# Patient Record
Sex: MALE | Race: OTHER | Employment: OTHER
[De-identification: names, ages, dates, MRNs, and addresses within clinical notes are randomized per-mention and may not be internally consistent; named-entity substitution may affect disease eponyms.]

---

## 2017-01-23 ENCOUNTER — OFFICE VISIT (OUTPATIENT)
Dept: FAMILY MEDICINE CLINIC | Facility: CLINIC | Age: 57
End: 2017-01-23

## 2017-01-23 VITALS
DIASTOLIC BLOOD PRESSURE: 91 MMHG | SYSTOLIC BLOOD PRESSURE: 141 MMHG | HEART RATE: 81 BPM | TEMPERATURE: 99.2 F | BODY MASS INDEX: 29.71 KG/M2 | WEIGHT: 200.6 LBS | HEIGHT: 69 IN

## 2017-01-23 DIAGNOSIS — G62.9 NEUROPATHY: ICD-10-CM

## 2017-01-23 DIAGNOSIS — E11.49 OTHER DIABETIC NEUROLOGICAL COMPLICATION ASSOCIATED WITH TYPE 2 DIABETES MELLITUS (HCC): Primary | ICD-10-CM

## 2017-01-23 DIAGNOSIS — R10.9 ABDOMINAL PAIN, UNSPECIFIED LOCATION: ICD-10-CM

## 2017-01-23 PROCEDURE — 99213 OFFICE O/P EST LOW 20 MIN: CPT | Performed by: FAMILY MEDICINE

## 2017-01-23 RX ORDER — GABAPENTIN 100 MG/1
100 CAPSULE ORAL NIGHTLY PRN
Qty: 90 CAPSULE | Refills: 3 | Status: SHIPPED | OUTPATIENT
Start: 2017-01-23 | End: 2017-11-20 | Stop reason: ALTCHOICE

## 2017-01-23 ASSESSMENT — ENCOUNTER SYMPTOMS
VOMITING: 0
CONSTIPATION: 0
SHORTNESS OF BREATH: 0
ABDOMINAL PAIN: 1
RHINORRHEA: 0
DIARRHEA: 0
COUGH: 0
NAUSEA: 0

## 2017-04-21 ENCOUNTER — OFFICE VISIT (OUTPATIENT)
Dept: FAMILY MEDICINE CLINIC | Age: 57
End: 2017-04-21
Payer: COMMERCIAL

## 2017-04-21 VITALS
WEIGHT: 200.8 LBS | BODY MASS INDEX: 29.74 KG/M2 | SYSTOLIC BLOOD PRESSURE: 153 MMHG | HEART RATE: 78 BPM | TEMPERATURE: 97.3 F | HEIGHT: 69 IN | DIASTOLIC BLOOD PRESSURE: 91 MMHG

## 2017-04-21 DIAGNOSIS — Z00.00 HEALTHCARE MAINTENANCE: ICD-10-CM

## 2017-04-21 DIAGNOSIS — Z79.4 TYPE 2 DIABETES MELLITUS WITHOUT COMPLICATION, WITH LONG-TERM CURRENT USE OF INSULIN (HCC): Primary | Chronic | ICD-10-CM

## 2017-04-21 DIAGNOSIS — E11.9 TYPE 2 DIABETES MELLITUS WITHOUT COMPLICATION, WITH LONG-TERM CURRENT USE OF INSULIN (HCC): Primary | Chronic | ICD-10-CM

## 2017-04-21 LAB
CREATININE URINE POCT: 300
MICROALBUMIN/CREAT 24H UR: 80 MG/G{CREAT}
MICROALBUMIN/CREAT UR-RTO: ABNORMAL

## 2017-04-21 PROCEDURE — 82044 UR ALBUMIN SEMIQUANTITATIVE: CPT | Performed by: HOSPITALIST

## 2017-04-21 PROCEDURE — 99213 OFFICE O/P EST LOW 20 MIN: CPT | Performed by: HOSPITALIST

## 2017-04-21 ASSESSMENT — ENCOUNTER SYMPTOMS
APNEA: 0
COUGH: 0
ABDOMINAL PAIN: 0
SHORTNESS OF BREATH: 0
DIARRHEA: 0
ABDOMINAL DISTENTION: 0
WHEEZING: 0
CONSTIPATION: 0

## 2017-06-15 ENCOUNTER — TELEPHONE (OUTPATIENT)
Dept: FAMILY MEDICINE CLINIC | Age: 57
End: 2017-06-15

## 2017-07-06 ENCOUNTER — OFFICE VISIT (OUTPATIENT)
Dept: FAMILY MEDICINE CLINIC | Age: 57
End: 2017-07-06
Payer: COMMERCIAL

## 2017-07-06 VITALS
HEART RATE: 85 BPM | TEMPERATURE: 98.3 F | WEIGHT: 199 LBS | BODY MASS INDEX: 29.47 KG/M2 | SYSTOLIC BLOOD PRESSURE: 145 MMHG | DIASTOLIC BLOOD PRESSURE: 93 MMHG | HEIGHT: 69 IN

## 2017-07-06 DIAGNOSIS — Z79.4 TYPE 2 DIABETES MELLITUS WITHOUT COMPLICATION, WITH LONG-TERM CURRENT USE OF INSULIN (HCC): Primary | Chronic | ICD-10-CM

## 2017-07-06 DIAGNOSIS — E11.9 TYPE 2 DIABETES MELLITUS WITHOUT COMPLICATION, WITH LONG-TERM CURRENT USE OF INSULIN (HCC): Primary | Chronic | ICD-10-CM

## 2017-07-06 DIAGNOSIS — I10 ESSENTIAL HYPERTENSION: ICD-10-CM

## 2017-07-06 LAB — HBA1C MFR BLD: 5.4 %

## 2017-07-06 PROCEDURE — 83036 HEMOGLOBIN GLYCOSYLATED A1C: CPT | Performed by: FAMILY MEDICINE

## 2017-07-06 PROCEDURE — 99213 OFFICE O/P EST LOW 20 MIN: CPT | Performed by: FAMILY MEDICINE

## 2017-07-06 RX ORDER — LISINOPRIL 30 MG/1
30 TABLET ORAL DAILY
Qty: 30 TABLET | Refills: 2 | Status: SHIPPED | OUTPATIENT
Start: 2017-07-06 | End: 2017-11-20 | Stop reason: SDUPTHER

## 2017-07-06 ASSESSMENT — ENCOUNTER SYMPTOMS
SHORTNESS OF BREATH: 0
ABDOMINAL PAIN: 1
COUGH: 0

## 2017-07-22 ENCOUNTER — HOSPITAL ENCOUNTER (EMERGENCY)
Age: 57
Discharge: HOME | End: 2017-07-22
Attending: EMERGENCY MEDICINE

## 2017-07-22 VITALS
SYSTOLIC BLOOD PRESSURE: 163 MMHG | BODY MASS INDEX: 29.03 KG/M2 | RESPIRATION RATE: 18 BRPM | DIASTOLIC BLOOD PRESSURE: 11 MMHG | HEART RATE: 107 BPM | WEIGHT: 196 LBS | HEIGHT: 69 IN | OXYGEN SATURATION: 100 % | TEMPERATURE: 99 F

## 2017-07-22 DIAGNOSIS — M54.50 CHRONIC RIGHT-SIDED LOW BACK PAIN WITHOUT SCIATICA: Primary | ICD-10-CM

## 2017-07-22 DIAGNOSIS — G89.29 CHRONIC RIGHT-SIDED LOW BACK PAIN WITHOUT SCIATICA: Primary | ICD-10-CM

## 2017-07-22 PROCEDURE — 6370000000 HC RX 637 (ALT 250 FOR IP): Performed by: EMERGENCY MEDICINE

## 2017-07-22 PROCEDURE — 99283 EMERGENCY DEPT VISIT LOW MDM: CPT

## 2017-07-22 PROCEDURE — 6360000002 HC RX W HCPCS: Performed by: EMERGENCY MEDICINE

## 2017-07-22 PROCEDURE — 96372 THER/PROPH/DIAG INJ SC/IM: CPT

## 2017-07-22 RX ORDER — NAPROXEN 250 MG/1
500 TABLET ORAL ONCE
Status: COMPLETED | OUTPATIENT
Start: 2017-07-22 | End: 2017-07-22

## 2017-07-22 RX ORDER — NAPROXEN 500 MG/1
500 TABLET ORAL 2 TIMES DAILY PRN
Qty: 15 TABLET | Refills: 0 | Status: ON HOLD | OUTPATIENT
Start: 2017-07-22 | End: 2020-03-15 | Stop reason: ALTCHOICE

## 2017-07-22 RX ORDER — CYCLOBENZAPRINE HCL 10 MG
10 TABLET ORAL 3 TIMES DAILY PRN
Qty: 15 TABLET | Refills: 0 | Status: SHIPPED | OUTPATIENT
Start: 2017-07-22 | End: 2017-08-01

## 2017-07-22 RX ORDER — ORPHENADRINE CITRATE 30 MG/ML
60 INJECTION INTRAMUSCULAR; INTRAVENOUS ONCE
Status: COMPLETED | OUTPATIENT
Start: 2017-07-22 | End: 2017-07-22

## 2017-07-22 RX ADMIN — NAPROXEN 500 MG: 250 TABLET ORAL at 21:02

## 2017-07-22 RX ADMIN — ORPHENADRINE CITRATE 60 MG: 30 INJECTION INTRAMUSCULAR; INTRAVENOUS at 21:02

## 2017-07-22 ASSESSMENT — PAIN DESCRIPTION - PROGRESSION: CLINICAL_PROGRESSION: GRADUALLY WORSENING

## 2017-07-22 ASSESSMENT — ENCOUNTER SYMPTOMS
ABDOMINAL PAIN: 0
COLOR CHANGE: 0
BACK PAIN: 1
VOMITING: 0
EYE PAIN: 0
NAUSEA: 0
COUGH: 0

## 2017-07-22 ASSESSMENT — PAIN DESCRIPTION - ONSET: ONSET: ON-GOING

## 2017-07-22 ASSESSMENT — PAIN DESCRIPTION - ORIENTATION: ORIENTATION: RIGHT

## 2017-07-22 ASSESSMENT — PAIN DESCRIPTION - DESCRIPTORS: DESCRIPTORS: SHARP;SHOOTING

## 2017-07-22 ASSESSMENT — PAIN DESCRIPTION - LOCATION: LOCATION: BACK;LEG

## 2017-07-22 ASSESSMENT — PAIN DESCRIPTION - FREQUENCY: FREQUENCY: CONTINUOUS

## 2017-07-22 ASSESSMENT — PAIN SCALES - GENERAL: PAINLEVEL_OUTOF10: 8

## 2017-07-22 ASSESSMENT — PAIN DESCRIPTION - PAIN TYPE: TYPE: ACUTE PAIN

## 2017-11-20 ENCOUNTER — OFFICE VISIT (OUTPATIENT)
Dept: FAMILY MEDICINE CLINIC | Age: 57
End: 2017-11-20
Payer: COMMERCIAL

## 2017-11-20 VITALS
TEMPERATURE: 98.6 F | DIASTOLIC BLOOD PRESSURE: 88 MMHG | WEIGHT: 215.8 LBS | SYSTOLIC BLOOD PRESSURE: 146 MMHG | BODY MASS INDEX: 31.96 KG/M2 | HEART RATE: 88 BPM | HEIGHT: 69 IN

## 2017-11-20 DIAGNOSIS — Z23 NEED FOR VACCINATION: ICD-10-CM

## 2017-11-20 DIAGNOSIS — Z79.4 TYPE 2 DIABETES MELLITUS WITHOUT COMPLICATION, WITH LONG-TERM CURRENT USE OF INSULIN (HCC): Chronic | ICD-10-CM

## 2017-11-20 DIAGNOSIS — E11.42 DIABETIC POLYNEUROPATHY ASSOCIATED WITH TYPE 2 DIABETES MELLITUS (HCC): Primary | ICD-10-CM

## 2017-11-20 DIAGNOSIS — I10 ESSENTIAL HYPERTENSION: ICD-10-CM

## 2017-11-20 DIAGNOSIS — E11.9 TYPE 2 DIABETES MELLITUS WITHOUT COMPLICATION, WITH LONG-TERM CURRENT USE OF INSULIN (HCC): Chronic | ICD-10-CM

## 2017-11-20 PROCEDURE — 99213 OFFICE O/P EST LOW 20 MIN: CPT | Performed by: STUDENT IN AN ORGANIZED HEALTH CARE EDUCATION/TRAINING PROGRAM

## 2017-11-20 PROCEDURE — 90688 IIV4 VACCINE SPLT 0.5 ML IM: CPT | Performed by: FAMILY MEDICINE

## 2017-11-20 PROCEDURE — 90471 IMMUNIZATION ADMIN: CPT | Performed by: FAMILY MEDICINE

## 2017-11-20 RX ORDER — INSULIN GLARGINE 100 [IU]/ML
35 INJECTION, SOLUTION SUBCUTANEOUS 2 TIMES DAILY
Qty: 21 ML | Refills: 3 | Status: SHIPPED | OUTPATIENT
Start: 2017-11-20 | End: 2018-01-02 | Stop reason: SDUPTHER

## 2017-11-20 RX ORDER — PREGABALIN 25 MG/1
25 CAPSULE ORAL 3 TIMES DAILY
Qty: 60 CAPSULE | Refills: 2 | Status: SHIPPED | OUTPATIENT
Start: 2017-11-20 | End: 2017-11-20 | Stop reason: CLARIF

## 2017-11-20 RX ORDER — ASPIRIN 81 MG/1
81 TABLET ORAL DAILY
Qty: 90 TABLET | Refills: 3 | Status: SHIPPED | OUTPATIENT
Start: 2017-11-20 | End: 2021-08-19

## 2017-11-20 RX ORDER — GABAPENTIN 300 MG/1
300 CAPSULE ORAL 3 TIMES DAILY
Qty: 90 CAPSULE | Refills: 2 | Status: SHIPPED | OUTPATIENT
Start: 2017-11-20 | End: 2019-09-24

## 2017-11-20 RX ORDER — LISINOPRIL 30 MG/1
30 TABLET ORAL DAILY
Qty: 30 TABLET | Refills: 2 | Status: SHIPPED | OUTPATIENT
Start: 2017-11-20 | End: 2019-09-24 | Stop reason: SDUPTHER

## 2017-11-20 ASSESSMENT — ENCOUNTER SYMPTOMS
DIARRHEA: 0
CONSTIPATION: 0
COUGH: 0
STRIDOR: 0
ABDOMINAL PAIN: 0
SHORTNESS OF BREATH: 0
NAUSEA: 0
VOMITING: 0

## 2017-11-20 ASSESSMENT — PATIENT HEALTH QUESTIONNAIRE - PHQ9
2. FEELING DOWN, DEPRESSED OR HOPELESS: 0
SUM OF ALL RESPONSES TO PHQ QUESTIONS 1-9: 0
SUM OF ALL RESPONSES TO PHQ9 QUESTIONS 1 & 2: 0
1. LITTLE INTEREST OR PLEASURE IN DOING THINGS: 0

## 2017-11-20 NOTE — PROGRESS NOTES
Subjective:      Patient ID: Luis Blankenship is a 56 y.o. male with hx of DM type 2 on insulin for last 7 yrs p/w sharp pain below his knees rhianna. Says has tried gabapentin and is helping him with some relief. Takes a few pills at a time so about 300 mg at night. Pain is through out the day. HPI    Review of Systems   Constitutional: Negative for fatigue, fever and unexpected weight change. Eyes: Negative for visual disturbance. Respiratory: Negative for cough, shortness of breath and stridor. Cardiovascular: Negative for chest pain, palpitations and leg swelling. Gastrointestinal: Negative for abdominal pain, constipation, diarrhea, nausea and vomiting. Neurological: Negative for dizziness, light-headedness and headaches. Objective:   Physical Exam   Constitutional: He appears well-developed and well-nourished. No distress. Cardiovascular: Normal rate, regular rhythm, normal heart sounds and intact distal pulses. Exam reveals no gallop and no friction rub. No murmur heard. Pulmonary/Chest: Effort normal and breath sounds normal. No respiratory distress. He has no wheezes. He has no rales. He exhibits no tenderness. Skin: He is not diaphoretic. Nursing note and vitals reviewed. Assessment:      1. Diabetic polyneuropathy associated with type 2 diabetes mellitus (HCC)  Will increase dose and frequency. Cautioned about side effects and with working on [employer] or any machinery or driving. He verbalized understanding that he must decrease dose or stop if feeling drowsy.  - gabapentin (NEURONTIN) 300 MG capsule; Take 1 capsule by mouth 3 times daily  Dispense: 90 capsule; Refill: 2    2.  Type 2 diabetes mellitus without complication, with long-term current use of insulin (HCC)  Stable, cont same  - insulin glargine (LANTUS) 100 UNIT/ML injection vial; Inject 35 Units into the skin 2 times daily  Dispense: 21 mL; Refill: 3  - insulin lispro (HUMALOG) 100 UNIT/ML injection cartridge; Inject 13 Units into the skin 3 times daily (before meals)  Dispense: 5 Cartridge; Refill: 3  - Insulin Syringe-Needle U-100 30G X 1/2\" 0.5 ML MISC; 1 each by Does not apply route daily  Dispense: 120 each; Refill: 3    3. Need for vaccination    - INFLUENZA, QUADV, 6 MO AND OLDER, IM, MDV, 0.5ML (FLULAVAL QUADV)    4. Essential hypertension  Will recheck next visit  - aspirin EC 81 MG EC tablet; Take 1 tablet by mouth daily  Dispense: 90 tablet; Refill: 3  - lisinopril (PRINIVIL;ZESTRIL) 30 MG tablet; Take 1 tablet by mouth daily  Dispense: 30 tablet; Refill: 2      1. Marcelo received counseling on the following healthy behaviors: medication adherence  2. Reviewed prior labs and health maintenance  3. Continue current medications, diet and exercise. 4.  Discussed use, benefit, and side effects of prescribed medications. Barriers to medication compliance addressed. All patient questions answered. Pt voiced understanding. 5.  Patient given educational materials - see patient instructions  6. Was a self-tracking handout given in paper form or via [employer]? No  If yes, see orders or list here.     PHQ Scores 2017   PHQ2 Score 0   PHQ9 Score 0     Interpretation of Total Score Depression Severity: 1-4 = Minimal depression, 5-9 = Mild depression, 10-14 = Moderate depression, 15-19 = Moderately severe depression, 20-27 = Severe depression  Normal    Completed Refills   Requested Prescriptions     Signed Prescriptions Disp Refills    aspirin EC 81 MG EC tablet 90 tablet 3     Sig: Take 1 tablet by mouth daily    insulin glargine (LANTUS) 100 UNIT/ML injection vial 21 mL 3     Sig: Inject 35 Units into the skin 2 times daily    insulin lispro (HUMALOG) 100 UNIT/ML injection cartridge 5 Cartridge 3     Sig: Inject 13 Units into the skin 3 times daily (before meals)    Insulin Syringe-Needle U-100 30G X 1/2\" 0.5 ML MISC 120 each 3     Si each by Does not apply route daily    lisinopril (PRINIVIL;ZESTRIL)

## 2017-11-21 NOTE — PROGRESS NOTES
Attending Physician Statement  I have discussed the care of Nena Led, including pertinent history and exam findings,  with the resident. I have reviewed the key elements of all parts of the encounter with the resident. I agree with the assessment, plan and orders as documented by the resident.   (GE Modifier)

## 2018-01-02 ENCOUNTER — OFFICE VISIT (OUTPATIENT)
Dept: FAMILY MEDICINE CLINIC | Age: 58
End: 2018-01-02
Payer: COMMERCIAL

## 2018-01-02 VITALS
DIASTOLIC BLOOD PRESSURE: 82 MMHG | HEIGHT: 69 IN | HEART RATE: 72 BPM | BODY MASS INDEX: 33.03 KG/M2 | TEMPERATURE: 98.2 F | WEIGHT: 223 LBS | SYSTOLIC BLOOD PRESSURE: 138 MMHG

## 2018-01-02 DIAGNOSIS — Z79.4 TYPE 2 DIABETES MELLITUS WITHOUT COMPLICATION, WITH LONG-TERM CURRENT USE OF INSULIN (HCC): Chronic | ICD-10-CM

## 2018-01-02 DIAGNOSIS — J06.9 VIRAL URI WITH COUGH: Primary | ICD-10-CM

## 2018-01-02 DIAGNOSIS — E11.9 TYPE 2 DIABETES MELLITUS WITHOUT COMPLICATION, WITH LONG-TERM CURRENT USE OF INSULIN (HCC): Chronic | ICD-10-CM

## 2018-01-02 PROCEDURE — 99213 OFFICE O/P EST LOW 20 MIN: CPT | Performed by: RADIOLOGY

## 2018-01-02 RX ORDER — IPRATROPIUM BROMIDE 21 UG/1
2 SPRAY, METERED NASAL 3 TIMES DAILY
Qty: 1 BOTTLE | Refills: 1 | Status: ON HOLD | OUTPATIENT
Start: 2018-01-02 | End: 2020-03-15 | Stop reason: ALTCHOICE

## 2018-01-02 RX ORDER — BENZONATATE 100 MG/1
100 CAPSULE ORAL 3 TIMES DAILY PRN
Qty: 21 CAPSULE | Refills: 0 | Status: SHIPPED | OUTPATIENT
Start: 2018-01-02 | End: 2018-01-09

## 2018-01-02 RX ORDER — INSULIN GLARGINE 100 [IU]/ML
35 INJECTION, SOLUTION SUBCUTANEOUS 2 TIMES DAILY
Qty: 21 ML | Refills: 3 | Status: SHIPPED | OUTPATIENT
Start: 2018-01-02 | End: 2018-01-08 | Stop reason: CLARIF

## 2018-01-02 ASSESSMENT — ENCOUNTER SYMPTOMS
COUGH: 1
SHORTNESS OF BREATH: 0
DIARRHEA: 0
ABDOMINAL PAIN: 1
NAUSEA: 0
SORE THROAT: 1
SINUS PRESSURE: 1
CONSTIPATION: 0
RHINORRHEA: 1
VOMITING: 0

## 2018-01-02 NOTE — PATIENT INSTRUCTIONS
Thank you for letting us take care of you today. We hope all your questions were addressed. If a question was overlooked or something else comes to mind after you return home, please contact a member of your Care Team listed below. Please make sure you have a routine office visit set up to follow-up on [address]. Your Care Team at [place] [address] is Team #4  Kaylee Pratt MD (Faculty)  MD Chris Joiner MD (Resident)  Tana Fuentes MD (Resident)  Tessy Duke MD (Resident)  Joaquin Rooney MD (Resident)  CATARINO Calvillo., ANDREW oLpez., ANDREW Martin ([address])  Abbey Byrd RN, ([address] [facility])  Vivek Casanova, Ph.D., (Behavioral Services)  Jhonny Swenson, [facility] (Clinical Pharmacist)       Office phone number: [phone]    If you need to get in right away due to illness, please be advised we have \"Same Day\" appointments available Monday-Friday. Please call us at [phone] option #1 to schedule your \"Same Day\" appointment.

## 2018-01-02 NOTE — PROGRESS NOTES
normal. There is tenderness (baseline). There is no rebound and no guarding. Neurological: He is alert. Skin: Skin is warm and dry. He is not diaphoretic. Assessment and Plan:      BP Readings from Last 3 Encounters:   18 138/82   17 (!) 146/88   17 (!) 163/11     /82 (Site: Left Arm, Position: Sitting, Cuff Size: Large Adult)   Pulse 72   Temp 98.2 °F (36.8 °C) (Temporal)   Ht 5' 9.02\" (1.753 m)   Wt 223 lb (101.2 kg)   BMI 32.92 kg/m²   Lab Results   Component Value Date    WBC 11.7 (H) 2016    HGB 8.9 (L) 2016    HCT 24.9 (L) 2016     (H) 2016    CHOL 121 2015    TRIG 46 2015    HDL 43 2015    ALT <5 (L) 2016    AST 9 2016     (L) 2016    K 4.2 2016    CL 95 (L) 2016    CREATININE 0.74 2016    BUN 20 2016    CO2 26 2016    TSH 1.04 2013    INR 1.3 2016    LABA1C 5.4 2017    LABMICR 20 2016     Lab Results   Component Value Date    CALCIUM 8.2 (L) 2016    PHOS 2.3 (L) 10/29/2016     Lab Results   Component Value Date    LDLCHOLESTEROL 69 2015         1. Viral URI with cough  - Ipratropium (ATROVENT) 0.03 % nasal spray; 2 sprays by Nasal route 3 times daily for 14 days  Dispense: 1 Bottle; Refill: 1  - Benzonatate (TESSALON PERLES) 100 MG capsule; Take 1 capsule by mouth 3 times daily as needed for Cough  Dispense: 21 capsule; Refill: 0  - Ibuprofen 600mg TID for musculoskeletal chest pain    2.  Type 2 diabetes mellitus without complication, with long-term current use of insulin (HCC)  - Insulin glargine (LANTUS) 100 UNIT/ML injection vial; Inject 35 Units into the skin 2 times daily  Dispense: 21 mL; Refill: 3      Requested Prescriptions     Signed Prescriptions Disp Refills    ipratropium (ATROVENT) 0.03 % nasal spray 1 Bottle 1     Si sprays by Nasal route 3 times daily for 14 days    insulin glargine (LANTUS) 100 UNIT/ML injection vial 21 mL 3     Sig: Inject 35 Units into the skin 2 times daily    benzonatate (TESSALON PERLES) 100 MG capsule 21 capsule 0     Sig: Take 1 capsule by mouth 3 times daily as needed for Cough       Medications Discontinued During This Encounter   Medication Reason    insulin glargine (LANTUS) 100 UNIT/ML injection vial Reorder        All patient questions answered. Pt voiced understanding. Return if symptoms worsen or fail to improve.

## 2018-01-02 NOTE — PROGRESS NOTES
I have reviewed and discussed crump elements of Remington Velarde with the resident including plan of care and follow up and agree with the care earl plan.

## 2018-01-08 DIAGNOSIS — Z79.4 TYPE 2 DIABETES MELLITUS WITHOUT COMPLICATION, WITH LONG-TERM CURRENT USE OF INSULIN (HCC): Chronic | ICD-10-CM

## 2018-01-08 DIAGNOSIS — E11.9 TYPE 2 DIABETES MELLITUS WITHOUT COMPLICATION, WITH LONG-TERM CURRENT USE OF INSULIN (HCC): Chronic | ICD-10-CM

## 2018-01-08 RX ORDER — INSULIN GLARGINE 100 [IU]/ML
35 INJECTION, SOLUTION SUBCUTANEOUS 2 TIMES DAILY
Qty: 21 ML | Refills: 3 | Status: CANCELLED | OUTPATIENT
Start: 2018-01-08

## 2018-01-08 RX ORDER — LANCETS 30 GAUGE
EACH MISCELLANEOUS
Qty: 120 EACH | Refills: 3 | Status: SHIPPED | OUTPATIENT
Start: 2018-01-08 | End: 2019-10-25 | Stop reason: SDUPTHER

## 2018-01-08 NOTE — TELEPHONE ENCOUNTER
I changed to Toujeo in hopes his insurance will cover that. Please let pharmacy know they can change to whatever is covered by his insurance.  thanks

## 2019-05-20 ENCOUNTER — OFFICE VISIT (OUTPATIENT)
Dept: FAMILY MEDICINE CLINIC | Age: 59
End: 2019-05-20
Payer: COMMERCIAL

## 2019-05-20 ENCOUNTER — TELEPHONE (OUTPATIENT)
Dept: FAMILY MEDICINE CLINIC | Age: 59
End: 2019-05-20

## 2019-05-20 VITALS
WEIGHT: 148 LBS | OXYGEN SATURATION: 98 % | TEMPERATURE: 97.4 F | BODY MASS INDEX: 21.85 KG/M2 | SYSTOLIC BLOOD PRESSURE: 127 MMHG | RESPIRATION RATE: 16 BRPM | HEART RATE: 90 BPM | DIASTOLIC BLOOD PRESSURE: 89 MMHG

## 2019-05-20 DIAGNOSIS — Z79.4 TYPE 2 DIABETES MELLITUS WITHOUT COMPLICATION, WITH LONG-TERM CURRENT USE OF INSULIN (HCC): Primary | ICD-10-CM

## 2019-05-20 DIAGNOSIS — H53.8 BLURRY VISION, BILATERAL: ICD-10-CM

## 2019-05-20 DIAGNOSIS — K59.09 OTHER CONSTIPATION: ICD-10-CM

## 2019-05-20 DIAGNOSIS — Z13.220 SCREENING FOR HYPERLIPIDEMIA: ICD-10-CM

## 2019-05-20 DIAGNOSIS — E11.9 TYPE 2 DIABETES MELLITUS WITHOUT COMPLICATION, WITH LONG-TERM CURRENT USE OF INSULIN (HCC): Primary | ICD-10-CM

## 2019-05-20 DIAGNOSIS — Z86.2 HISTORY OF ANEMIA: ICD-10-CM

## 2019-05-20 DIAGNOSIS — G25.81 RESTLESS LEG SYNDROME: ICD-10-CM

## 2019-05-20 DIAGNOSIS — E11.8 DIABETIC FOOT (HCC): ICD-10-CM

## 2019-05-20 DIAGNOSIS — I73.9 INTERMITTENT CLAUDICATION (HCC): ICD-10-CM

## 2019-05-20 LAB — HBA1C MFR BLD: 11.5 %

## 2019-05-20 PROCEDURE — G0444 DEPRESSION SCREEN ANNUAL: HCPCS | Performed by: STUDENT IN AN ORGANIZED HEALTH CARE EDUCATION/TRAINING PROGRAM

## 2019-05-20 PROCEDURE — 83036 HEMOGLOBIN GLYCOSYLATED A1C: CPT | Performed by: STUDENT IN AN ORGANIZED HEALTH CARE EDUCATION/TRAINING PROGRAM

## 2019-05-20 PROCEDURE — 99213 OFFICE O/P EST LOW 20 MIN: CPT | Performed by: STUDENT IN AN ORGANIZED HEALTH CARE EDUCATION/TRAINING PROGRAM

## 2019-05-20 RX ORDER — POLYETHYLENE GLYCOL 3350 17 G/17G
17 POWDER, FOR SOLUTION ORAL DAILY PRN
Qty: 510 G | Refills: 5 | Status: SHIPPED | OUTPATIENT
Start: 2019-05-20 | End: 2019-06-19

## 2019-05-20 RX ORDER — INSULIN GLARGINE 100 [IU]/ML
40 INJECTION, SOLUTION SUBCUTANEOUS
COMMUNITY
End: 2019-10-25 | Stop reason: SDUPTHER

## 2019-05-20 ASSESSMENT — PATIENT HEALTH QUESTIONNAIRE - PHQ9
10. IF YOU CHECKED OFF ANY PROBLEMS, HOW DIFFICULT HAVE THESE PROBLEMS MADE IT FOR YOU TO DO YOUR WORK, TAKE CARE OF THINGS AT HOME, OR GET ALONG WITH OTHER PEOPLE: 1
SUM OF ALL RESPONSES TO PHQ QUESTIONS 1-9: 10
4. FEELING TIRED OR HAVING LITTLE ENERGY: 2
9. THOUGHTS THAT YOU WOULD BE BETTER OFF DEAD, OR OF HURTING YOURSELF: 0
3. TROUBLE FALLING OR STAYING ASLEEP: 2
SUM OF ALL RESPONSES TO PHQ QUESTIONS 1-9: 10
7. TROUBLE CONCENTRATING ON THINGS, SUCH AS READING THE NEWSPAPER OR WATCHING TELEVISION: 0
2. FEELING DOWN, DEPRESSED OR HOPELESS: 2
8. MOVING OR SPEAKING SO SLOWLY THAT OTHER PEOPLE COULD HAVE NOTICED. OR THE OPPOSITE, BEING SO FIGETY OR RESTLESS THAT YOU HAVE BEEN MOVING AROUND A LOT MORE THAN USUAL: 0
5. POOR APPETITE OR OVEREATING: 0
SUM OF ALL RESPONSES TO PHQ9 QUESTIONS 1 & 2: 4
6. FEELING BAD ABOUT YOURSELF - OR THAT YOU ARE A FAILURE OR HAVE LET YOURSELF OR YOUR FAMILY DOWN: 2
1. LITTLE INTEREST OR PLEASURE IN DOING THINGS: 2

## 2019-05-20 ASSESSMENT — ENCOUNTER SYMPTOMS
STRIDOR: 0
ABDOMINAL PAIN: 0
NAUSEA: 0
SHORTNESS OF BREATH: 0
WHEEZING: 0
VOMITING: 0

## 2019-05-20 NOTE — PROGRESS NOTES
i have reviewed key elements of Giovanna Header history and exam. And I examined Giovanna Delarosa  . I have discussed the treatment plan with the resident and agree with the plan.

## 2019-05-20 NOTE — TELEPHONE ENCOUNTER
Called and left message that we have not received any thing from [facility] yet.   Patient can ask for form to be faxed again to [phone]

## 2019-05-20 NOTE — TELEPHONE ENCOUNTER
pt called earlier concerning utility cut off notice-says lights are threatened to be shut off & is on insulin

## 2019-05-20 NOTE — LETTER
[facility]  [address] [id number]  Dept: [phone]  Dept Fax: [phone]      Vear Car  1960    To whom it may concern,    There is a medical necessity for Vear Car to have running electricity continuously without interruptions given his medical condition requiring medication that needs to be kept refrigerated. For any questions, please do not hesitate to call.     Thank you,  Ryan Kelly MD      Electronically signed by Ryan Kelly MD on 5/20/2019 at 9:35 AM

## 2019-05-21 ENCOUNTER — HOSPITAL ENCOUNTER (OUTPATIENT)
Age: 59
Discharge: HOME | End: 2019-05-21
Payer: COMMERCIAL

## 2019-05-21 DIAGNOSIS — Z13.220 SCREENING FOR HYPERLIPIDEMIA: ICD-10-CM

## 2019-05-21 DIAGNOSIS — Z79.4 TYPE 2 DIABETES MELLITUS WITHOUT COMPLICATION, WITH LONG-TERM CURRENT USE OF INSULIN (HCC): ICD-10-CM

## 2019-05-21 DIAGNOSIS — E11.9 TYPE 2 DIABETES MELLITUS WITHOUT COMPLICATION, WITH LONG-TERM CURRENT USE OF INSULIN (HCC): ICD-10-CM

## 2019-05-21 DIAGNOSIS — Z86.2 HISTORY OF ANEMIA: ICD-10-CM

## 2019-05-21 LAB
ABSOLUTE EOS #: 0.09 K/UL (ref 0–0.44)
ABSOLUTE IMMATURE GRANULOCYTE: 0.17 K/UL (ref 0–0.3)
ABSOLUTE LYMPH #: 1.44 K/UL (ref 1.1–3.7)
ABSOLUTE MONO #: 0.63 K/UL (ref 0.1–1.2)
BASOPHILS # BLD: 1 % (ref 0–2)
BASOPHILS ABSOLUTE: 0.06 K/UL (ref 0–0.2)
CHOLESTEROL, FASTING: 172 MG/DL
CHOLESTEROL/HDL RATIO: 3.2
CREATININE URINE: 27.7 MG/DL (ref 39–259)
DIFFERENTIAL TYPE: ABNORMAL
EOSINOPHILS RELATIVE PERCENT: 1 % (ref 1–4)
HCT VFR BLD CALC: 41.3 % (ref 40.7–50.3)
HDLC SERPL-MCNC: 53 MG/DL
HEMOGLOBIN: 14.5 G/DL (ref 13–17)
IMMATURE GRANULOCYTES: 2 %
LDL CHOLESTEROL: 104 MG/DL (ref 0–130)
LYMPHOCYTES # BLD: 21 % (ref 24–43)
MCH RBC QN AUTO: 33 PG (ref 25.2–33.5)
MCHC RBC AUTO-ENTMCNC: 35.1 G/DL (ref 28.4–34.8)
MCV RBC AUTO: 93.9 FL (ref 82.6–102.9)
MICROALBUMIN/CREAT 24H UR: <12 MG/L
MICROALBUMIN/CREAT UR-RTO: ABNORMAL MCG/MG CREAT
MONOCYTES # BLD: 9 % (ref 3–12)
NRBC AUTOMATED: 0 PER 100 WBC
PDW BLD-RTO: 14.5 % (ref 11.8–14.4)
PLATELET # BLD: 280 K/UL (ref 138–453)
PLATELET ESTIMATE: ABNORMAL
PMV BLD AUTO: 9.9 FL (ref 8.1–13.5)
RBC # BLD: 4.4 M/UL (ref 4.21–5.77)
RBC # BLD: ABNORMAL 10*6/UL
SEG NEUTROPHILS: 66 % (ref 36–65)
SEGMENTED NEUTROPHILS ABSOLUTE COUNT: 4.6 K/UL (ref 1.5–8.1)
TRIGLYCERIDE, FASTING: 74 MG/DL
VLDLC SERPL CALC-MCNC: NORMAL MG/DL (ref 1–30)
WBC # BLD: 7 K/UL (ref 3.5–11.3)
WBC # BLD: ABNORMAL 10*3/UL

## 2019-05-21 PROCEDURE — 36415 COLL VENOUS BLD VENIPUNCTURE: CPT

## 2019-05-21 PROCEDURE — 82043 UR ALBUMIN QUANTITATIVE: CPT

## 2019-05-21 PROCEDURE — 82570 ASSAY OF URINE CREATININE: CPT

## 2019-05-21 PROCEDURE — 80061 LIPID PANEL: CPT

## 2019-05-21 PROCEDURE — 85025 COMPLETE CBC W/AUTO DIFF WBC: CPT

## 2019-05-22 ENCOUNTER — HOSPITAL ENCOUNTER (OUTPATIENT)
Dept: VASCULAR LAB | Age: 59
Discharge: HOME | End: 2019-05-22
Payer: COMMERCIAL

## 2019-05-22 DIAGNOSIS — I73.9 INTERMITTENT CLAUDICATION (HCC): ICD-10-CM

## 2019-05-22 DIAGNOSIS — I73.9 INTERMITTENT CLAUDICATION (HCC): Primary | ICD-10-CM

## 2019-05-22 PROCEDURE — 93925 LOWER EXTREMITY STUDY: CPT

## 2019-05-28 ENCOUNTER — TELEPHONE (OUTPATIENT)
Dept: FAMILY MEDICINE CLINIC | Age: 59
End: 2019-05-28

## 2019-09-24 ENCOUNTER — OFFICE VISIT (OUTPATIENT)
Dept: FAMILY MEDICINE CLINIC | Age: 59
End: 2019-09-24

## 2019-09-24 VITALS
WEIGHT: 143.6 LBS | SYSTOLIC BLOOD PRESSURE: 117 MMHG | DIASTOLIC BLOOD PRESSURE: 77 MMHG | BODY MASS INDEX: 20.56 KG/M2 | HEART RATE: 84 BPM | HEIGHT: 70 IN

## 2019-09-24 DIAGNOSIS — E11.9 TYPE 2 DIABETES MELLITUS WITHOUT COMPLICATION, WITH LONG-TERM CURRENT USE OF INSULIN (HCC): Primary | ICD-10-CM

## 2019-09-24 DIAGNOSIS — K59.00 CONSTIPATION, UNSPECIFIED CONSTIPATION TYPE: ICD-10-CM

## 2019-09-24 DIAGNOSIS — I10 ESSENTIAL HYPERTENSION: ICD-10-CM

## 2019-09-24 DIAGNOSIS — Z79.4 TYPE 2 DIABETES MELLITUS WITHOUT COMPLICATION, WITH LONG-TERM CURRENT USE OF INSULIN (HCC): Primary | ICD-10-CM

## 2019-09-24 DIAGNOSIS — E11.42 DIABETIC POLYNEUROPATHY ASSOCIATED WITH TYPE 2 DIABETES MELLITUS (HCC): ICD-10-CM

## 2019-09-24 LAB — HBA1C MFR BLD: 11.7 %

## 2019-09-24 PROCEDURE — 99211 OFF/OP EST MAY X REQ PHY/QHP: CPT | Performed by: FAMILY MEDICINE

## 2019-09-24 PROCEDURE — 83036 HEMOGLOBIN GLYCOSYLATED A1C: CPT | Performed by: STUDENT IN AN ORGANIZED HEALTH CARE EDUCATION/TRAINING PROGRAM

## 2019-09-24 PROCEDURE — 99213 OFFICE O/P EST LOW 20 MIN: CPT | Performed by: STUDENT IN AN ORGANIZED HEALTH CARE EDUCATION/TRAINING PROGRAM

## 2019-09-24 RX ORDER — DOCUSATE SODIUM 100 MG/1
100 CAPSULE, LIQUID FILLED ORAL 2 TIMES DAILY
Qty: 60 CAPSULE | Refills: 0 | Status: SHIPPED | OUTPATIENT
Start: 2019-09-24 | End: 2019-10-24

## 2019-09-24 RX ORDER — BLOOD PRESSURE TEST KIT
1 KIT MISCELLANEOUS 3 TIMES DAILY
Qty: 1 KIT | Refills: 0 | Status: SHIPPED | OUTPATIENT
Start: 2019-09-24 | End: 2020-01-16

## 2019-09-24 RX ORDER — LISINOPRIL 30 MG/1
30 TABLET ORAL DAILY
Qty: 30 TABLET | Refills: 2 | Status: SHIPPED | OUTPATIENT
Start: 2019-09-24 | End: 2020-01-16 | Stop reason: SDUPTHER

## 2019-09-24 RX ORDER — GABAPENTIN 800 MG/1
800 TABLET ORAL DAILY
Qty: 30 TABLET | Refills: 0 | Status: SHIPPED | OUTPATIENT
Start: 2019-09-24 | End: 2019-11-08 | Stop reason: SDUPTHER

## 2019-09-24 ASSESSMENT — ENCOUNTER SYMPTOMS
SHORTNESS OF BREATH: 0
CONSTIPATION: 0
ABDOMINAL PAIN: 0
NAUSEA: 0
VOMITING: 0
DIARRHEA: 0
BLOOD IN STOOL: 0

## 2019-09-24 NOTE — PROGRESS NOTES
He is oriented to person, place, and time. He appears well-developed and well-nourished. HENT:   Head: Normocephalic and atraumatic. Cardiovascular: Normal rate, regular rhythm and normal heart sounds. Pulmonary/Chest: Effort normal and breath sounds normal.   Neurological: He is alert and oriented to person, place, and time. Psychiatric: He has a normal mood and affect. Lab Results   Component Value Date    WBC 7.0 05/21/2019    HGB 14.5 05/21/2019    HCT 41.3 05/21/2019     05/21/2019    CHOL 121 06/18/2015    TRIG 46 06/18/2015    HDL 53 05/21/2019    ALT <5 (L) 11/11/2016    AST 9 11/11/2016     (L) 11/11/2016    K 4.2 11/11/2016    CL 95 (L) 11/11/2016    CREATININE 0.74 11/11/2016    BUN 20 11/11/2016    CO2 26 11/11/2016    TSH 1.04 07/31/2013    INR 1.3 11/11/2016    LABA1C 11.7 09/24/2019    LABMICR CANNOT BE CALCULATED 05/21/2019     Lab Results   Component Value Date    CALCIUM 8.2 (L) 11/11/2016    PHOS 2.3 (L) 10/29/2016     Lab Results   Component Value Date    LDLCHOLESTEROL 104 05/21/2019       Assessment and Plan:    1. Type 2 diabetes mellitus without complication, with long-term current use of insulin ([facility])  - POCT glycosylated hemoglobin (Hb A1C)  - insulin lispro (HUMALOG) 100 UNIT/ML injection cartridge; Inject 13 Units into the skin 3 times daily (before meals)  Dispense: 5 Cartridge; Refill: 3  - blood glucose monitor kit and supplies; Test 4 times a day & as needed for symptoms of irregular blood glucose. Dispense: 1 kit; Refill: 0  - External Referral To Ophthalmology    2. Essential hypertension  - Blood Pressure KIT; 1 Units by Does not apply route 3 times daily  Dispense: 1 kit; Refill: 0  - lisinopril (PRINIVIL;ZESTRIL) 30 MG tablet; Take 1 tablet by mouth daily  Dispense: 30 tablet; Refill: 2    3. Diabetic polyneuropathy associated with type 2 diabetes mellitus (HCC)  - gabapentin (NEURONTIN) 800 MG tablet; Take 1 tablet by mouth daily for 30 days. Dispense: 30 tablet; Refill: 0    4. Constipation, unspecified constipation type  - docusate sodium (COLACE) 100 MG capsule; Take 1 capsule by mouth 2 times daily  Dispense: 60 capsule; Refill: 0          Requested Prescriptions     Signed Prescriptions Disp Refills    insulin lispro (HUMALOG) 100 UNIT/ML injection cartridge 5 Cartridge 3     Sig: Inject 13 Units into the skin 3 times daily (before meals)    Blood Pressure KIT 1 kit 0     Si Units by Does not apply route 3 times daily    blood glucose monitor kit and supplies 1 kit 0     Sig: Test 4 times a day & as needed for symptoms of irregular blood glucose.  lisinopril (PRINIVIL;ZESTRIL) 30 MG tablet 30 tablet 2     Sig: Take 1 tablet by mouth daily    gabapentin (NEURONTIN) 800 MG tablet 30 tablet 0     Sig: Take 1 tablet by mouth daily for 30 days.  docusate sodium (COLACE) 100 MG capsule 60 capsule 0     Sig: Take 1 capsule by mouth 2 times daily       Medications Discontinued During This Encounter   Medication Reason    gabapentin (NEURONTIN) 300 MG capsule LIST CLEANUP    insulin lispro (HUMALOG) 100 UNIT/ML injection cartridge REORDER    lisinopril (PRINIVIL;ZESTRIL) 30 MG tablet REORDER       Return in about 1 month (around 10/24/2019) for BG readings. Zuri Ng received counseling on the following healthy behaviors: nutrition and exercise  Reviewed prior labs and health maintenance  Continue current medications, diet and exercise. Discussed use, benefit, and side effects of prescribed medications. Barriers to medication compliance addressed. Patient given educational materials - see patient instructions  Was a self-tracking handout given in paper form or via [employer]?  No:     Requested Prescriptions     Signed Prescriptions Disp Refills    insulin lispro (HUMALOG) 100 UNIT/ML injection cartridge 5 Cartridge 3     Sig: Inject 13 Units into the skin 3 times daily (before meals)    Blood Pressure KIT 1 kit 0     Si Units by Does

## 2019-10-23 ENCOUNTER — TELEPHONE (OUTPATIENT)
Dept: FAMILY MEDICINE CLINIC | Age: 59
End: 2019-10-23

## 2019-10-23 NOTE — TELEPHONE ENCOUNTER
Pt.states that he would like a refill on his Humulin and Lantus Insulin Also he needs Lancets &,alcohol swabs.  :  Future Appointments   Date Time Provider Maureen Rodas   10/30/2019  4:00 PM Jose Fisher MD [address] [place] [address] Maintenance   Topic Date Due    Diabetic retinal exam  10/06/1972    HIV screen  10/06/1977    Hepatitis B Vaccine (1 of 3 - Risk 3-dose series) 10/06/1981    Shingles Vaccine (1 of 2) 10/06/2012    Colon cancer screen colonoscopy  06/11/2017    Potassium monitoring  11/11/2017    Creatinine monitoring  11/11/2017    Flu vaccine (1) 09/01/2019    A1C test (Diabetic or Prediabetic)  12/24/2019    Diabetic foot exam  05/20/2020    Diabetic microalbuminuria test  05/21/2020    Lipid screen  05/21/2020    DTaP/Tdap/Td vaccine (2 - Td) 11/24/2025    Pneumococcal 0-64 years Vaccine  Completed    Hepatitis C screen  Completed       Hemoglobin A1C (%)   Date Value   09/24/2019 11.7   05/20/2019 11.5 (A)   07/06/2017 5.4             ( goal A1C is < 7)   Microalb/Crt.  Ratio (mcg/mg creat)   Date Value   05/21/2019 CANNOT BE CALCULATED     LDL Cholesterol (mg/dL)   Date Value   05/21/2019 104   06/18/2015 69       (goal LDL is <100)   AST (U/L)   Date Value   11/11/2016 9     ALT (U/L)   Date Value   11/11/2016 <5 (L)     BUN (mg/dL)   Date Value   11/11/2016 20     BP Readings from Last 3 Encounters:   09/24/19 117/77   05/20/19 127/89   01/02/18 138/82          (goal 120/80)    All Future Testing planned in [facility]  Lab Frequency Next Occurrence   Iron And TIBC Once 05/20/2020               Patient Active Problem List:     DM2 (diabetes mellitus, type 2) ([address]     Diverticulosis     DM (diabetes mellitus) ([facility]     HTN (hypertension)     Elevated liver enzymes     Scrotal abscess     Abdominal wall abscess     Hyperplastic colon polyp     Lower abdominal pain     Diabetic foot ([facility]

## 2019-10-25 DIAGNOSIS — Z79.4 TYPE 2 DIABETES MELLITUS WITHOUT COMPLICATION, WITH LONG-TERM CURRENT USE OF INSULIN (HCC): ICD-10-CM

## 2019-10-25 DIAGNOSIS — E11.9 TYPE 2 DIABETES MELLITUS WITHOUT COMPLICATION, WITH LONG-TERM CURRENT USE OF INSULIN (HCC): ICD-10-CM

## 2019-10-25 RX ORDER — INSULIN GLARGINE 100 [IU]/ML
40 INJECTION, SOLUTION SUBCUTANEOUS NIGHTLY
Qty: 1 VIAL | Refills: 3 | Status: SHIPPED | OUTPATIENT
Start: 2019-10-25 | End: 2019-10-30

## 2019-10-25 RX ORDER — LANCETS 30 GAUGE
EACH MISCELLANEOUS
Qty: 120 EACH | Refills: 3 | Status: SHIPPED | OUTPATIENT
Start: 2019-10-25 | End: 2021-08-19 | Stop reason: SDUPTHER

## 2019-10-25 RX ORDER — BLOOD PRESSURE TEST KIT
KIT MISCELLANEOUS
Qty: 120 EACH | Refills: 3 | Status: SHIPPED | OUTPATIENT
Start: 2019-10-25 | End: 2021-08-19 | Stop reason: SDUPTHER

## 2019-10-30 ENCOUNTER — OFFICE VISIT (OUTPATIENT)
Dept: FAMILY MEDICINE CLINIC | Age: 59
End: 2019-10-30
Payer: MEDICAID

## 2019-10-30 VITALS
DIASTOLIC BLOOD PRESSURE: 82 MMHG | HEIGHT: 69 IN | HEART RATE: 76 BPM | BODY MASS INDEX: 21.92 KG/M2 | TEMPERATURE: 97.3 F | WEIGHT: 148 LBS | SYSTOLIC BLOOD PRESSURE: 128 MMHG

## 2019-10-30 DIAGNOSIS — Z23 NEED FOR PROPHYLACTIC VACCINATION AND INOCULATION AGAINST VARICELLA: ICD-10-CM

## 2019-10-30 DIAGNOSIS — J06.9 VIRAL URI: ICD-10-CM

## 2019-10-30 DIAGNOSIS — Z79.4 TYPE 2 DIABETES MELLITUS WITHOUT COMPLICATION, WITH LONG-TERM CURRENT USE OF INSULIN (HCC): Primary | ICD-10-CM

## 2019-10-30 DIAGNOSIS — E11.9 TYPE 2 DIABETES MELLITUS WITHOUT COMPLICATION, WITH LONG-TERM CURRENT USE OF INSULIN (HCC): Primary | ICD-10-CM

## 2019-10-30 PROCEDURE — 99213 OFFICE O/P EST LOW 20 MIN: CPT | Performed by: STUDENT IN AN ORGANIZED HEALTH CARE EDUCATION/TRAINING PROGRAM

## 2019-10-30 PROCEDURE — 90686 IIV4 VACC NO PRSV 0.5 ML IM: CPT | Performed by: STUDENT IN AN ORGANIZED HEALTH CARE EDUCATION/TRAINING PROGRAM

## 2019-10-30 RX ORDER — GUAIFENESIN 600 MG/1
600 TABLET, EXTENDED RELEASE ORAL 2 TIMES DAILY
Qty: 30 TABLET | Refills: 0 | Status: SHIPPED | OUTPATIENT
Start: 2019-10-30 | End: 2019-11-14

## 2019-10-30 RX ORDER — IBUPROFEN 800 MG/1
800 TABLET ORAL 2 TIMES DAILY PRN
Qty: 60 TABLET | Refills: 0 | Status: ON HOLD | OUTPATIENT
Start: 2019-10-30 | End: 2020-03-15 | Stop reason: ALTCHOICE

## 2019-10-30 RX ORDER — GLUCOSAMINE HCL/CHONDROITIN SU 500-400 MG
CAPSULE ORAL
Qty: 100 STRIP | Refills: 0 | Status: SHIPPED | OUTPATIENT
Start: 2019-10-30 | End: 2020-02-24 | Stop reason: SDUPTHER

## 2019-10-30 ASSESSMENT — ENCOUNTER SYMPTOMS
VOMITING: 0
RHINORRHEA: 0
DIARRHEA: 0
SINUS PRESSURE: 0
SINUS PAIN: 0
ABDOMINAL PAIN: 0
NAUSEA: 0
CONSTIPATION: 0
SHORTNESS OF BREATH: 0
COUGH: 1
SORE THROAT: 1
CHEST TIGHTNESS: 0

## 2019-11-08 DIAGNOSIS — E11.42 DIABETIC POLYNEUROPATHY ASSOCIATED WITH TYPE 2 DIABETES MELLITUS (HCC): ICD-10-CM

## 2019-11-08 DIAGNOSIS — E11.42 DIABETIC POLYNEUROPATHY ASSOCIATED WITH TYPE 2 DIABETES MELLITUS (HCC): Primary | ICD-10-CM

## 2019-11-08 DIAGNOSIS — E11.9 TYPE 2 DIABETES MELLITUS WITHOUT COMPLICATION, WITH LONG-TERM CURRENT USE OF INSULIN (HCC): ICD-10-CM

## 2019-11-08 DIAGNOSIS — Z79.4 TYPE 2 DIABETES MELLITUS WITHOUT COMPLICATION, WITH LONG-TERM CURRENT USE OF INSULIN (HCC): ICD-10-CM

## 2019-11-08 RX ORDER — GABAPENTIN 800 MG/1
800 TABLET ORAL DAILY
Qty: 30 TABLET | Refills: 0 | Status: SHIPPED | OUTPATIENT
Start: 2019-11-08 | End: 2019-12-05 | Stop reason: SDUPTHER

## 2019-11-12 DIAGNOSIS — E11.9 TYPE 2 DIABETES MELLITUS WITHOUT COMPLICATION, WITH LONG-TERM CURRENT USE OF INSULIN (HCC): ICD-10-CM

## 2019-11-12 DIAGNOSIS — Z79.4 TYPE 2 DIABETES MELLITUS WITHOUT COMPLICATION, WITH LONG-TERM CURRENT USE OF INSULIN (HCC): ICD-10-CM

## 2019-11-14 ENCOUNTER — TELEPHONE (OUTPATIENT)
Dept: FAMILY MEDICINE CLINIC | Age: 59
End: 2019-11-14

## 2019-11-22 ENCOUNTER — OFFICE VISIT (OUTPATIENT)
Dept: FAMILY MEDICINE CLINIC | Age: 59
End: 2019-11-22
Payer: MEDICAID

## 2019-11-22 VITALS
TEMPERATURE: 96.3 F | HEART RATE: 83 BPM | HEIGHT: 69 IN | BODY MASS INDEX: 22.51 KG/M2 | WEIGHT: 152 LBS | SYSTOLIC BLOOD PRESSURE: 90 MMHG | DIASTOLIC BLOOD PRESSURE: 55 MMHG

## 2019-11-22 DIAGNOSIS — Z79.4 TYPE 2 DIABETES MELLITUS WITHOUT COMPLICATION, WITH LONG-TERM CURRENT USE OF INSULIN (HCC): ICD-10-CM

## 2019-11-22 DIAGNOSIS — E11.9 TYPE 2 DIABETES MELLITUS WITHOUT COMPLICATION, WITH LONG-TERM CURRENT USE OF INSULIN (HCC): ICD-10-CM

## 2019-11-22 DIAGNOSIS — J06.9 VIRAL URI: Primary | ICD-10-CM

## 2019-11-22 DIAGNOSIS — H53.8 BLURRY VISION: ICD-10-CM

## 2019-11-22 DIAGNOSIS — Z23 NEED FOR PROPHYLACTIC VACCINATION AND INOCULATION AGAINST VARICELLA: ICD-10-CM

## 2019-11-22 PROCEDURE — 3017F COLORECTAL CA SCREEN DOC REV: CPT | Performed by: STUDENT IN AN ORGANIZED HEALTH CARE EDUCATION/TRAINING PROGRAM

## 2019-11-22 PROCEDURE — G8420 CALC BMI NORM PARAMETERS: HCPCS | Performed by: STUDENT IN AN ORGANIZED HEALTH CARE EDUCATION/TRAINING PROGRAM

## 2019-11-22 PROCEDURE — G8482 FLU IMMUNIZE ORDER/ADMIN: HCPCS | Performed by: STUDENT IN AN ORGANIZED HEALTH CARE EDUCATION/TRAINING PROGRAM

## 2019-11-22 PROCEDURE — 3046F HEMOGLOBIN A1C LEVEL >9.0%: CPT | Performed by: STUDENT IN AN ORGANIZED HEALTH CARE EDUCATION/TRAINING PROGRAM

## 2019-11-22 PROCEDURE — G8427 DOCREV CUR MEDS BY ELIG CLIN: HCPCS | Performed by: STUDENT IN AN ORGANIZED HEALTH CARE EDUCATION/TRAINING PROGRAM

## 2019-11-22 PROCEDURE — 1036F TOBACCO NON-USER: CPT | Performed by: STUDENT IN AN ORGANIZED HEALTH CARE EDUCATION/TRAINING PROGRAM

## 2019-11-22 PROCEDURE — 2022F DILAT RTA XM EVC RTNOPTHY: CPT | Performed by: STUDENT IN AN ORGANIZED HEALTH CARE EDUCATION/TRAINING PROGRAM

## 2019-11-22 PROCEDURE — 99213 OFFICE O/P EST LOW 20 MIN: CPT | Performed by: STUDENT IN AN ORGANIZED HEALTH CARE EDUCATION/TRAINING PROGRAM

## 2019-11-22 RX ORDER — CETIRIZINE HYDROCHLORIDE 10 MG/1
10 TABLET ORAL DAILY
Qty: 30 TABLET | Refills: 0 | Status: SHIPPED | OUTPATIENT
Start: 2019-11-22 | End: 2019-12-22

## 2019-11-22 RX ORDER — GUAIFENESIN 600 MG/1
600 TABLET, EXTENDED RELEASE ORAL 2 TIMES DAILY
Qty: 30 TABLET | Refills: 0 | Status: SHIPPED | OUTPATIENT
Start: 2019-11-22 | End: 2019-12-07

## 2019-11-22 RX ORDER — FLUTICASONE PROPIONATE 50 MCG
1 SPRAY, SUSPENSION (ML) NASAL DAILY
Qty: 1 BOTTLE | Refills: 0 | Status: SHIPPED | OUTPATIENT
Start: 2019-11-22 | End: 2021-08-19 | Stop reason: SDUPTHER

## 2019-11-22 ASSESSMENT — ENCOUNTER SYMPTOMS
COUGH: 0
VOMITING: 0
SHORTNESS OF BREATH: 0
SORE THROAT: 0
CONSTIPATION: 0
DIARRHEA: 0
CHEST TIGHTNESS: 0
ABDOMINAL PAIN: 0
NAUSEA: 0

## 2019-11-29 ENCOUNTER — TELEPHONE (OUTPATIENT)
Dept: FAMILY MEDICINE CLINIC | Age: 59
End: 2019-11-29

## 2019-12-04 DIAGNOSIS — E11.42 DIABETIC POLYNEUROPATHY ASSOCIATED WITH TYPE 2 DIABETES MELLITUS (HCC): ICD-10-CM

## 2019-12-05 DIAGNOSIS — Z79.4 TYPE 2 DIABETES MELLITUS WITHOUT COMPLICATION, WITH LONG-TERM CURRENT USE OF INSULIN (HCC): Primary | ICD-10-CM

## 2019-12-05 DIAGNOSIS — E11.9 TYPE 2 DIABETES MELLITUS WITHOUT COMPLICATION, WITH LONG-TERM CURRENT USE OF INSULIN (HCC): Primary | ICD-10-CM

## 2019-12-05 RX ORDER — GABAPENTIN 800 MG/1
800 TABLET ORAL DAILY
Qty: 30 TABLET | Refills: 0 | Status: SHIPPED | OUTPATIENT
Start: 2019-12-05 | End: 2019-12-09 | Stop reason: SDUPTHER

## 2019-12-09 ENCOUNTER — TELEPHONE (OUTPATIENT)
Dept: FAMILY MEDICINE CLINIC | Age: 59
End: 2019-12-09

## 2019-12-09 DIAGNOSIS — E11.42 DIABETIC POLYNEUROPATHY ASSOCIATED WITH TYPE 2 DIABETES MELLITUS (HCC): ICD-10-CM

## 2019-12-09 RX ORDER — GABAPENTIN 800 MG/1
800 TABLET ORAL 2 TIMES DAILY
Qty: 60 TABLET | Refills: 0 | Status: SHIPPED | OUTPATIENT
Start: 2019-12-09 | End: 2020-01-16 | Stop reason: SDUPTHER

## 2019-12-15 ENCOUNTER — TELEPHONE (OUTPATIENT)
Dept: FAMILY MEDICINE CLINIC | Age: 59
End: 2019-12-15

## 2020-01-13 ENCOUNTER — APPOINTMENT (OUTPATIENT)
Dept: GENERAL RADIOLOGY | Age: 60
End: 2020-01-13
Payer: MEDICAID

## 2020-01-13 ENCOUNTER — APPOINTMENT (OUTPATIENT)
Dept: CT IMAGING | Age: 60
End: 2020-01-13
Payer: MEDICAID

## 2020-01-13 ENCOUNTER — HOSPITAL ENCOUNTER (EMERGENCY)
Age: 60
Discharge: HOME | End: 2020-01-13
Attending: EMERGENCY MEDICINE
Payer: MEDICAID

## 2020-01-13 VITALS
HEIGHT: 69 IN | DIASTOLIC BLOOD PRESSURE: 89 MMHG | WEIGHT: 148 LBS | BODY MASS INDEX: 21.92 KG/M2 | RESPIRATION RATE: 18 BRPM | SYSTOLIC BLOOD PRESSURE: 148 MMHG | TEMPERATURE: 97.5 F | HEART RATE: 78 BPM | OXYGEN SATURATION: 97 %

## 2020-01-13 LAB
ANION GAP SERPL CALCULATED.3IONS-SCNC: 11 MMOL/L (ref 9–17)
BILIRUBIN URINE: NEGATIVE
BUN BLDV-MCNC: 21 MG/DL (ref 6–20)
BUN/CREAT BLD: ABNORMAL (ref 9–20)
CALCIUM SERPL-MCNC: 9.1 MG/DL (ref 8.6–10.4)
CHLORIDE BLD-SCNC: 100 MMOL/L (ref 98–107)
CO2: 27 MMOL/L (ref 20–31)
COLOR: YELLOW
COMMENT UA: ABNORMAL
CREAT SERPL-MCNC: 0.6 MG/DL (ref 0.7–1.2)
GFR AFRICAN AMERICAN: >60 ML/MIN
GFR NON-AFRICAN AMERICAN: >60 ML/MIN
GFR SERPL CREATININE-BSD FRML MDRD: ABNORMAL ML/MIN/{1.73_M2}
GFR SERPL CREATININE-BSD FRML MDRD: ABNORMAL ML/MIN/{1.73_M2}
GLUCOSE BLD-MCNC: 101 MG/DL (ref 75–110)
GLUCOSE BLD-MCNC: 286 MG/DL (ref 75–110)
GLUCOSE BLD-MCNC: 381 MG/DL (ref 70–99)
GLUCOSE BLD-MCNC: 60 MG/DL (ref 75–110)
GLUCOSE URINE: ABNORMAL
HCT VFR BLD CALC: 38.7 % (ref 40.7–50.3)
HEMOGLOBIN: 14 G/DL (ref 13–17)
KETONES, URINE: NEGATIVE
LEUKOCYTE ESTERASE, URINE: NEGATIVE
MCH RBC QN AUTO: 32.4 PG (ref 25.2–33.5)
MCHC RBC AUTO-ENTMCNC: 36.2 G/DL (ref 28.4–34.8)
MCV RBC AUTO: 89.6 FL (ref 82.6–102.9)
NITRITE, URINE: NEGATIVE
NRBC AUTOMATED: 0.3 PER 100 WBC
PDW BLD-RTO: 15.2 % (ref 11.8–14.4)
PH UA: 5.5 (ref 5–8)
PLATELET # BLD: 252 K/UL (ref 138–453)
PMV BLD AUTO: 9.7 FL (ref 8.1–13.5)
POTASSIUM SERPL-SCNC: 4.7 MMOL/L (ref 3.7–5.3)
PROTEIN UA: NEGATIVE
RBC # BLD: 4.32 M/UL (ref 4.21–5.77)
SODIUM BLD-SCNC: 138 MMOL/L (ref 135–144)
SPECIFIC GRAVITY UA: 1.03 (ref 1–1.03)
TROPONIN INTERP: NORMAL
TROPONIN INTERP: NORMAL
TROPONIN T: NORMAL NG/ML
TROPONIN T: NORMAL NG/ML
TROPONIN, HIGH SENSITIVITY: 6 NG/L (ref 0–22)
TROPONIN, HIGH SENSITIVITY: <6 NG/L (ref 0–22)
TURBIDITY: CLEAR
URINE HGB: NEGATIVE
UROBILINOGEN, URINE: NORMAL
WBC # BLD: 7.4 K/UL (ref 3.5–11.3)

## 2020-01-13 PROCEDURE — 99284 EMERGENCY DEPT VISIT MOD MDM: CPT

## 2020-01-13 PROCEDURE — 6360000004 HC RX CONTRAST MEDICATION: Performed by: EMERGENCY MEDICINE

## 2020-01-13 PROCEDURE — 2580000003 HC RX 258: Performed by: EMERGENCY MEDICINE

## 2020-01-13 PROCEDURE — 81003 URINALYSIS AUTO W/O SCOPE: CPT

## 2020-01-13 PROCEDURE — 87086 URINE CULTURE/COLONY COUNT: CPT

## 2020-01-13 PROCEDURE — 74177 CT ABD & PELVIS W/CONTRAST: CPT

## 2020-01-13 PROCEDURE — 84484 ASSAY OF TROPONIN QUANT: CPT

## 2020-01-13 PROCEDURE — 71046 X-RAY EXAM CHEST 2 VIEWS: CPT

## 2020-01-13 PROCEDURE — 80048 BASIC METABOLIC PNL TOTAL CA: CPT

## 2020-01-13 PROCEDURE — 96372 THER/PROPH/DIAG INJ SC/IM: CPT

## 2020-01-13 PROCEDURE — 6370000000 HC RX 637 (ALT 250 FOR IP): Performed by: EMERGENCY MEDICINE

## 2020-01-13 PROCEDURE — 82947 ASSAY GLUCOSE BLOOD QUANT: CPT

## 2020-01-13 PROCEDURE — 85027 COMPLETE CBC AUTOMATED: CPT

## 2020-01-13 RX ORDER — 0.9 % SODIUM CHLORIDE 0.9 %
1000 INTRAVENOUS SOLUTION INTRAVENOUS ONCE
Status: COMPLETED | OUTPATIENT
Start: 2020-01-13 | End: 2020-01-13

## 2020-01-13 RX ORDER — DIAZEPAM 5 MG/1
5 TABLET ORAL ONCE
Status: COMPLETED | OUTPATIENT
Start: 2020-01-13 | End: 2020-01-13

## 2020-01-13 RX ADMIN — INSULIN LISPRO 10 UNITS: 100 INJECTION, SOLUTION INTRAVENOUS; SUBCUTANEOUS at 14:49

## 2020-01-13 RX ADMIN — DIAZEPAM 5 MG: 5 TABLET ORAL at 13:12

## 2020-01-13 RX ADMIN — SODIUM CHLORIDE 1000 ML: 9 INJECTION, SOLUTION INTRAVENOUS at 14:49

## 2020-01-13 RX ADMIN — IOHEXOL 75 ML: 350 INJECTION, SOLUTION INTRAVENOUS at 14:45

## 2020-01-13 ASSESSMENT — ENCOUNTER SYMPTOMS
VOMITING: 0
SORE THROAT: 0
DIARRHEA: 0
BLOOD IN STOOL: 0
BACK PAIN: 0
SHORTNESS OF BREATH: 0
CONSTIPATION: 0
COUGH: 0
NAUSEA: 0
ABDOMINAL PAIN: 1
WHEEZING: 0

## 2020-01-13 ASSESSMENT — HEART SCORE: ECG: 0

## 2020-01-13 ASSESSMENT — PAIN DESCRIPTION - LOCATION: LOCATION: FLANK;LEG

## 2020-01-13 ASSESSMENT — PAIN DESCRIPTION - ORIENTATION: ORIENTATION: RIGHT;LEFT

## 2020-01-13 ASSESSMENT — PAIN DESCRIPTION - PAIN TYPE: TYPE: CHRONIC PAIN

## 2020-01-13 ASSESSMENT — PAIN SCALES - GENERAL: PAINLEVEL_OUTOF10: 10

## 2020-01-13 NOTE — ED PROVIDER NOTES
MG EC tablet Take 1 tablet by mouth daily 11/20/17   Carmine Vargas MD   Insulin Syringe-Needle U-100 30G X 1/2\" 0.5 ML MISC 1 each by Does not apply route daily 11/20/17   Carmine Vargas MD   naproxen (NAPROSYN) 500 MG tablet Take 1 tablet by mouth 2 times daily as needed for Pain 7/22/17   Tanisha Denise MD   acetaminophen (TYLENOL) 325 MG tablet Take 2 tablets by mouth 2 times daily as needed for Pain 10/29/16   Nikki Rainey MD   docusate sodium (COLACE) 100 MG capsule Take 1 capsule by mouth 2 times daily as needed for Constipation 10/29/16   Nikki Rainey MD   omeprazole (PRILOSEC) 20 MG delayed release capsule Take 1 capsule by mouth daily 10/29/16   Nikki Rainey MD   glucose monitoring kit (FREESTYLE) monitoring kit 1 kit by Does not apply route 4 times daily (before meals and nightly) 10/29/16   Nikki Rainey MD   potassium chloride (KLOR-CON M) 20 MEQ extended release tablet Take 1 tablet by mouth daily 10/29/16   Nikki Rainey MD   magnesium oxide (MAGOX 400) 400 (241.3 MG) MG TABS tablet Take 1 tablet by mouth daily 10/29/16 11/28/16  Nikki Rainey MD   lovastatin (MEVACOR) 20 MG tablet Take 1 tablet by mouth nightly 2/10/16   Coco Cervantes MD   Blood Pressure Monitoring (ADULT BLOOD PRESSURE CUFF LG) KIT Check bp daily 6/10/14   Coco Cervantes MD       REVIEW OF SYSTEMS    (2-9 systems for level 4, 10 or more for level 5)      Review of Systems   Constitutional: Negative for diaphoresis and fever. HENT: Negative for sore throat. Respiratory: Negative for cough, shortness of breath and wheezing. Cardiovascular: Negative for chest pain, palpitations and leg swelling. Gastrointestinal: Positive for abdominal pain. Negative for blood in stool, constipation, diarrhea, nausea and vomiting. Genitourinary: Positive for flank pain. Negative for discharge, dysuria, frequency, hematuria, penile pain, penile swelling, scrotal swelling and testicular pain.         Pain in the right inguinal canal. Musculoskeletal: Negative for back pain and neck pain. Skin: Negative for rash. Neurological: Positive for numbness (Tingling in bilateral lower extermities). Negative for dizziness and headaches. Hematological: Does not bruise/bleed easily. PHYSICAL EXAM   (up to 7 for level 4, 8 or more for level 5)      INITIAL VITALS:   BP (!) 161/96   Pulse 84   Temp 97.5 °F (36.4 °C) (Oral)   Resp 18   Ht 5' 9\" (1.753 m)   Wt 148 lb (67.1 kg)   SpO2 100%   BMI 21.86 kg/m²     Physical Exam  Constitutional:       General: He is not in acute distress. Appearance: He is well-developed. He is not diaphoretic. HENT:      Head: Normocephalic and atraumatic. Cardiovascular:      Rate and Rhythm: Normal rate and regular rhythm. Heart sounds: Normal heart sounds. No murmur. No friction rub. No gallop. Pulmonary:      Effort: Pulmonary effort is normal. No respiratory distress. Breath sounds: Normal breath sounds. No wheezing or rales. Abdominal:      General: Bowel sounds are normal. There is no distension. Palpations: Abdomen is soft. Tenderness: There is no tenderness. There is no guarding. Genitourinary:     Comments: Patient has some tenderness in the right inguinal canal.  He has no point tenderness of the bilateral testicles. No cynthia scrotal swelling. Musculoskeletal:         General: No tenderness or deformity. Skin:     General: Skin is warm and dry. Findings: No erythema. Neurological:      Mental Status: He is alert and oriented to person, place, and time.    Psychiatric:         Behavior: Behavior normal.         DIFFERENTIAL  DIAGNOSIS     PLAN (LABS / IMAGING / EKG):  Orders Placed This Encounter   Procedures    Urine Culture    CT ABDOMEN PELVIS W IV CONTRAST Additional Contrast? None    XR CHEST STANDARD (2 VW)    Basic Metabolic Panel    CBC    Troponin    Urinalysis    POC Glucose Fingerstick    EKG 12 Lead       MEDICATIONS ORDERED:  Orders Leukocyte Esterase, Urine NEGATIVE NEGATIVE    Urinalysis Comments       Microscopic exam not performed based on chemical results unless requested in original order. POC Glucose Fingerstick   Result Value Ref Range    POC Glucose 286 (H) 75 - 110 mg/dL   EKG 12 Lead   Result Value Ref Range    Ventricular Rate 81 BPM    Atrial Rate 81 BPM    P-R Interval 156 ms    QRS Duration 86 ms    Q-T Interval 382 ms    QTc Calculation (Bazett) 443 ms    P Axis 42 degrees    R Axis 48 degrees    T Axis 68 degrees       RADIOLOGY:     Xr Chest Standard (2 Vw)    Result Date: 1/13/2020  EXAMINATION: TWO XRAY VIEWS OF THE CHEST 1/13/2020 1:20 pm COMPARISON: 11/15/2014 HISTORY: ORDERING SYSTEM PROVIDED HISTORY: chest pain TECHNOLOGIST PROVIDED HISTORY: chest pain FINDINGS: Frontal and lateral views of the chest are submitted for review. The cardiac silhouette is normal in size. Lung parenchyma is clear without focal airspace consolidation, sizeable pleural effusion, or pneumothorax. Trachea is midline. Osseous structures and soft tissues are grossly intact. No acute cardiopulmonary pathology. Ct Abdomen Pelvis W Iv Contrast Additional Contrast? None    Result Date: 1/13/2020  EXAMINATION: CT OF THE ABDOMEN AND PELVIS WITH CONTRAST 1/13/2020 2:36 pm TECHNIQUE: CT of the abdomen and pelvis was performed with the administration of intravenous contrast. Multiplanar reformatted images are provided for review. Dose modulation, iterative reconstruction, and/or weight based adjustment of the mA/kV was utilized to reduce the radiation dose to as low as reasonably achievable. COMPARISON: CT abdomen and pelvis performed 11/11/2016. HISTORY: ORDERING SYSTEM PROVIDED HISTORY: pain extending along right abdomen into scrotum. TECHNOLOGIST PROVIDED HISTORY: pain extending along right abdomen into scrotum. Reason for Exam: rt side abd pain, scrotum pain FINDINGS: Lower Chest: Lung bases are without consolidation or effusion.   The visualized cardiac structures are unremarkable. Organs: The liver and spleen are normal size and overall attenuation. The gallbladder has been removed. Pancreas and adrenal glands are unremarkable. The kidneys are without obstructive uropathy. There is a small left renal cyst.  The ureters are not dilated. The urinary bladder is unremarkable. GI/Bowel: The stomach is unremarkable. Loops of small bowel are normal in caliber without evidence for obstruction. Colon contains air and fecal residue and is otherwise unremarkable. There is no intraperitoneal free air or free fluid. Pelvis: The prostate gland and seminal vesicles are unremarkable. Phleboliths are seen in the pelvis. Peritoneum/Retroperitoneum: Psoas muscles are symmetric. The abdominal aorta is normal in caliber. The inferior vena cava is unremarkable. There is no retroperitoneal or mesenteric adenopathy. Bones/Soft Tissues: The extra-abdominal soft tissues are unremarkable. There is no acute osseous abnormality. No acute abdominal or pelvic abnormality. EKG    EKG: normal EKG, normal sinus rhythm. All EKG's are interpreted by the Emergency Department Physician whoeither signs or Co-signs this chart in the absence of a cardiologist.    Heart Score:   Heart Score for chest pain patients  History: Slightly Suspicious  ECG: Normal  Patient Age: > 39 and < 65 years  *Risk factors for Atherosclerotic disease: Diabetes Mellitus, Hypertension  Risk Factors: > 3 Risk factors or history of atherosclerotic disease*  Troponin: < 1X normal limit  Heart Score Total: 3    Score 0-3: 2.5% MACE over next 6 weeks = Discharge Home  Score 4-6: 20.3% MACE over next 6 weeks = Admit for Clinical Observation  Score 7-10: 72.7% MACE over next 6 weeks = Early Invasive Strategies   Chest Pain in the Emergency Room: A Multicenter Validation of the [address]  Samreen BE, Six AJ, Shiraz SELENA, Mast TP, Conergy F, Mast EG, Ileana SH, [employer],

## 2020-01-13 NOTE — ED PROVIDER NOTES
[facility]     Emergency Department     Faculty Note/ Attestation      Pt Name: Kathrine Liriano                                       MRN: [id number]  [place] 1960  Date of evaluation: 1/13/2020    Patients PCP:    Beatrice Curtis MD    Attestation  I performed a history and physical examination of the patient and discussed management with the resident. I reviewed the residents note and agree with the documented findings and plan of care. Any areas of disagreement are noted on the chart. I was personally present for the key portions of any procedures. I have documented in the chart those procedures where I was not present during the key portions. I have reviewed the emergency nurses triage note. I agree with the chief complaint, past medical history, past surgical history, allergies, medications, social and family history as documented unless otherwise noted below. For Physician Assistant/ Nurse Practitioner cases/documentation I have personally evaluated this patient and have completed at least one if not all key elements of the E/M (history, physical exam, and MDM). Additional findings are as noted. Initial Screens:             Vitals:    Vitals:    01/13/20 1226   BP: (!) 161/96   Pulse: 84   Resp: 18   Temp: 97.5 °F (36.4 °C)   TempSrc: Oral   SpO2: 100%   Weight: 148 lb (67.1 kg)   Height: 5' 9\" (1.753 m)       CHIEF COMPLAINT       Chief Complaint   Patient presents with    Leg Pain     Pt c/o increasing bilateral leg pain, bilateral flank pain, unable to get blood sugar below 200, has PCP appt on the 16th unable to wait        Patient is a 17-year-old male with poorly controlled diabetes patient has sugars in the 270s almost constantly. The pt has pain in chest pressure like in the upper abdomen sub xyphod area. No fever no cough leg burning pins and needles.       DIAGNOSTIC RESULTS     RADIOLOGY:   CT ABDOMEN PELVIS W IV CONTRAST Additional Contrast? None (Results Pending)   XR CHEST STANDARD (2 VW)    (Results Pending)   EKG Interpretation   Interpreted by Glendy Dalton DO    Rhythm: normal sinus   Rate: normal  Axis: normal  Ectopy: none  Conduction: normal  ST Segments: normal  T Waves: normal  Q Waves: none    Clinical Impression: no acute changes normal EKG      LABS:  Labs Reviewed   URINE CULTURE   BASIC METABOLIC PANEL   CBC   TROPONIN   TROPONIN   URINALYSIS       EMERGENCY DEPARTMENT COURSE:     -------------------------  BP: (!) 161/96, Temp: 97.5 °F (36.4 °C), Pulse: 84, Resp: 18  Physical Exam  Constitutional:       Appearance: He is well-developed. He is not diaphoretic. HENT:      Head: Normocephalic and atraumatic. Right Ear: External ear normal.      Left Ear: External ear normal.   Eyes:      General: No scleral icterus. Right eye: No discharge. Left eye: No discharge. Neck:      Musculoskeletal: Normal range of motion. Trachea: No tracheal deviation. Cardiovascular:      Rate and Rhythm: Normal rate and regular rhythm. Pulmonary:      Effort: Pulmonary effort is normal. No respiratory distress. Breath sounds: No stridor. Musculoskeletal: Normal range of motion. Skin:     General: Skin is warm and dry. Neurological:      Mental Status: He is alert and oriented to person, place, and time. Coordination: Coordination normal.   Psychiatric:         Behavior: Behavior normal.         Comments  The pt will need evaluation for ACS given the pain  CT abdomen for possible hernia and AAA given pain into back in the abdome  Electroyltes for possible abnormalities  Probable neuropathy from DM due to poor controlled sugars. Jules DO, RDMS.   Attending Emergency Physician          Glendy Dalton DO  01/13/20 1257

## 2020-01-13 NOTE — ED NOTES
Pt given 8 oz of OJ, peanut butter and karly crackers, will monitor, Dr Steve Hannah and Dr Georgina Ortega updated     Steffany Thompson, RN  01/13/20 7642

## 2020-01-14 LAB
CULTURE: NORMAL
Lab: NORMAL
SPECIMEN DESCRIPTION: NORMAL

## 2020-01-16 ENCOUNTER — OFFICE VISIT (OUTPATIENT)
Dept: FAMILY MEDICINE CLINIC | Age: 60
End: 2020-01-16
Payer: MEDICAID

## 2020-01-16 VITALS
DIASTOLIC BLOOD PRESSURE: 104 MMHG | SYSTOLIC BLOOD PRESSURE: 158 MMHG | HEIGHT: 69 IN | BODY MASS INDEX: 26.07 KG/M2 | HEART RATE: 87 BPM | WEIGHT: 176 LBS

## 2020-01-16 PROBLEM — T85.79XA INFECTED HERNIOPLASTY MESH (HCC): Status: ACTIVE | Noted: 2017-06-19

## 2020-01-16 LAB — HBA1C MFR BLD: 7.6 %

## 2020-01-16 PROCEDURE — 1036F TOBACCO NON-USER: CPT | Performed by: STUDENT IN AN ORGANIZED HEALTH CARE EDUCATION/TRAINING PROGRAM

## 2020-01-16 PROCEDURE — G8419 CALC BMI OUT NRM PARAM NOF/U: HCPCS | Performed by: STUDENT IN AN ORGANIZED HEALTH CARE EDUCATION/TRAINING PROGRAM

## 2020-01-16 PROCEDURE — G8427 DOCREV CUR MEDS BY ELIG CLIN: HCPCS | Performed by: STUDENT IN AN ORGANIZED HEALTH CARE EDUCATION/TRAINING PROGRAM

## 2020-01-16 PROCEDURE — 83036 HEMOGLOBIN GLYCOSYLATED A1C: CPT | Performed by: STUDENT IN AN ORGANIZED HEALTH CARE EDUCATION/TRAINING PROGRAM

## 2020-01-16 PROCEDURE — 99213 OFFICE O/P EST LOW 20 MIN: CPT | Performed by: STUDENT IN AN ORGANIZED HEALTH CARE EDUCATION/TRAINING PROGRAM

## 2020-01-16 PROCEDURE — 2022F DILAT RTA XM EVC RTNOPTHY: CPT | Performed by: STUDENT IN AN ORGANIZED HEALTH CARE EDUCATION/TRAINING PROGRAM

## 2020-01-16 PROCEDURE — 3017F COLORECTAL CA SCREEN DOC REV: CPT | Performed by: STUDENT IN AN ORGANIZED HEALTH CARE EDUCATION/TRAINING PROGRAM

## 2020-01-16 PROCEDURE — G8482 FLU IMMUNIZE ORDER/ADMIN: HCPCS | Performed by: STUDENT IN AN ORGANIZED HEALTH CARE EDUCATION/TRAINING PROGRAM

## 2020-01-16 RX ORDER — LISINOPRIL 30 MG/1
30 TABLET ORAL DAILY
Qty: 30 TABLET | Refills: 2 | Status: SHIPPED | OUTPATIENT
Start: 2020-01-16 | End: 2020-02-24

## 2020-01-16 RX ORDER — BLOOD PRESSURE TEST KIT
1 KIT MISCELLANEOUS 2 TIMES DAILY
Qty: 1 KIT | Refills: 0 | Status: SHIPPED | OUTPATIENT
Start: 2020-01-16

## 2020-01-16 RX ORDER — CYCLOBENZAPRINE HCL 5 MG
5 TABLET ORAL 2 TIMES DAILY PRN
Qty: 10 TABLET | Refills: 0 | Status: SHIPPED | OUTPATIENT
Start: 2020-01-16 | End: 2020-01-26

## 2020-01-16 RX ORDER — GABAPENTIN 800 MG/1
800 TABLET ORAL 3 TIMES DAILY
Qty: 90 TABLET | Refills: 0 | Status: SHIPPED | OUTPATIENT
Start: 2020-01-16 | End: 2020-02-21

## 2020-01-16 RX ORDER — TRAZODONE HYDROCHLORIDE 50 MG/1
50 TABLET ORAL NIGHTLY
Qty: 90 TABLET | Refills: 1 | Status: ON HOLD | OUTPATIENT
Start: 2020-01-16 | End: 2020-03-15 | Stop reason: ALTCHOICE

## 2020-01-16 ASSESSMENT — ENCOUNTER SYMPTOMS
CONSTIPATION: 0
SHORTNESS OF BREATH: 0
COUGH: 0
NAUSEA: 0
CHEST TIGHTNESS: 0
DIARRHEA: 0
SORE THROAT: 0
BACK PAIN: 1
ABDOMINAL PAIN: 0
VOMITING: 0

## 2020-01-16 ASSESSMENT — PATIENT HEALTH QUESTIONNAIRE - PHQ9
SUM OF ALL RESPONSES TO PHQ QUESTIONS 1-9: 0
SUM OF ALL RESPONSES TO PHQ9 QUESTIONS 1 & 2: 0
2. FEELING DOWN, DEPRESSED OR HOPELESS: 0
SUM OF ALL RESPONSES TO PHQ QUESTIONS 1-9: 0
1. LITTLE INTEREST OR PLEASURE IN DOING THINGS: 0

## 2020-01-16 NOTE — PROGRESS NOTES
1 tablet by mouth nightly       Medications Discontinued During This Encounter   Medication Reason    Blood Pressure KIT LIST CLEANUP    lisinopril (PRINIVIL;ZESTRIL) 30 MG tablet REORDER    gabapentin (NEURONTIN) 800 MG tablet REORDER       Return in about 4 weeks (around 2020) for diabetes f/u. Adrienne Moffett received counseling on the following healthy behaviors: nutrition, exercise and medication adherence  Reviewed prior labs and health maintenance  Continue current medications, diet and exercise. Discussed use, benefit, and side effects of prescribed medications. Barriers to medication compliance addressed. Patient given educational materials - see patient instructions  Was a self-tracking handout given in paper form or via [employer]? No:     Requested Prescriptions     Signed Prescriptions Disp Refills    lisinopril (PRINIVIL;ZESTRIL) 30 MG tablet 30 tablet 2     Sig: Take 1 tablet by mouth daily    gabapentin (NEURONTIN) 800 MG tablet 90 tablet 0     Sig: Take 1 tablet by mouth 3 times daily for 30 days.  cyclobenzaprine (FLEXERIL) 5 MG tablet 10 tablet 0     Sig: Take 1 tablet by mouth 2 times daily as needed for Muscle spasms    Blood Pressure KIT 1 kit 0     Si Applicatorful by Does not apply route 2 times daily    traZODone (DESYREL) 50 MG tablet 90 tablet 1     Sig: Take 1 tablet by mouth nightly       All patient questions answered. Patient voiced understanding. Quality Measures    Body mass index is 25.99 kg/m². Normal. Weight control planned discussed Healthy diet and regular exercise. BP: (!) 158/104 Blood pressure is high. Treatment plan consists of Antihypertensive Medication Started.     Lab Results   Component Value Date    LDLCHOLESTEROL 104 2019    (goal LDL reduction with dx if diabetes is 50% LDL reduction)      PHQ Scores 2017   PHQ2 Score 0 4 0   PHQ9 Score 0 10 0     Interpretation of Total Score Depression Severity: 1-4 = Minimal depression, 5-9 = Mild depression, 10-14 = Moderate depression, 15-19 = Moderately severe depression, 20-27 = Severe depression

## 2020-01-17 LAB
EKG ATRIAL RATE: 81 BPM
EKG P AXIS: 42 DEGREES
EKG P-R INTERVAL: 156 MS
EKG Q-T INTERVAL: 382 MS
EKG QRS DURATION: 86 MS
EKG QTC CALCULATION (BAZETT): 443 MS
EKG R AXIS: 48 DEGREES
EKG T AXIS: 68 DEGREES
EKG VENTRICULAR RATE: 81 BPM

## 2020-01-20 NOTE — PROGRESS NOTES
Attending Physician Statement    Wt Readings from Last 3 Encounters:   01/16/20 176 lb (79.8 kg)   01/13/20 148 lb (67.1 kg)   11/22/19 152 lb (68.9 kg)     Temp Readings from Last 3 Encounters:   01/13/20 97.5 °F (36.4 °C) (Oral)   11/22/19 96.3 °F (35.7 °C) (Temporal)   10/30/19 97.3 °F (36.3 °C) (Temporal)     BP Readings from Last 3 Encounters:   01/16/20 (!) 158/104   01/13/20 (!) 148/89   11/22/19 (!) 90/55     Pulse Readings from Last 3 Encounters:   01/16/20 87   01/13/20 78   11/22/19 83         I have discussed the care of Fanny Ballard, including pertinent history and exam findings with the resident. I have reviewed the key elements of all parts of the encounter with the resident. I agree with the assessment, plan and orders as documented by the resident.   (GE Modifier)

## 2020-01-22 ENCOUNTER — HOSPITAL ENCOUNTER (OUTPATIENT)
Dept: PHYSICAL THERAPY | Age: 60
Setting detail: THERAPIES SERIES
Discharge: HOME | End: 2020-01-22
Payer: MEDICAID

## 2020-02-21 RX ORDER — GABAPENTIN 800 MG/1
TABLET ORAL
Qty: 60 TABLET | Refills: 0 | Status: SHIPPED | OUTPATIENT
Start: 2020-02-21 | End: 2020-02-24 | Stop reason: SDUPTHER

## 2020-02-24 ENCOUNTER — OFFICE VISIT (OUTPATIENT)
Dept: FAMILY MEDICINE CLINIC | Age: 60
End: 2020-02-24
Payer: MEDICAID

## 2020-02-24 VITALS
DIASTOLIC BLOOD PRESSURE: 100 MMHG | WEIGHT: 170 LBS | SYSTOLIC BLOOD PRESSURE: 141 MMHG | HEIGHT: 69 IN | BODY MASS INDEX: 25.18 KG/M2

## 2020-02-24 PROCEDURE — G8428 CUR MEDS NOT DOCUMENT: HCPCS | Performed by: STUDENT IN AN ORGANIZED HEALTH CARE EDUCATION/TRAINING PROGRAM

## 2020-02-24 PROCEDURE — G8482 FLU IMMUNIZE ORDER/ADMIN: HCPCS | Performed by: STUDENT IN AN ORGANIZED HEALTH CARE EDUCATION/TRAINING PROGRAM

## 2020-02-24 PROCEDURE — 2022F DILAT RTA XM EVC RTNOPTHY: CPT | Performed by: STUDENT IN AN ORGANIZED HEALTH CARE EDUCATION/TRAINING PROGRAM

## 2020-02-24 PROCEDURE — 3017F COLORECTAL CA SCREEN DOC REV: CPT | Performed by: STUDENT IN AN ORGANIZED HEALTH CARE EDUCATION/TRAINING PROGRAM

## 2020-02-24 PROCEDURE — 99213 OFFICE O/P EST LOW 20 MIN: CPT | Performed by: STUDENT IN AN ORGANIZED HEALTH CARE EDUCATION/TRAINING PROGRAM

## 2020-02-24 PROCEDURE — 1036F TOBACCO NON-USER: CPT | Performed by: STUDENT IN AN ORGANIZED HEALTH CARE EDUCATION/TRAINING PROGRAM

## 2020-02-24 PROCEDURE — 3051F HG A1C>EQUAL 7.0%<8.0%: CPT | Performed by: STUDENT IN AN ORGANIZED HEALTH CARE EDUCATION/TRAINING PROGRAM

## 2020-02-24 PROCEDURE — G8419 CALC BMI OUT NRM PARAM NOF/U: HCPCS | Performed by: STUDENT IN AN ORGANIZED HEALTH CARE EDUCATION/TRAINING PROGRAM

## 2020-02-24 RX ORDER — GABAPENTIN 800 MG/1
TABLET ORAL
Qty: 90 TABLET | Refills: 0 | Status: SHIPPED | OUTPATIENT
Start: 2020-02-24 | End: 2020-03-20 | Stop reason: SDUPTHER

## 2020-02-24 RX ORDER — GLUCOSAMINE HCL/CHONDROITIN SU 500-400 MG
CAPSULE ORAL
Qty: 100 STRIP | Refills: 0 | Status: SHIPPED | OUTPATIENT
Start: 2020-02-24 | End: 2021-08-19

## 2020-02-24 RX ORDER — LISINOPRIL 40 MG/1
40 TABLET ORAL DAILY
Qty: 30 TABLET | Refills: 1 | Status: SHIPPED | OUTPATIENT
Start: 2020-02-24 | End: 2020-04-22

## 2020-02-24 RX ORDER — AMLODIPINE BESYLATE 5 MG/1
5 TABLET ORAL DAILY
Qty: 30 TABLET | Refills: 3 | Status: CANCELLED | OUTPATIENT
Start: 2020-02-24

## 2020-02-24 ASSESSMENT — ENCOUNTER SYMPTOMS
CHEST TIGHTNESS: 0
VOMITING: 0
ABDOMINAL PAIN: 0
SORE THROAT: 0
NAUSEA: 0
SHORTNESS OF BREATH: 0
CONSTIPATION: 0
DIARRHEA: 0
COUGH: 0

## 2020-02-24 NOTE — PROGRESS NOTES
5' 9.02\" (1.753 m)   Wt 170 lb (77.1 kg)   BMI 25.09 kg/m²    BP Readings from Last 3 Encounters:   02/24/20 (!) 141/100   01/16/20 (!) 158/104   01/13/20 (!) 148/89     Physical Exam  Vitals signs and nursing note reviewed. Constitutional:       Appearance: He is well-developed. Cardiovascular:      Rate and Rhythm: Normal rate and regular rhythm. Heart sounds: Normal heart sounds. No murmur. No friction rub. No gallop. Pulmonary:      Effort: Pulmonary effort is normal. No respiratory distress. Breath sounds: Normal breath sounds. No wheezing or rales. Chest:      Chest wall: No tenderness. Abdominal:      General: Bowel sounds are normal. There is no distension. Palpations: Abdomen is soft. There is no mass. Tenderness: There is no abdominal tenderness. There is no guarding. Skin:     Capillary Refill: Capillary refill takes less than 2 seconds. Neurological:      Mental Status: He is alert and oriented to person, place, and time. Lab Results   Component Value Date    WBC 7.4 01/13/2020    HGB 14.0 01/13/2020    HCT 38.7 (L) 01/13/2020     01/13/2020    CHOL 121 06/18/2015    TRIG 46 06/18/2015    HDL 53 05/21/2019    ALT <5 (L) 11/11/2016    AST 9 11/11/2016     01/13/2020    K 4.7 01/13/2020     01/13/2020    CREATININE 0.60 (L) 01/13/2020    BUN 21 (H) 01/13/2020    CO2 27 01/13/2020    TSH 1.04 07/31/2013    INR 1.3 11/11/2016    LABA1C 7.6 01/16/2020    LABMICR CANNOT BE CALCULATED 05/21/2019     Lab Results   Component Value Date    CALCIUM 9.1 01/13/2020    PHOS 2.3 (L) 10/29/2016     Lab Results   Component Value Date    LDLCHOLESTEROL 104 05/21/2019       Assessment and Plan:    1. Diabetic polyneuropathy associated with type 2 diabetes mellitus (HCC)  - gabapentin (NEURONTIN) 800 MG tablet; TAKE ONE TABLET BY MOUTH THREE TIMES A DAY  Dispense: 90 tablet; Refill: 0  - [facility]    2.  Type 2 diabetes mellitus without complication, with long-term current use of insulin (HCC)  - blood glucose monitor strips; Test 3 times a day & as needed for symptoms of irregular blood glucose. Dispense: 100 strip; Refill: 0  - Insulin Syringe-Needle U-100 30G X 1/2\" 0.5 ML MISC; 1 each by Does not apply route daily  Dispense: 120 each; Refill: 3  - lisinopril (PRINIVIL;ZESTRIL) 40 MG tablet; Take 1 tablet by mouth daily  Dispense: 30 tablet; Refill: 1    3. Essential hypertension  - lisinopril (PRINIVIL;ZESTRIL) 40 MG tablet; Take 1 tablet by mouth daily  Dispense: 30 tablet; Refill: 1      Requested Prescriptions     Signed Prescriptions Disp Refills    gabapentin (NEURONTIN) 800 MG tablet 90 tablet 0     Sig: TAKE ONE TABLET BY MOUTH THREE TIMES A DAY    blood glucose monitor strips 100 strip 0     Sig: Test 3 times a day & as needed for symptoms of irregular blood glucose.  Insulin Syringe-Needle U-100 30G X 1/2\" 0.5 ML MISC 120 each 3     Si each by Does not apply route daily    lisinopril (PRINIVIL;ZESTRIL) 40 MG tablet 30 tablet 1     Sig: Take 1 tablet by mouth daily       Medications Discontinued During This Encounter   Medication Reason    lisinopril (PRINIVIL;ZESTRIL) 30 MG tablet LIST CLEANUP    gabapentin (NEURONTIN) 800 MG tablet REORDER    blood glucose monitor strips REORDER    Insulin Syringe-Needle U-100 30G X 1/2\" 0.5 ML MISC REORDER       Return in about 2 months (around 2020). Freida Cummings received counseling on the following healthy behaviors: nutrition and exercise  Reviewed prior labs and health maintenance  Continue current medications, diet and exercise. Discussed use, benefit, and side effects of prescribed medications. Barriers to medication compliance addressed. Patient given educational materials - see patient instructions  Was a self-tracking handout given in paper form or via [employer]?  No:     Requested Prescriptions     Signed Prescriptions Disp Refills    gabapentin (NEURONTIN) 800 MG tablet 90 tablet 0     Sig: TAKE ONE TABLET BY MOUTH THREE TIMES A DAY    blood glucose monitor strips 100 strip 0     Sig: Test 3 times a day & as needed for symptoms of irregular blood glucose.  Insulin Syringe-Needle U-100 30G X 1/2\" 0.5 ML MISC 120 each 3     Si each by Does not apply route daily    lisinopril (PRINIVIL;ZESTRIL) 40 MG tablet 30 tablet 1     Sig: Take 1 tablet by mouth daily       All patient questions answered. Patient voiced understanding. Quality Measures    Body mass index is 25.09 kg/m². Normal. Weight control planned discussed Healthy diet and regular exercise. BP: (!) 141/100 Blood pressure is high. Treatment plan consists of DASH Eating Plan, Dietary Sodium Restriction, Increased Physical Activity and Antihypertensive Medication Increased.     Lab Results   Component Value Date    LDLCHOLESTEROL 104 2019    (goal LDL reduction with dx if diabetes is 50% LDL reduction)      PHQ Scores 2017   PHQ2 Score 0 4 0   PHQ9 Score 0 10 0     Interpretation of Total Score Depression Severity: 1-4 = Minimal depression, 5-9 = Mild depression, 10-14 = Moderate depression, 15-19 = Moderately severe depression, 20-27 = Severe depression

## 2020-02-24 NOTE — PATIENT INSTRUCTIONS
Thank you for letting us take care of you today. We hope all your questions were addressed. If a question was overlooked or something else comes to mind after you return home, please contact a member of your Care Team listed below. Please make sure you have a routine office visit set up to follow-up on [address]. Your Care Team at [place] [address] is Team #2  Vicki Nur DO (Faculty)  June Valle MD (Faculty)  Yordy Cruz MD (Resident)  Amaya Serna MD (Resident)  Abhay Scott MD (Resident)  Karlee Chatterjee MD (Resident)  CATARINO Smith ,LUCA MAZARIEGOS, [facility] office)  Arden Ferraro [facility] office)  Carolee Arboleda ([facility])  Claudia [place], [place] ([address] [facility])  Cheryl Russell, [address] [place] [address] (Clinical Pharmacist)     Office phone number: [phone]    If you need to get in right away due to illness, please be advised we have \"Same Day\" appointments available Monday-Friday. Please call us at [phone] option #3 to schedule your \"Same Day\" appointment.

## 2020-02-24 NOTE — PROGRESS NOTES
Attending Physician Statement  I have discussed the care of John Arauz, including pertinent history and exam findings,  with the resident. I have reviewed the key elements of all parts of the encounter with the resident. I agree with the assessment, plan and orders as documented by the resident.   (GE Modifier)

## 2020-03-04 ENCOUNTER — HOSPITAL ENCOUNTER (EMERGENCY)
Age: 60
Discharge: HOME | End: 2020-03-04
Attending: EMERGENCY MEDICINE
Payer: MEDICAID

## 2020-03-04 VITALS
HEART RATE: 75 BPM | BODY MASS INDEX: 34.15 KG/M2 | TEMPERATURE: 97.6 F | OXYGEN SATURATION: 98 % | WEIGHT: 200 LBS | DIASTOLIC BLOOD PRESSURE: 96 MMHG | SYSTOLIC BLOOD PRESSURE: 159 MMHG | HEIGHT: 64 IN

## 2020-03-04 LAB
ALBUMIN SERPL-MCNC: 3.7 G/DL (ref 3.5–5.2)
ALBUMIN/GLOBULIN RATIO: 2.1 (ref 1–2.5)
ALP BLD-CCNC: 89 U/L (ref 40–129)
ALT SERPL-CCNC: <5 U/L (ref 5–41)
ANION GAP SERPL CALCULATED.3IONS-SCNC: 11 MMOL/L (ref 9–17)
AST SERPL-CCNC: 10 U/L
BILIRUB SERPL-MCNC: 0.87 MG/DL (ref 0.3–1.2)
BILIRUBIN URINE: NEGATIVE
BUN BLDV-MCNC: 28 MG/DL (ref 6–20)
BUN/CREAT BLD: ABNORMAL (ref 9–20)
CALCIUM SERPL-MCNC: 8.4 MG/DL (ref 8.6–10.4)
CHLORIDE BLD-SCNC: 102 MMOL/L (ref 98–107)
CO2: 22 MMOL/L (ref 20–31)
COLOR: YELLOW
COMMENT UA: ABNORMAL
CREAT SERPL-MCNC: 0.59 MG/DL (ref 0.7–1.2)
GFR AFRICAN AMERICAN: >60 ML/MIN
GFR NON-AFRICAN AMERICAN: >60 ML/MIN
GFR SERPL CREATININE-BSD FRML MDRD: ABNORMAL ML/MIN/{1.73_M2}
GFR SERPL CREATININE-BSD FRML MDRD: ABNORMAL ML/MIN/{1.73_M2}
GLUCOSE BLD-MCNC: 332 MG/DL (ref 70–99)
GLUCOSE URINE: ABNORMAL
HCT VFR BLD CALC: 32.2 % (ref 40.7–50.3)
HEMOGLOBIN: 12 G/DL (ref 13–17)
KETONES, URINE: ABNORMAL
LEUKOCYTE ESTERASE, URINE: NEGATIVE
LIPASE: 171 U/L (ref 13–60)
MCH RBC QN AUTO: 33.4 PG (ref 25.2–33.5)
MCHC RBC AUTO-ENTMCNC: 37.3 G/DL (ref 28.4–34.8)
MCV RBC AUTO: 89.7 FL (ref 82.6–102.9)
NITRITE, URINE: NEGATIVE
NRBC AUTOMATED: 0 PER 100 WBC
PDW BLD-RTO: 14.8 % (ref 11.8–14.4)
PH UA: 5.5 (ref 5–8)
PLATELET # BLD: 302 K/UL (ref 138–453)
PMV BLD AUTO: 9.6 FL (ref 8.1–13.5)
POTASSIUM SERPL-SCNC: 4.2 MMOL/L (ref 3.7–5.3)
PROTEIN UA: NEGATIVE
RBC # BLD: 3.59 M/UL (ref 4.21–5.77)
SODIUM BLD-SCNC: 135 MMOL/L (ref 135–144)
SPECIFIC GRAVITY UA: 1.03 (ref 1–1.03)
TOTAL PROTEIN: 5.5 G/DL (ref 6.4–8.3)
TURBIDITY: CLEAR
URINE HGB: NEGATIVE
UROBILINOGEN, URINE: NORMAL
WBC # BLD: 6.6 K/UL (ref 3.5–11.3)

## 2020-03-04 PROCEDURE — 99284 EMERGENCY DEPT VISIT MOD MDM: CPT

## 2020-03-04 PROCEDURE — 83690 ASSAY OF LIPASE: CPT

## 2020-03-04 PROCEDURE — 85027 COMPLETE CBC AUTOMATED: CPT

## 2020-03-04 PROCEDURE — 96374 THER/PROPH/DIAG INJ IV PUSH: CPT

## 2020-03-04 PROCEDURE — 6360000002 HC RX W HCPCS: Performed by: STUDENT IN AN ORGANIZED HEALTH CARE EDUCATION/TRAINING PROGRAM

## 2020-03-04 PROCEDURE — 80053 COMPREHEN METABOLIC PANEL: CPT

## 2020-03-04 PROCEDURE — 6370000000 HC RX 637 (ALT 250 FOR IP): Performed by: STUDENT IN AN ORGANIZED HEALTH CARE EDUCATION/TRAINING PROGRAM

## 2020-03-04 PROCEDURE — 81003 URINALYSIS AUTO W/O SCOPE: CPT

## 2020-03-04 RX ORDER — CYCLOBENZAPRINE HCL 10 MG
5 TABLET ORAL ONCE
Status: COMPLETED | OUTPATIENT
Start: 2020-03-04 | End: 2020-03-04

## 2020-03-04 RX ORDER — KETOROLAC TROMETHAMINE 30 MG/ML
30 INJECTION, SOLUTION INTRAMUSCULAR; INTRAVENOUS ONCE
Status: COMPLETED | OUTPATIENT
Start: 2020-03-04 | End: 2020-03-04

## 2020-03-04 RX ADMIN — CYCLOBENZAPRINE 5 MG: 10 TABLET, FILM COATED ORAL at 18:38

## 2020-03-04 RX ADMIN — KETOROLAC TROMETHAMINE 30 MG: 30 INJECTION, SOLUTION INTRAMUSCULAR; INTRAVENOUS at 18:38

## 2020-03-04 ASSESSMENT — PAIN SCALES - WONG BAKER: WONGBAKER_NUMERICALRESPONSE: 8

## 2020-03-04 ASSESSMENT — PAIN DESCRIPTION - PAIN TYPE: TYPE: CHRONIC PAIN

## 2020-03-04 ASSESSMENT — PAIN DESCRIPTION - ORIENTATION: ORIENTATION: LEFT;MID;LOWER

## 2020-03-04 ASSESSMENT — PAIN DESCRIPTION - LOCATION: LOCATION: BACK;GROIN;LEG

## 2020-03-04 ASSESSMENT — PAIN DESCRIPTION - DESCRIPTORS: DESCRIPTORS: CONSTANT

## 2020-03-04 ASSESSMENT — PAIN SCALES - GENERAL: PAINLEVEL_OUTOF10: 10

## 2020-03-04 ASSESSMENT — PAIN DESCRIPTION - FREQUENCY: FREQUENCY: CONTINUOUS

## 2020-03-04 NOTE — ED PROVIDER NOTES
[facility] ED  Emergency Department Encounter  EmergencyMedicine Resident     Pt Name:Marcelo Rodriguez  MRN: [id number]  [place] 1960  Date of evaluation: 3/4/20  PCP:  Carlos Gomez MD    [address] [place] [address]          Chief Complaint   Patient presents with    Back Pain     chronic scheduled for pain managment tomorrow    Groin Pain     left groin crease, no known injury, no difficulty urinating    Leg Pain     chronic, \"its nerve pain and my doctor only gives me Neurontin and gapebentin\", \"scheduled for pain magmt tomorrow, but I need soomething for pain now \". Pt sts PCP will not order \"anything stronger\". HISTORY OF PRESENT ILLNESS  (Location/Symptom, Timing/Onset, Context/Setting, Quality, Duration, Modifying Factors, Severity.)       Marcus Bates is a 64 y.o. male who presents with back pain groin pain and leg pain. Patient states his leg pain is chronic, and his doctor gives him gabapentin. No acute change in this, he just states that he is sick of dealing with it. Also reports lower back pain, he is post to go to pain management tomorrow. No fevers or chills, no unintentional weight loss, no falls, no IV drug abuse, no recent spinal injections, does have diabetes and pliant with meds. No pain or burning with urination no hematuria denies any chest pain or shortness of breath    PAST MEDICAL / SURGICAL / SOCIAL / FAMILY HISTORY       has a past medical history of Bowel obstruction ([address] Diabetes mellitus ([address] Diverticulitis, Diverticulosis, GERD (gastroesophageal reflux disease), Hypertension, MIGUEL (obstructive sleep apnea), Osteoarthritis, Renal lithiasis, Rheumatoid arteritis ([address] and Type II or unspecified type diabetes mellitus without mention of complication, not stated as uncontrolled. has a past surgical history that includes Meckel's diverticulum excision; hernia repair; Cholecystectomy; Urethra dilation; Small intestine surgery; Colonoscopy;  Colon RADIOLOGY:   No results found. [facility]/EMERGENCY DEPARTMENT COURSE:  5:36 PM: 63 yo m presenting with back and groin pain, well-appearing, vital signs within normal limits except for elevated blood pressure, no focal deficits on exam, will obtain abdominal labs, urine. We will treat his pain. Patient does need to follow-up with pain management and has a appointment tomorrow, discussed giving analgesia in the emergency department but would not be able to prescribe narcotics. Lab work was within normal limits, patient's presentation low risk for back pain that is emergent that would require imaging. Discussed supportive care, following up tomorrow, patient is agreeable with the plan                  PROCEDURES:  None    CONSULTS:   None    CRITICALCARE:  None    FINAL IMPRESSION         1. Sciatica, unspecified laterality    2. Acute exacerbation of chronic low back pain    3. Left groin pain    4.  Abdominal pain, left lower quadrant        DISPOSITION / PLAN     DISPOSITION Decision To Discharge    PATIENTREFERRED TO:   Ivette Chaudhry MD  [facility] [address]  [phone]    Schedule an appointment as soon as possible for a visit       [facility] ED  [address] [place] [address]  [phone]  Go to   If symptoms worsen      DISCHARGE MEDICATIONS:     Discharge Medication List as of 3/4/2020  7:17 PM           Elisabet Rand MD  EmergencyMedicine Resident    (Please note that portions of this note were completed with a voice recognition program.  Efforts were made to edit the dictations but occasionally words are mis-transcribed.)        Elisabet Rand MD  03/06/20 5807

## 2020-03-04 NOTE — ED PROVIDER NOTES
[facility]     Emergency Department     Faculty Attestation    I performed a history and physical examination of the patient and discussed management with the resident. I have reviewed and agree with the residents findings including all diagnostic interpretations, and treatment plans as written at the time of my review. Any areas of disagreement are noted on the chart. I was personally present for the key portions of any procedures. I have documented in the chart those procedures where I was not present during the key portions. For Physician Assistant/ Nurse Practitioner cases/documentation I have personally evaluated this patient and have completed at least one if not all key elements of the E/M (history, physical exam, and MDM). Additional findings are as noted. Primary Care Physician: Kadi Hunt MD    History: This is a 64 y.o. male who presents to the Emergency Department with complaint of back belly and leg pain. This been ongoing for months. Patient has neuropathy. Physical:   height is 5' 4\" (1.626 m) and weight is 200 lb (90.7 kg). His temperature is 97.6 °F (36.4 °C). His blood pressure is 159/96 (abnormal) and his pulse is 75. His oxygen saturation is 98%. Abdomen is soft he is tender left side no guarding no rebound    Impression: Neuropathy, abdominal pain    Plan: Urinalysis, CBC, lipase, urinalysis, analgesia    (Please note that portions of this note were completed with a voice recognition program.  Efforts were made to edit the dictations but occasionally words are mis-transcribed.)    Dayami Isacas.  Jose Kiran MD, [address] [facility]  Attending Emergency Medicine Physician        Lisa Phelps MD  03/04/20 0928

## 2020-03-05 ENCOUNTER — TELEPHONE (OUTPATIENT)
Dept: FAMILY MEDICINE CLINIC | Age: 60
End: 2020-03-05

## 2020-03-05 ENCOUNTER — INITIAL CONSULT (OUTPATIENT)
Dept: PAIN MANAGEMENT | Age: 60
End: 2020-03-05
Payer: MEDICAID

## 2020-03-05 VITALS
SYSTOLIC BLOOD PRESSURE: 152 MMHG | HEIGHT: 70 IN | HEART RATE: 95 BPM | OXYGEN SATURATION: 100 % | DIASTOLIC BLOOD PRESSURE: 96 MMHG | WEIGHT: 170 LBS | BODY MASS INDEX: 24.34 KG/M2

## 2020-03-05 PROCEDURE — 2022F DILAT RTA XM EVC RTNOPTHY: CPT | Performed by: ANESTHESIOLOGY

## 2020-03-05 PROCEDURE — G8427 DOCREV CUR MEDS BY ELIG CLIN: HCPCS | Performed by: ANESTHESIOLOGY

## 2020-03-05 PROCEDURE — G8482 FLU IMMUNIZE ORDER/ADMIN: HCPCS | Performed by: ANESTHESIOLOGY

## 2020-03-05 PROCEDURE — G8420 CALC BMI NORM PARAMETERS: HCPCS | Performed by: ANESTHESIOLOGY

## 2020-03-05 PROCEDURE — 99244 OFF/OP CNSLTJ NEW/EST MOD 40: CPT | Performed by: ANESTHESIOLOGY

## 2020-03-05 RX ORDER — DULOXETIN HYDROCHLORIDE 30 MG/1
30 CAPSULE, DELAYED RELEASE ORAL DAILY
Qty: 30 CAPSULE | Refills: 0 | Status: ON HOLD | OUTPATIENT
Start: 2020-03-05 | End: 2020-03-15 | Stop reason: ALTCHOICE

## 2020-03-05 RX ORDER — TOPIRAMATE 50 MG/1
100 TABLET, FILM COATED ORAL NIGHTLY
Qty: 30 TABLET | Refills: 0 | Status: ON HOLD | OUTPATIENT
Start: 2020-03-05 | End: 2020-03-15 | Stop reason: ALTCHOICE

## 2020-03-05 ASSESSMENT — ENCOUNTER SYMPTOMS
DIARRHEA: 0
WHEEZING: 0
CONSTIPATION: 1
BACK PAIN: 1
EYE ITCHING: 0
EYE PAIN: 0
FACIAL SWELLING: 0
COUGH: 0

## 2020-03-05 NOTE — LETTER
[facility] [address] [address]  Phone: [phone]  Fax: [phone]    Quentin Briggs MD        March 5, 2020     Carlos Gomez MD  [facility] [address]    Patient: Marcus Bates  MR Number: [id number]  YOB: 1960  Date of Visit: 3/5/2020    Dear Dr. Carlos Gomez:    Thank you for the request for consultation for Mary Aaron to me for the evaluation of chronic bilateral foot pain and left lumbar radiculitis. Below are the relevant portions of my assessment and plan of care. Lumbar spine examination  No apparent deformity on inspection  Range of motion is moderately limited and associated with pain particularly on left side lateral bending and forward flexion  Gait is antalgic    Neurological examination  Normal muscle mass  Normal muscle tone  Muscle strength 5/5 in both upper and lower extremities  Deep and reflexes are 2+ at both knees and bicep  1+ at both ankles  Clonus is negative    If you have questions, please do not hesitate to call me. I look forward to following Ivanna Mclaughlin along with you.     Sincerely,        Quentin Briggs MD

## 2020-03-05 NOTE — PROGRESS NOTES
The patient is a 64 y. o. / male. Chief Complaint   Patient presents with    Consultation    Foot Pain    Leg Pain    Back Pain        HPI    Requesting physician for the evaluation of Laith Corey 1960: Zeinab Buchanan    Pain History  -28-year-old man with longstanding history of diabetes  He report history of chronic bilateral foot pain numbness tingling  He describes his pain as electric sensation  Pain extends from toes above the ankle and towards the knee  Pain is constant fluctuating intensity depending upon the weather changes touch and activity level  Nothing seems to alleviate the pain  No previous EMG nerve conduction testing    Recently patient had developed a flareup of his pain  This pain is radiating down from left side of her lower back all the way to the toes  That pain aggravates with standing lifting bending twisting and turning  Nothing seems to alleviate the pain  No previous MRI lumbar spine or x-rays  No previous physical therapy  No previous lumbar spine injection history  No previous lumbar spine surgical history    Patient denies any loss of bladder or bowel control  No history of fever chills or weight loss    Patient tried include NSAIDs and gabapentin  He was prescribed trazodone for insomnia but he states that he did not find it helpful and decided not to take it after few days  Pain score today  10  1. Location: back, legs and feet  2. Radiation: back travels around to groin area and legs  3. Character:penetrating, nagging, numb, burning, aching, throbbing, shooting, sharp, tender,   5. Duration 3 months  6. Onset: 3 months   7. Did an injury cause pain: no  8. Aggravating factors: walking and normal activities   9. Alleviating factors: rubbing the legs  10. Associated symptoms (numbness / tingling / weakness):  yes  -Where at: legs   -Down into finger tips or toes (specify which finger or toes):all toes  -constant or intermitting:  Intermitting   11.  Red Flags: (weight loss / chills / loss of bladder or bowel control):none    Previous management history  1. Previous diagnostic workup: (Imaging/EMG)   CT, MRI, or Xray: none  What part of the body:n/a  What facility did they have it at:n/a  What year or specific date: N/A  EMG:  no    2. Previous non interventional treatments tried:  chiropractor or physical therapy:  n/a  What part of the body:n/a  What facility was it done at: n/a  How long ago was it last tried: n/a  Did it work: n/a  Did they complete it:n/a    3. Previous Medications tried  NSAID's: yes  Neurontin: yes  Lyrica: no  Trycyclic antidepressant (Ellavil / Pamelor ): no  Cymbalta: no  Opioids (Ultram / Vicodin / Percocet / Morphine / Dilaudid / Oramorph/ Fentanyl etc.): Percocet in the past, tylenol #3  Last Pain medication taken (name of med and date):    4. Previous Interventional pain procedures tried:  What kind of injection: n/a   Who did the injection: n/a  did the injection help: n/a  Last time injection was done:N/A    5.  Previous surgeries for pain  What part of the body did they have the surgery:n/a  What physician did the surgery:n/a  What Facility did they have the surgery done:n/a  Date of Surgery:N/A    Social History:  Marital status Single  Employment History: no  Working  No  Full time Or Part time:   Disability  No   Legal Issues related to pain complaint: No     Pain Disability Index score : 100    Lab Results   Component Value Date    LABA1C 7.6 01/16/2020     Lab Results   Component Value Date     07/29/2012         Informant: patient    Past Medical History:   Diagnosis Date    [facility] obstruction [facility]     Diabetes mellitus ([facility]     Diverticulitis     Diverticulosis     GERD (gastroesophageal reflux disease)     Hypertension     MIGUEL (obstructive sleep apnea)     Osteoarthritis     Renal lithiasis     Rheumatoid arteritis ([facility] [facility]     Type II or unspecified type diabetes mellitus without mention of complication, not stated as uncontrolled       Past Surgical History:   Procedure Laterality Date    APPENDECTOMY      CHOLECYSTECTOMY      COLON SURGERY      COLONOSCOPY      HERNIA REPAIR      MECKEL DIVERTICULUM EXCISION      SMALL INTESTINE SURGERY      URETHRAL STRICTURE DILATATION       Social History     Socioeconomic History    Marital status:      Spouse name: None    Number of children: None    Years of education: None    Highest education level: None   Occupational History    None   Social Needs    Financial resource strain: None    Food insecurity:     Worry: None     Inability: None    Transportation needs:     Medical: None     Non-medical: None   Tobacco Use    Smoking status: Former Smoker     Types: Cigars    Smokeless tobacco: Never Used    Tobacco comment: cigars 2x week   Substance and Sexual Activity    Alcohol use: No     Alcohol/week: 0.0 standard drinks    Drug use: No    Sexual activity: None   Lifestyle    Physical activity:     Days per week: None     Minutes per session: None    Stress: None   Relationships    Social connections:     Talks on phone: None     Gets together: None     Attends [employer] service: None     Active member of club or organization: None     Attends meetings of clubs or organizations: None     Relationship status: None    Intimate partner violence:     Fear of current or ex partner: None     Emotionally abused: None     Physically abused: None     Forced sexual activity: None   Other Topics Concern    None   Social History Narrative    None     Family History   Problem Relation Age of Onset    Diabetes Father     Diabetes Other     Heart Attack Other     Hypertension Other     Diabetes Brother      No Known Allergies  Patient has no known allergies.    Vitals:    03/05/20 1251   BP: (!) 152/96   Pulse: 95   SpO2: 100%     Current Outpatient Medications   Medication Sig Dispense Refill    DULoxetine (CYMBALTA) 30 MG extended release capsule Take 1 capsule by mouth daily 30 capsule 0    topiramate (TOPAMAX) 50 MG tablet Take 2 tablets by mouth nightly 30 tablet 0    gabapentin (NEURONTIN) 800 MG tablet TAKE ONE TABLET BY MOUTH THREE TIMES A DAY 90 tablet 0    blood glucose monitor strips Test 3 times a day & as needed for symptoms of irregular blood glucose. 100 strip 0    Insulin Syringe-Needle U-100 30G X 1/2\" 0.5 ML MISC 1 each by Does not apply route daily 120 each 3    lisinopril (PRINIVIL;ZESTRIL) 40 MG tablet Take 1 tablet by mouth daily 30 tablet 1    Blood Pressure KIT 1 Applicatorful by Does not apply route 2 times daily 1 kit 0    traZODone (DESYREL) 50 MG tablet Take 1 tablet by mouth nightly 90 tablet 1    Blood Glucose Monitoring Suppl (TRUE METRIX METER) w/Device KIT Use as directed 1 kit 0    blood glucose test strips (ASCENSIA AUTODISC VI;ONE TOUCH ULTRA TEST VI) strip 1 each by In Vitro route daily As needed. 100 each 3    blood glucose test strips (ASCENSIA AUTODISC VI;ONE TOUCH ULTRA TEST VI) strip Use with associated glucose meter. To check blood sugar 4 times daily 120 strip 3    insulin lispro (HUMALOG) 100 UNIT/ML injection vial Inject 13 Units into the skin 3 times daily (before meals) 1 vial 3    fluticasone (FLONASE) 50 MCG/ACT nasal spray 1 spray by Each Nostril route daily 1 Bottle 0    insulin glargine (BASAGLAR KWIKPEN) 100 UNIT/ML injection pen Inject 40 Units into the skin nightly 5 pen 0    insulin aspart (NOVOLOG FLEXPEN) 100 UNIT/ML injection pen Inject 13 Units into the skin 3 times daily (before meals) 5 pen 3    ibuprofen (ADVIL;MOTRIN) 800 MG tablet Take 1 tablet by mouth 2 times daily as needed for Pain 60 tablet 0    Lancets MISC Check blood sugar 4 times daily (AC HS) 120 each 3    Alcohol Swabs PADS Use as needed while checking blood sugar 120 each 3    blood glucose monitor kit and supplies Test 4 times a day & as needed for symptoms of irregular blood glucose.  1 kit 0    aspirin EC 81 MG EC tablet Take 1 tablet by mouth daily 90 tablet 3    naproxen (NAPROSYN) 500 MG tablet Take 1 tablet by mouth 2 times daily as needed for Pain 15 tablet 0    acetaminophen (TYLENOL) 325 MG tablet Take 2 tablets by mouth 2 times daily as needed for Pain 120 tablet 3    docusate sodium (COLACE) 100 MG capsule Take 1 capsule by mouth 2 times daily as needed for Constipation 14 capsule 0    omeprazole (PRILOSEC) 20 MG delayed release capsule Take 1 capsule by mouth daily 30 capsule 1    glucose monitoring kit (FREESTYLE) monitoring kit 1 kit by Does not apply route 4 times daily (before meals and nightly) 1 kit 0    potassium chloride (KLOR-CON M) 20 MEQ extended release tablet Take 1 tablet by mouth daily 30 tablet 0    Blood Pressure Monitoring (ADULT BLOOD PRESSURE CUFF LG) KIT Check bp daily 1 kit 0    ipratropium (ATROVENT) 0.03 % nasal spray 2 sprays by Nasal route 3 times daily for 14 days 1 Bottle 1    magnesium oxide (MAGOX 400) 400 (241.3 MG) MG TABS tablet Take 1 tablet by mouth daily 30 tablet 0     No current facility-administered medications for this visit. Review of Systems   Constitutional: Negative for chills and fever. HENT: Negative for facial swelling and hearing loss. Eyes: Negative for pain and itching. Respiratory: Negative for cough and wheezing. Gastrointestinal: Positive for constipation. Negative for diarrhea. Endocrine: Negative for cold intolerance. Genitourinary: Positive for flank pain. Musculoskeletal: Positive for back pain and gait problem. Skin: Negative for rash and wound. Allergic/Immunologic: Negative for food allergies. Neurological: Positive for numbness and headaches. Hematological: Does not bruise/bleed easily. Psychiatric/Behavioral: Positive for agitation. The patient is nervous/anxious. All other systems reviewed and are negative. Objective:  General Appearance:  Uncomfortable, in pain, well-appearing and in no acute distress. mg a day  Consider Cymbalta titration to 60 mg if patient tolerated well  Continue gabapentin    1. Left lumbar radiculitis    2. Painful diabetic neuropathy ([address]        Orders Placed This Encounter   Procedures    XR LUMBAR SPINE (MIN 4 VIEWS)    Urine Drug Screen, Comprehensive    Ambulatory referral to Physical Therapy    EMG        Orders Placed This Encounter   Medications    DULoxetine (CYMBALTA) 30 MG extended release capsule     Sig: Take 1 capsule by mouth daily     Dispense:  30 capsule     Refill:  0    topiramate (TOPAMAX) 50 MG tablet     Sig: Take 2 tablets by mouth nightly     Dispense:  30 tablet     Refill:  0        Controlled Substance Monitoring:    Acute and Chronic Pain Monitoring:   RX Monitoring 3/5/2020   Periodic Controlled Substance Monitoring No signs of potential drug abuse or diversion identified. ;Possible medication side effects, risk of tolerance/dependence & alternative treatments discussed. ;Assessed functional status. ;Random urine drug screen sent today.        Consult note sent to the referring physician    Electronically signed by Ashlee Hernandez MD on 3/5/2020 at 1:40 PM     Urine toxicology March 5, 2020  Positive for cocaine

## 2020-03-06 ASSESSMENT — ENCOUNTER SYMPTOMS
ABDOMINAL PAIN: 1
BACK PAIN: 0
PHOTOPHOBIA: 0
NAUSEA: 0
RHINORRHEA: 0
COUGH: 0
SHORTNESS OF BREATH: 0
VOMITING: 0

## 2020-03-11 PROBLEM — F14.10 COCAINE ABUSE (HCC): Status: ACTIVE | Noted: 2020-03-11

## 2020-03-15 ENCOUNTER — HOSPITAL ENCOUNTER (OUTPATIENT)
Age: 60
Setting detail: OBSERVATION
Discharge: HOME | End: 2020-03-16
Attending: EMERGENCY MEDICINE | Admitting: EMERGENCY MEDICINE
Payer: MEDICAID

## 2020-03-15 ENCOUNTER — APPOINTMENT (OUTPATIENT)
Dept: GENERAL RADIOLOGY | Age: 60
End: 2020-03-15
Payer: MEDICAID

## 2020-03-15 PROBLEM — R07.9 CHEST PAIN: Status: ACTIVE | Noted: 2020-03-15

## 2020-03-15 LAB
ABSOLUTE EOS #: 0.14 K/UL (ref 0–0.44)
ABSOLUTE IMMATURE GRANULOCYTE: 0.11 K/UL (ref 0–0.3)
ABSOLUTE LYMPH #: 1.18 K/UL (ref 1.1–3.7)
ABSOLUTE MONO #: 0.75 K/UL (ref 0.1–1.2)
ANION GAP SERPL CALCULATED.3IONS-SCNC: 12 MMOL/L (ref 9–17)
BASOPHILS # BLD: 0 % (ref 0–2)
BASOPHILS ABSOLUTE: 0.04 K/UL (ref 0–0.2)
BNP INTERPRETATION: NORMAL
BUN BLDV-MCNC: 32 MG/DL (ref 6–20)
BUN/CREAT BLD: ABNORMAL (ref 9–20)
CALCIUM SERPL-MCNC: 8.8 MG/DL (ref 8.6–10.4)
CHLORIDE BLD-SCNC: 108 MMOL/L (ref 98–107)
CO2: 19 MMOL/L (ref 20–31)
CREAT SERPL-MCNC: 0.81 MG/DL (ref 0.7–1.2)
D-DIMER QUANTITATIVE: <0.17 MG/L FEU
DIFFERENTIAL TYPE: ABNORMAL
EOSINOPHILS RELATIVE PERCENT: 1 % (ref 1–4)
GFR AFRICAN AMERICAN: >60 ML/MIN
GFR NON-AFRICAN AMERICAN: >60 ML/MIN
GFR SERPL CREATININE-BSD FRML MDRD: ABNORMAL ML/MIN/{1.73_M2}
GFR SERPL CREATININE-BSD FRML MDRD: ABNORMAL ML/MIN/{1.73_M2}
GLUCOSE BLD-MCNC: 155 MG/DL (ref 70–99)
GLUCOSE BLD-MCNC: 362 MG/DL (ref 75–110)
HCT VFR BLD CALC: 33.5 % (ref 40.7–50.3)
HEMOGLOBIN: 12.4 G/DL (ref 13–17)
IMMATURE GRANULOCYTES: 1 %
LYMPHOCYTES # BLD: 11 % (ref 24–43)
MAGNESIUM: 1.9 MG/DL (ref 1.6–2.6)
MCH RBC QN AUTO: 33.3 PG (ref 25.2–33.5)
MCHC RBC AUTO-ENTMCNC: 37 G/DL (ref 28.4–34.8)
MCV RBC AUTO: 90.1 FL (ref 82.6–102.9)
MONOCYTES # BLD: 7 % (ref 3–12)
NRBC AUTOMATED: 0 PER 100 WBC
PDW BLD-RTO: 14.6 % (ref 11.8–14.4)
PLATELET # BLD: 256 K/UL (ref 138–453)
PLATELET ESTIMATE: ABNORMAL
PMV BLD AUTO: 9.7 FL (ref 8.1–13.5)
POTASSIUM SERPL-SCNC: 3.8 MMOL/L (ref 3.7–5.3)
PRO-BNP: <20 PG/ML
RBC # BLD: 3.72 M/UL (ref 4.21–5.77)
RBC # BLD: ABNORMAL 10*6/UL
SEG NEUTROPHILS: 80 % (ref 36–65)
SEGMENTED NEUTROPHILS ABSOLUTE COUNT: 8.71 K/UL (ref 1.5–8.1)
SODIUM BLD-SCNC: 139 MMOL/L (ref 135–144)
TROPONIN INTERP: NORMAL
TROPONIN INTERP: NORMAL
TROPONIN T: NORMAL NG/ML
TROPONIN T: NORMAL NG/ML
TROPONIN, HIGH SENSITIVITY: 6 NG/L (ref 0–22)
TROPONIN, HIGH SENSITIVITY: 6 NG/L (ref 0–22)
TSH SERPL DL<=0.05 MIU/L-ACNC: 0.84 MIU/L (ref 0.3–5)
WBC # BLD: 10.9 K/UL (ref 3.5–11.3)
WBC # BLD: ABNORMAL 10*3/UL

## 2020-03-15 PROCEDURE — 6370000000 HC RX 637 (ALT 250 FOR IP): Performed by: STUDENT IN AN ORGANIZED HEALTH CARE EDUCATION/TRAINING PROGRAM

## 2020-03-15 PROCEDURE — 2580000003 HC RX 258: Performed by: STUDENT IN AN ORGANIZED HEALTH CARE EDUCATION/TRAINING PROGRAM

## 2020-03-15 PROCEDURE — 80048 BASIC METABOLIC PNL TOTAL CA: CPT

## 2020-03-15 PROCEDURE — 93005 ELECTROCARDIOGRAM TRACING: CPT | Performed by: STUDENT IN AN ORGANIZED HEALTH CARE EDUCATION/TRAINING PROGRAM

## 2020-03-15 PROCEDURE — 84443 ASSAY THYROID STIM HORMONE: CPT

## 2020-03-15 PROCEDURE — 84484 ASSAY OF TROPONIN QUANT: CPT

## 2020-03-15 PROCEDURE — 82947 ASSAY GLUCOSE BLOOD QUANT: CPT

## 2020-03-15 PROCEDURE — 6360000002 HC RX W HCPCS: Performed by: STUDENT IN AN ORGANIZED HEALTH CARE EDUCATION/TRAINING PROGRAM

## 2020-03-15 PROCEDURE — 96374 THER/PROPH/DIAG INJ IV PUSH: CPT

## 2020-03-15 PROCEDURE — 83880 ASSAY OF NATRIURETIC PEPTIDE: CPT

## 2020-03-15 PROCEDURE — 99285 EMERGENCY DEPT VISIT HI MDM: CPT

## 2020-03-15 PROCEDURE — 71046 X-RAY EXAM CHEST 2 VIEWS: CPT

## 2020-03-15 PROCEDURE — 85025 COMPLETE CBC W/AUTO DIFF WBC: CPT

## 2020-03-15 PROCEDURE — 85379 FIBRIN DEGRADATION QUANT: CPT

## 2020-03-15 PROCEDURE — G0378 HOSPITAL OBSERVATION PER HR: HCPCS

## 2020-03-15 PROCEDURE — 83735 ASSAY OF MAGNESIUM: CPT

## 2020-03-15 RX ORDER — NICOTINE POLACRILEX 4 MG
15 LOZENGE BUCCAL PRN
Status: DISCONTINUED | OUTPATIENT
Start: 2020-03-15 | End: 2020-03-16 | Stop reason: HOSPADM

## 2020-03-15 RX ORDER — ONDANSETRON 4 MG/1
8 TABLET, ORALLY DISINTEGRATING ORAL EVERY 8 HOURS PRN
Status: DISCONTINUED | OUTPATIENT
Start: 2020-03-15 | End: 2020-03-16 | Stop reason: HOSPADM

## 2020-03-15 RX ORDER — UREA 10 %
3 LOTION (ML) TOPICAL ONCE
Status: COMPLETED | OUTPATIENT
Start: 2020-03-15 | End: 2020-03-15

## 2020-03-15 RX ORDER — DEXTROSE MONOHYDRATE 50 MG/ML
100 INJECTION, SOLUTION INTRAVENOUS PRN
Status: DISCONTINUED | OUTPATIENT
Start: 2020-03-15 | End: 2020-03-16 | Stop reason: HOSPADM

## 2020-03-15 RX ORDER — SODIUM CHLORIDE 0.9 % (FLUSH) 0.9 %
10 SYRINGE (ML) INJECTION PRN
Status: DISCONTINUED | OUTPATIENT
Start: 2020-03-15 | End: 2020-03-16 | Stop reason: HOSPADM

## 2020-03-15 RX ORDER — IBUPROFEN 400 MG/1
600 TABLET ORAL EVERY 6 HOURS PRN
Status: DISCONTINUED | OUTPATIENT
Start: 2020-03-15 | End: 2020-03-16 | Stop reason: HOSPADM

## 2020-03-15 RX ORDER — GABAPENTIN 400 MG/1
800 CAPSULE ORAL 3 TIMES DAILY
Status: DISCONTINUED | OUTPATIENT
Start: 2020-03-15 | End: 2020-03-16 | Stop reason: HOSPADM

## 2020-03-15 RX ORDER — DEXTROSE MONOHYDRATE 25 G/50ML
12.5 INJECTION, SOLUTION INTRAVENOUS PRN
Status: DISCONTINUED | OUTPATIENT
Start: 2020-03-15 | End: 2020-03-16 | Stop reason: HOSPADM

## 2020-03-15 RX ORDER — SODIUM CHLORIDE 0.9 % (FLUSH) 0.9 %
10 SYRINGE (ML) INJECTION EVERY 12 HOURS SCHEDULED
Status: DISCONTINUED | OUTPATIENT
Start: 2020-03-15 | End: 2020-03-16 | Stop reason: HOSPADM

## 2020-03-15 RX ORDER — FENTANYL CITRATE 50 UG/ML
50 INJECTION, SOLUTION INTRAMUSCULAR; INTRAVENOUS ONCE
Status: COMPLETED | OUTPATIENT
Start: 2020-03-15 | End: 2020-03-15

## 2020-03-15 RX ORDER — ACETAMINOPHEN 500 MG
1000 TABLET ORAL ONCE
Status: COMPLETED | OUTPATIENT
Start: 2020-03-15 | End: 2020-03-15

## 2020-03-15 RX ORDER — ACETAMINOPHEN 325 MG/1
650 TABLET ORAL EVERY 4 HOURS PRN
Status: DISCONTINUED | OUTPATIENT
Start: 2020-03-15 | End: 2020-03-16 | Stop reason: HOSPADM

## 2020-03-15 RX ORDER — ASPIRIN 81 MG/1
324 TABLET, CHEWABLE ORAL DAILY
Status: DISCONTINUED | OUTPATIENT
Start: 2020-03-15 | End: 2020-03-16 | Stop reason: HOSPADM

## 2020-03-15 RX ADMIN — IBUPROFEN 600 MG: 400 TABLET, FILM COATED ORAL at 21:41

## 2020-03-15 RX ADMIN — FENTANYL CITRATE 50 MCG: 50 INJECTION, SOLUTION INTRAMUSCULAR; INTRAVENOUS at 15:47

## 2020-03-15 RX ADMIN — ACETAMINOPHEN 650 MG: 325 TABLET ORAL at 18:42

## 2020-03-15 RX ADMIN — Medication 10 ML: at 21:42

## 2020-03-15 RX ADMIN — ACETAMINOPHEN 1000 MG: 500 TABLET ORAL at 15:26

## 2020-03-15 RX ADMIN — Medication 3 MG: at 22:47

## 2020-03-15 RX ADMIN — ASPIRIN 81 MG 324 MG: 81 TABLET ORAL at 15:26

## 2020-03-15 RX ADMIN — INSULIN LISPRO 3 UNITS: 100 INJECTION, SOLUTION INTRAVENOUS; SUBCUTANEOUS at 21:02

## 2020-03-15 RX ADMIN — GABAPENTIN 800 MG: 400 CAPSULE ORAL at 22:46

## 2020-03-15 ASSESSMENT — PAIN DESCRIPTION - DESCRIPTORS: DESCRIPTORS: DISCOMFORT;ACHING

## 2020-03-15 ASSESSMENT — PAIN DESCRIPTION - LOCATION: LOCATION: ABDOMEN;CHEST

## 2020-03-15 ASSESSMENT — PAIN SCALES - GENERAL
PAINLEVEL_OUTOF10: 8
PAINLEVEL_OUTOF10: 7
PAINLEVEL_OUTOF10: 7
PAINLEVEL_OUTOF10: 8
PAINLEVEL_OUTOF10: 7

## 2020-03-15 ASSESSMENT — PAIN DESCRIPTION - ORIENTATION: ORIENTATION: UPPER;MID

## 2020-03-15 ASSESSMENT — PAIN DESCRIPTION - PAIN TYPE: TYPE: ACUTE PAIN

## 2020-03-15 NOTE — ED PROVIDER NOTES
[facility]  Emergency Department  Faculty Attestation     I performed a history and physical examination of the patient and discussed management with the resident. I reviewed the residents note and agree with the documented findings and plan of care. Any areas of disagreement are noted on the chart. I was personally present for the key portions of any procedures. I have documented in the chart those procedures where I was not present during the key portions. I have reviewed the emergency nurses triage note. I agree with the chief complaint, past medical history, past surgical history, allergies, medications, social and family history as documented unless otherwise noted below. For Physician Assistant/ Nurse Practitioner cases/documentation I have personally evaluated this patient and have completed at least one if not all key elements of the E/M (history, physical exam, and MDM). Additional findings are as noted. Primary Care Physician:  Albaro Grier MD    Screenings:  [address]       Chief Complaint   Patient presents with    Chest Pain     intermittent epigastric to midsternal chest pain since last night, worse today. Pt feels like his heart is racing       RECENT VITALS:   Temp: 98 °F (36.7 °C),  Pulse: 89, Resp: 18, BP: 128/88    LABS:  Labs Reviewed   CBC WITH AUTO DIFFERENTIAL - Abnormal; Notable for the following components:       Result Value    RBC 3.72 (*)     Hemoglobin 12.4 (*)     Hematocrit 33.5 (*)     MCHC 37.0 (*)     RDW 14.6 (*)     Seg Neutrophils 80 (*)     Lymphocytes 11 (*)     Immature Granulocytes 1 (*)     Segs Absolute 8.71 (*)     All other components within normal limits   BASIC METABOLIC PANEL   MAGNESIUM   TSH WITH REFLEX   BRAIN NATRIURETIC PEPTIDE   TROPONIN   TROPONIN       Radiology  XR CHEST STANDARD (2 VW)   Final Result   No acute cardiopulmonary abnormality.              EKG:   EKG Interpretation    Interpreted by me    Rhythm: normal sinus   Rate: normal  Axis: normal  Ectopy: none  Conduction: Notching of R wave versus incomplete right bundle branch block pattern  ST Segments: no acute change  T Waves: Inversion aVL  Q Waves: none    Clinical Impression: Nonspecific    Attending Physician Additional  Notes    Patient has sudden onset of left costal margin pain with radiation to the back. It is worse with movement and breathing. No cough sputum hemoptysis. No fever chills sweats. He does have left leg discomfort recently but no swelling. No history of DVT/PE. He is had extensive abdominal surgery but nothing recently. No esophageal symptoms. No cardiac history. On exam he is mildly uncomfortable afebrile vital signs normal.  There is reproducible chest wall tenderness. Lungs are clear. Abdomen is soft and nontender. No edema cords Homans or calf tenderness. Impression is musculoskeletal chest wall pain versus pleurodynia, unlikely ACS or VTE. Unable to [facility] due to age, will add d-dimer but Wells' score is low. Plan is chest x-ray, labs, analgesics, reassess. Juvenal Baldwin.  Jose Juan Wallace MD, [facility]  Attending Emergency  Physician                Jared Rooney MD  03/15/20 1530       Jared Rooney MD  03/15/20 1539

## 2020-03-15 NOTE — ED NOTES
pt4 ambulatory to bathroom with steady gait, no distress noted     Rodriguez Valdez RN  03/15/20 6517

## 2020-03-15 NOTE — ED PROVIDER NOTES
Ina Roberts MD   lisinopril (PRINIVIL;ZESTRIL) 40 MG tablet Take 1 tablet by mouth daily 2/24/20   Carlene Cabrera MD   Blood Pressure KIT 1 Applicatorful by Does not apply route 2 times daily 1/16/20   Carlene Cabrera MD   traZODone (DESYREL) 50 MG tablet Take 1 tablet by mouth nightly 1/16/20   Carlene Cabrera MD   Blood Glucose Monitoring Suppl (TRUE METRIX METER) w/Device KIT Use as directed 12/22/19   Shelley Roldan MD   blood glucose test strips (ASCENSIA AUTODISC VI;ONE TOUCH ULTRA TEST VI) strip 1 each by In Vitro route daily As needed. 12/22/19   Shelley Roldan MD   blood glucose test strips (ASCENSIA AUTODISC VI;ONE TOUCH ULTRA TEST VI) strip Use with associated glucose meter. To check blood sugar 4 times daily 12/5/19   Carlene Cabrera MD   insulin lispro (HUMALOG) 100 UNIT/ML injection vial Inject 13 Units into the skin 3 times daily (before meals) 12/5/19   Carlene Cabrera MD   fluticasone Verline Memory) 50 MCG/ACT nasal spray 1 spray by Each Nostril route daily 11/22/19   Carlene Cabrera MD   insulin glargine [facility]) 100 UNIT/ML injection pen Inject 40 Units into the skin nightly 11/22/19   Carlene Cabrera MD   insulin aspart (NOVOLOG FLEXPEN) 100 UNIT/ML injection pen Inject 13 Units into the skin 3 times daily (before meals) 11/12/19   Carlene Cabrera MD   ibuprofen (ADVIL;MOTRIN) 800 MG tablet Take 1 tablet by mouth 2 times daily as needed for Pain 10/30/19   Carlene Cabrera MD   Lancets MISC Check blood sugar 4 times daily (AC HS) 10/25/19   Carlene Cabrera MD   Alcohol Swabs PADS Use as needed while checking blood sugar 10/25/19   Carlene Cabrera MD   blood glucose monitor kit and supplies Test 4 times a day & as needed for symptoms of irregular blood glucose.  9/24/19   Carlene Cabrera MD   ipratropium (ATROVENT) 0.03 % nasal spray 2 sprays by Nasal route 3 times daily for 14 days 1/2/18 1/16/18  Claudia Higuera MD   aspirin EC 81 MG EC tablet Take 1 tablet by mouth daily 11/20/17   Rashad GENAO is 128/88 and his pulse is 89. His respiration is 18 and oxygen saturation is 100%. Physical Exam  GENERAL: upon initial examination, patient is well appearing, nontoxic, and not in acute respiratory distress  HENT: normocephalic , nose normal,   EYES: no occular discharge, no scleral icterus  NECK: no JVD, no tracheal deviation  CV: Normal S1 S2, no MRG  PULM / CHEST: CTA Bilaterally all fields, no WRR, no pitting edema in lower extremities no unilateral leg swelling or calf tenderness palpation. ABDOMEN: SNTND, No peritoneal signs  MSK: no gross deformity, no edema, no TTP  SKIN: no rash, no erythema, cap refill < 2 sec  PSYCH / BEHAVIORAL: mood/affect normal, behavior normal, no flight of ideas      DIFFERENTIAL  DIAGNOSIS/ MDM   PLAN (LABS / IMAGING / EKG):  Orders Placed This Encounter   Procedures    XR CHEST STANDARD (2 VW)    CBC Auto Differential    Basic Metabolic Panel    Magnesium    TSH with Reflex    Brain Natriuretic Peptide    Troponin    D-Dimer, Quantitative    EKG 12 Lead       MEDICATIONS ORDERED:  Orders Placed This Encounter   Medications    aspirin chewable tablet 324 mg    acetaminophen (TYLENOL) tablet 1,000 mg    fentaNYL (SUBLIMAZE) injection 50 mcg               MDM:    Eva Lara is a 64 y.o. male who presents with chest pain. Patient has multiple cardiac risk factors. Will do cardiac work-up and pain control.         HEART Risk Score for Chest Pain Patients      History and Physical Exam Suspicion Level   · Nausea/Vomiting   · Diaphoresis   · Radiation   · Related to Exertion  · Quality of Pain     EKG Interpretation  · Normal (0 pts)  · Non-Specific Repolarization Disturbance (1 pt)  · Significant ST-Depression (2 pts)     Age of Patient (in years)  · = 45 (0 pts)  · 46-64 (1 pt)  · = 65 (2 pts)    Risk Factors (number present)  · Hypercholesterolemia  · Hypertension  · Diabetes Mellitus  · Cigarette smoking  · Positive family

## 2020-03-15 NOTE — ED NOTES
Pt to ER with c/o intermittent epigastric to midsternal chest pain radiating to left side of chest since last night, worse today. Pt feels like his heart is racing and has had a headache since last night, Denies SOB, dizziness, cough, no N/V/D/C/fever/chills. Afebrile. Placed pt on full cardiac monitor, no acute distress noted, rr even and NL.       Sharri Singh RN  03/15/20 0093

## 2020-03-16 ENCOUNTER — APPOINTMENT (OUTPATIENT)
Dept: NUCLEAR MEDICINE | Age: 60
End: 2020-03-16
Payer: MEDICAID

## 2020-03-16 VITALS
OXYGEN SATURATION: 100 % | SYSTOLIC BLOOD PRESSURE: 122 MMHG | HEIGHT: 69 IN | BODY MASS INDEX: 25.18 KG/M2 | HEART RATE: 87 BPM | DIASTOLIC BLOOD PRESSURE: 91 MMHG | RESPIRATION RATE: 16 BRPM | TEMPERATURE: 97.5 F | WEIGHT: 170 LBS

## 2020-03-16 LAB
EKG ATRIAL RATE: 82 BPM
EKG ATRIAL RATE: 90 BPM
EKG P AXIS: 38 DEGREES
EKG P AXIS: 64 DEGREES
EKG P-R INTERVAL: 154 MS
EKG P-R INTERVAL: 156 MS
EKG Q-T INTERVAL: 364 MS
EKG Q-T INTERVAL: 374 MS
EKG QRS DURATION: 86 MS
EKG QRS DURATION: 88 MS
EKG QTC CALCULATION (BAZETT): 436 MS
EKG QTC CALCULATION (BAZETT): 445 MS
EKG R AXIS: 25 DEGREES
EKG R AXIS: 54 DEGREES
EKG T AXIS: 65 DEGREES
EKG T AXIS: 72 DEGREES
EKG VENTRICULAR RATE: 82 BPM
EKG VENTRICULAR RATE: 90 BPM
GLUCOSE BLD-MCNC: 248 MG/DL (ref 75–110)
GLUCOSE BLD-MCNC: 74 MG/DL (ref 75–110)
LV EF: 61 %
LVEF MODALITY: NORMAL

## 2020-03-16 PROCEDURE — A9500 TC99M SESTAMIBI: HCPCS | Performed by: STUDENT IN AN ORGANIZED HEALTH CARE EDUCATION/TRAINING PROGRAM

## 2020-03-16 PROCEDURE — 78452 HT MUSCLE IMAGE SPECT MULT: CPT

## 2020-03-16 PROCEDURE — 6370000000 HC RX 637 (ALT 250 FOR IP): Performed by: STUDENT IN AN ORGANIZED HEALTH CARE EDUCATION/TRAINING PROGRAM

## 2020-03-16 PROCEDURE — 6360000002 HC RX W HCPCS: Performed by: STUDENT IN AN ORGANIZED HEALTH CARE EDUCATION/TRAINING PROGRAM

## 2020-03-16 PROCEDURE — 2580000003 HC RX 258: Performed by: STUDENT IN AN ORGANIZED HEALTH CARE EDUCATION/TRAINING PROGRAM

## 2020-03-16 PROCEDURE — 93010 ELECTROCARDIOGRAM REPORT: CPT | Performed by: INTERNAL MEDICINE

## 2020-03-16 PROCEDURE — 82947 ASSAY GLUCOSE BLOOD QUANT: CPT

## 2020-03-16 PROCEDURE — 93017 CV STRESS TEST TRACING ONLY: CPT

## 2020-03-16 PROCEDURE — G0378 HOSPITAL OBSERVATION PER HR: HCPCS

## 2020-03-16 PROCEDURE — 96372 THER/PROPH/DIAG INJ SC/IM: CPT

## 2020-03-16 PROCEDURE — 3430000000 HC RX DIAGNOSTIC RADIOPHARMACEUTICAL: Performed by: STUDENT IN AN ORGANIZED HEALTH CARE EDUCATION/TRAINING PROGRAM

## 2020-03-16 PROCEDURE — 93005 ELECTROCARDIOGRAM TRACING: CPT | Performed by: EMERGENCY MEDICINE

## 2020-03-16 RX ORDER — ALBUTEROL SULFATE 90 UG/1
2 AEROSOL, METERED RESPIRATORY (INHALATION) PRN
Status: DISCONTINUED | OUTPATIENT
Start: 2020-03-16 | End: 2020-03-16 | Stop reason: ALTCHOICE

## 2020-03-16 RX ORDER — SODIUM CHLORIDE 9 MG/ML
500 INJECTION, SOLUTION INTRAVENOUS CONTINUOUS PRN
Status: DISCONTINUED | OUTPATIENT
Start: 2020-03-16 | End: 2020-03-16 | Stop reason: ALTCHOICE

## 2020-03-16 RX ORDER — SODIUM CHLORIDE 0.9 % (FLUSH) 0.9 %
10 SYRINGE (ML) INJECTION PRN
Status: DISCONTINUED | OUTPATIENT
Start: 2020-03-16 | End: 2020-03-16 | Stop reason: HOSPADM

## 2020-03-16 RX ORDER — NITROGLYCERIN 0.4 MG/1
0.4 TABLET SUBLINGUAL EVERY 5 MIN PRN
Status: DISCONTINUED | OUTPATIENT
Start: 2020-03-16 | End: 2020-03-16 | Stop reason: ALTCHOICE

## 2020-03-16 RX ORDER — METOPROLOL TARTRATE 5 MG/5ML
5 INJECTION INTRAVENOUS EVERY 5 MIN PRN
Status: DISCONTINUED | OUTPATIENT
Start: 2020-03-16 | End: 2020-03-16 | Stop reason: ALTCHOICE

## 2020-03-16 RX ORDER — ATROPINE SULFATE 0.1 MG/ML
0.5 INJECTION INTRAVENOUS EVERY 5 MIN PRN
Status: DISCONTINUED | OUTPATIENT
Start: 2020-03-16 | End: 2020-03-16 | Stop reason: ALTCHOICE

## 2020-03-16 RX ORDER — ACETAMINOPHEN 325 MG/1
650 TABLET ORAL 2 TIMES DAILY PRN
Qty: 120 TABLET | Refills: 3 | Status: SHIPPED | OUTPATIENT
Start: 2020-03-16 | End: 2020-05-29 | Stop reason: ALTCHOICE

## 2020-03-16 RX ORDER — SODIUM CHLORIDE 0.9 % (FLUSH) 0.9 %
10 SYRINGE (ML) INJECTION PRN
Status: DISCONTINUED | OUTPATIENT
Start: 2020-03-16 | End: 2020-03-16 | Stop reason: ALTCHOICE

## 2020-03-16 RX ORDER — NITROGLYCERIN 0.4 MG/1
0.4 TABLET SUBLINGUAL EVERY 5 MIN PRN
Qty: 25 TABLET | Refills: 0 | Status: SHIPPED | OUTPATIENT
Start: 2020-03-16 | End: 2021-08-19

## 2020-03-16 RX ORDER — AMINOPHYLLINE DIHYDRATE 25 MG/ML
50 INJECTION, SOLUTION INTRAVENOUS PRN
Status: DISCONTINUED | OUTPATIENT
Start: 2020-03-16 | End: 2020-03-16 | Stop reason: ALTCHOICE

## 2020-03-16 RX ADMIN — TETRAKIS(2-METHOXYISOBUTYLISOCYANIDE)COPPER(I) TETRAFLUOROBORATE 38 MILLICURIE: 1 INJECTION, POWDER, LYOPHILIZED, FOR SOLUTION INTRAVENOUS at 14:52

## 2020-03-16 RX ADMIN — REGADENOSON 0.4 MG: 0.08 INJECTION, SOLUTION INTRAVENOUS at 10:59

## 2020-03-16 RX ADMIN — Medication 10 ML: at 08:49

## 2020-03-16 RX ADMIN — GABAPENTIN 800 MG: 400 CAPSULE ORAL at 08:47

## 2020-03-16 RX ADMIN — Medication 10 ML: at 10:59

## 2020-03-16 RX ADMIN — ENOXAPARIN SODIUM 40 MG: 40 INJECTION SUBCUTANEOUS at 08:48

## 2020-03-16 RX ADMIN — Medication 10 ML: at 11:00

## 2020-03-16 RX ADMIN — IBUPROFEN 600 MG: 400 TABLET, FILM COATED ORAL at 08:46

## 2020-03-16 RX ADMIN — IBUPROFEN 600 MG: 400 TABLET, FILM COATED ORAL at 16:40

## 2020-03-16 RX ADMIN — GABAPENTIN 800 MG: 400 CAPSULE ORAL at 13:55

## 2020-03-16 RX ADMIN — ASPIRIN 81 MG 324 MG: 81 TABLET ORAL at 08:47

## 2020-03-16 RX ADMIN — TETRAKIS(2-METHOXYISOBUTYLISOCYANIDE)COPPER(I) TETRAFLUOROBORATE 14 MILLICURIE: 1 INJECTION, POWDER, LYOPHILIZED, FOR SOLUTION INTRAVENOUS at 09:10

## 2020-03-16 RX ADMIN — INSULIN LISPRO 2 UNITS: 100 INJECTION, SOLUTION INTRAVENOUS; SUBCUTANEOUS at 13:52

## 2020-03-16 ASSESSMENT — PAIN SCALES - GENERAL
PAINLEVEL_OUTOF10: 8
PAINLEVEL_OUTOF10: 6
PAINLEVEL_OUTOF10: 8

## 2020-03-16 NOTE — CARE COORDINATION
Case Management Initial Discharge Plan  Martin Kearney,             Met with:patient to discuss discharge plans. Information verified: address, contacts, phone number, , insurance Yes  PCP: Ayla Kemp MD  Date of last visit: 2 weeks ago     Insurance Provider: [facility]     Discharge Planning    Living Arrangements:  Spouse/Significant Other   Support Systems:  None    Home has 2 stories  5 stairs to climb to get into front door, 1 flight stairs to climb to reach second floor  Location of bedroom/bathroom in home second floor     Patient able to perform ADL's:Independent    Current Services (outpatient & in home) none   DME equipment: none   DME provider: na       Potential Assistance Needed:  N/A    Patient agreeable to home care: No  [place] of choice provided:  n/a    Prior SNF/Rehab Placement and Facility: no  Agreeable to SNF/Rehab: No  [place] of choice provided: n/a   Evaluation: no    Expected Discharge date:  20  Patient expects to be discharged to:  home  Follow Up Appointment: Best Day/ Time: Monday PM    Transportation provider: family   Transportation arrangements needed for discharge: No    Readmission Risk              Risk of Unplanned Readmission:        0             Does patient have a readmission risk score greater than 14?: No  If yes, follow-up appointment must be made within 7 days of discharge. Goals of Care:  To have pain controlled and to check why he is having CP      Discharge Plan: Home independently after testing           Electronically signed by Unique Wilcox RN on 3/16/20 at 12:23 PM EDT

## 2020-03-16 NOTE — PROGRESS NOTES
[facility]  CDU / OBSERVATION eNCOUnter  Attending NOte       I performed a history and physical examination of the patient and discussed management with the resident. I reviewed the residents note and agree with the documented findings and plan of care. Any areas of disagreement are noted on the chart. I was personally present for the key portions of any procedures. I have documented in the chart those procedures where I was not present during the key portions. I have reviewed the nurses notes. I agree with the chief complaint, past medical history, past surgical history, allergies, medications, social and family history as documented unless otherwise noted below. The Family history, social history, and ROS are effectively unchanged since admission unless noted elsewhere in the chart. Negative EKG and troponins in ED. Heart score of 4. Patient admitted for cardiology evaluation. No prior testing. No other complaint. Patient is feeling well. Will disposition based on stress testing result. The patient is diabetic. I have reviewed the patient's chart and found the most recent A1c is 7.6. This indicates reasonable control.  Will continue to follow-up with PCP      Angelica Castelan MD  Attending Emergency  Physician

## 2020-03-16 NOTE — H&P
Attack in an other family member; Hypertension in an other family member. The patient denies any pertinent family history. I have reviewed and agree with the family history entered. I have reviewed the Family History and it is not significant to the case    SOCIAL HISTORY      reports that he has quit smoking. His smoking use included cigars. He has never used smokeless tobacco. He reports that he does not drink alcohol or use drugs. I have reviewed and agree with all Social.    PHYSICAL EXAM     INITIAL VITALS:  height is 5' 9\" (1.753 m) and weight is 170 lb (77.1 kg). His temperature is 97.5 °F (36.4 °C). His blood pressure is 122/91 (abnormal) and his pulse is 87. His respiration is 16 and oxygen saturation is 100%.       CONSTITUTIONAL: AOx4, no apparent distress, appears stated age   HEAD: normocephalic, atraumatic   EYES: PERRLA, EOMI    ENT: moist mucous membranes, uvula midline   NECK: supple, symmetric   BACK: symmetric   LUNGS: clear to auscultation bilaterally   CARDIOVASCULAR: regular rate and rhythm, no murmurs, rubs or gallops   ABDOMEN: soft, non-tender, non-distended with normal active bowel sounds   NEUROLOGIC:  MAEx4, no focal sensory or motor deficits   MUSCULOSKELETAL: no clubbing, cyanosis or edema   SKIN: no rash or wounds       DIFFERENTIAL DIAGNOSIS/MDM:   Chest Pain:  DDX: Emergent: ACS/NSTEMI/STEMI/angina, arrhythmia, trauma, aortic dissection,  PE, PNA, pneumothroax, esophageal rupture, tamponade, Cocaine use  Nonemergent: pneumonia, pericarditis, GERD, MSK, Endocarditis, anxiety  Evaluated for: diaphoresis, present chest pain, tachypnea, BP both arms, heart sounds, JVD, tender chest wall, wheezing    DIAGNOSTIC RESULTS     EKG: All EKG's are interpreted by the Observation Physician who either signs or Co-signs this chart in the absence of a cardiologist.    EKG Interpretation    Interpreted by observation physician    Rhythm: normal sinus   Rate: normal  Axis: normal  Ectopy: Possible Risk Score for Chest Pain Patients   History and Physical Exam Suspicion Level  (Nausea, Vomiting, Diaphoresis, Radiation, Exertion)   Slightly Suspicious (0 pts)   Moderately Suspicious (1 pt)   Highly Suspicious (2 pts)   EKG Interpretation   Normal (0 pts)   Non-Specific Repolarization Disturbance (1 pt)   Significant ST-Depression (2 pts)   Age of Patient (in years)   = 39 (0 pts)   46-64 (1 pt)   = 65 (2 pts)   Risk Factors   No Risk Factors (0 pts)   1-2 Risk Factors (1 pt)   = 3 Risk Factors (2 pts)   Risk Factors Include:   Hypercholesterolemia   Hypertension   Diabetes Mellitus   Cigarette smoking   Positive family history   Obesity   CAD   (SLE, CKDz, HIV, Cocaine abuse)   Troponin Levels   = Normal Limit (0 pts)   1-3 Times Normal Limit (1 pt)   > 3 Times Normal Limit (2 pts)  TOTAL: 5    Percent Risk for Major Adverse Cardiac Event (MACE)  0-3 pts indicates low risk for MACE   2.5% (DISCHARGE)   4-7 pts indicates moderate risk for MACE  20.3% (OBS)  8-10 pts indicates high risk for MACE  72.7% (EARLY INVASIVE TX)    CDU IMPRESSION / Giorgi Crew is a 64 y.o. male who presents with:    1) acute midsternal chest pain with associated palpitations, unclear etiology, initial encounter   1. Stress test ordered for today, results pending   2. Symptom control with p.o. Tylenol, IV Zofran    2) chronic type 2 diabetes mellitus with associated acute hyperglycemia, initial encounter   1. Blood glucose was 362 in emergency department. Normalizing at 74 this morning   2. SubQ insulin sliding scale orders placed. Hypoglycemia treatment protocol ordered   3. Last hemoglobin A1c 7.6.   This indicates adequate long-term glucose control    · None  · Further workup and evaluation   · Follow up recommendations     · Continue current home meds, pain control   · Monitor vitals, labs, imaging     DISPO: pending consults and clinical improvement     CONSULTS:    None    PROCEDURES:        PATIENT REFERRED TO:    Africa Ferris MD  [address]  Andria Alcantar D.O. PGY1  Emergency Medicine Resident Physician

## 2020-03-17 ENCOUNTER — TELEPHONE (OUTPATIENT)
Dept: FAMILY MEDICINE CLINIC | Age: 60
End: 2020-03-17

## 2020-03-20 RX ORDER — GABAPENTIN 800 MG/1
TABLET ORAL
Qty: 90 TABLET | Refills: 0 | Status: SHIPPED | OUTPATIENT
Start: 2020-03-20 | End: 2020-04-22 | Stop reason: SDUPTHER

## 2020-03-21 NOTE — DISCHARGE SUMMARY
DULoxetine 30 MG extended release capsule  Commonly known as:  Cymbalta     ibuprofen 800 MG tablet  Commonly known as:  ADVIL;MOTRIN     ipratropium 0.03 % nasal spray  Commonly known as: Atrovent     naproxen 500 MG tablet  Commonly known as:  Naprosyn     topiramate 50 MG tablet  Commonly known as:   Topamax     traZODone 50 MG tablet  Commonly known as:  DESYREL           Where to Get Your Medications      You can get these medications from any pharmacy    Bring a paper prescription for each of these medications  · acetaminophen 325 MG tablet  · nitroGLYCERIN 0.4 MG SL tablet         Diet:  No diet orders on file, advance as tolerated     Activity:  As tolerated    Consultants: None    Procedures:  Not indicated    Diagnostic Test:   Results for orders placed or performed during the hospital encounter of 03/15/20   CBC Auto Differential   Result Value Ref Range    WBC 10.9 3.5 - 11.3 k/uL    RBC 3.72 (L) 4.21 - 5.77 m/uL    Hemoglobin 12.4 (L) 13.0 - 17.0 g/dL    Hematocrit 33.5 (L) 40.7 - 50.3 %    MCV 90.1 82.6 - 102.9 fL    MCH 33.3 25.2 - 33.5 pg    MCHC 37.0 (H) 28.4 - 34.8 g/dL    RDW 14.6 (H) 11.8 - 14.4 %    Platelets 035 669 - 440 k/uL    MPV 9.7 8.1 - 13.5 fL    NRBC Automated 0.0 0.0 per 100 WBC    Differential Type NOT REPORTED     Seg Neutrophils 80 (H) 36 - 65 %    Lymphocytes 11 (L) 24 - 43 %    Monocytes 7 3 - 12 %    Eosinophils % 1 1 - 4 %    Basophils 0 0 - 2 %    Immature Granulocytes 1 (H) 0 %    Segs Absolute 8.71 (H) 1.50 - 8.10 k/uL    Absolute Lymph # 1.18 1.10 - 3.70 k/uL    Absolute Mono # 0.75 0.10 - 1.20 k/uL    Absolute Eos # 0.14 0.00 - 0.44 k/uL    Basophils Absolute 0.04 0.00 - 0.20 k/uL    Absolute Immature Granulocyte 0.11 0.00 - 0.30 k/uL    WBC Morphology NOT REPORTED     RBC Morphology ANISOCYTOSIS PRESENT     Platelet Estimate NOT REPORTED    Basic Metabolic Panel   Result Value Ref Range    Glucose 155 (H) 70 - 99 mg/dL    BUN 32 (H) 6 - 20 mg/dL    CREATININE 0.81 0.70 HISTORY: ORDERING SYSTEM PROVIDED HISTORY: cp TECHNOLOGIST PROVIDED HISTORY: cp Reason for Exam: lt side chest pain, sob FINDINGS: The lungs are clear. The cardiac and mediastinal contours are normal.  There is no pleural effusion or pneumothorax. No acute osseous abnormality is identified. No acute cardiopulmonary abnormality. Nm Cardiac Stress Test Nuclear Imaging    Result Date: 3/16/2020  EXAMINATION: MYOCARDIAL PERFUSION IMAGING 3/16/2020 11:19 am TECHNIQUE: For the rest study, 14.0 mCi of Tc 99 labeled sestamibi were injected. SPECT images were acquired. Under cardiology supervision, 0.4mg Tanisha Glad was infused. After pharmacologic stress, 38 mCi of Tc 99 labeled sestamibi were injected. SPECT images with ECG gating were acquired. COMPARISON: None Available. HISTORY: ORDERING SYSTEM PROVIDED HISTORY: Chest pain TECHNOLOGIST PROVIDED HISTORY: Reason for Exam: Chest pain Procedure Type->Rx Chest pain, palpitations Reason for Exam: chest pain, palpitations, hypertension, FINDINGS: Images interpreted utilizing [employer]. There is normal distribution of radiotracer throughout the myocardium without evidence for a significant reversible or fixed perfusion defect. Perfusion scores are visually adjusted to account for artifact. Summed stress score:  0 Summed rest score:  0 Summed reversibility score:  0 Function: End diastolic volume:  70SN Left ventricular ejection fraction:  61% Wall motion abnormalities:  None. TID score:  1.05 (scores greater than 1.39 are considered elevated for Lexiscan stress with Tc99m)     1. No discrete perfusion abnormality to suggest myocardial ischemia/infarction. 2. No wall motion abnormality. Calculated ejection fraction of 61%. 3. Risk stratification: Low Notes concerning risk stratification: Risk stratification incorporates both clinical history and some testing results.   Final risk determination is the responsibility of the ordering provider improved, labs and imaging reviewed. Susi Haines originally presented to the hospital on 3/15/2020  2:47 PM with acute chest pain. At that time it was determined that He required further observation and testing and consultation. Was evaluated by cardiology. Stress test ordered which was negative. No further cardiac work-up or intervention warranted during this admission. labs and imaging were followed daily. Imaging results as above. He is medically stable to be discharged. Disposition: Home    Patient stated that they will not drive themselves home from the hospital if they have gotten pain killers/ narcotics earlier that day and that they will arrange for transportation on their own or work with the  for a ride. Patient counseled NOT to drive while under the influence of narcotics/ pain killers. Condition: Good    Patient stable and ready for discharge home. I have discussed plan of care with patient and they are in understanding. They were instructed to read discharge paperwork. All of their questions and concerns were addressed. Time Spent: 0 day      --  Brianna Patricia DO  Emergency Medicine Resident Physician    This dictation was generated by voice recognition computer software. Although all attempts are made to edit the dictation for accuracy, there may be errors in the transcription that are not intended.

## 2020-03-23 RX ORDER — TOPIRAMATE 50 MG/1
TABLET, FILM COATED ORAL
Qty: 30 TABLET | Refills: 0 | OUTPATIENT
Start: 2020-03-23

## 2020-04-06 RX ORDER — DULOXETIN HYDROCHLORIDE 30 MG/1
CAPSULE, DELAYED RELEASE ORAL
Qty: 30 CAPSULE | Refills: 0 | OUTPATIENT
Start: 2020-04-06

## 2020-04-10 ENCOUNTER — TELEPHONE (OUTPATIENT)
Dept: PAIN MANAGEMENT | Age: 60
End: 2020-04-10

## 2020-04-13 ENCOUNTER — TELEPHONE (OUTPATIENT)
Dept: PAIN MANAGEMENT | Age: 60
End: 2020-04-13

## 2020-04-22 RX ORDER — GABAPENTIN 800 MG/1
TABLET ORAL
Qty: 90 TABLET | Refills: 0 | Status: SHIPPED | OUTPATIENT
Start: 2020-04-22 | End: 2020-06-18

## 2020-04-22 RX ORDER — INSULIN GLARGINE 100 [IU]/ML
40 INJECTION, SOLUTION SUBCUTANEOUS NIGHTLY
Qty: 5 PEN | Refills: 0 | Status: SHIPPED | OUTPATIENT
Start: 2020-04-22 | End: 2020-12-21

## 2020-05-16 ENCOUNTER — APPOINTMENT (OUTPATIENT)
Dept: CT IMAGING | Age: 60
End: 2020-05-16
Payer: MEDICAID

## 2020-05-16 ENCOUNTER — HOSPITAL ENCOUNTER (EMERGENCY)
Age: 60
Discharge: HOME | End: 2020-05-16
Attending: EMERGENCY MEDICINE
Payer: MEDICAID

## 2020-05-16 VITALS
HEART RATE: 104 BPM | DIASTOLIC BLOOD PRESSURE: 87 MMHG | RESPIRATION RATE: 16 BRPM | OXYGEN SATURATION: 100 % | TEMPERATURE: 97.6 F | SYSTOLIC BLOOD PRESSURE: 128 MMHG

## 2020-05-16 LAB
-: ABNORMAL
AMORPHOUS: ABNORMAL
BACTERIA: ABNORMAL
BILIRUBIN URINE: NEGATIVE
CASTS UA: ABNORMAL /LPF (ref 0–8)
COLOR: YELLOW
CRYSTALS, UA: ABNORMAL /HPF
EPITHELIAL CELLS UA: ABNORMAL /HPF (ref 0–5)
GLUCOSE URINE: ABNORMAL
KETONES, URINE: NEGATIVE
LEUKOCYTE ESTERASE, URINE: NEGATIVE
MUCUS: ABNORMAL
NITRITE, URINE: NEGATIVE
OTHER OBSERVATIONS UA: ABNORMAL
PH UA: 5 (ref 5–8)
PROTEIN UA: NEGATIVE
RBC UA: ABNORMAL /HPF (ref 0–4)
RENAL EPITHELIAL, UA: ABNORMAL /HPF
SPECIFIC GRAVITY UA: 1.03 (ref 1–1.03)
TRICHOMONAS: ABNORMAL
TURBIDITY: CLEAR
URINE HGB: NEGATIVE
UROBILINOGEN, URINE: NORMAL
WBC UA: ABNORMAL /HPF (ref 0–5)
YEAST: ABNORMAL

## 2020-05-16 PROCEDURE — 87591 N.GONORRHOEAE DNA AMP PROB: CPT

## 2020-05-16 PROCEDURE — 74176 CT ABD & PELVIS W/O CONTRAST: CPT

## 2020-05-16 PROCEDURE — 96372 THER/PROPH/DIAG INJ SC/IM: CPT

## 2020-05-16 PROCEDURE — 87491 CHLMYD TRACH DNA AMP PROBE: CPT

## 2020-05-16 PROCEDURE — 6370000000 HC RX 637 (ALT 250 FOR IP): Performed by: STUDENT IN AN ORGANIZED HEALTH CARE EDUCATION/TRAINING PROGRAM

## 2020-05-16 PROCEDURE — 99284 EMERGENCY DEPT VISIT MOD MDM: CPT

## 2020-05-16 PROCEDURE — 81001 URINALYSIS AUTO W/SCOPE: CPT

## 2020-05-16 PROCEDURE — 87086 URINE CULTURE/COLONY COUNT: CPT

## 2020-05-16 PROCEDURE — 6360000002 HC RX W HCPCS: Performed by: STUDENT IN AN ORGANIZED HEALTH CARE EDUCATION/TRAINING PROGRAM

## 2020-05-16 RX ORDER — CIPROFLOXACIN 500 MG/1
500 TABLET, FILM COATED ORAL ONCE
Status: COMPLETED | OUTPATIENT
Start: 2020-05-16 | End: 2020-05-16

## 2020-05-16 RX ORDER — CIPROFLOXACIN 500 MG/1
500 TABLET, FILM COATED ORAL 2 TIMES DAILY
Qty: 28 TABLET | Refills: 0 | Status: SHIPPED | OUTPATIENT
Start: 2020-05-16 | End: 2020-05-30

## 2020-05-16 RX ORDER — KETOROLAC TROMETHAMINE 30 MG/ML
30 INJECTION, SOLUTION INTRAMUSCULAR; INTRAVENOUS ONCE
Status: COMPLETED | OUTPATIENT
Start: 2020-05-16 | End: 2020-05-16

## 2020-05-16 RX ORDER — IBUPROFEN 800 MG/1
800 TABLET ORAL EVERY 8 HOURS PRN
Qty: 30 TABLET | Refills: 0 | Status: SHIPPED | OUTPATIENT
Start: 2020-05-16 | End: 2020-05-26

## 2020-05-16 RX ORDER — MORPHINE SULFATE 4 MG/ML
4 INJECTION, SOLUTION INTRAMUSCULAR; INTRAVENOUS ONCE
Status: COMPLETED | OUTPATIENT
Start: 2020-05-16 | End: 2020-05-16

## 2020-05-16 RX ADMIN — MORPHINE SULFATE 4 MG: 4 INJECTION INTRAVENOUS at 18:04

## 2020-05-16 RX ADMIN — KETOROLAC TROMETHAMINE 30 MG: 30 INJECTION, SOLUTION INTRAMUSCULAR at 18:04

## 2020-05-16 RX ADMIN — CIPROFLOXACIN 500 MG: 500 TABLET, FILM COATED ORAL at 20:03

## 2020-05-16 ASSESSMENT — ENCOUNTER SYMPTOMS
EYE PAIN: 0
NAUSEA: 0
ABDOMINAL PAIN: 0
BACK PAIN: 0
SHORTNESS OF BREATH: 0
SORE THROAT: 0
VOMITING: 0

## 2020-05-16 ASSESSMENT — PAIN SCALES - GENERAL
PAINLEVEL_OUTOF10: 10
PAINLEVEL_OUTOF10: 10

## 2020-05-16 NOTE — ED NOTES
Patient states that the pain starts in his scrotum and work up his sides. Patient states that it hurts when he pees.  Patient has an extensive GI history       Sunny Masters RN  05/16/20 2628

## 2020-05-16 NOTE — ED PROVIDER NOTES
[facility]  ED  Emergency Department Encounter  Emergency Medicine Resident     Pt Name: Zoran Branch  AQW:[id number]  Birthdate 1960  Date of evaluation: 5/16/20  PCP:  Marc Shepherd MD    [address] [place] [address]       Chief Complaint   Patient presents with    Abdominal Pain    Dysuria       HISTORY OF PRESENT ILLNESS  (Location/Symptom, Timing/Onset, Context/Setting, Quality, Duration, ModifyingFactors, Severity.)      Zoran Branch is a 64 y.o. male presents with 1 month of dysuria, testicular pain bilaterally. Patient states pain has gotten worse which is why he came today. Pain on both sides of his scrotum, better with elevation of testicles. No history of inguinal hernia. Pain is worse with urination and worse at night. No trauma to the area, redness or rash, no fevers or chills, abdominal pain, nausea or vomiting. Additionally has left-sided flank pain that is intermittent. Has had kidney stones before and states this feels similar. He denies any hematuria. PAST MEDICAL / SURGICAL / SOCIAL /FAMILY HISTORY      has a past medical history of Bowel obstruction ([address] Cocaine abuse ([address] Diabetes mellitus ([address] Diverticulitis, Diverticulosis, GERD (gastroesophageal reflux disease), Hypertension, MIGUEL (obstructive sleep apnea), Osteoarthritis, Renal lithiasis, Rheumatoid arteritis ([address] and Type II or unspecified type diabetes mellitus without mention of complication, not stated as uncontrolled. No other pertinent PMH on review with patient/guardian. has a past surgical history that includes Meckel's diverticulum excision; hernia repair; Cholecystectomy; Urethra dilation; Small intestine surgery; Colonoscopy; Colon surgery; and Appendectomy. No other pertinent PSH on review with patient/guardian.   Social History     Socioeconomic History    Marital status:      Spouse name: Not on file    Number of children: Not on file    Years of education: Not on file    Emergency Medicine  05/16/20 8:56 PM        (Please note that portions of this note were completed with a voice recognition program.Efforts were made to edit the dictations but occasionally words are mis-transcribed.)        Cosme Fernández DO  Resident  05/16/20 2130

## 2020-05-17 LAB
CULTURE: NO GROWTH
Lab: NORMAL
SPECIMEN DESCRIPTION: NORMAL

## 2020-05-18 LAB
C. TRACHOMATIS DNA ,URINE: NEGATIVE
N. GONORRHOEAE DNA, URINE: NEGATIVE
SPECIMEN DESCRIPTION: NORMAL

## 2020-05-20 ENCOUNTER — HOSPITAL ENCOUNTER (EMERGENCY)
Age: 60
Discharge: HOME | End: 2020-05-20
Attending: EMERGENCY MEDICINE
Payer: MEDICAID

## 2020-05-20 ENCOUNTER — APPOINTMENT (OUTPATIENT)
Dept: ULTRASOUND IMAGING | Age: 60
End: 2020-05-20
Payer: MEDICAID

## 2020-05-20 VITALS
HEIGHT: 70 IN | TEMPERATURE: 98.3 F | WEIGHT: 173 LBS | RESPIRATION RATE: 20 BRPM | OXYGEN SATURATION: 99 % | HEART RATE: 91 BPM | SYSTOLIC BLOOD PRESSURE: 141 MMHG | BODY MASS INDEX: 24.77 KG/M2 | DIASTOLIC BLOOD PRESSURE: 92 MMHG

## 2020-05-20 LAB
-: ABNORMAL
ABSOLUTE EOS #: 0.13 K/UL (ref 0–0.44)
ABSOLUTE IMMATURE GRANULOCYTE: 0.1 K/UL (ref 0–0.3)
ABSOLUTE LYMPH #: 1.46 K/UL (ref 1.1–3.7)
ABSOLUTE MONO #: 0.66 K/UL (ref 0.1–1.2)
ALBUMIN SERPL-MCNC: 3.7 G/DL (ref 3.5–5.2)
ALBUMIN/GLOBULIN RATIO: 1.8 (ref 1–2.5)
ALP BLD-CCNC: 80 U/L (ref 40–129)
ALT SERPL-CCNC: 11 U/L (ref 5–41)
AMORPHOUS: ABNORMAL
ANION GAP SERPL CALCULATED.3IONS-SCNC: 11 MMOL/L (ref 9–17)
AST SERPL-CCNC: 12 U/L
BACTERIA: ABNORMAL
BASOPHILS # BLD: 0 % (ref 0–2)
BASOPHILS ABSOLUTE: 0.03 K/UL (ref 0–0.2)
BILIRUB SERPL-MCNC: 0.62 MG/DL (ref 0.3–1.2)
BILIRUBIN URINE: NEGATIVE
BUN BLDV-MCNC: 20 MG/DL (ref 6–20)
BUN/CREAT BLD: ABNORMAL (ref 9–20)
C-REACTIVE PROTEIN: <0.3 MG/L (ref 0–5)
CALCIUM SERPL-MCNC: 8.5 MG/DL (ref 8.6–10.4)
CASTS UA: ABNORMAL /LPF (ref 0–8)
CHLORIDE BLD-SCNC: 104 MMOL/L (ref 98–107)
CO2: 22 MMOL/L (ref 20–31)
COLOR: YELLOW
CREAT SERPL-MCNC: 0.92 MG/DL (ref 0.7–1.2)
CRYSTALS, UA: ABNORMAL /HPF
DIFFERENTIAL TYPE: ABNORMAL
EOSINOPHILS RELATIVE PERCENT: 2 % (ref 1–4)
EPITHELIAL CELLS UA: ABNORMAL /HPF (ref 0–5)
GFR AFRICAN AMERICAN: >60 ML/MIN
GFR NON-AFRICAN AMERICAN: >60 ML/MIN
GFR SERPL CREATININE-BSD FRML MDRD: ABNORMAL ML/MIN/{1.73_M2}
GFR SERPL CREATININE-BSD FRML MDRD: ABNORMAL ML/MIN/{1.73_M2}
GLUCOSE BLD-MCNC: 347 MG/DL (ref 70–99)
GLUCOSE URINE: ABNORMAL
HCT VFR BLD CALC: 32.6 % (ref 40.7–50.3)
HEMOGLOBIN: 11.6 G/DL (ref 13–17)
IMMATURE GRANULOCYTES: 1 %
KETONES, URINE: ABNORMAL
LACTIC ACID, WHOLE BLOOD: 1.1 MMOL/L (ref 0.7–2.1)
LEUKOCYTE ESTERASE, URINE: NEGATIVE
LIPASE: 81 U/L (ref 13–60)
LYMPHOCYTES # BLD: 20 % (ref 24–43)
MCH RBC QN AUTO: 34 PG (ref 25.2–33.5)
MCHC RBC AUTO-ENTMCNC: 35.6 G/DL (ref 28.4–34.8)
MCV RBC AUTO: 95.6 FL (ref 82.6–102.9)
MONOCYTES # BLD: 9 % (ref 3–12)
MUCUS: ABNORMAL
NITRITE, URINE: NEGATIVE
NRBC AUTOMATED: 0.3 PER 100 WBC
OTHER OBSERVATIONS UA: ABNORMAL
PDW BLD-RTO: 15.3 % (ref 11.8–14.4)
PH UA: 5 (ref 5–8)
PLATELET # BLD: 325 K/UL (ref 138–453)
PLATELET ESTIMATE: ABNORMAL
PMV BLD AUTO: 9.7 FL (ref 8.1–13.5)
POTASSIUM SERPL-SCNC: 4.4 MMOL/L (ref 3.7–5.3)
PROTEIN UA: NEGATIVE
RBC # BLD: 3.41 M/UL (ref 4.21–5.77)
RBC # BLD: ABNORMAL 10*6/UL
RBC UA: ABNORMAL /HPF (ref 0–4)
RENAL EPITHELIAL, UA: ABNORMAL /HPF
SEG NEUTROPHILS: 68 % (ref 36–65)
SEGMENTED NEUTROPHILS ABSOLUTE COUNT: 4.91 K/UL (ref 1.5–8.1)
SODIUM BLD-SCNC: 137 MMOL/L (ref 135–144)
SPECIFIC GRAVITY UA: 1.04 (ref 1–1.03)
TOTAL PROTEIN: 5.8 G/DL (ref 6.4–8.3)
TRICHOMONAS: ABNORMAL
TURBIDITY: CLEAR
URINE HGB: NEGATIVE
UROBILINOGEN, URINE: NORMAL
WBC # BLD: 7.3 K/UL (ref 3.5–11.3)
WBC # BLD: ABNORMAL 10*3/UL
WBC UA: ABNORMAL /HPF (ref 0–5)
YEAST: ABNORMAL

## 2020-05-20 PROCEDURE — 86140 C-REACTIVE PROTEIN: CPT

## 2020-05-20 PROCEDURE — 96376 TX/PRO/DX INJ SAME DRUG ADON: CPT

## 2020-05-20 PROCEDURE — 6360000002 HC RX W HCPCS: Performed by: EMERGENCY MEDICINE

## 2020-05-20 PROCEDURE — 99283 EMERGENCY DEPT VISIT LOW MDM: CPT

## 2020-05-20 PROCEDURE — 80053 COMPREHEN METABOLIC PANEL: CPT

## 2020-05-20 PROCEDURE — 83605 ASSAY OF LACTIC ACID: CPT

## 2020-05-20 PROCEDURE — 87491 CHLMYD TRACH DNA AMP PROBE: CPT

## 2020-05-20 PROCEDURE — 2580000003 HC RX 258: Performed by: EMERGENCY MEDICINE

## 2020-05-20 PROCEDURE — 85025 COMPLETE CBC W/AUTO DIFF WBC: CPT

## 2020-05-20 PROCEDURE — 87086 URINE CULTURE/COLONY COUNT: CPT

## 2020-05-20 PROCEDURE — 96374 THER/PROPH/DIAG INJ IV PUSH: CPT

## 2020-05-20 PROCEDURE — 93976 VASCULAR STUDY: CPT

## 2020-05-20 PROCEDURE — 81001 URINALYSIS AUTO W/SCOPE: CPT

## 2020-05-20 PROCEDURE — 83690 ASSAY OF LIPASE: CPT

## 2020-05-20 PROCEDURE — 87591 N.GONORRHOEAE DNA AMP PROB: CPT

## 2020-05-20 PROCEDURE — 76870 US EXAM SCROTUM: CPT

## 2020-05-20 RX ORDER — MORPHINE SULFATE 4 MG/ML
4 INJECTION, SOLUTION INTRAMUSCULAR; INTRAVENOUS ONCE
Status: COMPLETED | OUTPATIENT
Start: 2020-05-20 | End: 2020-05-20

## 2020-05-20 RX ORDER — 0.9 % SODIUM CHLORIDE 0.9 %
1000 INTRAVENOUS SOLUTION INTRAVENOUS ONCE
Status: COMPLETED | OUTPATIENT
Start: 2020-05-20 | End: 2020-05-20

## 2020-05-20 RX ORDER — ONDANSETRON 2 MG/ML
4 INJECTION INTRAMUSCULAR; INTRAVENOUS ONCE
Status: COMPLETED | OUTPATIENT
Start: 2020-05-20 | End: 2020-05-20

## 2020-05-20 RX ORDER — TAMSULOSIN HYDROCHLORIDE 0.4 MG/1
0.4 CAPSULE ORAL DAILY
Qty: 14 CAPSULE | Refills: 0 | Status: SHIPPED | OUTPATIENT
Start: 2020-05-20 | End: 2020-11-10

## 2020-05-20 RX ORDER — HYDROCODONE BITARTRATE AND ACETAMINOPHEN 5; 325 MG/1; MG/1
1 TABLET ORAL EVERY 6 HOURS PRN
Qty: 18 TABLET | Refills: 0 | Status: SHIPPED | OUTPATIENT
Start: 2020-05-20 | End: 2020-05-25

## 2020-05-20 RX ADMIN — MORPHINE SULFATE 4 MG: 4 INJECTION, SOLUTION INTRAMUSCULAR; INTRAVENOUS at 17:15

## 2020-05-20 RX ADMIN — ONDANSETRON 4 MG: 2 INJECTION, SOLUTION INTRAMUSCULAR; INTRAVENOUS at 15:19

## 2020-05-20 RX ADMIN — SODIUM CHLORIDE 1000 ML: 9 INJECTION, SOLUTION INTRAVENOUS at 16:32

## 2020-05-20 RX ADMIN — MORPHINE SULFATE 4 MG: 4 INJECTION, SOLUTION INTRAMUSCULAR; INTRAVENOUS at 15:19

## 2020-05-20 ASSESSMENT — ENCOUNTER SYMPTOMS
CONSTIPATION: 0
ABDOMINAL PAIN: 1
VOMITING: 0
NAUSEA: 1
BACK PAIN: 0
COLOR CHANGE: 0
DIARRHEA: 0

## 2020-05-20 ASSESSMENT — PAIN SCALES - GENERAL
PAINLEVEL_OUTOF10: 9
PAINLEVEL_OUTOF10: 9
PAINLEVEL_OUTOF10: 10

## 2020-05-20 ASSESSMENT — PAIN DESCRIPTION - PAIN TYPE: TYPE: ACUTE PAIN

## 2020-05-20 ASSESSMENT — PAIN DESCRIPTION - PROGRESSION: CLINICAL_PROGRESSION: GRADUALLY WORSENING

## 2020-05-20 ASSESSMENT — PAIN DESCRIPTION - LOCATION: LOCATION: GROIN

## 2020-05-20 NOTE — ED PROVIDER NOTES
[facility]  Emergency Department Encounter  Emergency Medicine Resident     Pt Name: Chico Vera  MRN: [id number]  [place] 1960  Date of evaluation: 5/20/20  PCP:  Stella Page MD    [address] [place] [address]       Chief Complaint   Patient presents with    Groin Pain     was seen here a few days ago and treated with antibiotics without any symptom relief       HISTORY OF PRESENT ILLNESS  (Location/Symptom, Timing/Onset, Context/Setting, Quality, Duration, Modifying Factors, Severity.)    Chico Vera is a 64 y.o. male who presents with significant past medical history of prior bowel obstruction, diverticulitis, nephrolithiasis who presents to the emergency department for persistent testicular pain. Patient was seen in the emergency department recently for similar symptoms. He underwent CT, labs, urine and was diagnosed with probable prostatitis and sent home on ciprofloxacin. Patient states that he has had at home for the past 2 days hoping that the antibiotics would kick in, however his pain has been persistent and progressively worsening despite taking the prescribed Motrin and he was so uncomfortable he cannot stay at home anymore. He states that both of his testicles and penis are tender but he has not noticed any discharge, skin changing, swelling. He states that he is also had generalized abdominal pain with nausea but no vomiting. Patient has significant past abdominal surgical history including cholecystectomy, correction of small bowel obstruction, appendectomy. he denies any fever, chills, sweats, back pain, diarrhea or constipation.     PAST MEDICAL / SURGICAL / SOCIAL / FAMILY HISTORY    has a past medical history of Bowel obstruction ([address] Cocaine abuse ([address] Diabetes mellitus ([address] Diverticulitis, Diverticulosis, GERD (gastroesophageal reflux disease), Hypertension, MIGUEL (obstructive sleep apnea), Osteoarthritis, Renal lithiasis, Rheumatoid arteritis ([address] and Type (ASCENSIA AUTODISC VI;ONE TOUCH ULTRA TEST VI) strip 1 each by In Vitro route daily As needed. 12/22/19   Parvin Gamboa MD   blood glucose test strips (ASCENSIA AUTODISC VI;ONE TOUCH ULTRA TEST VI) strip Use with associated glucose meter. To check blood sugar 4 times daily 12/5/19   Yevgeniy Leo MD   fluticasone [facility]) 50 MCG/ACT nasal spray 1 spray by Each Nostril route daily 11/22/19   Yevgeniy Leo MD   insulin aspart (NOVOLOG FLEXPEN) 100 UNIT/ML injection pen Inject 13 Units into the skin 3 times daily (before meals) 11/12/19   Yevgeniy Leo MD   Lancets MISC Check blood sugar 4 times daily (AC HS) 10/25/19   Yevgeniy Leo MD   Alcohol Swabs PADS Use as needed while checking blood sugar 10/25/19   Yevgeniy Leo MD   blood glucose monitor kit and supplies Test 4 times a day & as needed for symptoms of irregular blood glucose. 9/24/19   Yevgeniy Leo MD   aspirin EC 81 MG EC tablet Take 1 tablet by mouth daily 11/20/17   Ahmed Sarina Hammans, MD   docusate sodium (COLACE) 100 MG capsule Take 1 capsule by mouth 2 times daily as needed for Constipation 10/29/16   Smita Ovalle MD   omeprazole (PRILOSEC) 20 MG delayed release capsule Take 1 capsule by mouth daily 10/29/16   Smita Ovalle MD   glucose monitoring kit (FREESTYLE) monitoring kit 1 kit by Does not apply route 4 times daily (before meals and nightly) 10/29/16   Smita Ovalle MD   potassium chloride (KLOR-CON M) 20 MEQ extended release tablet Take 1 tablet by mouth daily 10/29/16   Smita Ovalle MD   magnesium oxide (MAGOX 400) 400 (241.3 MG) MG TABS tablet Take 1 tablet by mouth daily 10/29/16 11/28/16  Smita Ovalle MD   Blood Pressure Monitoring (ADULT BLOOD PRESSURE CUFF LG) KIT Check bp daily 6/10/14   Juan M Sue MD       REVIEW OF SYSTEMS    (2-9 systems for level 4, 10 or more for level 5)    Review of Systems   Constitutional: Negative for chills, diaphoresis and fever. Gastrointestinal: Positive for abdominal pain and nausea. rash.   Neurological:      General: No focal deficit present. Mental Status: He is alert. Psychiatric:         Behavior: Behavior normal.       DIFFERENTIAL  DIAGNOSIS   PLAN (LABS / IMAGING / EKG):  Orders Placed This Encounter   Procedures    Culture, Urine    C.trachomatis N.gonorrhoeae DNA, Urine    US SCROTUM AND TESTICLES    US DUP ABD PEL RETRO SCROT LIMITED    CBC WITH AUTO DIFFERENTIAL    Comprehensive Metabolic Panel    LIPASE    C-REACTIVE PROTEIN    LACTIC ACID, WHOLE BLOOD    Urinalysis with Microscopic    Inpatient consult to Urology    Insert peripheral IV       MEDICATIONS ORDERED:  Orders Placed This Encounter   Medications    morphine injection 4 mg    ondansetron (ZOFRAN) injection 4 mg    0.9 % sodium chloride bolus    morphine injection 4 mg    HYDROcodone-acetaminophen (NORCO) 5-325 MG per tablet     Sig: Take 1 tablet by mouth every 6 hours as needed for Pain for up to 5 days. Intended supply: 3 days.  Take lowest dose possible to manage pain     Dispense:  18 tablet     Refill:  0       DDX:   Epididymitis, orchitis, prostatitis, urinary tract infection, nephrolithiasis, incarcerated hernia, testicular torsion    DIAGNOSTIC RESULTS / EMERGENCYDEPARTMENT COURSE / MDM   LABS:  Labs Reviewed   CBC WITH AUTO DIFFERENTIAL - Abnormal; Notable for the following components:       Result Value    RBC 3.41 (*)     Hemoglobin 11.6 (*)     Hematocrit 32.6 (*)     MCH 34.0 (*)     MCHC 35.6 (*)     RDW 15.3 (*)     NRBC Automated 0.3 (*)     Seg Neutrophils 68 (*)     Lymphocytes 20 (*)     Immature Granulocytes 1 (*)     All other components within normal limits   COMPREHENSIVE METABOLIC PANEL - Abnormal; Notable for the following components:    Glucose 347 (*)     Calcium 8.5 (*)     Total Protein 5.8 (*)     All other components within normal limits   LIPASE - Abnormal; Notable for the following components:    Lipase 81 (*)     All other components within normal limits URINALYSIS WITH MICROSCOPIC - Abnormal; Notable for the following components:    Glucose, Ur 3+ (*)     Ketones, Urine TRACE (*)     Specific Gravity, UA 1.039 (*)     All other components within normal limits   CULTURE, URINE   C.TRACHOMATIS N.GONORRHOEAE DNA, URINE   C-REACTIVE PROTEIN   LACTIC ACID, WHOLE BLOOD       RADIOLOGY:  Us Scrotum And Testicles    Result Date: 5/20/2020  Testicles appear symmetric with flow demonstrated bilaterally. No evidence of torsion. 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 Small hydroceles and similar small epididymal cysts. Tiny 2-3 mm left testicular cyst. RECOMMENDATIONS: In the absence of any other risk factors for testicular cancer (e.g., personal history of testicular cancer, a father or brother with testicular cancer, history of cryptorchidism or maldescent, testicular atrophy, or other risk factors), no further imaging or biochemical follow-up is necessary; all that is recommended is routine monthly testicular self-examination. However, if the patient has risk factors for testicular cancer, referral to a urologist for evaluation and determination of an optimal follow-up strategy is recommended. EMERGENCY DEPARTMENT COURSE:  ED Course as of May 20 1801   Wed May 20, 2020   1529 CBC WITH AUTO DIFFERENTIAL(!):    WBC 7.3   RBC 3.41(!)   Hemoglobin Quant 11.6(!)   Hematocrit 32.6(!)   MCV 95.6   MCH 34.0(!)   MCHC 35.6(!)   RDW 15.3(!)   Platelet Count 332   MPV 9.7   NRBC Automated 0.3(!)   Differential Type NOT REPORTED   Seg Neutrophils 68(!)   Lymphocytes 20(!)   Monocytes 9   Eosinophils % 2   Basophils 0   Immature Granulocytes 1(!)   Segs Absolute 4.91   Absolute Lymph # 1.46   Absolute Mono # 0.66   Absolute Eos # 0.13   Basophils Absolute 0.03   Absol. .. [AM]   1538 Lactic Acid, Whole Blood: 1.1 [AM]   1543 Lipase(!): 81 [AM]   1543 Comprehensive Metabolic Panel(!):    Glucose 347(!)   BUN 20   Creatinine 0.92   Bun/Cre Ratio NOT REPORTED   Calcium

## 2020-05-20 NOTE — CONSULTS
Procedure Laterality Date    APPENDECTOMY      CHOLECYSTECTOMY      COLON SURGERY      COLONOSCOPY      HERNIA REPAIR      MECKEL DIVERTICULUM EXCISION      SMALL INTESTINE SURGERY      URETHRAL STRICTURE DILATATION         Medications:      Current Facility-Administered Medications:     morphine injection 4 mg, 4 mg, Intravenous, Once, Kaylie Liu,     Current Outpatient Medications:     HYDROcodone-acetaminophen (NORCO) 5-325 MG per tablet, Take 1 tablet by mouth every 6 hours as needed for Pain for up to 5 days. Intended supply: 3 days. Take lowest dose possible to manage pain, Disp: 18 tablet, Rfl: 0    ciprofloxacin (CIPRO) 500 MG tablet, Take 1 tablet by mouth 2 times daily for 14 days, Disp: 28 tablet, Rfl: 0    ibuprofen (ADVIL;MOTRIN) 800 MG tablet, Take 1 tablet by mouth every 8 hours as needed for Pain, Disp: 30 tablet, Rfl: 0    lisinopril (PRINIVIL;ZESTRIL) 40 MG tablet, TAKE ONE TABLET BY MOUTH DAILY, Disp: 30 tablet, Rfl: 3    insulin glargine (BASAGLAR KWIKPEN) 100 UNIT/ML injection pen, Inject 40 Units into the skin nightly, Disp: 5 pen, Rfl: 0    gabapentin (NEURONTIN) 800 MG tablet, TAKE ONE TABLET BY MOUTH THREE TIMES A DAY, Disp: 90 tablet, Rfl: 0    insulin lispro (HUMALOG) 100 UNIT/ML injection vial, Inject 13 Units into the skin 3 times daily (before meals), Disp: 1 vial, Rfl: 3    acetaminophen (TYLENOL) 325 MG tablet, Take 2 tablets by mouth 2 times daily as needed for Pain, Disp: 120 tablet, Rfl: 3    nitroGLYCERIN (NITROSTAT) 0.4 MG SL tablet, Place 1 tablet under the tongue every 5 minutes as needed for Chest pain up to max of 3 total doses.  If no relief after 1 dose, call 911., Disp: 25 tablet, Rfl: 0    blood glucose monitor strips, Test 3 times a day & as needed for symptoms of irregular blood glucose., Disp: 100 strip, Rfl: 0    Insulin Syringe-Needle U-100 30G X 1/2\" 0.5 ML MISC, 1 each by Does not apply route daily, Disp: 120 each, Rfl: 3    Blood Pressure KIT, 1 Applicatorful by Does not apply route 2 times daily, Disp: 1 kit, Rfl: 0    Blood Glucose Monitoring Suppl (TRUE METRIX METER) w/Device KIT, Use as directed, Disp: 1 kit, Rfl: 0    blood glucose test strips (ASCENSIA AUTODISC VI;ONE TOUCH ULTRA TEST VI) strip, 1 each by In Vitro route daily As needed. , Disp: 100 each, Rfl: 3    blood glucose test strips (ASCENSIA AUTODISC VI;ONE TOUCH ULTRA TEST VI) strip, Use with associated glucose meter.  To check blood sugar 4 times daily, Disp: 120 strip, Rfl: 3    fluticasone (FLONASE) 50 MCG/ACT nasal spray, 1 spray by Each Nostril route daily, Disp: 1 Bottle, Rfl: 0    insulin aspart (NOVOLOG FLEXPEN) 100 UNIT/ML injection pen, Inject 13 Units into the skin 3 times daily (before meals), Disp: 5 pen, Rfl: 3    Lancets MISC, Check blood sugar 4 times daily (AC HS), Disp: 120 each, Rfl: 3    Alcohol Swabs PADS, Use as needed while checking blood sugar, Disp: 120 each, Rfl: 3    blood glucose monitor kit and supplies, Test 4 times a day & as needed for symptoms of irregular blood glucose., Disp: 1 kit, Rfl: 0    aspirin EC 81 MG EC tablet, Take 1 tablet by mouth daily, Disp: 90 tablet, Rfl: 3    docusate sodium (COLACE) 100 MG capsule, Take 1 capsule by mouth 2 times daily as needed for Constipation, Disp: 14 capsule, Rfl: 0    omeprazole (PRILOSEC) 20 MG delayed release capsule, Take 1 capsule by mouth daily, Disp: 30 capsule, Rfl: 1    glucose monitoring kit (FREESTYLE) monitoring kit, 1 kit by Does not apply route 4 times daily (before meals and nightly), Disp: 1 kit, Rfl: 0    potassium chloride (KLOR-CON M) 20 MEQ extended release tablet, Take 1 tablet by mouth daily, Disp: 30 tablet, Rfl: 0    magnesium oxide (MAGOX 400) 400 (241.3 MG) MG TABS tablet, Take 1 tablet by mouth daily, Disp: 30 tablet, Rfl: 0    Blood Pressure Monitoring (ADULT BLOOD PRESSURE CUFF LG) KIT, Check bp daily, Disp: 1 kit, Rfl: 0    Allergies:  No Known Allergies    Social History:   Social History     Socioeconomic History    Marital status:      Spouse name: Not on file    Number of children: Not on file    Years of education: Not on file    Highest education level: Not on file   Occupational History    Not on file   Social Needs    Financial resource strain: Not on file    Food insecurity     Worry: Not on file     Inability: Not on file   [employer] needs     Medical: Not on file     Non-medical: Not on file   Tobacco Use    Smoking status: Former Smoker     Types: Cigars    Smokeless tobacco: Never Used    Tobacco comment: cigars 2x week   Substance and Sexual Activity    Alcohol use: No     Alcohol/week: 0.0 standard drinks    Drug use: No    Sexual activity: Not on file   Lifestyle    Physical activity     Days per week: Not on file     Minutes per session: Not on file    Stress: Not on file   Relationships    Social connections     Talks on phone: Not on file     Gets together: Not on file     Attends [employer] service: Not on file     Active member of club or organization: Not on file     Attends meetings of clubs or organizations: Not on file     Relationship status: Not on file    Intimate partner violence     Fear of current or ex partner: Not on file     Emotionally abused: Not on file     Physically abused: Not on file     Forced sexual activity: Not on file   Other Topics Concern    Not on file   Social History Narrative    Not on file       Family History:    Family History   Problem Relation Age of Onset    Diabetes Father     Diabetes Other     Heart Attack Other     Hypertension Other     Diabetes Brother        REVIEW OF SYSTEMS:  A comprehensive 14 point review of systems was obtained. Constitutional: No fatigue  Eyes: No blurry vision  Ears, nose, mouth, throat, face: No ringing in the ears; no facial droop. Respiratory: No cough or cold.   Cardiovascular: No palpitations  Gastrointestinal: No diarrhea or constipation. Genitourinary: +burning with urination  Integument/Skin: No rashes  Hematologic/Lymphatic: No easy bruising  Musculoskeletal: No muscle pains  Neurologic: No weakness in the extremities. Psychiatric: No depression or suicidal thoughts. Endocrine: No heat or cold intolerances. Allergic/Immunologic: No current seasonal allergies; no skin hives. Physical Exam:      Vitals:    05/20/20 1413   BP: (!) 141/92   Pulse: 91   Resp: 20   Temp: 98.3 °F (36.8 °C)   SpO2: 99%     Constitutional: Patient in no acute distress. Neuro: Alert and oriented to person place and time. Head: Atraumatic and normocephalic. Neck: Trachea midline   Ext: 2+ radial pulses bilaterally. Psych: Mood and affect normal.  Skin: No rashes or bruising present. Lungs: Respiratory effort normal.  Cardiovascular:  Regular rhythm. Abdomen: Soft, non-tender, non-distended. Neither side has CVA tenderness on exam.  Bladder non-tender and not distended. Lymphatics: no palpable lymphadenopathy. Penis normal and circumcised  Urethral meatus normal  Scrotal exam shows tenderness to palpation of the left hemiscrotum and testis  Testicles normal bilaterally  Epididymis normal bilaterally  No evidence of inguinal hernia  Anus and perineum normal  Normal rectal tone with no masses  Prostate soft, non-tender to palpation. No palpable nodules. Estimated 35 grams. Nontender.     Labs:  Recent Labs     05/20/20  1510   WBC 7.3   HGB 11.6*   HCT 32.6*   MCV 95.6        Recent Labs     05/20/20  1510      K 4.4      CO2 22   BUN 20   CREATININE 0.92       Recent Labs     05/20/20  1531   COLORU YELLOW   PHUR 5.0   WBCUA None   RBCUA None   MUCUS NOT REPORTED   TRICHOMONAS NOT REPORTED   YEAST NOT REPORTED   BACTERIA NOT REPORTED   SPECGRAV 1.039*   LEUKOCYTESUR NEGATIVE   UROBILINOGEN Normal   BILIRUBINUR NEGATIVE       Culture Results:  Urine culture no growth from last visit      -----------------------------------------------------------------  Imaging Results:  Us Scrotum And Testicles    Result Date: 5/20/2020  Testicles appear symmetric with flow demonstrated bilaterally. No evidence of torsion. 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 Small hydroceles and similar small epididymal cysts. Tiny 2-3 mm left testicular cyst. RECOMMENDATIONS: In the absence of any other risk factors for testicular cancer (e.g., personal history of testicular cancer, a father or brother with testicular cancer, history of cryptorchidism or maldescent, testicular atrophy, or other risk factors), no further imaging or biochemical follow-up is necessary; all that is recommended is routine monthly testicular self-examination. However, if the patient has risk factors for testicular cancer, referral to a urologist for evaluation and determination of an optimal follow-up strategy is recommended. Images independently reviewed and correlated with radiology report(s). Assessment and Plan   Assessment:  Evelyn Guo is a 64 y.o. male who presents with:  Left chronic orchialgia  Possible chronic prostatitis/pelvic pain syndrome  Left flank pain  Dysuria      Plan:   CT abdomen/pelvis from last visit reviewed, left hydronephrosis likely an extrarenal pelvis as dilation does not go down past the proximal ureter, same appearance as CT from January 2020  Will extend patient's course of Cipro by 2 weeks to treat for possible prostatitis  Recommend high-dose Toradol for acute pain management, continue gabapentin the patient takes at home  Patient will need further work-up of chronic orchialgia including possible cord block  Patient will need further work-up of dysuria to rule out bladder pathology including tumor or CIS with cystoscopy  Set up for staff clinic follow-up appointment    Discussed with Dr. Sunny Navarro, on-call urologist, who agrees with plan.     Wayne Angel MD  PGY-3, [address] Department of Urology   6:08 PM 5/20/2020

## 2020-05-20 NOTE — ED PROVIDER NOTES
[facility] ED  Emergency Department  Emergency Medicine Resident Sign-out     Care of Amanda Hodgson was assumed from Dr. Srinivasan Ortega and is being seen for Groin Pain (was seen here a few days ago and treated with antibiotics without any symptom relief)  . The patient's initial evaluation and plan have been discussed with the prior provider who initially evaluated the patient. EMERGENCY DEPARTMENT COURSE / MEDICAL DECISION MAKING:       MEDICATIONS GIVEN:  Orders Placed This Encounter   Medications    morphine injection 4 mg    ondansetron (ZOFRAN) injection 4 mg    0.9 % sodium chloride bolus    morphine injection 4 mg    HYDROcodone-acetaminophen (NORCO) 5-325 MG per tablet     Sig: Take 1 tablet by mouth every 6 hours as needed for Pain for up to 5 days. Intended supply: 3 days.  Take lowest dose possible to manage pain     Dispense:  18 tablet     Refill:  0       LABS / RADIOLOGY:     Labs Reviewed   CBC WITH AUTO DIFFERENTIAL - Abnormal; Notable for the following components:       Result Value    RBC 3.41 (*)     Hemoglobin 11.6 (*)     Hematocrit 32.6 (*)     MCH 34.0 (*)     MCHC 35.6 (*)     RDW 15.3 (*)     NRBC Automated 0.3 (*)     Seg Neutrophils 68 (*)     Lymphocytes 20 (*)     Immature Granulocytes 1 (*)     All other components within normal limits   COMPREHENSIVE METABOLIC PANEL - Abnormal; Notable for the following components:    Glucose 347 (*)     Calcium 8.5 (*)     Total Protein 5.8 (*)     All other components within normal limits   LIPASE - Abnormal; Notable for the following components:    Lipase 81 (*)     All other components within normal limits   URINALYSIS WITH MICROSCOPIC - Abnormal; Notable for the following components:    Glucose, Ur 3+ (*)     Ketones, Urine TRACE (*)     Specific Gravity, UA 1.039 (*)     All other components within normal limits   CULTURE, URINE   C.TRACHOMATIS N.GONORRHOEAE DNA, URINE   C-REACTIVE PROTEIN   LACTIC ACID, WHOLE BLOOD factors for testicular cancer (e.g., personal history of testicular cancer, a father or brother with testicular cancer, history of cryptorchidism or maldescent, testicular atrophy, or other risk factors), no further imaging or biochemical follow-up is necessary; all that is recommended is routine monthly testicular self-examination. However, if the patient has risk factors for testicular cancer, referral to a urologist for evaluation and determination of an optimal follow-up strategy is recommended. RECENT VITALS:     Temp: 98.3 °F (36.8 °C),  Pulse: 91, Resp: 20, BP: (!) 141/92, SpO2: 99 %      This patient is a 64 y.o. Male with Testicular pain and penile pain. Epididymal cyst and microliths on US. Was recently treated for prostatitis. Labs wnl. UA clean without UTI. Hyperglycemia without complication, treated with IVF. Urology consulted, to evaluate. F/u urology evaluation. Likely discharge. Urology evaluated. Okay for discharge. ED Course as of May 20 1753   Wed May 20, 2020   1529 CBC WITH AUTO DIFFERENTIAL(!):    WBC 7.3   RBC 3.41(!)   Hemoglobin Quant 11.6(!)   Hematocrit 32.6(!)   MCV 95.6   MCH 34.0(!)   MCHC 35.6(!)   RDW 15.3(!)   Platelet Count 937   MPV 9.7   NRBC Automated 0.3(!)   Differential Type NOT REPORTED   Seg Neutrophils 68(!)   Lymphocytes 20(!)   Monocytes 9   Eosinophils % 2   Basophils 0   Immature Granulocytes 1(!)   Segs Absolute 4.91   Absolute Lymph # 1.46   Absolute Mono # 0.66   Absolute Eos # 0.13   Basophils Absolute 0.03   Absol. .. [AM]   1538 Lactic Acid, Whole Blood: 1.1 [AM]   1543 Lipase(!): 81 [AM]   1543 Comprehensive Metabolic Panel(!):    Glucose 347(!)   BUN 20   Creatinine 0.92   Bun/Cre Ratio NOT REPORTED   Calcium 8.5(!)   Sodium 137   Potassium 4.4   Chloride 104   CO2 22   Anion Gap 11   Alk Phos 80   ALT 11   AST 12   Bilirubin 0.62   Total Protein 5.8(!)   Albumin 3.7   Albumin/Globulin Ratio 1.8   GFR Non- >60   GFR

## 2020-05-21 LAB
C. TRACHOMATIS DNA ,URINE: NEGATIVE
CULTURE: NO GROWTH
Lab: NORMAL
N. GONORRHOEAE DNA, URINE: NEGATIVE
SPECIMEN DESCRIPTION: NORMAL
SPECIMEN DESCRIPTION: NORMAL

## 2020-05-26 ENCOUNTER — APPOINTMENT (OUTPATIENT)
Dept: ULTRASOUND IMAGING | Age: 60
End: 2020-05-26
Payer: MEDICAID

## 2020-05-26 ENCOUNTER — TELEPHONE (OUTPATIENT)
Dept: UROLOGY | Age: 60
End: 2020-05-26

## 2020-05-26 ENCOUNTER — HOSPITAL ENCOUNTER (EMERGENCY)
Age: 60
Discharge: HOME | End: 2020-05-26
Attending: EMERGENCY MEDICINE
Payer: MEDICAID

## 2020-05-26 VITALS
TEMPERATURE: 97.6 F | SYSTOLIC BLOOD PRESSURE: 142 MMHG | RESPIRATION RATE: 16 BRPM | DIASTOLIC BLOOD PRESSURE: 90 MMHG | HEIGHT: 69 IN | HEART RATE: 92 BPM | WEIGHT: 178 LBS | BODY MASS INDEX: 26.36 KG/M2 | OXYGEN SATURATION: 100 %

## 2020-05-26 LAB
ABSOLUTE EOS #: 0.13 K/UL (ref 0–0.44)
ABSOLUTE IMMATURE GRANULOCYTE: 0.06 K/UL (ref 0–0.3)
ABSOLUTE LYMPH #: 1.28 K/UL (ref 1.1–3.7)
ABSOLUTE MONO #: 0.48 K/UL (ref 0.1–1.2)
ANION GAP SERPL CALCULATED.3IONS-SCNC: 11 MMOL/L (ref 9–17)
BASOPHILS # BLD: 1 % (ref 0–2)
BASOPHILS ABSOLUTE: 0.04 K/UL (ref 0–0.2)
BILIRUBIN URINE: NEGATIVE
BUN BLDV-MCNC: 24 MG/DL (ref 6–20)
BUN/CREAT BLD: ABNORMAL (ref 9–20)
CALCIUM SERPL-MCNC: 9.2 MG/DL (ref 8.6–10.4)
CHLORIDE BLD-SCNC: 101 MMOL/L (ref 98–107)
CO2: 25 MMOL/L (ref 20–31)
COLOR: YELLOW
COMMENT UA: ABNORMAL
CREAT SERPL-MCNC: 0.69 MG/DL (ref 0.7–1.2)
DIFFERENTIAL TYPE: ABNORMAL
EOSINOPHILS RELATIVE PERCENT: 2 % (ref 1–4)
GFR AFRICAN AMERICAN: >60 ML/MIN
GFR NON-AFRICAN AMERICAN: >60 ML/MIN
GFR SERPL CREATININE-BSD FRML MDRD: ABNORMAL ML/MIN/{1.73_M2}
GFR SERPL CREATININE-BSD FRML MDRD: ABNORMAL ML/MIN/{1.73_M2}
GLUCOSE BLD-MCNC: 399 MG/DL (ref 70–99)
GLUCOSE URINE: ABNORMAL
HCT VFR BLD CALC: 34.9 % (ref 40.7–50.3)
HEMOGLOBIN: 12.5 G/DL (ref 13–17)
IMMATURE GRANULOCYTES: 1 %
KETONES, URINE: NEGATIVE
LEUKOCYTE ESTERASE, URINE: NEGATIVE
LYMPHOCYTES # BLD: 18 % (ref 24–43)
MCH RBC QN AUTO: 33.7 PG (ref 25.2–33.5)
MCHC RBC AUTO-ENTMCNC: 35.8 G/DL (ref 28.4–34.8)
MCV RBC AUTO: 94.1 FL (ref 82.6–102.9)
MONOCYTES # BLD: 7 % (ref 3–12)
NITRITE, URINE: NEGATIVE
NRBC AUTOMATED: 0 PER 100 WBC
PDW BLD-RTO: 15.3 % (ref 11.8–14.4)
PH UA: 5 (ref 5–8)
PLATELET # BLD: 333 K/UL (ref 138–453)
PLATELET ESTIMATE: ABNORMAL
PMV BLD AUTO: 9.5 FL (ref 8.1–13.5)
POTASSIUM SERPL-SCNC: 4.3 MMOL/L (ref 3.7–5.3)
PROTEIN UA: NEGATIVE
RBC # BLD: 3.71 M/UL (ref 4.21–5.77)
RBC # BLD: ABNORMAL 10*6/UL
SEG NEUTROPHILS: 71 % (ref 36–65)
SEGMENTED NEUTROPHILS ABSOLUTE COUNT: 5.29 K/UL (ref 1.5–8.1)
SODIUM BLD-SCNC: 137 MMOL/L (ref 135–144)
SPECIFIC GRAVITY UA: 1.04 (ref 1–1.03)
TURBIDITY: CLEAR
URINE HGB: NEGATIVE
UROBILINOGEN, URINE: NORMAL
WBC # BLD: 7.3 K/UL (ref 3.5–11.3)
WBC # BLD: ABNORMAL 10*3/UL

## 2020-05-26 PROCEDURE — 87491 CHLMYD TRACH DNA AMP PROBE: CPT

## 2020-05-26 PROCEDURE — 80048 BASIC METABOLIC PNL TOTAL CA: CPT

## 2020-05-26 PROCEDURE — 96374 THER/PROPH/DIAG INJ IV PUSH: CPT

## 2020-05-26 PROCEDURE — 76770 US EXAM ABDO BACK WALL COMP: CPT

## 2020-05-26 PROCEDURE — 6360000002 HC RX W HCPCS: Performed by: EMERGENCY MEDICINE

## 2020-05-26 PROCEDURE — 81003 URINALYSIS AUTO W/O SCOPE: CPT

## 2020-05-26 PROCEDURE — 76870 US EXAM SCROTUM: CPT

## 2020-05-26 PROCEDURE — 93976 VASCULAR STUDY: CPT

## 2020-05-26 PROCEDURE — 85025 COMPLETE CBC W/AUTO DIFF WBC: CPT

## 2020-05-26 PROCEDURE — 6370000000 HC RX 637 (ALT 250 FOR IP): Performed by: EMERGENCY MEDICINE

## 2020-05-26 PROCEDURE — 87591 N.GONORRHOEAE DNA AMP PROB: CPT

## 2020-05-26 PROCEDURE — 99284 EMERGENCY DEPT VISIT MOD MDM: CPT

## 2020-05-26 PROCEDURE — 87086 URINE CULTURE/COLONY COUNT: CPT

## 2020-05-26 RX ORDER — OXYCODONE HYDROCHLORIDE AND ACETAMINOPHEN 5; 325 MG/1; MG/1
1 TABLET ORAL ONCE
Status: COMPLETED | OUTPATIENT
Start: 2020-05-26 | End: 2020-05-26

## 2020-05-26 RX ORDER — IBUPROFEN 800 MG/1
800 TABLET ORAL EVERY 8 HOURS PRN
Qty: 21 TABLET | Refills: 0 | Status: SHIPPED | OUTPATIENT
Start: 2020-05-26 | End: 2022-03-29 | Stop reason: SDUPTHER

## 2020-05-26 RX ORDER — OXYCODONE HYDROCHLORIDE AND ACETAMINOPHEN 5; 325 MG/1; MG/1
1-2 TABLET ORAL EVERY 6 HOURS PRN
Qty: 10 TABLET | Refills: 0 | Status: SHIPPED | OUTPATIENT
Start: 2020-05-26 | End: 2020-05-29 | Stop reason: ALTCHOICE

## 2020-05-26 RX ORDER — KETOROLAC TROMETHAMINE 30 MG/ML
30 INJECTION, SOLUTION INTRAMUSCULAR; INTRAVENOUS ONCE
Status: COMPLETED | OUTPATIENT
Start: 2020-05-26 | End: 2020-05-26

## 2020-05-26 RX ADMIN — KETOROLAC TROMETHAMINE 30 MG: 30 INJECTION, SOLUTION INTRAMUSCULAR at 17:45

## 2020-05-26 RX ADMIN — OXYCODONE HYDROCHLORIDE AND ACETAMINOPHEN 1 TABLET: 5; 325 TABLET ORAL at 17:46

## 2020-05-26 RX ADMIN — OXYCODONE HYDROCHLORIDE AND ACETAMINOPHEN 1 TABLET: 5; 325 TABLET ORAL at 16:02

## 2020-05-26 ASSESSMENT — ENCOUNTER SYMPTOMS
ABDOMINAL PAIN: 0
SORE THROAT: 0
BACK PAIN: 0
NAUSEA: 0
WHEEZING: 0
COUGH: 0
SHORTNESS OF BREATH: 0
DIARRHEA: 0
VOMITING: 0
CONSTIPATION: 0
BLOOD IN STOOL: 0

## 2020-05-26 ASSESSMENT — PAIN SCALES - GENERAL
PAINLEVEL_OUTOF10: 7
PAINLEVEL_OUTOF10: 10
PAINLEVEL_OUTOF10: 7
PAINLEVEL_OUTOF10: 10

## 2020-05-26 NOTE — CONSULTS
[facility], [facility]  Urology Consultation      Patient:  Rosa Vega  MRN: [id number]  YOB: 1960    CHIEF COMPLAINT:  Left testicular pain    HISTORY OF PRESENT ILLNESS:   The patient is a 64 y.o. male who presents with left testicular pain that's been on going for the past few weeks. This is his third trip to the emergency room for it. Initially seen 5/16/20 for left flank and groin pain. CTAP showed left mod-severe hydro and was thought to have just passed a stone. He does have history of kidney stones in the past and required treatment. Discharged home on 2 weeks of cipro. He returned 4 days later on 5/20/20 due to continued pain and scrotal US showed small hydrocele, epididymal cysts. He was continued on his cipro and given pain medication. He now returns due to continued pain that has not improved and he is out of pain medication. The pain is mainly in left testicle, worse with lying down. Better with warm shower. Having some burning when he urinates. He is still slightly sore on left flank. He denies any blood in his urine. Denies fever, chills, nausea or vomiting. He is not taking any anti-inflammatories. He is diabetic with neuropathy. His labs are normal. UA looks clean, prior two urine cultures no growth. He does have follow up with urology clinic June 12th. Patient's old records, notes and chart reviewed and summarized above.     Past Medical History:    Past Medical History:   Diagnosis Date    Bowel obstruction ([address]     Cocaine abuse ([address] 3/11/2020    Diabetes mellitus ([address]     Diverticulitis     Diverticulosis     GERD (gastroesophageal reflux disease)     Hypertension     MIGUEL (obstructive sleep apnea)     Osteoarthritis     Renal lithiasis     Rheumatoid arteritis (HCC)     Type II or unspecified type diabetes mellitus without mention of complication, not stated as uncontrolled        Past Surgical History:    Past diarrhea or constipation  Genitourinary: See HPI  Integument/Skin: No rashes  Hematologic/Lymphatic: No easy bruising  Musculoskeletal: No muscle pains  Neurologic: No weakness in the extremities  Psychiatric: No depression or suicidal thoughts  Endocrine: No heat or cold intolerances  Allergic/Immunologic: No current seasonal allergies; no skin hives      Physical Exam:    This a 64 y.o. male   Vitals:    05/26/20 1543   BP:    Pulse:    Resp:    Temp: 97.6 °F (36.4 °C)   SpO2:        Constitutional: Patient in no acute distress  Neuro: Alert and oriented to person place and time  Psych: Mood and affect normal  Head: Atraumatic and normocephalic  Neck: Trachea midline  Lungs: Respiratory effort normal  Cardiovascular:  Regular rhythm  Abdomen: Soft, non-tender, non-distended. CVA tenderness left minimally   Ext: 2+ DP pulses bilaterally  Skin: No rashes or bruising present  Bladder: Non-tender and not distended  Lymphatics: No palpable lymphadenopathy    :  Penis normal and circumcised  Urethral meatus normal  Scrotal exam - skin without erythema, induration, fluctuance or crepitus. Testicles - right nontender, no masses. Left is tender but soft without any induration or masses felt  Epididymis - again tender on left side   No evidence of inguinal hernia  Anus and perineum normal without tenderness   Rectal exam deferred     Labs:  Recent Labs     05/26/20  1534   WBC 7.3   HGB 12.5*   HCT 34.9*   MCV 94.1        Recent Labs     05/26/20  1534      K 4.3      CO2 25   BUN 24*   CREATININE 0.69*       Recent Labs     05/26/20  1548   COLORU YELLOW   PHUR 5.0   SPECGRAV 1.038*   LEUKOCYTESUR NEGATIVE   UROBILINOGEN Normal   BILIRUBINUR NEGATIVE       Culture Results:  5/16 no growth  5/20 no growth  5/26 pending     -----------------------------------------------------------------  Imaging Results:  Renal US and scrotal US performed in ED.   Renal US shows improved left hydro with mild residual, improved compared to CT 5/16  Scrotal US shows testicular microlithiasis / no acute findings   No results found. Assessment and Plan   Impression:  63 yo male with -    Left epididymorchitis     Plan:  Recommend scheduled NSAID for one week - motrin 800 TID  Norco for severe pain  Pt educated on length of time for orchitis to improve and continued supportive care  Keep scheduled outpatient appointment  Can discharge home    I directly discussed this patient and plan of care with Dr Jersey Chaudhry who made all pertinent medical decisions and agrees with above. Thank you for involving us in [facility]. Should you have any questions, please do not hesitate to contact us at any time.       Dashawn Jackson PA-C  Urology Service   5:39 PM 5/26/2020     Rory Mejia   PGY2 URO

## 2020-05-26 NOTE — ED PROVIDER NOTES
Socioeconomic History    Marital status:      Spouse name: Not on file    Number of children: Not on file    Years of education: Not on file    Highest education level: Not on file   Occupational History    Not on file   Social Needs    Financial resource strain: Not on file    Food insecurity     Worry: Not on file     Inability: Not on file    Transportation needs     Medical: Not on file     Non-medical: Not on file   Tobacco Use    Smoking status: Former Smoker     Types: Cigars    Smokeless tobacco: Never Used    Tobacco comment: cigars 2x week   Substance and Sexual Activity    Alcohol use: No     Alcohol/week: 0.0 standard drinks    Drug use: No    Sexual activity: Not on file   Lifestyle    Physical activity     Days per week: Not on file     Minutes per session: Not on file    Stress: Not on file   Relationships    Social connections     Talks on phone: Not on file     Gets together: Not on file     Attends Nondenominational service: Not on file     Active member of club or organization: Not on file     Attends meetings of clubs or organizations: Not on file     Relationship status: Not on file    Intimate partner violence     Fear of current or ex partner: Not on file     Emotionally abused: Not on file     Physically abused: Not on file     Forced sexual activity: Not on file   Other Topics Concern    Not on file   Social History Narrative    Not on file       Family History   Problem Relation Age of Onset    Diabetes Father     Diabetes Other     Heart Attack Other     Hypertension Other     Diabetes Brother        Allergies:  Patient has no known allergies. Home Medications:  Prior to Admission medications    Medication Sig Start Date End Date Taking? Authorizing Provider   oxyCODONE-acetaminophen (PERCOCET) 5-325 MG per tablet Take 1-2 tablets by mouth every 6 hours as needed for Pain for up to 3 days. WARNING:  May cause drowsiness.   May impair ability to operate strips (ASCENSIA AUTODISC VI;ONE TOUCH ULTRA TEST VI) strip Use with associated glucose meter. To check blood sugar 4 times daily 12/5/19   Delvis Campuzano MD   fluticasone [facility]) 50 MCG/ACT nasal spray 1 spray by Each Nostril route daily 11/22/19   Delvis Campuzano MD   insulin aspart (NOVOLOG FLEXPEN) 100 UNIT/ML injection pen Inject 13 Units into the skin 3 times daily (before meals) 11/12/19   Delvis Campuzano MD   Lancets MISC Check blood sugar 4 times daily (AC HS) 10/25/19   Delvis Campuzano MD   Alcohol Swabs PADS Use as needed while checking blood sugar 10/25/19   Delvis Campuzano MD   blood glucose monitor kit and supplies Test 4 times a day & as needed for symptoms of irregular blood glucose. 9/24/19   Delvis Campuzano MD   aspirin EC 81 MG EC tablet Take 1 tablet by mouth daily 11/20/17   Rashad Lopez MD   docusate sodium (COLACE) 100 MG capsule Take 1 capsule by mouth 2 times daily as needed for Constipation 10/29/16   Luiza Zhang MD   omeprazole (PRILOSEC) 20 MG delayed release capsule Take 1 capsule by mouth daily 10/29/16   Luiza Zhang MD   glucose monitoring kit (FREESTYLE) monitoring kit 1 kit by Does not apply route 4 times daily (before meals and nightly) 10/29/16   Luiza Zhang MD   potassium chloride (KLOR-CON M) 20 MEQ extended release tablet Take 1 tablet by mouth daily 10/29/16   Luiza Zhang MD   magnesium oxide (MAGOX 400) 400 (241.3 MG) MG TABS tablet Take 1 tablet by mouth daily 10/29/16 11/28/16  Luiza Zhang MD   Blood Pressure Monitoring (ADULT BLOOD PRESSURE CUFF LG) KIT Check bp daily 6/10/14   Julisa Chatterjee MD       REVIEW OF SYSTEMS    (2-9 systems for level 4, 10 or more for level 5)      Review of Systems   Constitutional: Negative for diaphoresis and fever. HENT: Negative for sore throat. Respiratory: Negative for cough, shortness of breath and wheezing. Cardiovascular: Negative for chest pain, palpitations and leg swelling.    Gastrointestinal: Negative for Urinalysis    Inpatient consult to Urology       MEDICATIONS ORDERED:  Orders Placed This Encounter   Medications    oxyCODONE-acetaminophen (PERCOCET) 5-325 MG per tablet 1 tablet    ketorolac (TORADOL) injection 30 mg    oxyCODONE-acetaminophen (PERCOCET) 5-325 MG per tablet 1 tablet    oxyCODONE-acetaminophen (PERCOCET) 5-325 MG per tablet     Sig: Take 1-2 tablets by mouth every 6 hours as needed for Pain for up to 3 days. WARNING:  May cause drowsiness. May impair ability to operate vehicles or machinery. Do not use in combination with alcohol.      Dispense:  10 tablet     Refill:  0    ibuprofen (ADVIL;MOTRIN) 800 MG tablet     Sig: Take 1 tablet by mouth every 8 hours as needed for Pain     Dispense:  21 tablet     Refill:  0         DIAGNOSTIC RESULTS / EMERGENCY DEPARTMENT COURSE / MDM     LABS:  Results for orders placed or performed during the hospital encounter of 05/26/20   CBC Auto Differential   Result Value Ref Range    WBC 7.3 3.5 - 11.3 k/uL    RBC 3.71 (L) 4.21 - 5.77 m/uL    Hemoglobin 12.5 (L) 13.0 - 17.0 g/dL    Hematocrit 34.9 (L) 40.7 - 50.3 %    MCV 94.1 82.6 - 102.9 fL    MCH 33.7 (H) 25.2 - 33.5 pg    MCHC 35.8 (H) 28.4 - 34.8 g/dL    RDW 15.3 (H) 11.8 - 14.4 %    Platelets 558 222 - 216 k/uL    MPV 9.5 8.1 - 13.5 fL    NRBC Automated 0.0 0.0 per 100 WBC    Differential Type NOT REPORTED     Seg Neutrophils 71 (H) 36 - 65 %    Lymphocytes 18 (L) 24 - 43 %    Monocytes 7 3 - 12 %    Eosinophils % 2 1 - 4 %    Basophils 1 0 - 2 %    Immature Granulocytes 1 (H) 0 %    Segs Absolute 5.29 1.50 - 8.10 k/uL    Absolute Lymph # 1.28 1.10 - 3.70 k/uL    Absolute Mono # 0.48 0.10 - 1.20 k/uL    Absolute Eos # 0.13 0.00 - 0.44 k/uL    Basophils Absolute 0.04 0.00 - 0.20 k/uL    Absolute Immature Granulocyte 0.06 0.00 - 0.30 k/uL    WBC Morphology NOT REPORTED     RBC Morphology ANISOCYTOSIS PRESENT     Platelet Estimate NOT REPORTED    Basic Metabolic Panel   Result Value Ref Range    Glucose 399 (H) 70 - 99 mg/dL    BUN 24 (H) 6 - 20 mg/dL    CREATININE 0.69 (L) 0.70 - 1.20 mg/dL    Bun/Cre Ratio NOT REPORTED 9 - 20    Calcium 9.2 8.6 - 10.4 mg/dL    Sodium 137 135 - 144 mmol/L    Potassium 4.3 3.7 - 5.3 mmol/L    Chloride 101 98 - 107 mmol/L    CO2 25 20 - 31 mmol/L    Anion Gap 11 9 - 17 mmol/L    GFR Non-African American >60 >60 mL/min    GFR African American >60 >60 mL/min    GFR Comment          GFR Staging NOT REPORTED    Urinalysis   Result Value Ref Range    Color, UA YELLOW YELLOW    Turbidity UA CLEAR CLEAR    Glucose, Ur 3+ (A) NEGATIVE    Bilirubin Urine NEGATIVE NEGATIVE    Ketones, Urine NEGATIVE NEGATIVE    Specific Gravity, UA 1.038 (H) 1.005 - 1.030    Urine Hgb NEGATIVE NEGATIVE    pH, UA 5.0 5.0 - 8.0    Protein, UA NEGATIVE NEGATIVE    Urobilinogen, Urine Normal Normal    Nitrite, Urine NEGATIVE NEGATIVE    Leukocyte Esterase, Urine NEGATIVE NEGATIVE    Urinalysis Comments       Microscopic exam not performed based on chemical results unless requested in original order. RADIOLOGY:     Ct Abdomen Pelvis Wo Contrast    Result Date: 5/16/2020  EXAMINATION: CT OF THE ABDOMEN AND PELVIS WITHOUT CONTRAST 5/16/2020 6:32 pm TECHNIQUE: CT of the abdomen and pelvis was performed without the administration of intravenous contrast. Multiplanar reformatted images are provided for review. Dose modulation, iterative reconstruction, and/or weight based adjustment of the mA/kV was utilized to reduce the radiation dose to as low as reasonably achievable. COMPARISON: 01/13/2020 HISTORY: ORDERING SYSTEM PROVIDED HISTORY: left flank pain, scrotal pain TECHNOLOGIST PROVIDED HISTORY: left flank pain, scrotal pain Reason for Exam: left flank pain, scrotal pain Acuity: Unknown Type of Exam: Unknown FINDINGS: LOWER CHEST: No focal abnormality. KIDNEYS AND URINARY TRACT: Moderate left hydronephrosis without obstructing stone identified.   This appears more prominent since the prior exam.  Minimal right pelvicaliectasis appears unchanged. ORGANS: Lack of intravenous contrast limits evaluation of the solid organs and bowel. Status post cholecystectomy. The liver, pancreas, spleen, and adrenals reveal no acute abnormality. GI/BOWEL: No bowel dilatation or wall thickening. Status post partial sigmoid resection. Moderate stool burden. PELVIS: No acute findings. PERITONEUM/RETROPERITONEUM: No lymphadenopathy is noted. No free air or free fluid. The aorta is normal in caliber. BONES/SOFT TISSUES: No acute osseous abnormality is identified. Moderately severe left hydronephrosis without obstructing stone identified. The possibility of recently passed stone should be considered in the appropriate clinical setting. Us Renal Complete    Result Date: 5/26/2020  EXAMINATION: RETROPERITONEAL ULTRASOUND OF THE KIDNEYS AND URINARY BLADDER 5/26/2020 COMPARISON: CT 05/16/2020 HISTORY: ORDERING SYSTEM PROVIDED HISTORY: left flank pain, recent hydro TECHNOLOGIST PROVIDED HISTORY: left flank pain, recent hydro FINDINGS: Kidneys: The right kidney measures 12.2 cm in length and the left kidney measures 13.1 cm in length. Kidneys demonstrate normal cortical echogenicity. Mild residual pelvicaliectasis in the left kidney, which appears improved compared to the prior CT exam.  No hydronephrosis on the right side. Bladder: Unremarkable appearance of the bladder. Bilateral ureteral jets are visualized. Minimal residual collecting system dilatation on the left side, improved since CT exam 05/16/2020. Us Scrotum And Testicles    No testicular torsion. Testicular microlithiasis which has a controversial increased risk of malignancy.      Us Scrotum And Testicles    Result Date: 5/21/2020  EXAMINATION: ULTRASOUND OF THE SCROTUM/TESTICLES WITH COLOR DOPPLER FLOW EVALUATION; DOPPLER EVALUATION 5/20/2020 COMPARISON: 11/22/2014, abdomen and pelvic CT 05/16/2020 HISTORY: ORDERING SYSTEM PROVIDED HISTORY: L sided testicular pain, r/o torsion TECHNOLOGIST PROVIDED HISTORY: L sided testicular pain, r/o torsion FINDINGS: Measurements: Right testicle: 24.8 x 28.0 x 41.8 mm Left testicle: 20.6 x 29.0 x 42.3 mm Right: Grey scale: The right testicle demonstrates normal homogeneous echotexture without focal lesion. A few scattered microliths, similar to the prior study. Doppler Evaluation:  There is normal arterial and venous Doppler flow within the testicle. Scrotal Sac:  Tiny hydrocele. Epididymis:  Slightly prominent right epididymal head. Tiny 4-5 mm epididymal cyst. Left: Grey scale: The left testicle demonstrates normal homogeneous echotexture without focal lesion. Again shown is evidence of testicular microlithiasis. Tiny benign-appearing 2-3 mm cyst adjacent to the mediastinum. Doppler Evaluation:  There is normal arterial and venous Doppler flow within the testicle. Scrotal Sac:  Small hydrocele. There are mildly prominent left scrotal vessels accentuated by Valsalva maneuver suggesting a small/developing varicocele. Epididymis:  No acute abnormality. Similar small epididymal cysts, the largest measuring up to 7.4 mm which appears to be partially exophytic. Testicles appear symmetric with flow demonstrated bilaterally. No evidence of torsion. 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 Small hydroceles and similar small epididymal cysts. Tiny 2-3 mm left testicular cyst. RECOMMENDATIONS: In the absence of any other risk factors for testicular cancer (e.g., personal history of testicular cancer, a father or brother with testicular cancer, history of cryptorchidism or maldescent, testicular atrophy, or other risk factors), no further imaging or biochemical follow-up is necessary; all that is recommended is routine monthly testicular self-examination. However, if the patient has risk factors for testicular cancer, referral to a urologist for evaluation and determination of an optimal follow-up strategy is recommended.      Us Dup Abd Pel Retro Scrot Limited    Result Date: 5/21/2020  EXAMINATION: ULTRASOUND OF THE SCROTUM/TESTICLES WITH COLOR DOPPLER FLOW EVALUATION; DOPPLER EVALUATION 5/20/2020 COMPARISON: 11/22/2014, abdomen and pelvic CT 05/16/2020 HISTORY: ORDERING SYSTEM PROVIDED HISTORY: L sided testicular pain, r/o torsion TECHNOLOGIST PROVIDED HISTORY: L sided testicular pain, r/o torsion FINDINGS: Measurements: Right testicle: 24.8 x 28.0 x 41.8 mm Left testicle: 20.6 x 29.0 x 42.3 mm Right: Grey scale: The right testicle demonstrates normal homogeneous echotexture without focal lesion. A few scattered microliths, similar to the prior study. Doppler Evaluation:  There is normal arterial and venous Doppler flow within the testicle. Scrotal Sac:  Tiny hydrocele. Epididymis:  Slightly prominent right epididymal head. Tiny 4-5 mm epididymal cyst. Left: Grey scale: The left testicle demonstrates normal homogeneous echotexture without focal lesion. Again shown is evidence of testicular microlithiasis. Tiny benign-appearing 2-3 mm cyst adjacent to the mediastinum. Doppler Evaluation:  There is normal arterial and venous Doppler flow within the testicle. Scrotal Sac:  Small hydrocele. There are mildly prominent left scrotal vessels accentuated by Valsalva maneuver suggesting a small/developing varicocele. Epididymis:  No acute abnormality. Similar small epididymal cysts, the largest measuring up to 7.4 mm which appears to be partially exophytic. Testicles appear symmetric with flow demonstrated bilaterally. No evidence of torsion. 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 Small hydroceles and similar small epididymal cysts.   Tiny 2-3 mm left testicular cyst. RECOMMENDATIONS: In the absence of any other risk factors for testicular cancer (e.g., personal history of testicular cancer, a father or brother with testicular cancer, history of cryptorchidism or maldescent, testicular atrophy, or other risk factors), no

## 2020-05-26 NOTE — ED NOTES
Pt came to ED with c/o groin pain. Pt states he has pain in his left testicle. Pt reports having a stone that needed to be passed recently. Pt has burning and pain with urination. Pt alert and oriented, NAD, RR even and unlabored. Vital signs obtained, Dr. Eliceo Borja at bedside to assess pt.        Alex Kelly RN  05/26/20 2066

## 2020-05-29 ENCOUNTER — HOSPITAL ENCOUNTER (EMERGENCY)
Age: 60
Discharge: HOME | End: 2020-05-29
Attending: EMERGENCY MEDICINE
Payer: MEDICAID

## 2020-05-29 VITALS
RESPIRATION RATE: 16 BRPM | BODY MASS INDEX: 25.48 KG/M2 | TEMPERATURE: 97.9 F | HEIGHT: 69 IN | WEIGHT: 172 LBS | HEART RATE: 78 BPM | DIASTOLIC BLOOD PRESSURE: 83 MMHG | OXYGEN SATURATION: 99 % | SYSTOLIC BLOOD PRESSURE: 147 MMHG

## 2020-05-29 LAB
ANION GAP SERPL CALCULATED.3IONS-SCNC: 12 MMOL/L (ref 9–17)
BILIRUBIN URINE: NEGATIVE
BUN BLDV-MCNC: 21 MG/DL (ref 6–20)
BUN/CREAT BLD: ABNORMAL (ref 9–20)
CALCIUM SERPL-MCNC: 8.8 MG/DL (ref 8.6–10.4)
CHLORIDE BLD-SCNC: 100 MMOL/L (ref 98–107)
CO2: 24 MMOL/L (ref 20–31)
COLOR: YELLOW
COMMENT UA: ABNORMAL
CREAT SERPL-MCNC: 0.98 MG/DL (ref 0.7–1.2)
GFR AFRICAN AMERICAN: >60 ML/MIN
GFR NON-AFRICAN AMERICAN: >60 ML/MIN
GFR SERPL CREATININE-BSD FRML MDRD: ABNORMAL ML/MIN/{1.73_M2}
GFR SERPL CREATININE-BSD FRML MDRD: ABNORMAL ML/MIN/{1.73_M2}
GLUCOSE BLD-MCNC: 313 MG/DL (ref 70–99)
GLUCOSE URINE: ABNORMAL
HCT VFR BLD CALC: 33.9 % (ref 40.7–50.3)
HEMOGLOBIN: 12.2 G/DL (ref 13–17)
KETONES, URINE: NEGATIVE
LEUKOCYTE ESTERASE, URINE: NEGATIVE
MCH RBC QN AUTO: 33.7 PG (ref 25.2–33.5)
MCHC RBC AUTO-ENTMCNC: 36 G/DL (ref 28.4–34.8)
MCV RBC AUTO: 93.6 FL (ref 82.6–102.9)
NITRITE, URINE: NEGATIVE
NRBC AUTOMATED: 0 PER 100 WBC
PDW BLD-RTO: 14.7 % (ref 11.8–14.4)
PH UA: 5 (ref 5–8)
PLATELET # BLD: 313 K/UL (ref 138–453)
PMV BLD AUTO: 9.6 FL (ref 8.1–13.5)
POTASSIUM SERPL-SCNC: 4 MMOL/L (ref 3.7–5.3)
PROTEIN UA: NEGATIVE
RBC # BLD: 3.62 M/UL (ref 4.21–5.77)
SODIUM BLD-SCNC: 136 MMOL/L (ref 135–144)
SPECIFIC GRAVITY UA: 1.04 (ref 1–1.03)
TURBIDITY: CLEAR
URINE HGB: NEGATIVE
UROBILINOGEN, URINE: NORMAL
WBC # BLD: 5.8 K/UL (ref 3.5–11.3)

## 2020-05-29 PROCEDURE — 80048 BASIC METABOLIC PNL TOTAL CA: CPT

## 2020-05-29 PROCEDURE — 87086 URINE CULTURE/COLONY COUNT: CPT

## 2020-05-29 PROCEDURE — 2580000003 HC RX 258: Performed by: STUDENT IN AN ORGANIZED HEALTH CARE EDUCATION/TRAINING PROGRAM

## 2020-05-29 PROCEDURE — 85027 COMPLETE CBC AUTOMATED: CPT

## 2020-05-29 PROCEDURE — 6360000002 HC RX W HCPCS: Performed by: EMERGENCY MEDICINE

## 2020-05-29 PROCEDURE — 96374 THER/PROPH/DIAG INJ IV PUSH: CPT

## 2020-05-29 PROCEDURE — 81003 URINALYSIS AUTO W/O SCOPE: CPT

## 2020-05-29 PROCEDURE — 99284 EMERGENCY DEPT VISIT MOD MDM: CPT

## 2020-05-29 PROCEDURE — 6370000000 HC RX 637 (ALT 250 FOR IP): Performed by: STUDENT IN AN ORGANIZED HEALTH CARE EDUCATION/TRAINING PROGRAM

## 2020-05-29 RX ORDER — OXYCODONE HYDROCHLORIDE 5 MG/1
5 TABLET ORAL EVERY 6 HOURS PRN
Qty: 10 TABLET | Refills: 0 | Status: SHIPPED | OUTPATIENT
Start: 2020-05-29 | End: 2020-06-01

## 2020-05-29 RX ORDER — ACETAMINOPHEN 500 MG
1000 TABLET ORAL EVERY 6 HOURS PRN
Qty: 60 TABLET | Refills: 0 | Status: SHIPPED | OUTPATIENT
Start: 2020-05-29 | End: 2022-08-09 | Stop reason: SDUPTHER

## 2020-05-29 RX ORDER — FENTANYL CITRATE 50 UG/ML
25 INJECTION, SOLUTION INTRAMUSCULAR; INTRAVENOUS ONCE
Status: COMPLETED | OUTPATIENT
Start: 2020-05-29 | End: 2020-05-29

## 2020-05-29 RX ORDER — ACETAMINOPHEN 500 MG
1000 TABLET ORAL ONCE
Status: COMPLETED | OUTPATIENT
Start: 2020-05-29 | End: 2020-05-29

## 2020-05-29 RX ORDER — OXYCODONE HYDROCHLORIDE 5 MG/1
10 TABLET ORAL ONCE
Status: COMPLETED | OUTPATIENT
Start: 2020-05-29 | End: 2020-05-29

## 2020-05-29 RX ORDER — 0.9 % SODIUM CHLORIDE 0.9 %
1000 INTRAVENOUS SOLUTION INTRAVENOUS ONCE
Status: COMPLETED | OUTPATIENT
Start: 2020-05-29 | End: 2020-05-29

## 2020-05-29 RX ADMIN — ACETAMINOPHEN 1000 MG: 500 TABLET ORAL at 10:39

## 2020-05-29 RX ADMIN — SODIUM CHLORIDE 1000 ML: 9 INJECTION, SOLUTION INTRAVENOUS at 09:56

## 2020-05-29 RX ADMIN — OXYCODONE HYDROCHLORIDE 10 MG: 5 TABLET ORAL at 10:39

## 2020-05-29 RX ADMIN — FENTANYL CITRATE 25 MCG: 50 INJECTION, SOLUTION INTRAMUSCULAR; INTRAVENOUS at 09:14

## 2020-05-29 ASSESSMENT — PAIN SCALES - GENERAL
PAINLEVEL_OUTOF10: 10

## 2020-05-29 ASSESSMENT — PAIN DESCRIPTION - ONSET: ONSET: GRADUAL

## 2020-05-29 ASSESSMENT — ENCOUNTER SYMPTOMS
ABDOMINAL PAIN: 0
EYE DISCHARGE: 0
SHORTNESS OF BREATH: 0
EYE REDNESS: 0
CHEST TIGHTNESS: 0

## 2020-05-29 ASSESSMENT — PAIN DESCRIPTION - FREQUENCY: FREQUENCY: CONTINUOUS

## 2020-05-29 ASSESSMENT — PAIN DESCRIPTION - DESCRIPTORS: DESCRIPTORS: ACHING

## 2020-05-29 ASSESSMENT — PAIN DESCRIPTION - ORIENTATION: ORIENTATION: LEFT

## 2020-05-29 ASSESSMENT — PAIN DESCRIPTION - PAIN TYPE: TYPE: ACUTE PAIN

## 2020-05-29 ASSESSMENT — PAIN DESCRIPTION - PROGRESSION: CLINICAL_PROGRESSION: NOT CHANGED

## 2020-05-29 ASSESSMENT — PAIN DESCRIPTION - LOCATION: LOCATION: SCROTUM

## 2020-05-29 NOTE — CONSULTS
person place and time  Psych: Mood and affect normal  Head: Atraumatic and normocephalic  Neck: Trachea midline  Lungs: Respiratory effort normal  Cardiovascular:  Regular rhythm  Abdomen: Soft, non-tender, non-distended. CVA tenderness left minimally   Ext: 2+ DP pulses bilaterally  Skin: No rashes or bruising present  Bladder: Non-tender and not distended  Lymphatics: No palpable lymphadenopathy     :  Penis normal and circumcised  Urethral meatus normal  Scrotal exam - skin without erythema, induration, fluctuance or crepitus. Testicles - right nontender, no masses. Left is tender but soft without any induration or masses felt  Epididymis - again tender on left side   No evidence of inguinal hernia  Anus and perineum normal without tenderness   Rectal exam : 50 cc prostate / anodular, nontender    Labs:  Recent Labs     05/26/20  1534 05/29/20  0920   WBC 7.3 5.8   HGB 12.5* 12.2*   HCT 34.9* 33.9*   MCV 94.1 93.6    313     Recent Labs     05/26/20  1534 05/29/20  0920    136   K 4.3 4.0    100   CO2 25 24   BUN 24* 21*   CREATININE 0.69* 0.98       Recent Labs     05/29/20  1008   COLORU YELLOW   PHUR 5.0   SPECGRAV 1.036*   LEUKOCYTESUR NEGATIVE   UROBILINOGEN Normal   BILIRUBINUR NEGATIVE       Culture Results:  [unfilled]      -----------------------------------------------------------------  Imaging Results:  No results found.     Assessment and Plan   Impression:       problem list:   Left epididymitis  Lower urinary tract symptoms    Patient Active Problem List   Diagnosis    DM2 (diabetes mellitus, type 2) ([facility] [address]    Diverticulosis    DM (diabetes mellitus) ([facility] [address]    HTN (hypertension)    Elevated liver enzymes    Scrotal abscess    Abdominal abscess    Hyperplastic colon polyp    Lower abdominal pain    Diabetic foot ([facility] [address]    Infected hernioplasty mesh ([facility] [address]    Cocaine abuse ([facility] [address]    Chest pain           Plan:   Continue scheduled NSAIDs  Oral narcotic pain medication

## 2020-05-29 NOTE — ED PROVIDER NOTES
with recurrent testicle pain, chart was reviewed that shows he has had for sound of his testicle and scrotum on the 20th of this month as well as the 26th. No testicular torsion was seen, there was bilateral microlithiasis that were noted. Well as a lesion adjacent to the left epididymal head which measured 7 mm and may be an exophytic cyst.  Patient also had a CT of his abdomen and pelvis that was done the 16th of this month  That showed moderately severe left hydronephrosis without obstructing stone identified. For repeat labs, pain control urine analysis at this time. Possible urology consultation.   He does have follow-up with urology scheduled for June 16      KELVIN DuongP.H  Attending Emergency Medicine Physician         Giuseppe Black DO  05/29/20 6100      11:31 AM EDT  Spoke with urology who will be down to evaluate the patient      Giuseppe Black DO  05/29/20 0678

## 2020-05-29 NOTE — ED NOTES
Patient resting comfortably and in no acute distress. States pain significantly improved with fentanyl. Patient will try to urinate at this time. Patient denies any needs.  Will continue to monitor     Marybeth Nugyen RN  05/29/20 1047

## 2020-05-29 NOTE — ED PROVIDER NOTES
[facility] ED  Emergency Department Encounter  Emergency Medicine Resident     Pt Name: Sarah Galdamez  MRN: [id number]  [place] 1960  Date of evaluation: 5/29/20  PCP:  Kayy Pena MD    [address] [place] [address]       Chief Complaint   Patient presents with    Scrotal Pain       HISTORY OFPRESENT ILLNESS  (Location/Symptom, Timing/Onset, Context/Setting, Quality, Duration, Modifying Factors,Severity.)      Sarah Galdamez is a 64 y.o. male who presents with scrotal pain which is been ongoing for the past month. nitially seen 5/16/20 for left flank and groin pain. CT showed left mod-severe hydro and was thought to have just passed a stone. He does have history of kidney stones in the past and required treatment. Discharged home on 2 weeks of cipro. He returned 4 days later on 5/20/20 due to continued pain and scrotal US showed small hydrocele, epididymal cysts. Was most recently seen on the 26th and was evaluated by urology. Was diagnosed with epididymalorchitis and was given Motrin 800 as well as Norco for severe pain. Continuing to complain of pain. Pain is a 10 out of 10. Left testicle. Nonradiating. Sharp. PAST MEDICAL / SURGICAL / SOCIAL / FAMILY HISTORY      has a past medical history of Bowel obstruction ([address] Cocaine abuse ([address] Diabetes mellitus ([address] Diverticulitis, Diverticulosis, GERD (gastroesophageal reflux disease), Hypertension, MIGUEL (obstructive sleep apnea), Osteoarthritis, Renal lithiasis, Rheumatoid arteritis ([address] and Type II or unspecified type diabetes mellitus without mention of complication, not stated as uncontrolled. has a past surgical history that includes Meckel's diverticulum excision; hernia repair; Cholecystectomy; Urethra dilation; Small intestine surgery; Colonoscopy; Colon surgery; and Appendectomy.     Social History     Socioeconomic History    Marital status:      Spouse name: Not on file    Number of children: Not on file BASIC METABOLIC PANEL    Inpatient consult to Urology       MEDICATIONS ORDERED:  Orders Placed This Encounter   Medications    fentaNYL (SUBLIMAZE) injection 25 mcg    0.9 % sodium chloride bolus    oxyCODONE (ROXICODONE) immediate release tablet 10 mg    acetaminophen (TYLENOL) tablet 1,000 mg    acetaminophen (APAP EXTRA STRENGTH) 500 MG tablet     Sig: Take 2 tablets by mouth every 6 hours as needed for Pain     Dispense:  60 tablet     Refill:  0    oxyCODONE (ROXICODONE) 5 MG immediate release tablet     Sig: Take 1 tablet by mouth every 6 hours as needed for Pain for up to 3 days. Dispense:  10 tablet     Refill:  0       DDX: Orchitis versus epididymitis versus STD versus testicular torsion versus nephrolithiasis versus Opal's    Initial MDM/Plan: 64 y.o. male who presents with left scrotal pain ongoing for the past month. Patient has had ultrasound as well as CT scan. Pain has not changed discontinuing to remain. Will get basic labs including urinalysis and urine culture. Discussed with urology. Suspicion for testicular torsion is low as testicles normally descended. No rashes and symptoms been ongoing for months. Also normal ultrasound recently. GC and gonorrhea testing negative. Patient also known diabetic will give fluid bolus.     DIAGNOSTIC RESULTS / EMERGENCY DEPARTMENT COURSE / MDM     LABS:  Labs Reviewed   URINALYSIS - Abnormal; Notable for the following components:       Result Value    Glucose, Ur 3+ (*)     Specific Gravity, UA 1.036 (*)     All other components within normal limits   CBC - Abnormal; Notable for the following components:    RBC 3.62 (*)     Hemoglobin 12.2 (*)     Hematocrit 33.9 (*)     MCH 33.7 (*)     MCHC 36.0 (*)     RDW 14.7 (*)     All other components within normal limits   BASIC METABOLIC PANEL - Abnormal; Notable for the following components:    Glucose 313 (*)     BUN 21 (*)     All other components within normal limits   CULTURE, URINE RADIOLOGY:  No results found. EMERGENCY DEPARTMENT COURSE:  ED Course as of May 29 1635   Fri May 29, 2020   1016 Glucose(!): 313 [MS]   1136 Attending discussed case with urology resident states he will come down to see patient. [MS]      ED Course User Index  [MS] Corona Veloz, DO     Patient hyperglycemic with glucose at 313. Will give fluids. due to patient's multiple visits will discuss with urology. Urology resident came and evaluated patient who still feels that patient most likely has epididymitis and orchitis and that is most likely taking extended period of time to resolve. Reeducated patient that this can take up to 6 weeks to resolve. Follow-up appointment for urologist moved to 1 week. Refill of oxycodone as well as ibuprofen and Tylenol given. Patient understanding of plan. Encouraged to return emergency part for any worsening signs or symptoms and to follow-up with urology in 1 week. Suspicion for any acute intratesticular process is low with recent imaging as well as normal labs and normal urinalysis negative for any blood. · Based on the low acuity of concerning symptoms and improvement of symptoms, patient will be discharged with follow up and prescription information listed in the Disposition section. · Patient states they will follow-up with primary care physician and/or return to the emergency department should they experience a change or worsening of symptoms. · Patient will be discharged with resources: summary of visit, instructions, follow-up information, prescriptions if necessary and clinics available. · Patient/ family instructed to read discharge paperwork. All of their questions and concerns were addressed. · Suspicion for any acute life-threatening processes is low. Patient voices understanding of plan. PROCEDURES:  None    CONSULTS:  IP CONSULT TO UROLOGY    CRITICAL CARE:  Please see attending note    FINAL IMPRESSION      1.  Epididymitis 2. Orchitis          DISPOSITION / PLAN     DISPOSITION Decision To Discharge 05/29/2020 12:05:42 PM        PATIENTREFERRED TO:  Amarjit Figueroa MD  [address]  [phone]    Go on 6/5/2020        DISCHARGE MEDICATIONS:  Discharge Medication List as of 5/29/2020 12:18 PM      START taking these medications    Details   acetaminophen (APAP EXTRA STRENGTH) 500 MG tablet Take 2 tablets by mouth every 6 hours as needed for Pain, Disp-60 tablet, R-0Print      oxyCODONE (ROXICODONE) 5 MG immediate release tablet Take 1 tablet by mouth every 6 hours as needed for Pain for up to 3 days. , Disp-10 tablet, R-0Print             Kwan Chawla DO  EmergencyMedicine Resident    (Please note that portions of this note were completed with a voice recognition program.  Efforts were made to edit the dictations but occasionally words are mis-transcribed.)       Kwan Chawla DO  Resident  05/29/20 0914

## 2020-05-30 LAB
CULTURE: NO GROWTH
Lab: NORMAL
SPECIMEN DESCRIPTION: NORMAL

## 2020-06-01 RX ORDER — TAMSULOSIN HYDROCHLORIDE 0.4 MG/1
CAPSULE ORAL
Qty: 14 CAPSULE | Refills: 0 | OUTPATIENT
Start: 2020-06-01

## 2020-06-02 ENCOUNTER — OFFICE VISIT (OUTPATIENT)
Dept: FAMILY MEDICINE CLINIC | Age: 60
End: 2020-06-02
Payer: MEDICAID

## 2020-06-02 VITALS
BODY MASS INDEX: 25.77 KG/M2 | DIASTOLIC BLOOD PRESSURE: 78 MMHG | SYSTOLIC BLOOD PRESSURE: 124 MMHG | HEIGHT: 69 IN | HEART RATE: 96 BPM | WEIGHT: 174 LBS | OXYGEN SATURATION: 99 % | TEMPERATURE: 97.9 F

## 2020-06-02 PROCEDURE — 3017F COLORECTAL CA SCREEN DOC REV: CPT | Performed by: STUDENT IN AN ORGANIZED HEALTH CARE EDUCATION/TRAINING PROGRAM

## 2020-06-02 PROCEDURE — 1036F TOBACCO NON-USER: CPT | Performed by: STUDENT IN AN ORGANIZED HEALTH CARE EDUCATION/TRAINING PROGRAM

## 2020-06-02 PROCEDURE — 99213 OFFICE O/P EST LOW 20 MIN: CPT | Performed by: STUDENT IN AN ORGANIZED HEALTH CARE EDUCATION/TRAINING PROGRAM

## 2020-06-02 PROCEDURE — 3051F HG A1C>EQUAL 7.0%<8.0%: CPT | Performed by: STUDENT IN AN ORGANIZED HEALTH CARE EDUCATION/TRAINING PROGRAM

## 2020-06-02 PROCEDURE — 2022F DILAT RTA XM EVC RTNOPTHY: CPT | Performed by: STUDENT IN AN ORGANIZED HEALTH CARE EDUCATION/TRAINING PROGRAM

## 2020-06-02 PROCEDURE — G8419 CALC BMI OUT NRM PARAM NOF/U: HCPCS | Performed by: STUDENT IN AN ORGANIZED HEALTH CARE EDUCATION/TRAINING PROGRAM

## 2020-06-02 PROCEDURE — G8428 CUR MEDS NOT DOCUMENT: HCPCS | Performed by: STUDENT IN AN ORGANIZED HEALTH CARE EDUCATION/TRAINING PROGRAM

## 2020-06-02 RX ORDER — CYCLOBENZAPRINE HCL 10 MG
10 TABLET ORAL NIGHTLY PRN
Qty: 10 TABLET | Refills: 0 | Status: SHIPPED | OUTPATIENT
Start: 2020-06-02 | End: 2020-06-09 | Stop reason: SDUPTHER

## 2020-06-02 RX ORDER — CEPHALEXIN 500 MG/1
500 CAPSULE ORAL 2 TIMES DAILY
Qty: 14 CAPSULE | Refills: 0 | Status: SHIPPED | OUTPATIENT
Start: 2020-06-02 | End: 2020-06-09

## 2020-06-02 ASSESSMENT — ENCOUNTER SYMPTOMS
COUGH: 0
CHEST TIGHTNESS: 0
SORE THROAT: 0
CONSTIPATION: 0
SHORTNESS OF BREATH: 0
NAUSEA: 0
DIARRHEA: 0
ABDOMINAL PAIN: 0
VOMITING: 0

## 2020-06-02 NOTE — PATIENT INSTRUCTIONS
Thank you for letting us take care of you today. We hope all your questions were addressed. If a question was overlooked or something else comes to mind after you return home, please contact a member of your Care Team listed below. Please make sure you have a routine office visit set up to follow-up on [address]. Your Care Team at [place] [address] is Team #2  Janett Shetty DO (Faculty)  Suki Montes De Oca MD (Faculty)  Bharat Rico MD (Resident)  Darrin Honeycutt MD (Resident)  Julia Florian MD (Resident)  Dina العلي MD (Resident)  CATARINO Melara ,LUCA MAZARIEGOS, [facility] office)  [facility])  Kourtney Ortiz ([facility])  Ayha Saavedra, [place] ([address] [facility])  Shanti Lewis, [address] [place] [address] (Clinical Pharmacist)     Office phone number: [phone]    If you need to get in right away due to illness, please be advised we have \"Same Day\" appointments available Monday-Friday. Please call us at [phone] option #3 to schedule your \"Same Day\" appointment.

## 2020-06-02 NOTE — PROGRESS NOTES
Subjective:    Afua Roman is a 64 y.o. male with  has a past medical history of Bowel obstruction ([address] Cocaine abuse ([address] Diabetes mellitus ([address] Diverticulitis, Diverticulosis, GERD (gastroesophageal reflux disease), Hypertension, MIGUEL (obstructive sleep apnea), Osteoarthritis, Renal lithiasis, Rheumatoid arteritis ([address] and Type II or unspecified type diabetes mellitus without mention of complication, not stated as uncontrolled. Family History   Problem Relation Age of Onset    Diabetes Father     Diabetes Other     Heart Attack Other     Hypertension Other     Diabetes Brother        Presented tothe office today for:  Chief Complaint   Patient presents with    Flank Pain    Chest Pain       HPI    Patient presents today complaining of testicular pain x3 months. Patient has been to the ER multiple times for  testicular pain treated with antibiotics and imaging studies. Patient has microlithiasis in the testicles and scheduled for urology appointment this Friday. Patient denies having any discharge or bleeding. Patient also complained of having right finger pain x3 days. Patient states that there is increased swelling however denies any fever and chills. Patient denies any drainage or any bleeding. Patient denies any trauma to the area. Review of Systems   Constitutional: Negative for chills, fatigue, fever and unexpected weight change. HENT: Negative for congestion, mouth sores and sore throat. Eyes: Negative for visual disturbance. Respiratory: Negative for cough, chest tightness and shortness of breath. Cardiovascular: Negative for chest pain and leg swelling. Gastrointestinal: Negative for abdominal pain, constipation, diarrhea, nausea and vomiting. Genitourinary: Positive for testicular pain. Negative for difficulty urinating. Musculoskeletal: Positive for joint swelling. Skin: Negative for rash. Neurological: Negative for dizziness, weakness and headaches. Objective:    /78 (Site: Left Upper Arm, Position: Sitting, Cuff Size: Medium Adult) Comment: machine  Pulse 96   Temp 97.9 °F (36.6 °C) (Temporal)   Ht 5' 9.02\" (1.753 m)   Wt 174 lb (78.9 kg)   SpO2 99%   BMI 25.68 kg/m²    BP Readings from Last 3 Encounters:   06/02/20 124/78   05/29/20 (!) 147/83   05/26/20 (!) 142/90     Physical Exam  Vitals signs and nursing note reviewed. Constitutional:       Appearance: He is well-developed. Cardiovascular:      Rate and Rhythm: Normal rate and regular rhythm. Heart sounds: Normal heart sounds. No murmur. No friction rub. No gallop. Pulmonary:      Effort: Pulmonary effort is normal. No respiratory distress. Breath sounds: Normal breath sounds. No wheezing or rales. Chest:      Chest wall: No tenderness. Abdominal:      General: Bowel sounds are normal. There is no distension. Palpations: Abdomen is soft. There is no mass. Tenderness: There is no abdominal tenderness. There is no guarding. Musculoskeletal:      Comments: Right middle finger swelling around the nailbed. No discharge when applying intense pressure. Left flank pain on palpation. Skin:     Capillary Refill: Capillary refill takes less than 2 seconds. Neurological:      Mental Status: He is alert and oriented to person, place, and time.            Lab Results   Component Value Date    WBC 5.8 05/29/2020    HGB 12.2 (L) 05/29/2020    HCT 33.9 (L) 05/29/2020     05/29/2020    CHOL 121 06/18/2015    TRIG 46 06/18/2015    HDL 53 05/21/2019    ALT 11 05/20/2020    AST 12 05/20/2020     05/29/2020    K 4.0 05/29/2020     05/29/2020    CREATININE 0.98 05/29/2020    BUN 21 (H) 05/29/2020    CO2 24 05/29/2020    TSH 0.84 03/15/2020    INR 1.3 11/11/2016    LABA1C 7.6 01/16/2020    LABMICR CANNOT BE CALCULATED 05/21/2019     Lab Results   Component Value Date    CALCIUM 8.8 05/29/2020    PHOS 2.3 (L) 10/29/2016     Lab Results   Component Value Date    LDLCHOLESTEROL 104 05/21/2019       Assessment and Plan:    1. Testicular pain  -Ultrasound remarkable for microlithiasis  -Patient has urology appointment scheduled for this Friday  -Patient has visited ER multiple times for testicular pain and prescribed pain medication    2. Screening for hyperlipidemia  - Lipid Panel; Future    3. Flank pain  - cyclobenzaprine (FLEXERIL) 10 MG tablet; Take 1 tablet by mouth nightly as needed for Muscle spasms  Dispense: 10 tablet; Refill: 0    4. Type 2 diabetes mellitus without complication, with long-term current use of insulin ([facility])  -  DIABETES FOOT EXAM  - Microalbumin, Ur; Future  - Hemoglobin A1C; Future  - metFORMIN (GLUCOPHAGE) 1000 MG tablet; Take 1 tablet by mouth 2 times daily (with meals)  Dispense: 60 tablet; Refill: 3    5. Colon cancer screening  -Patient states have Cologuard material at home and will send in this week. 6. Encounter for screening for HIV  - HIV Screen; Future    7. Paronychia of finger, right  -Patient instructed to soak finger in warm water and apply intense pressure excrete material.  Repeat multiple times. - cephALEXin (KEFLEX) 500 MG capsule; Take 1 capsule by mouth 2 times daily for 7 days  Dispense: 14 capsule; Refill: 0  -Instructed to call office or go to the ER if symptoms worsen along with fever and chills          Requested Prescriptions     Signed Prescriptions Disp Refills    cyclobenzaprine (FLEXERIL) 10 MG tablet 10 tablet 0     Sig: Take 1 tablet by mouth nightly as needed for Muscle spasms    metFORMIN (GLUCOPHAGE) 1000 MG tablet 60 tablet 3     Sig: Take 1 tablet by mouth 2 times daily (with meals)    cephALEXin (KEFLEX) 500 MG capsule 14 capsule 0     Sig: Take 1 capsule by mouth 2 times daily for 7 days       There are no discontinued medications. Return in about 3 months (around 9/2/2020).       Marcelo received counseling on the following healthy behaviors: nutrition and exercise  Reviewed prior labs and health maintenance  Continue current medications, diet and exercise. Discussed use, benefit, and side effects of prescribed medications. Barriers to medication compliance addressed. Patient given educational materials - see patient instructions  Was a self-tracking handout given in paper form or via [employer]? No:     Requested Prescriptions     Signed Prescriptions Disp Refills    cyclobenzaprine (FLEXERIL) 10 MG tablet 10 tablet 0     Sig: Take 1 tablet by mouth nightly as needed for Muscle spasms    metFORMIN (GLUCOPHAGE) 1000 MG tablet 60 tablet 3     Sig: Take 1 tablet by mouth 2 times daily (with meals)    cephALEXin (KEFLEX) 500 MG capsule 14 capsule 0     Sig: Take 1 capsule by mouth 2 times daily for 7 days       All patient questions answered. Patient voiced understanding. Quality Measures    Body mass index is 25.68 kg/m². Elevated. Weight control planned discussed Healthy diet and regular exercise. BP: 124/78(machine) Blood pressure is normal. Treatment plan consists of No treatment change needed.     Lab Results   Component Value Date    LDLCHOLESTEROL 104 05/21/2019    (goal LDL reduction with dx if diabetes is 50% LDL reduction)      PHQ Scores 1/16/2020 5/20/2019 11/20/2017   PHQ2 Score 0 4 0   PHQ9 Score 0 10 0     Interpretation of Total Score Depression Severity: 1-4 = Minimal depression, 5-9 = Mild depression, 10-14 = Moderate depression, 15-19 = Moderately severe depression, 20-27 = Severe depression

## 2020-06-02 NOTE — PROGRESS NOTES
Visit Information    Have you changed or started any medications since your last visit including any over-the-counter medicines, vitamins, or herbal medicines? no   Have you stopped taking any of your medications? Is so, why? -  no  Are you having any side effects from any of your medications? - no    Have you seen any other physician or provider since your last visit?  no   Have you had any other diagnostic tests since your last visit? yes    Have you been seen in the emergency room and/or had an admission in a hospital since we last saw you?  yes    Have you had your routine dental cleaning in the past 6 months?  no     Do you have an active MyChart account? If no, what is the barrier?   Yes    Patient Care Team:  Redge Hodgkin, MD as PCP - General (Family Medicine)    Medical History Review  Past Medical, Family, and Social History reviewed and does contribute to the patient presenting condition    Health Maintenance   Topic Date Due    Diabetic retinal exam  10/06/1972    HIV screen  10/06/1977    Colon cancer screen colonoscopy  06/11/2017    Diabetic foot exam  05/20/2020    Diabetic microalbuminuria test  05/21/2020    Lipid screen  05/21/2020    Hepatitis B vaccine (1 of 3 - Risk 3-dose series) 01/16/2021 (Originally [birth year]6/1979)    A1C test (Diabetic or Prediabetic)  01/16/2021    Potassium monitoring  05/29/2021    Creatinine monitoring  05/29/2021    DTaP/Tdap/Td vaccine (2 - Td) 11/24/2025    Flu vaccine  Completed    Shingles Vaccine  Completed    Pneumococcal 0-64 years Vaccine  Completed    Hepatitis C screen  Completed    Hepatitis A vaccine  Aged Out    Hib vaccine  Aged Out    Meningococcal (ACWY) vaccine  Aged Out

## 2020-06-04 ENCOUNTER — HOSPITAL ENCOUNTER (EMERGENCY)
Age: 60
Discharge: HOME | End: 2020-06-04
Attending: EMERGENCY MEDICINE
Payer: MEDICAID

## 2020-06-04 ENCOUNTER — APPOINTMENT (OUTPATIENT)
Dept: GENERAL RADIOLOGY | Age: 60
End: 2020-06-04
Payer: MEDICAID

## 2020-06-04 VITALS
TEMPERATURE: 97.7 F | WEIGHT: 173 LBS | OXYGEN SATURATION: 98 % | BODY MASS INDEX: 25.62 KG/M2 | DIASTOLIC BLOOD PRESSURE: 86 MMHG | HEIGHT: 69 IN | RESPIRATION RATE: 17 BRPM | SYSTOLIC BLOOD PRESSURE: 139 MMHG | HEART RATE: 80 BPM

## 2020-06-04 LAB
ABSOLUTE EOS #: 0.22 K/UL (ref 0–0.44)
ABSOLUTE IMMATURE GRANULOCYTE: 0.06 K/UL (ref 0–0.3)
ABSOLUTE LYMPH #: 1.51 K/UL (ref 1.1–3.7)
ABSOLUTE MONO #: 0.65 K/UL (ref 0.1–1.2)
ANION GAP SERPL CALCULATED.3IONS-SCNC: 11 MMOL/L (ref 9–17)
BASOPHILS # BLD: 1 % (ref 0–2)
BASOPHILS ABSOLUTE: 0.04 K/UL (ref 0–0.2)
BILIRUBIN URINE: NEGATIVE
BUN BLDV-MCNC: 25 MG/DL (ref 6–20)
BUN/CREAT BLD: ABNORMAL (ref 9–20)
CALCIUM SERPL-MCNC: 9.4 MG/DL (ref 8.6–10.4)
CHLORIDE BLD-SCNC: 97 MMOL/L (ref 98–107)
CO2: 23 MMOL/L (ref 20–31)
COLOR: YELLOW
COMMENT UA: ABNORMAL
CREAT SERPL-MCNC: 0.62 MG/DL (ref 0.7–1.2)
DIFFERENTIAL TYPE: ABNORMAL
EOSINOPHILS RELATIVE PERCENT: 3 % (ref 1–4)
GFR AFRICAN AMERICAN: >60 ML/MIN
GFR NON-AFRICAN AMERICAN: >60 ML/MIN
GFR SERPL CREATININE-BSD FRML MDRD: ABNORMAL ML/MIN/{1.73_M2}
GFR SERPL CREATININE-BSD FRML MDRD: ABNORMAL ML/MIN/{1.73_M2}
GLUCOSE BLD-MCNC: 343 MG/DL (ref 70–99)
GLUCOSE URINE: ABNORMAL
HCT VFR BLD CALC: 36 % (ref 40.7–50.3)
HEMOGLOBIN: 12.8 G/DL (ref 13–17)
IMMATURE GRANULOCYTES: 1 %
KETONES, URINE: NEGATIVE
LEUKOCYTE ESTERASE, URINE: NEGATIVE
LYMPHOCYTES # BLD: 19 % (ref 24–43)
MCH RBC QN AUTO: 33.1 PG (ref 25.2–33.5)
MCHC RBC AUTO-ENTMCNC: 35.6 G/DL (ref 28.4–34.8)
MCV RBC AUTO: 93 FL (ref 82.6–102.9)
MONOCYTES # BLD: 8 % (ref 3–12)
NITRITE, URINE: NEGATIVE
NRBC AUTOMATED: 0 PER 100 WBC
PDW BLD-RTO: 14.2 % (ref 11.8–14.4)
PH UA: 5 (ref 5–8)
PLATELET # BLD: 271 K/UL (ref 138–453)
PLATELET ESTIMATE: ABNORMAL
PMV BLD AUTO: 9.4 FL (ref 8.1–13.5)
POTASSIUM SERPL-SCNC: 4.4 MMOL/L (ref 3.7–5.3)
PROTEIN UA: NEGATIVE
RBC # BLD: 3.87 M/UL (ref 4.21–5.77)
RBC # BLD: ABNORMAL 10*6/UL
SEG NEUTROPHILS: 68 % (ref 36–65)
SEGMENTED NEUTROPHILS ABSOLUTE COUNT: 5.29 K/UL (ref 1.5–8.1)
SODIUM BLD-SCNC: 131 MMOL/L (ref 135–144)
SPECIFIC GRAVITY UA: 1.03 (ref 1–1.03)
TROPONIN INTERP: NORMAL
TROPONIN INTERP: NORMAL
TROPONIN T: NORMAL NG/ML
TROPONIN T: NORMAL NG/ML
TROPONIN, HIGH SENSITIVITY: 10 NG/L (ref 0–22)
TROPONIN, HIGH SENSITIVITY: 11 NG/L (ref 0–22)
TURBIDITY: CLEAR
URINE HGB: NEGATIVE
UROBILINOGEN, URINE: NORMAL
WBC # BLD: 7.8 K/UL (ref 3.5–11.3)
WBC # BLD: ABNORMAL 10*3/UL

## 2020-06-04 PROCEDURE — 80048 BASIC METABOLIC PNL TOTAL CA: CPT

## 2020-06-04 PROCEDURE — 93005 ELECTROCARDIOGRAM TRACING: CPT | Performed by: EMERGENCY MEDICINE

## 2020-06-04 PROCEDURE — 81003 URINALYSIS AUTO W/O SCOPE: CPT

## 2020-06-04 PROCEDURE — 71045 X-RAY EXAM CHEST 1 VIEW: CPT

## 2020-06-04 PROCEDURE — 84484 ASSAY OF TROPONIN QUANT: CPT

## 2020-06-04 PROCEDURE — 85025 COMPLETE CBC W/AUTO DIFF WBC: CPT

## 2020-06-04 PROCEDURE — 99284 EMERGENCY DEPT VISIT MOD MDM: CPT

## 2020-06-04 PROCEDURE — 87086 URINE CULTURE/COLONY COUNT: CPT

## 2020-06-04 PROCEDURE — 6370000000 HC RX 637 (ALT 250 FOR IP): Performed by: EMERGENCY MEDICINE

## 2020-06-04 RX ORDER — IBUPROFEN 800 MG/1
800 TABLET ORAL ONCE
Status: COMPLETED | OUTPATIENT
Start: 2020-06-04 | End: 2020-06-04

## 2020-06-04 RX ADMIN — IBUPROFEN 800 MG: 800 TABLET, FILM COATED ORAL at 09:59

## 2020-06-04 ASSESSMENT — ENCOUNTER SYMPTOMS
DIARRHEA: 0
COUGH: 0
ABDOMINAL PAIN: 0
SHORTNESS OF BREATH: 0
CONSTIPATION: 0
SORE THROAT: 0
VOMITING: 0
NAUSEA: 0
WHEEZING: 0
BACK PAIN: 0
BLOOD IN STOOL: 0

## 2020-06-04 ASSESSMENT — PAIN DESCRIPTION - PROGRESSION: CLINICAL_PROGRESSION: NOT CHANGED

## 2020-06-04 ASSESSMENT — PAIN DESCRIPTION - ORIENTATION: ORIENTATION: LEFT

## 2020-06-04 ASSESSMENT — HEART SCORE: ECG: 0

## 2020-06-04 ASSESSMENT — PAIN DESCRIPTION - PAIN TYPE
TYPE: ACUTE PAIN
TYPE: ACUTE PAIN

## 2020-06-04 ASSESSMENT — PAIN SCALES - GENERAL
PAINLEVEL_OUTOF10: 10

## 2020-06-04 ASSESSMENT — PAIN DESCRIPTION - LOCATION
LOCATION: GROIN
LOCATION: GROIN

## 2020-06-04 NOTE — ED PROVIDER NOTES
DEPARTMENT COURSE / MDM     LABS:  Results for orders placed or performed during the hospital encounter of 06/04/20   CBC Auto Differential   Result Value Ref Range    WBC 7.8 3.5 - 11.3 k/uL    RBC 3.87 (L) 4.21 - 5.77 m/uL    Hemoglobin 12.8 (L) 13.0 - 17.0 g/dL    Hematocrit 36.0 (L) 40.7 - 50.3 %    MCV 93.0 82.6 - 102.9 fL    MCH 33.1 25.2 - 33.5 pg    MCHC 35.6 (H) 28.4 - 34.8 g/dL    RDW 14.2 11.8 - 14.4 %    Platelets 340 100 - 882 k/uL    MPV 9.4 8.1 - 13.5 fL    NRBC Automated 0.0 0.0 per 100 WBC    Differential Type NOT REPORTED     Seg Neutrophils 68 (H) 36 - 65 %    Lymphocytes 19 (L) 24 - 43 %    Monocytes 8 3 - 12 %    Eosinophils % 3 1 - 4 %    Basophils 1 0 - 2 %    Immature Granulocytes 1 (H) 0 %    Segs Absolute 5.29 1.50 - 8.10 k/uL    Absolute Lymph # 1.51 1.10 - 3.70 k/uL    Absolute Mono # 0.65 0.10 - 1.20 k/uL    Absolute Eos # 0.22 0.00 - 0.44 k/uL    Basophils Absolute 0.04 0.00 - 0.20 k/uL    Absolute Immature Granulocyte 0.06 0.00 - 0.30 k/uL    WBC Morphology NOT REPORTED     RBC Morphology NOT REPORTED     Platelet Estimate NOT REPORTED    Basic Metabolic Panel   Result Value Ref Range    Glucose 343 (H) 70 - 99 mg/dL    BUN 25 (H) 6 - 20 mg/dL    CREATININE 0.62 (L) 0.70 - 1.20 mg/dL    Bun/Cre Ratio NOT REPORTED 9 - 20    Calcium 9.4 8.6 - 10.4 mg/dL    Sodium 131 (L) 135 - 144 mmol/L    Potassium 4.4 3.7 - 5.3 mmol/L    Chloride 97 (L) 98 - 107 mmol/L    CO2 23 20 - 31 mmol/L    Anion Gap 11 9 - 17 mmol/L    GFR Non-African American >60 >60 mL/min    GFR African American >60 >60 mL/min    GFR Comment          GFR Staging NOT REPORTED    Troponin   Result Value Ref Range    Troponin, High Sensitivity 11 0 - 22 ng/L    Troponin T NOT REPORTED <0.03 ng/mL    Troponin Interp NOT REPORTED    Troponin   Result Value Ref Range    Troponin, High Sensitivity 10 0 - 22 ng/L    Troponin T NOT REPORTED <0.03 ng/mL    Troponin Interp NOT REPORTED    Urinalysis   Result Value Ref Range    Color,

## 2020-06-05 LAB
CULTURE: NO GROWTH
EKG ATRIAL RATE: 86 BPM
EKG P AXIS: 55 DEGREES
EKG P-R INTERVAL: 156 MS
EKG Q-T INTERVAL: 366 MS
EKG QRS DURATION: 86 MS
EKG QTC CALCULATION (BAZETT): 437 MS
EKG R AXIS: 39 DEGREES
EKG T AXIS: 65 DEGREES
EKG VENTRICULAR RATE: 86 BPM
Lab: NORMAL
SPECIMEN DESCRIPTION: NORMAL

## 2020-06-05 PROCEDURE — 93010 ELECTROCARDIOGRAM REPORT: CPT | Performed by: INTERNAL MEDICINE

## 2020-06-08 ENCOUNTER — HOSPITAL ENCOUNTER (OUTPATIENT)
Age: 60
Setting detail: SPECIMEN
Discharge: HOME | End: 2020-06-08
Payer: MEDICAID

## 2020-06-08 LAB
CHOLESTEROL/HDL RATIO: 3.3
CHOLESTEROL: 153 MG/DL
CREATININE URINE: 53.7 MG/DL (ref 39–259)
ESTIMATED AVERAGE GLUCOSE: 146 MG/DL
HBA1C MFR BLD: 6.7 % (ref 4–6)
HDLC SERPL-MCNC: 47 MG/DL
HIV AG/AB: NONREACTIVE
LDL CHOLESTEROL: 83 MG/DL (ref 0–130)
MICROALBUMIN/CREAT 24H UR: 16 MG/L
MICROALBUMIN/CREAT UR-RTO: 30 MCG/MG CREAT
TRIGL SERPL-MCNC: 114 MG/DL
VLDLC SERPL CALC-MCNC: NORMAL MG/DL (ref 1–30)

## 2020-06-08 NOTE — TELEPHONE ENCOUNTER
Last visit: 06-02-20  Last Med refill: 06-02-20  Does patient have enough medication for 72 hours: yes    Next Visit Date:  Future Appointments   Date Time Provider Maureen Rodas   6/12/2020  9:30 AM SCHEDULE, P [facility] UROLOGY [facility] Urology [address] Maintenance   Topic Date Due    HIV screen  10/06/1977    Colon cancer screen colonoscopy  06/11/2017    Diabetic microalbuminuria test  05/21/2020    Lipid screen  05/21/2020    Hepatitis B vaccine (1 of 3 - Risk 3-dose series) 01/16/2021 (Originally 10/6/1981)    Diabetic retinal exam  06/12/2021 (Originally 10/6/1972)    A1C test (Diabetic or Prediabetic)  01/16/2021    Diabetic foot exam  06/02/2021    Potassium monitoring  06/04/2021    Creatinine monitoring  06/04/2021    DTaP/Tdap/Td vaccine (2 - Td) 11/24/2025    Flu vaccine  Completed    Shingles Vaccine  Completed    Pneumococcal 0-64 years Vaccine  Completed    Hepatitis C screen  Completed    Hepatitis A vaccine  Aged Out    Hib vaccine  Aged Out    Meningococcal (ACWY) vaccine  Aged Out       Hemoglobin A1C (%)   Date Value   01/16/2020 7.6   09/24/2019 11.7   05/20/2019 11.5 (A)             ( goal A1C is < 7)   Microalb/Crt.  Ratio (mcg/mg creat)   Date Value   05/21/2019 CANNOT BE CALCULATED     LDL Cholesterol (mg/dL)   Date Value   05/21/2019 104   06/18/2015 69       (goal LDL is <100)   AST (U/L)   Date Value   05/20/2020 12     ALT (U/L)   Date Value   05/20/2020 11     BUN (mg/dL)   Date Value   06/04/2020 25 (H)     BP Readings from Last 3 Encounters:   06/04/20 139/86   06/02/20 124/78   05/29/20 (!) 147/83          (goal 120/80)    All Future Testing planned in [facility]  Lab Frequency Next Occurrence   Cologuard (For External Results Only) Once 10/10/2020   XR LUMBAR SPINE (MIN 4 VIEWS) Once 03/05/2020               Patient Active Problem List:     DM2 (diabetes mellitus, type 2) ([address]     Diverticulosis     DM (diabetes mellitus) ([facility]     HTN (hypertension)

## 2020-06-09 RX ORDER — CYCLOBENZAPRINE HCL 10 MG
10 TABLET ORAL NIGHTLY PRN
Qty: 10 TABLET | Refills: 0 | Status: SHIPPED | OUTPATIENT
Start: 2020-06-09 | End: 2020-06-19

## 2020-06-12 ENCOUNTER — OFFICE VISIT (OUTPATIENT)
Dept: UROLOGY | Age: 60
End: 2020-06-12
Payer: MEDICAID

## 2020-06-12 VITALS
RESPIRATION RATE: 18 BRPM | WEIGHT: 170 LBS | DIASTOLIC BLOOD PRESSURE: 84 MMHG | BODY MASS INDEX: 24.34 KG/M2 | TEMPERATURE: 98.7 F | SYSTOLIC BLOOD PRESSURE: 131 MMHG | HEART RATE: 101 BPM | HEIGHT: 70 IN

## 2020-06-12 PROCEDURE — 99202 OFFICE O/P NEW SF 15 MIN: CPT

## 2020-06-12 PROCEDURE — 99204 OFFICE O/P NEW MOD 45 MIN: CPT | Performed by: UROLOGY

## 2020-06-12 PROCEDURE — G8427 DOCREV CUR MEDS BY ELIG CLIN: HCPCS | Performed by: UROLOGY

## 2020-06-12 PROCEDURE — 1036F TOBACCO NON-USER: CPT | Performed by: UROLOGY

## 2020-06-12 PROCEDURE — 3017F COLORECTAL CA SCREEN DOC REV: CPT | Performed by: UROLOGY

## 2020-06-12 PROCEDURE — G8420 CALC BMI NORM PARAMETERS: HCPCS | Performed by: UROLOGY

## 2020-06-12 RX ORDER — TRAZODONE HYDROCHLORIDE 50 MG/1
TABLET ORAL
COMMUNITY
Start: 2020-05-23 | End: 2020-07-29

## 2020-06-12 RX ORDER — GABAPENTIN 800 MG/1
TABLET ORAL
COMMUNITY
Start: 2020-05-24 | End: 2021-04-19

## 2020-06-12 RX ORDER — ALFUZOSIN HYDROCHLORIDE 10 MG/1
10 TABLET, EXTENDED RELEASE ORAL DAILY
Qty: 30 TABLET | Refills: 0 | Status: SHIPPED | OUTPATIENT
Start: 2020-06-12 | End: 2020-07-10 | Stop reason: SDUPTHER

## 2020-06-12 RX ORDER — DOXYCYCLINE HYCLATE 100 MG
100 TABLET ORAL 2 TIMES DAILY
Qty: 60 TABLET | Refills: 0 | Status: SHIPPED | OUTPATIENT
Start: 2020-06-12 | End: 2020-07-12

## 2020-06-12 RX ORDER — DULOXETIN HYDROCHLORIDE 20 MG/1
40 CAPSULE, DELAYED RELEASE ORAL DAILY
Qty: 60 CAPSULE | Refills: 0 | Status: SHIPPED | OUTPATIENT
Start: 2020-06-12 | End: 2021-08-19

## 2020-06-12 NOTE — PROGRESS NOTES
Faraz Vidales MD   Urology Clinic Consultation / New Patient Visit      Patient:  Radha Brown  YOB: 1960  Date: 6/12/2020    HISTORY OF PRESENT ILLNESS:   The patient is a 64 y.o. male who presents today for evaluation of the following problem(s): Left testicular pain      Overall the problem(s) : show no change. Associated Symptoms: + dysuria, gross hematuria. Pain Severity: Pain Score:  10 - Worst pain ever       Scrotal / Testicular Pain:  Patient is here today for a scrotal/testicular pain which was first noted 2 month(s) ago. The pain is: left. Recently the symptoms: show no change  Current medical Rx for pain: NSAIDs (including IV toradol), gabapentin (800 mg TID), narcotics   Pain onset: gradual  Pain details: radiates to the left flank, penis  Pain type: burning  Pain severity: severe  Flank pain? Yes, left; Abdominal pain? 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 related to pain  Insurance does not cover Flomax  Has tried course of Cipro for prostatitis, no improvement  History of inguinal hernia repair  Left epididymal cyst   UCx/G/C negative    (Patient's old records, notes and chart reviewed and summarized above.)    Last several PSA's:  No results found for: PSA    Last total testosterone:  No results found for: TESTOSTERONE    Urinalysis today:  No results found for this visit on 06/12/20.       Last BUN and creatinine:  Lab Results   Component Value Date    BUN 25 (H) 06/04/2020     Lab Results   Component Value Date    CREATININE 0.62 (L) 06/04/2020 Imaging Reviewed during this Office Visit:   (results were independently reviewed by physician and radiology report verified)    PAST MEDICAL, FAMILY AND SOCIAL HISTORY:  Past Medical History:   Diagnosis Date    [facility] [address] [facility]     Cocaine abuse ([facility] [address] [facility] 3/11/2020    Diabetes mellitus ([facility] [address] [facility]     Diverticulitis     Diverticulosis     GERD (gastroesophageal reflux disease)     Hypertension     MIGUEL (obstructive sleep apnea)     Osteoarthritis     Renal lithiasis     Rheumatoid arteritis (HCC)     Type II or unspecified type diabetes mellitus without mention of complication, not stated as uncontrolled      Past Surgical History:   Procedure Laterality Date    APPENDECTOMY      CHOLECYSTECTOMY      COLON SURGERY      COLONOSCOPY      HERNIA REPAIR      MECKEL DIVERTICULUM EXCISION      SMALL INTESTINE SURGERY      URETHRAL STRICTURE DILATATION       Family History   Problem Relation Age of Onset    Diabetes Father     Diabetes Other     Heart Attack Other     Hypertension Other     Diabetes Brother      Outpatient Medications Marked as Taking for the 6/12/20 encounter (Office Visit) with Sesar Ramirez MD   Medication Sig Dispense Refill    gabapentin (NEURONTIN) 800 MG tablet       traZODone (DESYREL) 50 MG tablet       DULoxetine (CYMBALTA) 20 MG extended release capsule Take 2 capsules by mouth daily 60 capsule 0    alfuzosin (UROXATRAL) 10 MG extended release tablet Take 1 tablet by mouth daily Take at night.  30 tablet 0    doxycycline hyclate (VIBRA-TABS) 100 MG tablet Take 1 tablet by mouth 2 times daily 60 tablet 0    cyclobenzaprine (FLEXERIL) 10 MG tablet Take 1 tablet by mouth nightly as needed for Muscle spasms 10 tablet 0    metFORMIN (GLUCOPHAGE) 1000 MG tablet Take 1 tablet by mouth 2 times daily (with meals) 60 tablet 3    acetaminophen (APAP EXTRA STRENGTH) 500 MG tablet Take 2 tablets by mouth every 6 hours as needed for Pain 60 tablet 0    ibuprofen (ADVIL;MOTRIN) 800 MG tablet Take 1 tablet by mouth every 8 hours as needed for Pain 21 tablet 0    lisinopril (PRINIVIL;ZESTRIL) 40 MG tablet TAKE ONE TABLET BY MOUTH DAILY 30 tablet 3    insulin glargine (BASAGLAR KWIKPEN) 100 UNIT/ML injection pen Inject 40 Units into the skin nightly 5 pen 0    gabapentin (NEURONTIN) 800 MG tablet TAKE ONE TABLET BY MOUTH THREE TIMES A DAY 90 tablet 0    insulin lispro (HUMALOG) 100 UNIT/ML injection vial Inject 13 Units into the skin 3 times daily (before meals) 1 vial 3    blood glucose monitor strips Test 3 times a day & as needed for symptoms of irregular blood glucose. 100 strip 0    Insulin Syringe-Needle U-100 30G X 1/2\" 0.5 ML MISC 1 each by Does not apply route daily 120 each 3    Blood Pressure KIT 1 Applicatorful by Does not apply route 2 times daily 1 kit 0    Blood Glucose Monitoring Suppl (TRUE METRIX METER) w/Device KIT Use as directed 1 kit 0    blood glucose test strips (ASCENSIA AUTODISC VI;ONE TOUCH ULTRA TEST VI) strip 1 each by In Vitro route daily As needed. 100 each 3    blood glucose test strips (ASCENSIA AUTODISC VI;ONE TOUCH ULTRA TEST VI) strip Use with associated glucose meter. To check blood sugar 4 times daily 120 strip 3    insulin aspart (NOVOLOG FLEXPEN) 100 UNIT/ML injection pen Inject 13 Units into the skin 3 times daily (before meals) 5 pen 3    Lancets MISC Check blood sugar 4 times daily (AC HS) 120 each 3    Alcohol Swabs PADS Use as needed while checking blood sugar 120 each 3    blood glucose monitor kit and supplies Test 4 times a day & as needed for symptoms of irregular blood glucose.  1 kit 0    aspirin EC 81 MG EC tablet Take 1 tablet by mouth daily 90 tablet 3    docusate sodium (COLACE) 100 MG capsule Take 1 capsule by mouth 2 times daily as needed for Constipation 14 capsule 0    glucose monitoring kit (FREESTYLE) monitoring kit 1 kit by Does not apply route 4 times daily (before meals and nightly) 1 kit 0    cystoscopy under MAC to assess irritative voiding symptoms with  history of chemical exposure to rule out CIS -consent obtained in office  Left epididymal cyst unlikely to be causing problems, may need pain management if chronic issues possibly related to neuropathy considering peripheral neuropathy from diabetes             Gricel Monae MD  [facility]      I have discussed the care of this patient including pertinent history and exam findings, with the resident. I have seen and examined the patient and the key elements of all parts of the encounter have been performed by me. I agree with the assessment, plan and orders as documented by the resident.     Francisco Ulloa M.D, MD

## 2020-06-17 ENCOUNTER — TELEPHONE (OUTPATIENT)
Dept: UROLOGY | Age: 60
End: 2020-06-17

## 2020-06-18 RX ORDER — GABAPENTIN 800 MG/1
TABLET ORAL
Qty: 90 TABLET | Refills: 0 | Status: SHIPPED | OUTPATIENT
Start: 2020-06-18 | End: 2020-07-20

## 2020-06-26 ENCOUNTER — HOSPITAL ENCOUNTER (OUTPATIENT)
Dept: PREADMISSION TESTING | Age: 60
Setting detail: SPECIMEN
Discharge: HOME | End: 2020-06-30
Payer: MEDICAID

## 2020-06-26 PROCEDURE — U0003 INFECTIOUS AGENT DETECTION BY NUCLEIC ACID (DNA OR RNA); SEVERE ACUTE RESPIRATORY SYNDROME CORONAVIRUS 2 (SARS-COV-2) (CORONAVIRUS DISEASE [COVID-19]), AMPLIFIED PROBE TECHNIQUE, MAKING USE OF HIGH THROUGHPUT TECHNOLOGIES AS DESCRIBED BY CMS-2020-01-R: HCPCS

## 2020-06-28 LAB — SARS-COV-2, NAA: NOT DETECTED

## 2020-06-29 ENCOUNTER — TELEPHONE (OUTPATIENT)
Dept: PRIMARY CARE CLINIC | Age: 60
End: 2020-06-29

## 2020-06-30 ENCOUNTER — ANESTHESIA EVENT (OUTPATIENT)
Dept: OPERATING ROOM | Age: 60
End: 2020-06-30
Payer: MEDICAID

## 2020-06-30 ENCOUNTER — ANESTHESIA (OUTPATIENT)
Dept: OPERATING ROOM | Age: 60
End: 2020-06-30
Payer: MEDICAID

## 2020-06-30 ENCOUNTER — HOSPITAL ENCOUNTER (OUTPATIENT)
Age: 60
Setting detail: OUTPATIENT SURGERY
Discharge: HOME | End: 2020-06-30
Attending: UROLOGY | Admitting: UROLOGY
Payer: MEDICAID

## 2020-06-30 VITALS — DIASTOLIC BLOOD PRESSURE: 88 MMHG | OXYGEN SATURATION: 100 % | SYSTOLIC BLOOD PRESSURE: 122 MMHG

## 2020-06-30 VITALS
OXYGEN SATURATION: 95 % | HEART RATE: 80 BPM | SYSTOLIC BLOOD PRESSURE: 160 MMHG | TEMPERATURE: 97.5 F | RESPIRATION RATE: 16 BRPM | DIASTOLIC BLOOD PRESSURE: 95 MMHG

## 2020-06-30 LAB
GLUCOSE BLD-MCNC: 136 MG/DL (ref 75–110)
GLUCOSE BLD-MCNC: 144 MG/DL (ref 75–110)

## 2020-06-30 PROCEDURE — 82947 ASSAY GLUCOSE BLOOD QUANT: CPT

## 2020-06-30 PROCEDURE — 2709999900 HC NON-CHARGEABLE SUPPLY: Performed by: UROLOGY

## 2020-06-30 PROCEDURE — 3700000000 HC ANESTHESIA ATTENDED CARE: Performed by: UROLOGY

## 2020-06-30 PROCEDURE — 7100000041 HC SPAR PHASE II RECOVERY - ADDTL 15 MIN: Performed by: UROLOGY

## 2020-06-30 PROCEDURE — 3600000012 HC SURGERY LEVEL 2 ADDTL 15MIN: Performed by: UROLOGY

## 2020-06-30 PROCEDURE — 7100000040 HC SPAR PHASE II RECOVERY - FIRST 15 MIN: Performed by: UROLOGY

## 2020-06-30 PROCEDURE — 3600000002 HC SURGERY LEVEL 2 BASE: Performed by: UROLOGY

## 2020-06-30 PROCEDURE — 3700000001 HC ADD 15 MINUTES (ANESTHESIA): Performed by: UROLOGY

## 2020-06-30 PROCEDURE — 2580000003 HC RX 258: Performed by: NURSE ANESTHETIST, CERTIFIED REGISTERED

## 2020-06-30 PROCEDURE — 6360000002 HC RX W HCPCS: Performed by: NURSE ANESTHETIST, CERTIFIED REGISTERED

## 2020-06-30 PROCEDURE — 2580000003 HC RX 258: Performed by: UROLOGY

## 2020-06-30 PROCEDURE — 2500000003 HC RX 250 WO HCPCS: Performed by: NURSE ANESTHETIST, CERTIFIED REGISTERED

## 2020-06-30 RX ORDER — PROPOFOL 10 MG/ML
INJECTION, EMULSION INTRAVENOUS PRN
Status: DISCONTINUED | OUTPATIENT
Start: 2020-06-30 | End: 2020-06-30 | Stop reason: SDUPTHER

## 2020-06-30 RX ORDER — MAGNESIUM HYDROXIDE 1200 MG/15ML
LIQUID ORAL PRN
Status: DISCONTINUED | OUTPATIENT
Start: 2020-06-30 | End: 2020-06-30 | Stop reason: ALTCHOICE

## 2020-06-30 RX ORDER — PROPOFOL 10 MG/ML
INJECTION, EMULSION INTRAVENOUS CONTINUOUS PRN
Status: DISCONTINUED | OUTPATIENT
Start: 2020-06-30 | End: 2020-06-30 | Stop reason: SDUPTHER

## 2020-06-30 RX ORDER — FENTANYL CITRATE 50 UG/ML
50 INJECTION, SOLUTION INTRAMUSCULAR; INTRAVENOUS EVERY 5 MIN PRN
Status: DISCONTINUED | OUTPATIENT
Start: 2020-06-30 | End: 2020-06-30 | Stop reason: HOSPADM

## 2020-06-30 RX ORDER — FENTANYL CITRATE 50 UG/ML
INJECTION, SOLUTION INTRAMUSCULAR; INTRAVENOUS PRN
Status: DISCONTINUED | OUTPATIENT
Start: 2020-06-30 | End: 2020-06-30 | Stop reason: SDUPTHER

## 2020-06-30 RX ORDER — MAGNESIUM HYDROXIDE 1200 MG/15ML
LIQUID ORAL CONTINUOUS PRN
Status: COMPLETED | OUTPATIENT
Start: 2020-06-30 | End: 2020-06-30

## 2020-06-30 RX ORDER — SODIUM CHLORIDE, SODIUM LACTATE, POTASSIUM CHLORIDE, CALCIUM CHLORIDE 600; 310; 30; 20 MG/100ML; MG/100ML; MG/100ML; MG/100ML
INJECTION, SOLUTION INTRAVENOUS CONTINUOUS PRN
Status: DISCONTINUED | OUTPATIENT
Start: 2020-06-30 | End: 2020-06-30 | Stop reason: SDUPTHER

## 2020-06-30 RX ORDER — SODIUM CHLORIDE 0.9 % (FLUSH) 0.9 %
10 SYRINGE (ML) INJECTION PRN
Status: CANCELLED | OUTPATIENT
Start: 2020-06-30

## 2020-06-30 RX ORDER — ONDANSETRON 2 MG/ML
4 INJECTION INTRAMUSCULAR; INTRAVENOUS ONCE
Status: CANCELLED | OUTPATIENT
Start: 2020-06-30 | End: 2020-06-30

## 2020-06-30 RX ORDER — MIDAZOLAM HYDROCHLORIDE 1 MG/ML
2 INJECTION INTRAMUSCULAR; INTRAVENOUS
Status: CANCELLED | OUTPATIENT
Start: 2020-06-30 | End: 2020-06-30

## 2020-06-30 RX ORDER — SODIUM CHLORIDE, SODIUM LACTATE, POTASSIUM CHLORIDE, CALCIUM CHLORIDE 600; 310; 30; 20 MG/100ML; MG/100ML; MG/100ML; MG/100ML
INJECTION, SOLUTION INTRAVENOUS CONTINUOUS
Status: CANCELLED | OUTPATIENT
Start: 2020-06-30

## 2020-06-30 RX ORDER — SODIUM CHLORIDE 0.9 % (FLUSH) 0.9 %
10 SYRINGE (ML) INJECTION EVERY 12 HOURS SCHEDULED
Status: CANCELLED | OUTPATIENT
Start: 2020-06-30

## 2020-06-30 RX ORDER — KETOROLAC TROMETHAMINE 30 MG/ML
INJECTION, SOLUTION INTRAMUSCULAR; INTRAVENOUS PRN
Status: DISCONTINUED | OUTPATIENT
Start: 2020-06-30 | End: 2020-06-30 | Stop reason: SDUPTHER

## 2020-06-30 RX ORDER — FENTANYL CITRATE 50 UG/ML
25 INJECTION, SOLUTION INTRAMUSCULAR; INTRAVENOUS ONCE
Status: CANCELLED | OUTPATIENT
Start: 2020-06-30 | End: 2020-06-30

## 2020-06-30 RX ORDER — LIDOCAINE HYDROCHLORIDE 10 MG/ML
INJECTION, SOLUTION EPIDURAL; INFILTRATION; INTRACAUDAL; PERINEURAL PRN
Status: DISCONTINUED | OUTPATIENT
Start: 2020-06-30 | End: 2020-06-30 | Stop reason: SDUPTHER

## 2020-06-30 RX ADMIN — PROPOFOL 100 MCG/KG/MIN: 10 INJECTION, EMULSION INTRAVENOUS at 11:12

## 2020-06-30 RX ADMIN — SODIUM CHLORIDE, POTASSIUM CHLORIDE, SODIUM LACTATE AND CALCIUM CHLORIDE: 600; 310; 30; 20 INJECTION, SOLUTION INTRAVENOUS at 11:09

## 2020-06-30 RX ADMIN — FENTANYL CITRATE 25 MCG: 50 INJECTION INTRAMUSCULAR; INTRAVENOUS at 11:19

## 2020-06-30 RX ADMIN — FENTANYL CITRATE 50 MCG: 50 INJECTION INTRAMUSCULAR; INTRAVENOUS at 11:10

## 2020-06-30 RX ADMIN — PROPOFOL 20 MG: 10 INJECTION, EMULSION INTRAVENOUS at 11:24

## 2020-06-30 RX ADMIN — PROPOFOL 20 MG: 10 INJECTION, EMULSION INTRAVENOUS at 11:17

## 2020-06-30 RX ADMIN — FENTANYL CITRATE 25 MCG: 50 INJECTION INTRAMUSCULAR; INTRAVENOUS at 11:28

## 2020-06-30 RX ADMIN — PROPOFOL 50 MG: 10 INJECTION, EMULSION INTRAVENOUS at 11:12

## 2020-06-30 RX ADMIN — KETOROLAC TROMETHAMINE 30 MG: 30 INJECTION, SOLUTION INTRAMUSCULAR; INTRAVENOUS at 11:28

## 2020-06-30 RX ADMIN — LIDOCAINE HYDROCHLORIDE 50 MG: 10 INJECTION, SOLUTION EPIDURAL; INFILTRATION; INTRACAUDAL; PERINEURAL at 11:12

## 2020-06-30 ASSESSMENT — PULMONARY FUNCTION TESTS
PIF_VALUE: 0
PIF_VALUE: 1
PIF_VALUE: 1
PIF_VALUE: 0
PIF_VALUE: 1
PIF_VALUE: 1
PIF_VALUE: 0
PIF_VALUE: 1
PIF_VALUE: 1
PIF_VALUE: 0
PIF_VALUE: 1
PIF_VALUE: 0
PIF_VALUE: 1
PIF_VALUE: 0
PIF_VALUE: 1
PIF_VALUE: 1
PIF_VALUE: 0
PIF_VALUE: 1
PIF_VALUE: 1
PIF_VALUE: 0
PIF_VALUE: 1
PIF_VALUE: 0
PIF_VALUE: 1
PIF_VALUE: 0

## 2020-06-30 ASSESSMENT — PAIN DESCRIPTION - PAIN TYPE: TYPE: CHRONIC PAIN

## 2020-06-30 ASSESSMENT — PAIN - FUNCTIONAL ASSESSMENT: PAIN_FUNCTIONAL_ASSESSMENT: 0-10

## 2020-06-30 ASSESSMENT — PAIN DESCRIPTION - LOCATION: LOCATION: PENIS

## 2020-06-30 NOTE — H&P
Pre-op History and Physical  Russell Reynolds PA-C    Patient:  Kim Roque  MRN: [id number]  YOB: 1960    HISTORY OF PRESENT ILLNESS:     The patient is a 64 y.o. male who presents with history of left orchalgia the past 6-8 weeks. Here for cystoscopy under MAC. Patient's old records, notes and chart reviewed and summarized above. Russell Reynolds PA-C independently reviewed the images and verified the radiology reports from:    No results found. Past Medical History:    Past Medical History:   Diagnosis Date    Bowel obstruction ([address]     Cocaine abuse ([facility] [address] 3/11/2020    Diabetes mellitus ([facility] [address]     Diverticulitis     Diverticulosis     GERD (gastroesophageal reflux disease)     Hypertension     MIGUEL (obstructive sleep apnea)     Osteoarthritis     Renal lithiasis     Rheumatoid arteritis (HCC)     Type II or unspecified type diabetes mellitus without mention of complication, not stated as uncontrolled        Past Surgical History:    Past Surgical History:   Procedure Laterality Date    APPENDECTOMY      CHOLECYSTECTOMY      COLON SURGERY      COLONOSCOPY      HERNIA REPAIR      MECKEL DIVERTICULUM EXCISION      SMALL INTESTINE SURGERY      URETHRAL STRICTURE DILATATION         Medications Prior to Admission:    Prior to Admission medications    Medication Sig Start Date End Date Taking? Authorizing Provider   gabapentin (NEURONTIN) 800 MG tablet TAKE ONE TABLET BY MOUTH THREE TIMES A DAY 6/18/20 7/18/20  Sherlyn Porter MD   gabapentin (NEURONTIN) 800 MG tablet  5/24/20   Historical Provider, MD   traZODone (DESYREL) 50 MG tablet  5/23/20   Historical Provider, MD   DULoxetine (CYMBALTA) 20 MG extended release capsule Take 2 capsules by mouth daily 6/12/20 7/12/20  Emely Vides MD   alfuzosin (UROXATRAL) 10 MG extended release tablet Take 1 tablet by mouth daily Take at night.  6/12/20 7/12/20  Deangelo Diane MD   doxycycline hyclate (VIBRA-TABS) TOUCH ULTRA TEST VI) strip Use with associated glucose meter. To check blood sugar 4 times daily 12/5/19   Nicolette Bender MD   fluticasone [facility]) 50 MCG/ACT nasal spray 1 spray by Each Nostril route daily  Patient not taking: Reported on 6/12/2020 11/22/19   Nicolette Bender MD   insulin aspart (NOVOLOG FLEXPEN) 100 UNIT/ML injection pen Inject 13 Units into the skin 3 times daily (before meals) 11/12/19   Nicolette Bender MD   Lancets MISC Check blood sugar 4 times daily (AC HS) 10/25/19   Nicolette Bender MD   Alcohol Swabs PADS Use as needed while checking blood sugar 10/25/19   Nicolette Bender MD   blood glucose monitor kit and supplies Test 4 times a day & as needed for symptoms of irregular blood glucose. 9/24/19   Nicolette Bender MD   aspirin EC 81 MG EC tablet Take 1 tablet by mouth daily 11/20/17   Rashad Guido MD   docusate sodium (COLACE) 100 MG capsule Take 1 capsule by mouth 2 times daily as needed for Constipation 10/29/16   Eben Mccabe MD   omeprazole (PRILOSEC) 20 MG delayed release capsule Take 1 capsule by mouth daily  Patient not taking: Reported on 6/12/2020 10/29/16   Eben Mccabe MD   glucose monitoring kit (FREESTYLE) monitoring kit 1 kit by Does not apply route 4 times daily (before meals and nightly) 10/29/16   Eben Mccabe MD   potassium chloride (KLOR-CON M) 20 MEQ extended release tablet Take 1 tablet by mouth daily  Patient not taking: Reported on 6/12/2020 10/29/16   Eben Mccabe MD   Blood Pressure Monitoring (ADULT BLOOD PRESSURE CUFF LG) KIT Check bp daily 6/10/14   She Knight MD       Allergies:  Patient has no known allergies.     Social History:    Social History     Socioeconomic History    Marital status:      Spouse name: Not on file    Number of children: Not on file    Years of education: Not on file    Highest education level: Not on file   Occupational History    Not on file   Social Needs    Financial resource strain: Not on file   [place] insecurity     Worry: Not on file     Inability: Not on file    Transportation needs     Medical: Not on file     Non-medical: Not on file   Tobacco Use    Smoking status: Former Smoker     Types: Cigars    Smokeless tobacco: Never Used    Tobacco comment: cigars 2x week   Substance and Sexual Activity    Alcohol use: No     Alcohol/week: 0.0 standard drinks    Drug use: No    Sexual activity: Not on file   Lifestyle    Physical activity     Days per week: Not on file     Minutes per session: Not on file    Stress: Not on file   Relationships    Social connections     Talks on phone: Not on file     Gets together: Not on file     Attends Rastafarian service: Not on file     Active member of club or organization: Not on file     Attends meetings of clubs or organizations: Not on file     Relationship status: Not on file    Intimate partner violence     Fear of current or ex partner: Not on file     Emotionally abused: Not on file     Physically abused: Not on file     Forced sexual activity: Not on file   Other Topics Concern    Not on file   Social History Narrative    Not on file       Family History:    Family History   Problem Relation Age of Onset    Diabetes Father     Diabetes Other     Heart Attack Other     Hypertension Other     Diabetes Brother        REVIEW OF SYSTEMS:  Constitutional: negative  Eyes: negative  Respiratory: negative  Cardiovascular: negative  Gastrointestinal: negative  Genitourinary: no acute issues  Musculoskeletal: negative  Skin: negative   Neurological: negative  Hematological/Lymphatic: negative  Psychological: negative    PHYSICAL EXAM:    No data found. Constitutional: Patient in NAD  Neuro: Alert and oriented to person, place, and time  Psych: Mood and affect normal  Skin: Clean, dry, intact   Lungs: Respiratory effort normal, CTA  Cardiovascular:  Normal peripheral pulses; no murmur.  Normal rhythm  Abdomen: Soft, non-tender, non-distended, no hepatosplenomegaly or hernia, CVA tenderness none  Bladder: Non-tender and non-disdended   : Non-tender, skin intact, no lesions       LABS:   No results for input(s): WBC, HGB, HCT, MCV, PLT in the last 72 hours. No results for input(s): NA, K, CL, CO2, PHOS, BUN, CREATININE in the last 72 hours. Invalid input(s): CA  No results found for: PSA      Urinalysis: No results for input(s): COLORU, PHUR, LABCAST, WBCUA, RBCUA, MUCUS, TRICHOMONAS, YEAST, BACTERIA, CLARITYU, SPECGRAV, LEUKOCYTESUR, UROBILINOGEN, Kamille Shabbir in the last 72 hours. Invalid input(s): NITRATE, GLUCOSEUKETONESUAMORPHOUS     -----------------------------------------------------------------    ASSESSMENT AND PLAN:    Impression:    Left Orchalgia/epididymitis  LUTS    Plan: Cystoscopy under MAC in OR today.     Consent obtained    Liliana Espinal PA-C  7:54 AM 6/30/2020

## 2020-06-30 NOTE — OP NOTE
Operative Note     Name: Cadence Ramos  YOB: 1960  MRN: [id number]  Date of Procedure: 06/30/20    Surgeon: Salome Mac MD    Resident(s): Chasity Lorenzo MD-[id number]    Preoperative Diagnosis: Gross hematuria, dysuria    Postoperative Diagnosis: Gross hematuria, dysuria    Procedure:  1) Rigid cystourethroscopy     Anesthesia: MAC    Antibiotics: N/A    Indications:  Cadence Ramos is a 64 y.o. male who presents for cystoscopy for evaluation due to history of gross hematuria and dysuria. Risks, benefits, and alternatives were discussed and the patient elected to proceed. Informed consent was obtained. Description of Procedure: The patient was taken back to the operating room and transferred onto the operating room table. MAC anesthesia was induced appropriately with 2% viscous lidocaine. The patient was then placed in dorsolithotomy position and prepped and draped in the usual sterile fashion. Surgical time-out was performed indicating correct patient, procedure, and positioning. 25 Chinese rigid cystoscope with 30 degree lens was assembled and passed per the urethra. The anterior urethra was normal without any lesions; the posterior urethra and prostate were unremarkable other than some mild bilobar hypertrophy. The bladder was inspected thoroughly and systematically which did not show any lesions or tumors, stone, fistulous tracts, or diverticula. Both ureteral orifices were normal with clear efflux of urine. The bladder was then drained. The scope was removed intact and without any issues. The procedure was then terminated. The patient tolerated the procedure well and there were no intraoperative complications. The patient was taken to PACU in stable condition. Dr. Wesly Thornton was present for all critical portions of the procedure.      Findings: Mild bilobar prostatic hypertrophy    Specimens: None    Implants/Drains: None    Complications: None    Estimated blood loss:

## 2020-06-30 NOTE — ANESTHESIA PRE PROCEDURE
Department of Anesthesiology  Preprocedure Note       Name:  Veena Nogueira   Age:  64 y.o.  :  1960                                          MRN:  [id number]         Date:  2020      Surgeon: Ady Teague):  Anne Krishnamurthy MD    Procedure: Procedure(s):  CYSTOSCOPY    Medications prior to admission:   Prior to Admission medications    Medication Sig Start Date End Date Taking? Authorizing Provider   gabapentin (NEURONTIN) 800 MG tablet TAKE ONE TABLET BY MOUTH THREE TIMES A DAY 20  Michelle Odonnell MD   gabapentin (NEURONTIN) 800 MG tablet  20   Historical Provider, MD   traZODone (DESYREL) 50 MG tablet  20   Historical Provider, MD   DULoxetine (CYMBALTA) 20 MG extended release capsule Take 2 capsules by mouth daily 20  Gina Vincent MD   alfuzosin (UROXATRAL) 10 MG extended release tablet Take 1 tablet by mouth daily Take at night.  20  Deangelo Mcdonough MD   doxycycline hyclate (VIBRA-TABS) 100 MG tablet Take 1 tablet by mouth 2 times daily 20  Gina Vincent MD   metFORMIN (GLUCOPHAGE) 1000 MG tablet Take 1 tablet by mouth 2 times daily (with meals) 20   Michelle Odonnell MD   acetaminophen (APAP EXTRA STRENGTH) 500 MG tablet Take 2 tablets by mouth every 6 hours as needed for Pain 20   Brandon Winn DO   ibuprofen (ADVIL;MOTRIN) 800 MG tablet Take 1 tablet by mouth every 8 hours as needed for Pain 20  Vicky Mcfarland DO   tamsulosin [facility]) 0.4 MG capsule Take 1 capsule by mouth daily for 14 doses  Patient not taking: Reported on 2020 5/20/20 6/3/20  Vivien Patterson DO   lisinopril (PRINIVIL;ZESTRIL) 40 MG tablet TAKE ONE TABLET BY MOUTH DAILY 20   Naldo Acosta MD   insulin glargine (BASAGLAR KWIKPEN) 100 UNIT/ML injection pen Inject 40 Units into the skin nightly 20   Michelle Odonnell MD   insulin lispro (HUMALOG) 100 UNIT/ML injection vial Inject 13 Units into the skin 3 times daily (before meals) 3/20/20   Dutch Hansen MD   nitroGLYCERIN (NITROSTAT) 0.4 MG SL tablet Place 1 tablet under the tongue every 5 minutes as needed for Chest pain up to max of 3 total doses. If no relief after 1 dose, call 911. Patient not taking: Reported on 6/12/2020 3/16/20   Evangelista Aggarwal,    blood glucose monitor strips Test 3 times a day & as needed for symptoms of irregular blood glucose. 2/24/20   Dutch Hansen MD   Insulin Syringe-Needle U-100 30G X 1/2\" 0.5 ML MISC 1 each by Does not apply route daily 2/24/20   Dutch Hansen MD   Blood Pressure KIT 1 Applicatorful by Does not apply route 2 times daily 1/16/20   Dutch Hansen MD   Blood Glucose Monitoring Suppl (TRUE METRIX METER) w/Device KIT Use as directed 12/22/19   Lito Milian MD   blood glucose test strips (ASCENSIA AUTODISC VI;ONE TOUCH ULTRA TEST VI) strip 1 each by In Vitro route daily As needed. 12/22/19   Lito Milian MD   blood glucose test strips (ASCENSIA AUTODISC VI;ONE TOUCH ULTRA TEST VI) strip Use with associated glucose meter. To check blood sugar 4 times daily 12/5/19   Dutch Hansen MD   fluticasone Rick Pugh) 50 MCG/ACT nasal spray 1 spray by Each Nostril route daily  Patient not taking: Reported on 6/12/2020 11/22/19   Dutch Hansen MD   insulin aspart (NOVOLOG FLEXPEN) 100 UNIT/ML injection pen Inject 13 Units into the skin 3 times daily (before meals) 11/12/19   Dutch Hansen MD   Lancets MISC Check blood sugar 4 times daily (AC HS) 10/25/19   Dutch Hansen MD   Alcohol Swabs PADS Use as needed while checking blood sugar 10/25/19   Dutch Hansen MD   blood glucose monitor kit and supplies Test 4 times a day & as needed for symptoms of irregular blood glucose.  9/24/19   Dutch Hansen MD   aspirin EC 81 MG EC tablet Take 1 tablet by mouth daily 11/20/17   Maryanne Burrell MD   docusate sodium (COLACE) 100 MG capsule Take 1 capsule by mouth 2 times daily as needed for Constipation 10/29/16   Joanne Jane MD omeprazole (PRILOSEC) 20 MG delayed release capsule Take 1 capsule by mouth daily  Patient not taking: Reported on 6/12/2020 10/29/16   Saurav Rivera MD   glucose monitoring kit (FREESTYLE) monitoring kit 1 kit by Does not apply route 4 times daily (before meals and nightly) 10/29/16   Saurav Rivera MD   potassium chloride (KLOR-CON M) 20 MEQ extended release tablet Take 1 tablet by mouth daily  Patient not taking: Reported on 6/12/2020 10/29/16   Saurav Rivera MD   Blood Pressure Monitoring (ADULT BLOOD PRESSURE CUFF LG) KIT Check bp daily 6/10/14   Debra Palma MD       Current medications:    Current Facility-Administered Medications   Medication Dose Route Frequency Provider Last Rate Last Dose    fentaNYL (SUBLIMAZE) injection 50 mcg  50 mcg Intravenous Q5 Min PRN Agusto Waddell MD        fentaNYL (SUBLIMAZE) injection 50 mcg  50 mcg Intravenous Q5 Min PRN Agusto Waddell MD           Allergies:  No Known Allergies    Problem List:    Patient Active Problem List   Diagnosis Code    DM2 (diabetes mellitus, type 2) ([facility] [address] E11.9    Diverticulosis K57.90    DM (diabetes mellitus) ([facility] [address] E11.9    HTN (hypertension) I10    Elevated liver enzymes R74.8    Scrotal abscess N49.2    Abdominal abscess [id number]    Hyperplastic colon polyp K63.5    Lower abdominal pain R10.30    Diabetic foot ([address] E11.8    Infected hernioplasty mesh ([facility] [address] T85.79XA    Cocaine abuse ([facility] [address] F14.10    Chest pain R07.9       Past Medical History:        Diagnosis Date    Bowel obstruction ([facility] [address]     Cocaine abuse ([facility] [address] 3/11/2020    Diabetes mellitus ([facility] [address]     Diverticulitis     Diverticulosis     GERD (gastroesophageal reflux disease)     Hypertension     MIGUEL (obstructive sleep apnea)     Osteoarthritis     Renal lithiasis     Rheumatoid arteritis ([facility] [address]     Type II or unspecified type diabetes mellitus without mention of complication, not stated as uncontrolled        Past Surgical History:        Procedure Laterality Date APTT 25.3 11/11/2016       HCG (If Applicable): No results found for: PREGTESTUR, PREGSERUM, HCG, HCGQUANT     ABGs: No results found for: PHART, PO2ART, RJQ0BTP, VEB5YVD, BEART, X2JYKWGO     Type & Screen (If Applicable):  No results found for: LABABO, LABRH    Drug/Infectious Status (If Applicable):  Lab Results   Component Value Date    HEPCAB NONREACTIVE 07/17/2013       COVID-19 Screening (If Applicable):   Lab Results   Component Value Date    COVID19 Not Detected 06/26/2020         Anesthesia Evaluation  Patient summary reviewed and Nursing notes reviewed no history of anesthetic complications:   Airway: Mallampati: I  TM distance: >3 FB   Neck ROM: full  Mouth opening: > = 3 FB Dental: normal exam         Pulmonary:Negative Pulmonary ROS breath sounds clear to auscultation  (+) sleep apnea:                             Cardiovascular:  Exercise tolerance: good (>4 METS),   (+) hypertension:,     (-) valvular problems/murmurs, past MI and CAD    ECG reviewed  Rhythm: regular  Rate: normal                    Neuro/Psych:   Negative Neuro/Psych ROS  (+) neuromuscular disease:, depression/anxiety             GI/Hepatic/Renal: Neg GI/Hepatic/Renal ROS  (+) GERD:,           Endo/Other: Negative Endo/Other ROS   (+) Diabetes, : arthritis:., .                 Abdominal:       Abdomen: soft. Vascular: negative vascular ROS. Anesthesia Plan      general and MAC     ASA 3       Induction: intravenous. MIPS: Postoperative opioids intended and Prophylactic antiemetics administered. Anesthetic plan and risks discussed with patient. Plan discussed with CRNA.     Attending anesthesiologist reviewed and agrees with [address] Uyen Zamorano MD   6/30/2020

## 2020-07-07 ENCOUNTER — TELEPHONE (OUTPATIENT)
Dept: UROLOGY | Age: 60
End: 2020-07-07

## 2020-07-07 NOTE — TELEPHONE ENCOUNTER
Patient had a cystocopy on 6-30 w/Dr Elliot Jay. Patient is still having stinging on the tip of the penis and his testicles are still sore. Writer scheduled an appt for 7-10 for a PO. Patient thought maybe he should see his PCP. If patient should see PCP and not Urology, please call patient so he can get the appt rescheduled. Thanks!

## 2020-07-10 ENCOUNTER — OFFICE VISIT (OUTPATIENT)
Dept: UROLOGY | Age: 60
End: 2020-07-10
Payer: MEDICAID

## 2020-07-10 VITALS — WEIGHT: 165.8 LBS | HEIGHT: 70 IN | BODY MASS INDEX: 23.74 KG/M2

## 2020-07-10 PROBLEM — N13.8 BPH WITH OBSTRUCTION/LOWER URINARY TRACT SYMPTOMS: Status: ACTIVE | Noted: 2020-07-10

## 2020-07-10 PROBLEM — N40.1 BPH WITH OBSTRUCTION/LOWER URINARY TRACT SYMPTOMS: Status: ACTIVE | Noted: 2020-07-10

## 2020-07-10 LAB
BILIRUBIN, POC: ABNORMAL
BLOOD URINE, POC: NEGATIVE
CLARITY, POC: ABNORMAL
COLOR, POC: ABNORMAL
GLUCOSE URINE, POC: 500
KETONES, POC: NEGATIVE
LEUKOCYTE EST, POC: NEGATIVE
NITRITE, POC: NEGATIVE
PH, POC: 5.5
PROTEIN, POC: 30
SPECIFIC GRAVITY, POC: >1.03
UROBILINOGEN, POC: 1

## 2020-07-10 PROCEDURE — 1036F TOBACCO NON-USER: CPT | Performed by: UROLOGY

## 2020-07-10 PROCEDURE — 81003 URINALYSIS AUTO W/O SCOPE: CPT | Performed by: UROLOGY

## 2020-07-10 PROCEDURE — 99212 OFFICE O/P EST SF 10 MIN: CPT | Performed by: UROLOGY

## 2020-07-10 PROCEDURE — 3017F COLORECTAL CA SCREEN DOC REV: CPT | Performed by: UROLOGY

## 2020-07-10 PROCEDURE — G8420 CALC BMI NORM PARAMETERS: HCPCS | Performed by: UROLOGY

## 2020-07-10 PROCEDURE — 99213 OFFICE O/P EST LOW 20 MIN: CPT | Performed by: UROLOGY

## 2020-07-10 PROCEDURE — G8427 DOCREV CUR MEDS BY ELIG CLIN: HCPCS | Performed by: UROLOGY

## 2020-07-10 RX ORDER — ALFUZOSIN HYDROCHLORIDE 10 MG/1
10 TABLET, EXTENDED RELEASE ORAL DAILY
Qty: 90 TABLET | Refills: 3 | Status: SHIPPED | OUTPATIENT
Start: 2020-07-10 | End: 2020-09-11 | Stop reason: SDUPTHER

## 2020-07-10 RX ORDER — ALFUZOSIN HYDROCHLORIDE 10 MG/1
10 TABLET, EXTENDED RELEASE ORAL DAILY
Qty: 60 TABLET | Refills: 5 | Status: SHIPPED | OUTPATIENT
Start: 2020-07-10 | End: 2020-09-11

## 2020-07-10 RX ORDER — PHENAZOPYRIDINE HYDROCHLORIDE 100 MG/1
100 TABLET, FILM COATED ORAL 3 TIMES DAILY PRN
Qty: 21 TABLET | Refills: 0 | Status: SHIPPED | OUTPATIENT
Start: 2020-07-10 | End: 2020-07-17

## 2020-07-10 NOTE — PROGRESS NOTES
Caryl Morris MD   Urology Clinic follow-up      Patient:  Krysten Hollis  YOB: 1960  Date: 7/10/2020    HISTORY OF PRESENT ILLNESS:   The patient is a 64 y.o. male who presents today for evaluation of the following problem(s): Bilateral orchialgia and penile pain       Overall the problem(s) : show no change. Associated Symptoms: + dysuria, gross hematuria. Pain Severity: Pain Score:  10 - Worst pain ever  He is s/p rigid cystourethroscopy on 6/30/2020. Evaluation was normal except for mild bilateral hypertrophy of prostate     Scrotal / Testicular Pain:  Patient is here today for a scrotal/testicular pain which was first noted 3 month(s) ago. The pain is: bilateral testes  Recently the symptoms: show no change  Current medical Rx for pain: Duloxetine  Pain onset: gradual  Pain details: Soft bilateral testicles, pricking pain in penis just before voiding  Pain type: burning  Pain severity: severe  Flank pain? Yes, left; Abdominal pain? 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 related to pain  Insurance does not cover Flomax  History of Cipro for prostatitis and chronic pelvic pain  History of inguinal hernia repair  Left epididymal cyst   UCx/G/C negative    Today 7/10/2020  Cystoscopy June 30, 2020 is negative for ureteral or bladder or prostate abnormalities. Reports chronic orchialgia. No alleviating or aggravating factors. Reports dysuria post cystoscopy. No hematuria. Urinalysis today is negative. Has a stable stream.  On Uroxatrol.     (Patient's old records, notes and chart reviewed and summarized above.)    Last several PSA's:  No results found for: PSA    Last total testosterone:  No results found for: TESTOSTERONE    Urinalysis today:  Results for POC orders placed in visit on 07/10/20   POCT Urinalysis No Micro (Auto)   Result Value Ref Range    Color, UA dark yellow     Clarity, UA cloudy     Glucose, UA      Bilirubin, UA SMALL     Ketones, UA negative     Spec Grav, UA >1.030     Blood, UA POC negative     pH, UA 5.5     Protein, UA POC 30     Urobilinogen, UA 1.0     Leukocytes, UA negative     Nitrite, UA negative          Last BUN and creatinine:  Lab Results   Component Value Date    BUN 25 (H) 06/04/2020     Lab Results   Component Value Date    CREATININE 0.62 (L) 06/04/2020       Imaging Reviewed during this Office Visit:   (results were independently reviewed by physician and radiology report verified)    PAST MEDICAL, FAMILY AND SOCIAL HISTORY:  Past Medical History:   Diagnosis Date    [facility] obstruction ([facility]     Cocaine abuse ([facility] [facility] 3/11/2020    Diabetes mellitus ([facility]     Diverticulitis     Diverticulosis     GERD (gastroesophageal reflux disease)     Hypertension     MIGUEL (obstructive sleep apnea)     Osteoarthritis     Renal lithiasis     Rheumatoid arteritis (HCC)     Type II or unspecified type diabetes mellitus without mention of complication, not stated as uncontrolled      Past Surgical History:   Procedure Laterality Date    APPENDECTOMY      CHOLECYSTECTOMY      COLON SURGERY      COLONOSCOPY      CYSTOSCOPY  06/30/2020    CYSTOSCOPY N/A 6/30/2020    CYSTOSCOPY performed by Maritza Ramon MD at [address] [place] [address]      URETHRAL STRICTURE DILATATION       Family History   Problem Relation Age of Onset    Diabetes Father     Diabetes Other     Heart Attack Other     Hypertension Other     Diabetes Brother      Outpatient Medications Marked as Taking for the 7/10/20 encounter (Office Visit) with Jeevan Gauthier MD   Medication Sig Dispense Refill    phenazopyridine (PYRIDIUM) 100 MG tablet Take 1 tablet by mouth 3 times daily as needed for Pain 21 tablet 0    alfuzosin (UROXATRAL) 10 MG extended release tablet Take 1 tablet by mouth daily 60 tablet 5    alfuzosin (UROXATRAL) 10 MG extended release tablet Take 1 tablet by mouth daily Take at night. 90 tablet 3    gabapentin (NEURONTIN) 800 MG tablet TAKE ONE TABLET BY MOUTH THREE TIMES A DAY 90 tablet 0    gabapentin (NEURONTIN) 800 MG tablet       traZODone (DESYREL) 50 MG tablet       DULoxetine (CYMBALTA) 20 MG extended release capsule Take 2 capsules by mouth daily 60 capsule 0    doxycycline hyclate (VIBRA-TABS) 100 MG tablet Take 1 tablet by mouth 2 times daily 60 tablet 0    metFORMIN (GLUCOPHAGE) 1000 MG tablet Take 1 tablet by mouth 2 times daily (with meals) 60 tablet 3    acetaminophen (APAP EXTRA STRENGTH) 500 MG tablet Take 2 tablets by mouth every 6 hours as needed for Pain 60 tablet 0    ibuprofen (ADVIL;MOTRIN) 800 MG tablet Take 1 tablet by mouth every 8 hours as needed for Pain 21 tablet 0    lisinopril (PRINIVIL;ZESTRIL) 40 MG tablet TAKE ONE TABLET BY MOUTH DAILY 30 tablet 3    insulin glargine (BASAGLAR KWIKPEN) 100 UNIT/ML injection pen Inject 40 Units into the skin nightly 5 pen 0    insulin lispro (HUMALOG) 100 UNIT/ML injection vial Inject 13 Units into the skin 3 times daily (before meals) 1 vial 3    nitroGLYCERIN (NITROSTAT) 0.4 MG SL tablet Place 1 tablet under the tongue every 5 minutes as needed for Chest pain up to max of 3 total doses. If no relief after 1 dose, call 911. 25 tablet 0    blood glucose monitor strips Test 3 times a day & as needed for symptoms of irregular blood glucose.  100 strip 0    Insulin Syringe-Needle U-100 30G X 1/2\" 0.5 ML MISC 1 each by Does not apply route daily 120 each 3    Blood Pressure KIT 1 Applicatorful by Does not apply route 2 times daily 1 kit 0    Blood Glucose Monitoring Suppl (TRUE METRIX METER) w/Device KIT Use as directed 1 kit 0    blood glucose test strips (ASCENSIA AUTODISC VI;ONE TOUCH ULTRA TEST VI) strip 1 each by In Vitro route daily As needed. 100 each 3    blood glucose test strips (ASCENSIA AUTODISC VI;ONE TOUCH ULTRA TEST VI) strip Use with associated glucose meter. To check blood sugar 4 times daily 120 strip 3    fluticasone (FLONASE) 50 MCG/ACT nasal spray 1 spray by Each Nostril route daily 1 Bottle 0    insulin aspart (NOVOLOG FLEXPEN) 100 UNIT/ML injection pen Inject 13 Units into the skin 3 times daily (before meals) 5 pen 3    Lancets MISC Check blood sugar 4 times daily (AC HS) 120 each 3    Alcohol Swabs PADS Use as needed while checking blood sugar 120 each 3    blood glucose monitor kit and supplies Test 4 times a day & as needed for symptoms of irregular blood glucose. 1 kit 0    aspirin EC 81 MG EC tablet Take 1 tablet by mouth daily 90 tablet 3    docusate sodium (COLACE) 100 MG capsule Take 1 capsule by mouth 2 times daily as needed for Constipation 14 capsule 0    omeprazole (PRILOSEC) 20 MG delayed release capsule Take 1 capsule by mouth daily 30 capsule 1    glucose monitoring kit (FREESTYLE) monitoring kit 1 kit by Does not apply route 4 times daily (before meals and nightly) 1 kit 0    potassium chloride (KLOR-CON M) 20 MEQ extended release tablet Take 1 tablet by mouth daily 30 tablet 0    Blood Pressure Monitoring (ADULT BLOOD PRESSURE CUFF LG) KIT Check bp daily 1 kit 0       Patient has no known allergies.   Social History     Tobacco Use   Smoking Status Former Smoker    Types: Cigars   Smokeless Tobacco Never Used   Tobacco Comment    cigars 2x week       Social History     Substance and Sexual Activity   Alcohol Use No    Alcohol/week: 0.0 standard drinks       REVIEW OF SYSTEMS:  Constitutional: negative  Eyes: negative  Respiratory: negative  Cardiovascular: negative  Gastrointestinal: negative  Musculoskeletal: negative  Genitourinary: negative except for what is in HPI  Skin: negative   Neurological: negative  Hematological/Lymphatic: negative  Psychological: negative    Physical Exam:    This a 64 y.o. male   There were no vitals filed for this visit. Constitutional: Patient in no acute distress;          Assessment and Plan      1. Penile pain    2. Orchalgia    3. Dysuria    4. Gross hematuria    5. Epididymal cyst    6. Lower urinary tract symptoms (LUTS)    7. BPH with obstruction/lower urinary tract symptoms           Plan:      Return in about 1 year (around 7/10/2021). Continues to have bilateral orchialgia despite cymbalta and course of antibiotics-we will refer to pain management  Cystoscopy 7/2020 work-up negative for hematuria   Has some penile pain after the procedure, will prescribe Pyridium  Follow-up in 1 year with PSA  BPH: Continue Uroxatrol. Maryanne Romero MD  [facility]    I have discussed the care of this patient including pertinent history and exam findings, with the resident. I have seen and examined the patient and the key elements of all parts of the encounter have been performed by me. I agree with the assessment, plan and orders as documented by the resident.     Simran Carrera M.D, MD

## 2020-07-20 RX ORDER — GABAPENTIN 800 MG/1
TABLET ORAL
Qty: 90 TABLET | Refills: 0 | Status: SHIPPED | OUTPATIENT
Start: 2020-07-20 | End: 2020-08-25 | Stop reason: SDUPTHER

## 2020-07-20 NOTE — TELEPHONE ENCOUNTER
Next Visit Date:  No future appointments. Health Maintenance   Topic Date Due    Colon cancer screen colonoscopy  06/11/2017    Hepatitis B vaccine (1 of 3 - Risk 3-dose series) 01/16/2021 (Originally 10/6/1981)    Diabetic retinal exam  06/12/2021 (Originally 10/6/1972)    Flu vaccine (1) 09/01/2020    Diabetic foot exam  06/02/2021    Potassium monitoring  06/04/2021    Creatinine monitoring  06/04/2021    A1C test (Diabetic or Prediabetic)  06/08/2021    Diabetic microalbuminuria test  06/08/2021    Lipid screen  06/08/2021    DTaP/Tdap/Td vaccine (2 - Td) 11/24/2025    Shingles Vaccine  Completed    Pneumococcal 0-64 years Vaccine  Completed    Hepatitis C screen  Completed    HIV screen  Completed    Hepatitis A vaccine  Aged Out    Hib vaccine  Aged Out    Meningococcal (ACWY) vaccine  Aged Out       Hemoglobin A1C (%)   Date Value   06/08/2020 6.7 (H)   01/16/2020 7.6   09/24/2019 11.7             ( goal A1C is < 7)   Microalb/Crt.  Ratio (mcg/mg creat)   Date Value   06/08/2020 30 (H)     LDL Cholesterol (mg/dL)   Date Value   06/08/2020 83   05/21/2019 104       (goal LDL is <100)   AST (U/L)   Date Value   05/20/2020 12     ALT (U/L)   Date Value   05/20/2020 11     BUN (mg/dL)   Date Value   06/04/2020 25 (H)     BP Readings from Last 3 Encounters:   06/30/20 (!) 160/95   06/30/20 122/88   06/12/20 131/84          (goal 120/80)    All Future Testing planned in [facility]  Lab Frequency Next Occurrence   Cologuard (For External Results Only) Once 10/10/2020   XR LUMBAR SPINE (MIN 4 VIEWS) Once 03/05/2020   PSA, Diagnostic Once 07/10/2021               Patient Active Problem List:     DM2 (diabetes mellitus, type 2) ([address]     Diverticulosis     DM (diabetes mellitus) ([address]     HTN (hypertension)     Elevated liver enzymes     Scrotal abscess     Abdominal abscess     Hyperplastic colon polyp     Lower abdominal pain     Diabetic foot ([address]     Infected hernioplasty mesh (HCC)     Cocaine abuse ([facility] [address]     Chest pain     BPH with obstruction/lower urinary tract symptoms

## 2020-07-29 RX ORDER — TRAZODONE HYDROCHLORIDE 50 MG/1
TABLET ORAL
Qty: 30 TABLET | Refills: 0 | Status: SHIPPED | OUTPATIENT
Start: 2020-07-29 | End: 2020-09-01

## 2020-07-29 NOTE — TELEPHONE ENCOUNTER
Please address the medication refill and close the encounter. If I can be of assistance, please route to the applicable pool. Thank you. Last visit: 06/02/20  Last Med refill: 05/25/20  Does patient have enough medication for 72 hours: No:     Next Visit Date:  No future appointments. Health Maintenance   Topic Date Due    Colon cancer screen colonoscopy  06/11/2017    Hepatitis B vaccine (1 of 3 - Risk 3-dose series) 01/16/2021 (Originally 10/6/1981)    Diabetic retinal exam  06/12/2021 (Originally 10/6/1972)    Flu vaccine (1) 09/01/2020    Diabetic foot exam  06/02/2021    Potassium monitoring  06/04/2021    Creatinine monitoring  06/04/2021    A1C test (Diabetic or Prediabetic)  06/08/2021    Diabetic microalbuminuria test  06/08/2021    Lipid screen  06/08/2021    DTaP/Tdap/Td vaccine (2 - Td) 11/24/2025    Shingles Vaccine  Completed    Pneumococcal 0-64 years Vaccine  Completed    Hepatitis C screen  Completed    HIV screen  Completed    Hepatitis A vaccine  Aged Out    Hib vaccine  Aged Out    Meningococcal (ACWY) vaccine  Aged Out       Hemoglobin A1C (%)   Date Value   06/08/2020 6.7 (H)   01/16/2020 7.6   09/24/2019 11.7             ( goal A1C is < 7)   Microalb/Crt.  Ratio (mcg/mg creat)   Date Value   06/08/2020 30 (H)     LDL Cholesterol (mg/dL)   Date Value   06/08/2020 83   05/21/2019 104       (goal LDL is <100)   AST (U/L)   Date Value   05/20/2020 12     ALT (U/L)   Date Value   05/20/2020 11     BUN (mg/dL)   Date Value   06/04/2020 25 (H)     BP Readings from Last 3 Encounters:   06/30/20 (!) 160/95   06/30/20 122/88   06/12/20 131/84          (goal 120/80)    All Future Testing planned in [facility]  Lab Frequency Next Occurrence   Cologuard (For External Results Only) Once 10/10/2020   XR LUMBAR SPINE (MIN 4 VIEWS) Once 03/05/2020   PSA, Diagnostic Once 07/10/2021               Patient Active Problem List:     DM2 (diabetes mellitus, type 2) ([address] Diverticulosis     DM (diabetes mellitus) ([facility] [facility]     HTN (hypertension)     Elevated liver enzymes     Scrotal abscess     Abdominal abscess     Hyperplastic colon polyp     Lower abdominal pain     Diabetic foot ([facility] [address]     Infected hernioplasty mesh (HCC)     Cocaine abuse ([facility] [address]     Chest pain     BPH with obstruction/lower urinary tract symptoms

## 2020-08-25 RX ORDER — GABAPENTIN 800 MG/1
TABLET ORAL
Qty: 90 TABLET | Refills: 0 | Status: SHIPPED | OUTPATIENT
Start: 2020-08-25 | End: 2020-09-22 | Stop reason: SDUPTHER

## 2020-08-31 NOTE — TELEPHONE ENCOUNTER
Please address the medication refill and close the encounter. If I can be of assistance, please route to the applicable pool. Thank you. Last visit: 06/02/20  Last Med refill: 07/29/20  Does patient have enough medication for 72 hours: No:     Next Visit Date:  Future Appointments   Date Time Provider Maureen Rodas   9/11/2020  4:00 PM Osorio Hickey MD [address] [place] [address] Maintenance   Topic Date Due    Colon cancer screen colonoscopy  06/11/2017    Hepatitis B vaccine (1 of 3 - Risk 3-dose series) 01/16/2021 (Originally 10/6/1981)    Diabetic retinal exam  06/12/2021 (Originally 10/6/1972)    Flu vaccine (1) 09/01/2020    Diabetic foot exam  06/02/2021    Potassium monitoring  06/04/2021    Creatinine monitoring  06/04/2021    A1C test (Diabetic or Prediabetic)  06/08/2021    Diabetic microalbuminuria test  06/08/2021    Lipid screen  06/08/2021    DTaP/Tdap/Td vaccine (2 - Td) 11/24/2025    Shingles Vaccine  Completed    Pneumococcal 0-64 years Vaccine  Completed    Hepatitis C screen  Completed    HIV screen  Completed    Hepatitis A vaccine  Aged Out    Hib vaccine  Aged Out    Meningococcal (ACWY) vaccine  Aged Out       Hemoglobin A1C (%)   Date Value   06/08/2020 6.7 (H)   01/16/2020 7.6   09/24/2019 11.7             ( goal A1C is < 7)   Microalb/Crt.  Ratio (mcg/mg creat)   Date Value   06/08/2020 30 (H)     LDL Cholesterol (mg/dL)   Date Value   06/08/2020 83   05/21/2019 104       (goal LDL is <100)   AST (U/L)   Date Value   05/20/2020 12     ALT (U/L)   Date Value   05/20/2020 11     BUN (mg/dL)   Date Value   06/04/2020 25 (H)     BP Readings from Last 3 Encounters:   06/30/20 (!) 160/95   06/30/20 122/88   06/12/20 131/84          (goal 120/80)    All Future Testing planned in [facility]  Lab Frequency Next Occurrence   Cologuard (For External Results Only) Once 10/10/2020   XR LUMBAR SPINE (MIN 4 VIEWS) Once 03/05/2020   PSA, Diagnostic Once 07/10/2021 Patient Active Problem List:     DM2 (diabetes mellitus, type 2) ([facility] [address]     Diverticulosis     DM (diabetes mellitus) ([facility] [address]     HTN (hypertension)     Elevated liver enzymes     Scrotal abscess     Abdominal abscess     Hyperplastic colon polyp     Lower abdominal pain     Diabetic foot ([facility] [address]     Infected [facility] mesh ([facility]     Cocaine abuse ([facility]     Chest pain     BPH with obstruction/lower urinary tract symptoms

## 2020-09-01 RX ORDER — TRAZODONE HYDROCHLORIDE 50 MG/1
TABLET ORAL
Qty: 30 TABLET | Refills: 0 | Status: SHIPPED | OUTPATIENT
Start: 2020-09-01 | End: 2020-10-06

## 2020-09-09 RX ORDER — LISINOPRIL 40 MG/1
TABLET ORAL
Qty: 30 TABLET | Refills: 3 | Status: SHIPPED | OUTPATIENT
Start: 2020-09-09 | End: 2021-01-10

## 2020-09-09 NOTE — TELEPHONE ENCOUNTER
Last visit:   Last Med refill:   Does patient have enough medication for 72 hours: No:     Next Visit Date:  Future Appointments   Date Time Provider Maureen Rodas   9/11/2020  4:00 PM Kamar Arce MD [address] [place] [address] Maintenance   Topic Date Due    Colon cancer screen colonoscopy  06/11/2017    Flu vaccine (1) 09/01/2020    Hepatitis B vaccine (1 of 3 - Risk 3-dose series) 01/16/2021 (Originally 10/6/1981)    Diabetic retinal exam  06/12/2021 (Originally 10/6/1972)    Diabetic foot exam  06/02/2021    Potassium monitoring  06/04/2021    Creatinine monitoring  06/04/2021    A1C test (Diabetic or Prediabetic)  06/08/2021    Diabetic microalbuminuria test  06/08/2021    Lipid screen  06/08/2021    DTaP/Tdap/Td vaccine (2 - Td) 11/24/2025    Shingles Vaccine  Completed    Pneumococcal 0-64 years Vaccine  Completed    Hepatitis C screen  Completed    HIV screen  Completed    Hepatitis A vaccine  Aged Out    Hib vaccine  Aged Out    Meningococcal (ACWY) vaccine  Aged Out       Hemoglobin A1C (%)   Date Value   06/08/2020 6.7 (H)   01/16/2020 7.6   09/24/2019 11.7             ( goal A1C is < 7)   Microalb/Crt.  Ratio (mcg/mg creat)   Date Value   06/08/2020 30 (H)     LDL Cholesterol (mg/dL)   Date Value   06/08/2020 83   05/21/2019 104       (goal LDL is <100)   AST (U/L)   Date Value   05/20/2020 12     ALT (U/L)   Date Value   05/20/2020 11     BUN (mg/dL)   Date Value   06/04/2020 25 (H)     BP Readings from Last 3 Encounters:   06/30/20 (!) 160/95   06/30/20 122/88   06/12/20 131/84          (goal 120/80)    All Future Testing planned in [facility]  Lab Frequency Next Occurrence   Cologuard (For External Results Only) Once 10/10/2020   XR LUMBAR SPINE (MIN 4 VIEWS) Once 03/05/2020   PSA, Diagnostic Once 07/10/2021               Patient Active Problem List:     DM2 (diabetes mellitus, type 2) ([facility]     Diverticulosis     DM (diabetes mellitus) ([facility] [address]     HTN (hypertension) Elevated liver enzymes     Scrotal abscess     Abdominal abscess     Hyperplastic colon polyp     Lower abdominal pain     Diabetic foot (HCC)     Infected hernioplasty mesh (HCC)     Cocaine abuse (HCC)     Chest pain     BPH with obstruction/lower urinary tract symptoms           Please address the medication refill and close the encounter. If I can be of assistance, please route to the applicable pool. Thank you.

## 2020-09-11 ENCOUNTER — OFFICE VISIT (OUTPATIENT)
Dept: FAMILY MEDICINE CLINIC | Age: 60
End: 2020-09-11
Payer: MEDICAID

## 2020-09-11 VITALS
TEMPERATURE: 97.3 F | DIASTOLIC BLOOD PRESSURE: 88 MMHG | SYSTOLIC BLOOD PRESSURE: 134 MMHG | WEIGHT: 176 LBS | HEART RATE: 90 BPM | BODY MASS INDEX: 26.07 KG/M2 | HEIGHT: 69 IN

## 2020-09-11 PROCEDURE — 1036F TOBACCO NON-USER: CPT | Performed by: STUDENT IN AN ORGANIZED HEALTH CARE EDUCATION/TRAINING PROGRAM

## 2020-09-11 PROCEDURE — G8419 CALC BMI OUT NRM PARAM NOF/U: HCPCS | Performed by: STUDENT IN AN ORGANIZED HEALTH CARE EDUCATION/TRAINING PROGRAM

## 2020-09-11 PROCEDURE — G8427 DOCREV CUR MEDS BY ELIG CLIN: HCPCS | Performed by: STUDENT IN AN ORGANIZED HEALTH CARE EDUCATION/TRAINING PROGRAM

## 2020-09-11 PROCEDURE — 3017F COLORECTAL CA SCREEN DOC REV: CPT | Performed by: STUDENT IN AN ORGANIZED HEALTH CARE EDUCATION/TRAINING PROGRAM

## 2020-09-11 PROCEDURE — 99213 OFFICE O/P EST LOW 20 MIN: CPT | Performed by: STUDENT IN AN ORGANIZED HEALTH CARE EDUCATION/TRAINING PROGRAM

## 2020-09-11 RX ORDER — TADALAFIL 10 MG/1
5 TABLET ORAL PRN
Qty: 30 TABLET | Refills: 0 | Status: SHIPPED | OUTPATIENT
Start: 2020-09-11 | End: 2021-08-19 | Stop reason: SDUPTHER

## 2020-09-11 RX ORDER — TAMSULOSIN HYDROCHLORIDE 0.4 MG/1
0.4 CAPSULE ORAL DAILY
Qty: 30 CAPSULE | Refills: 3 | Status: SHIPPED | OUTPATIENT
Start: 2020-09-11 | End: 2020-09-11

## 2020-09-11 RX ORDER — ALFUZOSIN HYDROCHLORIDE 10 MG/1
10 TABLET, EXTENDED RELEASE ORAL DAILY
Qty: 90 TABLET | Refills: 0 | Status: SHIPPED | OUTPATIENT
Start: 2020-09-11 | End: 2021-08-19 | Stop reason: SDUPTHER

## 2020-09-11 RX ORDER — POLYETHYLENE GLYCOL 3350 17 G/17G
17 POWDER, FOR SOLUTION ORAL DAILY
Qty: 510 G | Refills: 5 | Status: SHIPPED | OUTPATIENT
Start: 2020-09-11 | End: 2021-03-10

## 2020-09-11 ASSESSMENT — ENCOUNTER SYMPTOMS
COUGH: 0
VOMITING: 0
SORE THROAT: 0
SHORTNESS OF BREATH: 0
CONSTIPATION: 0
CHEST TIGHTNESS: 0
NAUSEA: 0
ABDOMINAL PAIN: 0
DIARRHEA: 0

## 2020-09-11 NOTE — PROGRESS NOTES
Subjective:    Hunter Mi is a 64 y.o. male with  has a past medical history of Bowel obstruction ([address] Cocaine abuse ([address] Diabetes mellitus ([address] Diverticulitis, Diverticulosis, GERD (gastroesophageal reflux disease), Hypertension, MIGUEL (obstructive sleep apnea), Osteoarthritis, Renal lithiasis, Rheumatoid arteritis ([address] and Type II or unspecified type diabetes mellitus without mention of complication, not stated as uncontrolled. Family History   Problem Relation Age of Onset    Diabetes Father     Diabetes Other     Heart Attack Other     Hypertension Other     Diabetes Brother        Presented tot office today for:  Chief Complaint   Patient presents with    Hypertension    Diabetes       HPI     Patient presents today complaining of constipation stating regular bowel movements are once every 3 days with occasional episodes of diarrhea in between. Patient has increased fiber to his diet however continues to complain of constipation symptoms. Patient also complains of having erectile dysfunction ever since his cystoscopy procedure in July. Review of Systems   Constitutional: Negative for chills, fatigue, fever and unexpected weight change. HENT: Negative for congestion, mouth sores and sore throat. Eyes: Negative for visual disturbance. Respiratory: Negative for cough, chest tightness and shortness of breath. Cardiovascular: Negative for chest pain and leg swelling. Gastrointestinal: Negative for abdominal pain, constipation, diarrhea, nausea and vomiting. Genitourinary: Negative for difficulty urinating. Musculoskeletal: Negative for joint swelling. Skin: Negative for rash. Neurological: Negative for dizziness, weakness and headaches.        Objective:    /88 (Site: Right Upper Arm, Position: Sitting, Cuff Size: Medium Adult)   Pulse 90   Temp 97.3 °F (36.3 °C) (Temporal)   Ht 5' 9\" (1.753 m)   Wt 176 lb (79.8 kg)   BMI 25.99 kg/m²    BP Readings from Last 3 Encounters:   09/11/20 134/88   06/30/20 (!) 160/95   06/30/20 122/88     Physical Exam  Vitals signs and nursing note reviewed. Constitutional:       Appearance: He is well-developed. Cardiovascular:      Rate and Rhythm: Normal rate and regular rhythm. Heart sounds: Normal heart sounds. No murmur. No friction rub. No gallop. Pulmonary:      Effort: Pulmonary effort is normal. No respiratory distress. Breath sounds: Normal breath sounds. No wheezing or rales. Chest:      Chest wall: No tenderness. Abdominal:      General: Bowel sounds are normal. There is no distension. Palpations: Abdomen is soft. There is no mass. Tenderness: There is no abdominal tenderness. There is no guarding. Skin:     Capillary Refill: Capillary refill takes less than 2 seconds. Neurological:      Mental Status: He is alert and oriented to person, place, and time. Lab Results   Component Value Date    WBC 7.8 06/04/2020    HGB 12.8 (L) 06/04/2020    HCT 36.0 (L) 06/04/2020     06/04/2020    CHOL 153 06/08/2020    TRIG 114 06/08/2020    HDL 47 06/08/2020    ALT 11 05/20/2020    AST 12 05/20/2020     (L) 06/04/2020    K 4.4 06/04/2020    CL 97 (L) 06/04/2020    CREATININE 0.62 (L) 06/04/2020    BUN 25 (H) 06/04/2020    CO2 23 06/04/2020    TSH 0.84 03/15/2020    INR 1.3 11/11/2016    LABA1C 6.7 (H) 06/08/2020    LABMICR 30 (H) 06/08/2020     Lab Results   Component Value Date    CALCIUM 9.4 06/04/2020    PHOS 2.3 (L) 10/29/2016     Lab Results   Component Value Date    LDLCHOLESTEROL 83 06/08/2020       Assessment and Plan:    1. Constipation, unspecified constipation type  - polyethylene glycol (GLYCOLAX) 17 GM/SCOOP powder; Take 17 g by mouth daily  Dispense: 510 g; Refill: 5    2. Colon cancer screening  - Cologuard (For External Results Only); Future    3.  Xerosis of skin  - Emollient (EUCERIN SMOOTHING REPAIR) LOTN; Apply 1 ampule topically 2 times daily  Dispense: 1 Bottle; Refill: 1    4. Benign prostatic hyperplasia (BPH) with straining on urination  - alfuzosin (UROXATRAL) 10 MG extended release tablet; Take 1 tablet by mouth daily Take at night. Dispense: 90 tablet; Refill: 0    5. Post-procedural erectile dysfunction, unspecified type  - tadalafil (CIALIS) 10 MG tablet; Take 0.5 tablets by mouth as needed for Erectile Dysfunction  Dispense: 30 tablet; Refill: 0          Requested Prescriptions     Signed Prescriptions Disp Refills    polyethylene glycol (GLYCOLAX) 17 GM/SCOOP powder 510 g 5     Sig: Take 17 g by mouth daily    Emollient (EUCERIN SMOOTHING REPAIR) LOTN 1 Bottle 1     Sig: Apply 1 ampule topically 2 times daily    alfuzosin (UROXATRAL) 10 MG extended release tablet 90 tablet 0     Sig: Take 1 tablet by mouth daily Take at night.  tadalafil (CIALIS) 10 MG tablet 30 tablet 0     Sig: Take 0.5 tablets by mouth as needed for Erectile Dysfunction       Medications Discontinued During This Encounter   Medication Reason    alfuzosin (UROXATRAL) 10 MG extended release tablet LIST CLEANUP    tamsulosin (FLOMAX) 0.4 MG capsule LIST CLEANUP    alfuzosin (UROXATRAL) 10 MG extended release tablet REORDER       Return in about 3 months (around 12/11/2020). Elisa Church received counseling on the following healthy behaviors: nutrition and exercise  Reviewed prior labs and health maintenance  Continue current medications, diet and exercise. Discussed use, benefit, and side effects of prescribed medications. Barriers to medication compliance addressed. Patient given educational materials - see patient instructions  Was a self-tracking handout given in paper form or via [employer]?  Yes    Requested Prescriptions     Signed Prescriptions Disp Refills    polyethylene glycol (GLYCOLAX) 17 GM/SCOOP powder 510 g 5     Sig: Take 17 g by mouth daily    Emollient (EUCERIN SMOOTHING REPAIR) LOTN 1 Bottle 1     Sig: Apply 1 ampule topically 2 times daily    alfuzosin

## 2020-09-12 ENCOUNTER — TELEPHONE (OUTPATIENT)
Dept: FAMILY MEDICINE CLINIC | Age: 60
End: 2020-09-12

## 2020-09-12 NOTE — TELEPHONE ENCOUNTER
PA request for Alfuzosin     PA processed and submitted to pt insurance, waiting for response in regards to coverage

## 2020-09-15 ENCOUNTER — TELEPHONE (OUTPATIENT)
Dept: FAMILY MEDICINE CLINIC | Age: 60
End: 2020-09-15

## 2020-09-15 NOTE — TELEPHONE ENCOUNTER
PA Denied. Criteria for approval includes chart notes to show that you have  tried or are unable to try at least TWO (2) of the following for no less than 30 days  each: TAMSULOSIN, TERAZOSIN, DOXAZOSIN, PRAZOSIN. Medications pending.

## 2020-09-16 RX ORDER — DOXAZOSIN MESYLATE 4 MG/1
4 TABLET ORAL DAILY
Qty: 30 TABLET | Refills: 3 | Status: SHIPPED | OUTPATIENT
Start: 2020-09-16 | End: 2021-01-18

## 2020-09-16 RX ORDER — TERAZOSIN 10 MG/1
10 CAPSULE ORAL NIGHTLY
Qty: 30 CAPSULE | Refills: 3 | OUTPATIENT
Start: 2020-09-16

## 2020-09-16 RX ORDER — PRAZOSIN HYDROCHLORIDE 5 MG/1
5 CAPSULE ORAL NIGHTLY
Qty: 30 CAPSULE | Refills: 3 | OUTPATIENT
Start: 2020-09-16

## 2020-09-18 ENCOUNTER — TELEPHONE (OUTPATIENT)
Dept: FAMILY MEDICINE CLINIC | Age: 60
End: 2020-09-18

## 2020-09-18 NOTE — TELEPHONE ENCOUNTER
Pt called in stating the last medication  that was prescribed is not covered but his insurance and he will need something else pt can be reached at [phone]

## 2020-09-21 NOTE — TELEPHONE ENCOUNTER
Pierre Request for Gabapentin. Last Visit Date: 9/11/2020  Next Visit Date:  No future appointments. Health Maintenance   Topic Date Due    Colon cancer screen colonoscopy  06/11/2017    Flu vaccine (1) 09/01/2020    Hepatitis B vaccine (1 of 3 - Risk 3-dose series) 01/16/2021 (Originally 10/6/1979)    Diabetic retinal exam  06/12/2021 (Originally [birth year]/6/1972)    Diabetic foot exam  06/02/2021    Potassium monitoring  06/04/2021    Creatinine monitoring  06/04/2021    A1C test (Diabetic or Prediabetic)  06/08/2021    Diabetic microalbuminuria test  06/08/2021    Lipid screen  06/08/2021    DTaP/Tdap/Td vaccine (2 - Td) 11/24/2025    Shingles Vaccine  Completed    Pneumococcal 0-64 years Vaccine  Completed    Hepatitis C screen  Completed    HIV screen  Completed    Hepatitis A vaccine  Aged Out    Hib vaccine  Aged Out    Meningococcal (ACWY) vaccine  Aged Out       Hemoglobin A1C (%)   Date Value   06/08/2020 6.7 (H)   01/16/2020 7.6   09/24/2019 11.7             ( goal A1C is < 7)   Microalb/Crt. Ratio (mcg/mg creat)   Date Value   06/08/2020 30 (H)     LDL Cholesterol (mg/dL)   Date Value   06/08/2020 83       (goal LDL is <100)   AST (U/L)   Date Value   05/20/2020 12     ALT (U/L)   Date Value   05/20/2020 11     BUN (mg/dL)   Date Value   06/04/2020 25 (H)     BP Readings from Last 3 Encounters:   09/11/20 134/88   06/30/20 (!) 160/95   06/30/20 122/88          (goal 120/80)    All Future Testing planned in [facility]  Lab Frequency Next Occurrence   XR LUMBAR SPINE (MIN 4 VIEWS) Once 03/05/2020   PSA, Diagnostic Once 07/10/2021   Cologuard (For External Results Only) Once 09/11/2021       Next Visit Date:  No future appointments.       Patient Active Problem List:     DM2 (diabetes mellitus, type 2) ([facility]     Diverticulosis     DM (diabetes mellitus) ([facility]     HTN (hypertension)     Elevated liver enzymes     Scrotal abscess     Abdominal abscess     Hyperplastic colon polyp     Lower

## 2020-09-22 RX ORDER — GABAPENTIN 800 MG/1
TABLET ORAL
Qty: 90 TABLET | Refills: 0 | Status: SHIPPED | OUTPATIENT
Start: 2020-09-22 | End: 2020-10-23

## 2020-09-22 NOTE — PROGRESS NOTES
Attending Physician Statement    Wt Readings from Last 3 Encounters:   09/11/20 176 lb (79.8 kg)   07/10/20 165 lb 12.8 oz (75.2 kg)   06/12/20 170 lb (77.1 kg)     Temp Readings from Last 3 Encounters:   09/11/20 97.3 °F (36.3 °C) (Temporal)   06/30/20 97.5 °F (36.4 °C) (Temporal)   06/12/20 98.7 °F (37.1 °C)     BP Readings from Last 3 Encounters:   09/11/20 134/88   06/30/20 (!) 160/95   06/30/20 122/88     Pulse Readings from Last 3 Encounters:   09/11/20 90   06/30/20 80   06/12/20 101         I have discussed the care of Mandy Del Valle, including pertinent history and exam findings with the resident. I have reviewed the key elements of all parts of the encounter with the resident. I agree with the assessment, plan and orders as documented by the resident.   (GE Modifier)

## 2020-09-28 NOTE — TELEPHONE ENCOUNTER
Please address the medication refill and close the encounter. If I can be of assistance, please route to the applicable pool. Thank you. Last visit: 09/11/20  Last Med refill: 06/02/20  Does patient have enough medication for 72 hours: No:     Next Visit Date:  No future appointments. Health Maintenance   Topic Date Due    Colon cancer screen colonoscopy  06/11/2017    Flu vaccine (1) 09/01/2020    Hepatitis B vaccine (1 of 3 - Risk 3-dose series) 01/16/2021 (Originally [birth year]1981)    Diabetic retinal exam  06/12/2021 (Originally [birth year]/6/1972)    Diabetic foot exam  06/02/2021    Potassium monitoring  06/04/2021    Creatinine monitoring  06/04/2021    A1C test (Diabetic or Prediabetic)  06/08/2021    Diabetic microalbuminuria test  06/08/2021    Lipid screen  06/08/2021    DTaP/Tdap/Td vaccine (2 - Td) 11/24/2025    Shingles Vaccine  Completed    Pneumococcal 0-64 years Vaccine  Completed    Hepatitis C screen  Completed    HIV screen  Completed    Hepatitis A vaccine  Aged Out    Hib vaccine  Aged Out    Meningococcal (ACWY) vaccine  Aged Out       Hemoglobin A1C (%)   Date Value   06/08/2020 6.7 (H)   01/16/2020 7.6   09/24/2019 11.7             ( goal A1C is < 7)   Microalb/Crt.  Ratio (mcg/mg creat)   Date Value   06/08/2020 30 (H)     LDL Cholesterol (mg/dL)   Date Value   06/08/2020 83   05/21/2019 104       (goal LDL is <100)   AST (U/L)   Date Value   05/20/2020 12     ALT (U/L)   Date Value   05/20/2020 11     BUN (mg/dL)   Date Value   06/04/2020 25 (H)     BP Readings from Last 3 Encounters:   09/11/20 134/88   06/30/20 (!) 160/95   06/30/20 122/88          (goal 120/80)    All Future Testing planned in [facility]  Lab Frequency Next Occurrence   XR LUMBAR SPINE (MIN 4 VIEWS) Once 03/05/2020   PSA, Diagnostic Once 07/10/2021   Cologuard (For External Results Only) Once 09/11/2021               Patient Active Problem List:     DM2 (diabetes mellitus, type 2) ([facility]

## 2020-10-01 NOTE — TELEPHONE ENCOUNTER
E-scribe request for traZODone . Please review and e-scribe if applicable. Last Visit Date:    Next Visit Date:  Visit date not found    Hemoglobin A1C (%)   Date Value   06/08/2020 6.7 (H)   01/16/2020 7.6   09/24/2019 11.7             ( goal A1C is < 7)   Microalb/Crt.  Ratio (mcg/mg creat)   Date Value   06/08/2020 30 (H)     LDL Cholesterol (mg/dL)   Date Value   06/08/2020 83       (goal LDL is <100)   AST (U/L)   Date Value   05/20/2020 12     ALT (U/L)   Date Value   05/20/2020 11     BUN (mg/dL)   Date Value   06/04/2020 25 (H)     BP Readings from Last 3 Encounters:   09/11/20 134/88   06/30/20 (!) 160/95   06/30/20 122/88          (goal 120/80)        Patient Active Problem List:     DM2 (diabetes mellitus, type 2) (HCC)     Diverticulosis     DM (diabetes mellitus) ([address]     HTN (hypertension)     Elevated liver enzymes     Scrotal abscess     Abdominal abscess     Hyperplastic colon polyp     Lower abdominal pain     Diabetic foot ([address]     Infected hernioplasty mesh (HCC)     Cocaine abuse ([address]     Chest pain     BPH with obstruction/lower urinary tract symptoms      ----Radha Thomas

## 2020-10-06 RX ORDER — TRAZODONE HYDROCHLORIDE 50 MG/1
TABLET ORAL
Qty: 30 TABLET | Refills: 0 | Status: SHIPPED | OUTPATIENT
Start: 2020-10-06 | End: 2020-11-03

## 2020-10-13 ENCOUNTER — OFFICE VISIT (OUTPATIENT)
Dept: FAMILY MEDICINE CLINIC | Age: 60
End: 2020-10-13
Payer: MEDICAID

## 2020-10-13 VITALS
TEMPERATURE: 96.8 F | DIASTOLIC BLOOD PRESSURE: 78 MMHG | BODY MASS INDEX: 27.25 KG/M2 | WEIGHT: 184 LBS | HEART RATE: 82 BPM | HEIGHT: 69 IN | SYSTOLIC BLOOD PRESSURE: 120 MMHG

## 2020-10-13 PROCEDURE — 99213 OFFICE O/P EST LOW 20 MIN: CPT | Performed by: STUDENT IN AN ORGANIZED HEALTH CARE EDUCATION/TRAINING PROGRAM

## 2020-10-13 ASSESSMENT — ENCOUNTER SYMPTOMS
SHORTNESS OF BREATH: 0
SORE THROAT: 0
NAUSEA: 0
DIARRHEA: 0
ABDOMINAL PAIN: 0
CHEST TIGHTNESS: 0
COUGH: 0
VOMITING: 0
CONSTIPATION: 0

## 2020-10-13 ASSESSMENT — PATIENT HEALTH QUESTIONNAIRE - PHQ9
SUM OF ALL RESPONSES TO PHQ9 QUESTIONS 1 & 2: 0
SUM OF ALL RESPONSES TO PHQ QUESTIONS 1-9: 0
2. FEELING DOWN, DEPRESSED OR HOPELESS: 0
1. LITTLE INTEREST OR PLEASURE IN DOING THINGS: 0
SUM OF ALL RESPONSES TO PHQ QUESTIONS 1-9: 0

## 2020-10-13 NOTE — PROGRESS NOTES
Attending Physician Statement  I have discussed the care of Hortencia Wood 64 y.o. male, including pertinent history and exam findings, with the resident Dr. Sanaz Garcia MD.    History and Exam:   Chief Complaint   Patient presents with   [address] Maintenance     flu was done at [facility]     Past Medical History:   Diagnosis Date    Bowel obstruction ([facility]     Cocaine abuse ([facility] 3/11/2020    Diabetes mellitus ([facility]     Diverticulitis     Diverticulosis     GERD (gastroesophageal reflux disease)     Hypertension     MIGUEL (obstructive sleep apnea)     Osteoarthritis     Renal lithiasis     Rheumatoid arteritis ([facility]     Type II or unspecified type diabetes mellitus without mention of complication, not stated as uncontrolled      No Known Allergies   I have seen and examined the patient and the key elements of the encounter have been performed by me.   BP Readings from Last 3 Encounters:   10/13/20 120/78   09/11/20 134/88   06/30/20 (!) 160/95     /78 (Site: Right Upper Arm, Position: Sitting, Cuff Size: Medium Adult)   Pulse 82   Temp 96.8 °F (36 °C) (Temporal)   Ht 5' 9\" (1.753 m)   Wt 184 lb (83.5 kg)   BMI 27.17 kg/m²   Lab Results   Component Value Date    WBC 7.8 06/04/2020    HGB 12.8 (L) 06/04/2020    HCT 36.0 (L) 06/04/2020     06/04/2020    CHOL 153 06/08/2020    TRIG 114 06/08/2020    HDL 47 06/08/2020    ALT 11 05/20/2020    AST 12 05/20/2020     (L) 06/04/2020    K 4.4 06/04/2020    CL 97 (L) 06/04/2020    CREATININE 0.62 (L) 06/04/2020    BUN 25 (H) 06/04/2020    CO2 23 06/04/2020    TSH 0.84 03/15/2020    INR 1.3 11/11/2016    LABA1C 6.7 (H) 06/08/2020    LABMICR 30 (H) 06/08/2020     Lab Results   Component Value Date    LABPROT 7.0 01/04/2012    LABALBU 3.7 05/20/2020     No results found for: IRON, TIBC, FERRITIN  Lab Results   Component Value Date    LDLCHOLESTEROL 83 06/08/2020     I agree with the assessment, plan and the diagnosis of Diagnosis Orders   1. Acute bilateral knee pain  diclofenac sodium (VOLTAREN) 1 % GEL    [facility]    . I agree with orders as documented by the resident. Recommendations:  68-year-old male with bilateral knee pain, prescribed diclofenac and referred to physical therapy    More than 25 minutes spent  in face to face encounter with the patient and more than half in counseling. Patient's questions were answered. Patient Voiced understanding to the counseling. Return in about 3 months (around 1/13/2021).    (GC Modifier)-Dr. Elvira Abrams MD

## 2020-10-13 NOTE — PROGRESS NOTES
Subjective:    Terrance Barahona is a 64 y.o. male with  has a past medical history of Bowel obstruction ([facility] Cocaine abuse ([facility] [address] Diabetes mellitus ([facility] [facility] Diverticulitis, Diverticulosis, GERD (gastroesophageal reflux disease), Hypertension, MIGUEL (obstructive sleep apnea), Osteoarthritis, Renal lithiasis, Rheumatoid arteritis ([facility] [address] [facility] and Type II or unspecified type diabetes mellitus without mention of complication, not stated as uncontrolled. Family History   Problem Relation Age of Onset    Diabetes Father     Diabetes Other     Heart Attack Other     Hypertension Other     Diabetes Brother        Presented tot office today for:  Chief Complaint   Patient presents with   [address] Maintenance     flu was done at [facility]  Chief Complaint: Jacob knee pain  When did it happen? 3 months  Mechanism? Non traumatic  Location: anterior  Intensity: 7/10  Quality: achy  Radiation:shins  Getting better, worse or staying the same? worse  Aggravating: walking and steps  Associated: none  Treatments/Alleviating/Medications: asper cream    Review of Systems   Constitutional: Negative for chills, fatigue, fever and unexpected weight change. HENT: Negative for congestion, mouth sores and sore throat. Eyes: Negative for visual disturbance. Respiratory: Negative for cough, chest tightness and shortness of breath. Cardiovascular: Negative for chest pain and leg swelling. Gastrointestinal: Negative for abdominal pain, constipation, diarrhea, nausea and vomiting. Genitourinary: Negative for difficulty urinating. Musculoskeletal: Negative for joint swelling. Jacob knee pain   Skin: Negative for rash. Neurological: Negative for dizziness, weakness and headaches.        Objective:    /78 (Site: Right Upper Arm, Position: Sitting, Cuff Size: Medium Adult)   Pulse 82   Temp 96.8 °F (36 °C) (Temporal)   Ht 5' 9\" (1.753 m)   Wt 184 lb (83.5 kg)   BMI 27.17 kg/m²    BP Readings from Last 3 Encounters:   10/13/20 120/78   09/11/20 134/88   06/30/20 (!) 160/95     Physical Exam  Vitals signs and nursing note reviewed. Constitutional:       Appearance: He is well-developed. Cardiovascular:      Rate and Rhythm: Normal rate and regular rhythm. Heart sounds: Normal heart sounds. No murmur. No friction rub. No gallop. Pulmonary:      Effort: Pulmonary effort is normal. No respiratory distress. Breath sounds: Normal breath sounds. No wheezing or rales. Chest:      Chest wall: No tenderness. Abdominal:      General: Bowel sounds are normal. There is no distension. Palpations: Abdomen is soft. There is no mass. Tenderness: There is no abdominal tenderness. There is no guarding. Musculoskeletal:      Comments:     MEHDI Knee Exam  Musculoskeletal/Neurologic:  Inspection-Swelling: none, Ecchymosis: no  Palpation-Tenderness:yes  Pain with patellar grind: no  ROM-normal  Strength- WNL  Sensation-normal to light touch    Special Tests-  Varus Laxity: negative   Valgus Laxity:  negative   Anterior Drawer: negative   Posterior Drawer: negative  Lachman's: negative  Lyla's:negative    PSYCH:  Good fund of knowledge and displays understanding of exam.   Skin:     Capillary Refill: Capillary refill takes less than 2 seconds. Neurological:      Mental Status: He is alert and oriented to person, place, and time.            Lab Results   Component Value Date    WBC 7.8 06/04/2020    HGB 12.8 (L) 06/04/2020    HCT 36.0 (L) 06/04/2020     06/04/2020    CHOL 153 06/08/2020    TRIG 114 06/08/2020    HDL 47 06/08/2020    ALT 11 05/20/2020    AST 12 05/20/2020     (L) 06/04/2020    K 4.4 06/04/2020    CL 97 (L) 06/04/2020    CREATININE 0.62 (L) 06/04/2020    BUN 25 (H) 06/04/2020    CO2 23 06/04/2020    TSH 0.84 03/15/2020    INR 1.3 11/11/2016    LABA1C 6.7 (H) 06/08/2020    LABMICR 30 (H) 06/08/2020     Lab Results   Component Value Date    CALCIUM 9.4 06/04/2020    PHOS

## 2020-10-20 ENCOUNTER — HOSPITAL ENCOUNTER (OUTPATIENT)
Dept: PHYSICAL THERAPY | Age: 60
Setting detail: THERAPIES SERIES
Discharge: HOME | End: 2020-10-20
Payer: MEDICAID

## 2020-10-20 PROCEDURE — 97162 PT EVAL MOD COMPLEX 30 MIN: CPT

## 2020-10-20 PROCEDURE — 97110 THERAPEUTIC EXERCISES: CPT

## 2020-10-20 NOTE — CONSULTS
[x] [facility]  [address]  [phone]  F: [phone]         Physical Therapy Lower Extremity Evaluation    Date:  10/20/2020  Patient: Tanisha Muhammad  : 1960  MRN: [id number]  Physician: Dariusz Alvarez: Jacob Sams (limit 30 Rx per year)  Medical Diagnosis: Acute bilateral knee pain M25.561,M25.562     Rehab Codes: M79.604, M79.605, M25.561, M25.562, M25.662, R26.2, M62.81  Onset date: 2019  Next Dr's appt. : 3 months    Subjective:   HPI/CC: Pt reports B knee pain beginning approx 1 year ago, sudden onset, unknown cause. Pain increases w/cold weather, walking, steps, getting in/out of car, and after sitting for a while, up to 10/10. Pain decreases w/hot bath, and ace wraps, but only for short time. Pt has to go up steps sideways, feels doesn't have any strength with getting up. Pain is in calves and shins, burning, crampy pain, also gets pain L big toe. At times burning, crampy pain only lasts short time, other times lasts an hour, but then is sore afterward. Pain occasionally goes up to his hips.         PMHx: [] Unremarkable [x] Diabetes [x] HTN  [] Pacemaker   [] MI/Heart Problems [] Cancer [x] Arthritis-OA , RA   [x] Other:Bowel obstruction ([facility] Cocaine abuse ([facility] Diverticulitis, Diverticulosis, GERD (gastroesophageal reflux disease), MIGUEL (obstructive sleep apnea), Renal lithiasis; DM affecting vision, hernia repair              [x] Refer to full medical chart  In EPIC       Comorbidities:   [] Obesity [] Dialysis  [] Other:   [] Asthma/COPD [] Dementia [] Other:   [] Stroke [] Sleep apnea [] Other:   [] Vascular disease [] Rheumatic disease [] Other:     Tests: [] X-Ray: [] MRI:  [x] Other:    Medications: [x] Refer to full medical record [] None [] Other:  Allergies:      [x] Refer to full medical record [] None [] Other:    Function:  Hand Dominance  [x] Right  [] Left  Employer NA   Job Status []  Normal duty   [] Light duty   [] Off due to condition    []  Retired   [x] Not employed   [x] Disability  [] Other:  []  Return to work: Work activities/duties -unable to due to vision loss, applying for disability       Gait Prior level of function Current level of function    [x] Independent  [] Assist [x] Independent  [] Assist   Device: [x] Independent [x] Independent    [] Straight Cane [] Quad cane [x] Straight Cane [] Quad cane    [] Standard walker [] Rolling walker   [] 4 wheeled walker [] Standard walker [] Rolling walker   [] 4 wheeled walker    [] Wheelchair [] Wheelchair   Pt will use s.cane at times    Pain:  [x] Yes  [] No Location: B calves Pain Rating: (0-10 scale) 5-6/10  Pain altered Tx:  [] Yes  [x] No  Action:    Symptoms:  [] Improving [x] Worsening [] Same    Sleep: [] OK    [x] Disturbed-change position    Objective:    ROM  ° A/P STRENGTH TESTS (+/-) Left Right Not Tested    Left Right Left Right Ant. Drawer   []   Hip Flex 112°p 111°p 3+p 3p Post. Drawer   []   Ext   3+p 3+p Lachmans   []   ER     Valgus Stress - - []   IR     Varus Stress - - []   ABD   3 3+ Lylas   []   ADD     Apleys Comp. - - []   Knee Flex 124° 129°p 4- 4- Apleys Dist. - - []   Ext 9°p 4°p 3p 3+p Hip Scouring   []   Ankle DF   4p 4p MARISSAs   []   PF     Piriformis   []   INV     Nestors   []   EVER     Talor Tilt   []        Pat-Fem Grind   []   Varus, valgus stress tender where pushing.     OBSERVATION No Deficit Deficit Not Tested Comments   Posture       Forward Head [] [x] []    Rounded Shoulders [] [x] []    Slumped Sitting [] [x] []    Palpation [] [x] [] Tender all over B LE, knees, calves, thighs   Sensation [] [x] [] Increased sensitivity    Edema [] [] [x]    Neurological [] [] [x]    Patellar Mobility [] [x] [] R decreased; L infer/superior mobs painful   Patellar Orientation [] [] [x]    Gait [] [x] [] Analysis: slow guarded           Functional Test: LEFS  Score: 70% functionally impaired     Comments: Press Ups 5x no change in legs, affecting stomach. SKTC R sore post hip, L ant knee    Assessment:  Patient would benefit from skilled physical therapy services in order to: decrease pain, increase strength and ROM, and increase functional mobility, including steps. Problems:    [x] ? Pain: B knees, LE, 5-6/10 this date, up to 10/10  [x] ? ROM: L knee ext  [x] ? Strength: B LE  [x] ? Function: LEFS 70% loss of LE function. [] Other:       STG: (to be met in 10 treatments)  1. ? Pain: B LE 4/10 average pain, 7/10 at worst  2. ? ROM: L knee ext 5°  3. ? Strength:B hips 4-/5, B knee flex 4/5, B knee ext 4-/5, B DF 4+/5  4. ? Function: LEFS 56% loss of LE function. 5. Patient to be independent with home exercise program as demonstrated by performance with correct form without cues. LTG: (to be met in 18 treatments)  1. ? Pain: B LE 2/10 average pain, 5/10 at worst  2. ? Strength:B hips 4/5, B knee flex 4+/5, B knee ext 4/5  3. ? Function: LEFS 36% loss of LE function. 4. Pt report improved tolerance to steps                    Patient goals: \"get help\"    Rehab Potential:  [x] Good  [] Fair  [] Poor   Suggested Professional Referral:  [x] No  [] Yes:  Barriers to Goal Achievement:  [x] No  [] Yes:  Domestic Concerns:  [x] No  [] Yes:    Pt. Education:  [x] Plans/Goals, Risks/Benefits discussed  [x] Home exercise program    Method of Education: [x] Verbal  [x] Demo  [x] Written  Comprehension of Education:  [x] Verbalizes understanding. [] Demonstrates understanding. [] Needs Review. [] Demonstrates/verbalizes understanding of HEP/Ed previously given.     Treatment Plan:  [x] Therapeutic Exercise   55192  [] Iontophoresis: 4 mg/mL Dexamethasone Sodium Phosphate  mAmin  84231   [x] Therapeutic Activity  16288 [] Vasopneumatic cold with compression  27453    [] Gait Training   98824 [] Ultrasound   26154   [] Neuromuscular Re-education  76693 [x] Electrical Stimulation Unattended  51989   [x] Manual Therapy  61184 [] Electrical Stimulation Attended  [id number]   [x] Instruction in HEP  [] Lumbar/Cervical Traction  [id number]   [] Aquatic Therapy   [id number] [x] Cold/hotpack    [] Massage   [id number]      [] Dry Needling, 1 or 2 muscles  51986   [] Biofeedback, first 15 minutes   77004  [] Biofeedback, additional 15 minutes   97641 [] Dry Needling, 3 or more muscles  63816     []  Medication allergies reviewed for use of    Dexamethasone Sodium Phosphate 4mg/ml     with iontophoresis treatments. Pt is not allergic. Frequency:  2 x/week for 18 visits        Todays Treatment:  Modalities:   Precautions: Neuropathy-decreased sensation B feet  Exercises: B knees, LE  Exercise Reps/ Time Weight/ Level Comments   LAQ      Supine       Add sets 10x 3sec    SAQ 10x     SLR 10x           Other: Educated Pt in ex for HEP.      Specific Instructions for next treatment: add Scifit, calf stretch, ankle AROM ex, prone ex; Progress LE ex, stretches, strengthening, OK for heat to decrease pain, muscle tightness      Evaluation Complexity:  History (Personal factors, comorbidities) [] 0 [] 1-2 [x] 3+   Exam (limitations, restrictions) [] 1-2 [] 3 [x] 4+   Clinical presentation (progression) [] Stable [x] Evolving  [] Unstable   Decision Making [] Low [x] Moderate [] High    [] Low Complexity [x] Moderate Complexity [] High Complexity       Treatment Charges: Mins Units   [x] Evaluation       []  Low       [x]  Moderate       []  High 43 1   []  Modalities     [x]  Ther Exercise  11   []  Manual Therapy     []  Ther Activities     []  Aquatics     []  Vasocompression     []  Other       TOTAL TREATMENT TIME: 54 min    Time in:0900   Time Out:1002    Electronically signed by: Bre Farrar PT        Physician Signature:________________________________Date:__________________  By signing above or cosigning this note, I have reviewed this plan of care and certify a need for medically necessary rehabilitation services.      *PLEASE SIGN ABOVE AND FAX BACK ALL PAGES*

## 2020-10-21 NOTE — TELEPHONE ENCOUNTER
E-scribe request for diclofenac sodium . Please review and e-scribe if applicable. Last Visit Date:  10/13/2020  Next Visit Date:  Visit date not found    Hemoglobin A1C (%)   Date Value   06/08/2020 6.7 (H)   01/16/2020 7.6   09/24/2019 11.7             ( goal A1C is < 7)   Microalb/Crt.  Ratio (mcg/mg creat)   Date Value   06/08/2020 30 (H)     LDL Cholesterol (mg/dL)   Date Value   06/08/2020 83       (goal LDL is <100)   AST (U/L)   Date Value   05/20/2020 12     ALT (U/L)   Date Value   05/20/2020 11     BUN (mg/dL)   Date Value   06/04/2020 25 (H)     BP Readings from Last 3 Encounters:   10/13/20 120/78   09/11/20 134/88   06/30/20 (!) 160/95          (goal 120/80)        Patient Active Problem List:     DM2 (diabetes mellitus, type 2) (HCC)     Diverticulosis     DM (diabetes mellitus) ([address]     HTN (hypertension)     Elevated liver enzymes     Scrotal abscess     Abdominal abscess     Hyperplastic colon polyp     Lower abdominal pain     Diabetic foot ([address]     Infected hernioplasty mesh (HCC)     Cocaine abuse ([address]     Chest pain     BPH with obstruction/lower urinary tract symptoms      ----Glo Whiteside

## 2020-10-22 ENCOUNTER — HOSPITAL ENCOUNTER (OUTPATIENT)
Dept: PHYSICAL THERAPY | Age: 60
Setting detail: THERAPIES SERIES
Discharge: HOME | End: 2020-10-22
Payer: MEDICAID

## 2020-10-22 PROCEDURE — 97110 THERAPEUTIC EXERCISES: CPT

## 2020-10-22 NOTE — FLOWSHEET NOTE
[] [facility]) - [facility] &  Therapy  [address]  [phone]  F: [phone] [] [address]  [address]  P: [phone]  F: [phone] [] [address] &  Therapy  [address]  P: [phone]  F: [phone] [] [address]  P: [phone]  F: [phone] [] [address]  [facility]   [address]   [place]: [phone]  F: [phone]      Physical Therapy Daily Treatment Note    Date:  10/22/2020  Patient Name:  Deandre Pulido    :  1960  MRN: [id number]  Physician: Salvador Poncefurt: Tylor Cox (limit 30 Rx per year)  Medical Diagnosis: Acute bilateral knee pain M25.561,M25.562                 Rehab Codes: M79.604, M79.605, M25.561, M25.562, M25.662, R26.2, M62.81  Onset date: 2019                     Next 's appt. : 3 months\    Visit# / total visits:      Cancels/No Shows: 0/0    Subjective:    Pain:  [x]? Yes  []? No  Location: B calves       Pain Rating: (0-10 scale) \"sore\"/10  Pain altered Tx:  []? Yes  [x]? No  Action:  Comments: Patient states his feet are bothering him most at arrival this date, states he has \"good and bad days, but mostly bad\" when it comes to his feet. Notes knees are not really painful at arrival, \"just mostly sore\".       Objective:  Modalities:   Precautions: Neuropathy-decreased sensation B feet  Exercises: B knees, LE  Exercise Reps/ Time Weight/ Level Comments   SciFit 5m  L2          Gastroc stretch 3x20\"  Wedge         Seated      HS stretch 3x20  Stool    LAQ 10x     Marches 10x  TrA iso   HS curls 10x Lime    Heel/toe raises 10x             Supine          Add sets 10x 3sec     SAQ 10x       SLR 10x     Bridge 10x       Clamshells 10x Lime          Prone      HS curls 10x 1lb    Hip ext 10x      Quad stretch 3x20\"                 Other:      Specific Instructions for next treatment:  ankle AROM ex, prone ex; Progress LE ex, stretches, strengthening, OK for heat to decrease pain, muscle tightness        Treatment Charges: Mins Units   []  Modalities     [x]  Ther Exercise 40 3   []  Manual Therapy     []  Ther Activities     []  Aquatics     []  Vasocompression     []  Other     Total Treatment time 40 3       Assessment: [x] Progressing toward goals. Initiated therapeutic exercise program as noted with good tolerance overall and no verbalized complaints of increased pain or symptoms throughout. Patient with rhianna LE weakness demonstrated by fatigue noted with exercises completed this date, minimal rest break required. Patient stated minimal discomfort in lateral L foot during heel/toe raises, states due to increased pain at arrival in feet. Issued written handout for HEP this date with larger print as patient stated print of first copy was too small for him to read. [] No change. [] Other:  [x] Patient would continue to benefit from skilled physical therapy services in order to:     STG: (to be met in 10 treatments)  1. ? Pain: B LE 4/10 average pain, 7/10 at worst  2. ? ROM: L knee ext 5°  3. ? Strength:B hips 4-/5, B knee flex 4/5, B knee ext 4-/5, B DF 4+/5  4. ? Function: LEFS 56% loss of LE function. 5. Patient to be independent with home exercise program as demonstrated by performance with correct form without cues.     LTG: (to be met in 18 treatments)  1. ? Pain: B LE 2/10 average pain, 5/10 at worst  2. ? Strength:B hips 4/5, B knee flex 4+/5, B knee ext 4/5  3. ? Function: LEFS 36% loss of LE function. 4. Pt report improved tolerance to steps                     Patient goals: \"get help\"    Pt.  Education:  [x] Yes  [] No  [] Reviewed Prior HEP/Ed  Method of Education: [x] Verbal  [x] Demo

## 2020-10-23 RX ORDER — GABAPENTIN 800 MG/1
TABLET ORAL
Qty: 90 TABLET | Refills: 0 | Status: SHIPPED | OUTPATIENT
Start: 2020-10-23 | End: 2020-11-25

## 2020-10-27 ENCOUNTER — HOSPITAL ENCOUNTER (OUTPATIENT)
Dept: PHYSICAL THERAPY | Age: 60
Setting detail: THERAPIES SERIES
Discharge: HOME | End: 2020-10-27
Payer: MEDICAID

## 2020-10-27 PROCEDURE — 97110 THERAPEUTIC EXERCISES: CPT

## 2020-10-27 NOTE — FLOWSHEET NOTE
[] [facility]) - [facility] &  Therapy  [address]  [phone]  F: [phone] [] [address]  [facility]   [address]  P: [phone]  F: [phone] [] [address] &  Therapy  [address]  P: [phone]  F: [phone] [] [address]  P: [phone]  F: [phone] [] [address]  [facility]   [address]   [place]: [phone]  F: [phone]      Physical Therapy Daily Treatment Note    Date:  10/27/2020  Patient Name:  Hugo Akbar    :  1960  MRN: [id number]  Physician: Salvador Pryorfurt: Abrahan Robison (limit 30 Rx per year)  Medical Diagnosis: Acute bilateral knee pain M25.561,M25.562                 Rehab Codes: M79.604, M79.605, M25.561, M25.562, M25.662, R26.2, M62.81  Onset date: 2019                     Next 's appt. : 3 months\    Visit# / total visits: 3/18     Cancels/No Shows: 0/0    Subjective:    Pain:  [x]? Yes  []? No  Location: B calves       Pain Rating: (0-10 scale) \"sore\"/10  Pain altered Tx:  []? Yes  [x]? No  Action:  Comments: Patient states his feet are bothering him most at arrival this date, states he has \"good and bad days, but mostly bad\" when it comes to his feet. Notes knees are not really painful at arrival, \"just mostly sore\".       Objective:  Modalities:   Precautions: Neuropathy-decreased sensation B feet  Exercises: B knees, LE  Exercise Reps/ Time Weight/ Level Comments   SciFit 5m  L2          Gastroc stretch 3x20\"  Wedge   Heel raises  2x10            Seated      HS stretch 3x20  Stool    LAQ 15x     Marches 15x  TrA iso   HS curls 15x Lime    Heel/toe raises     Modified to standing 10/27         Supine          Add sets 15x 3sec     SAQ 15x       SLR 15x       Bridge 15x       Clamshells 2x10 Lime    HS stretch  3x20\"  Strap    4 way ankle 10x Lime           Prone      HS curls 10x 1lb    Hip ext 10x      Quad stretch 3x20\"                 Other:      Specific Instructions for next treatment:  ankle AROM ex, prone ex; Progress LE ex, stretches, strengthening, OK for heat to decrease pain, muscle tightness        Treatment Charges: Mins Units   []  Modalities     [x]  Ther Exercise 45 3   []  Manual Therapy     []  Ther Activities     []  Aquatics     []  Vasocompression     []  Other     Total Treatment time 45 3       Assessment: [x] Progressing toward goals. Continued with program as noted progressing to standing heel raises as well as increasing reps for majority of program with no issues or pain/symptoms noted. Added 4 way ankle for ankle strengthening and ROM with fair ROM noted and patient stating activation of ankle complex musculature felt good. Patient states he feels like he had a good workout post treatment. Will continue to progress per patient tolerance. [] No change. [] Other:  [x] Patient would continue to benefit from skilled physical therapy services in order to: decrease pain, increase strength and ROM, and increase functional mobility, including steps    STG: (to be met in 10 treatments)  1. ? Pain: B LE 4/10 average pain, 7/10 at worst  2. ? ROM: L knee ext 5°  3. ? Strength:B hips 4-/5, B knee flex 4/5, B knee ext 4-/5, B DF 4+/5  4. ? Function: LEFS 56% loss of LE function. 5. Patient to be independent with home exercise program as demonstrated by performance with correct form without cues.     LTG: (to be met in 18 treatments)  1. ? Pain: B LE 2/10 average pain, 5/10 at worst  2. ? Strength:B hips 4/5, B knee flex 4+/5, B knee ext 4/5  3. ? Function: LEFS 36% loss of LE function. 4. Pt report improved tolerance to steps                     Patient goals: \"get help\"    Pt.  Education:  [x] Yes  [] No  [] Reviewed Prior HEP/Ed  Method of Education: [x] Verbal  [x] Demo  [x] Written  Comprehension of Education:  [x] Verbalizes understanding. [] Demonstrates understanding. [x] Needs review. [] Demonstrates/verbalizes HEP/Ed previously given. HEP 10/22/20: SAQ, LAQ, bridges, HS stretch, SLR     Plan: [x] Continue current frequency toward long and short term goals.     [x] Specific Instructions for subsequent treatments: , progress LE strengthening/stretching    Frequency:  2 x/week for 18 visits      Time In: 773 am            Time Out: 955 am    Electronically signed by:  Shyanne Ag PTA

## 2020-10-29 ENCOUNTER — HOSPITAL ENCOUNTER (OUTPATIENT)
Dept: PHYSICAL THERAPY | Age: 60
Setting detail: THERAPIES SERIES
Discharge: HOME | End: 2020-10-29
Payer: MEDICAID

## 2020-10-29 PROCEDURE — 97110 THERAPEUTIC EXERCISES: CPT

## 2020-10-29 NOTE — FLOWSHEET NOTE
[] [facility]) - [facility] &  Therapy  [address]  [phone]  F: [phone] [] [address]  [facility]   [address]  P: [phone]  F: [phone] [] [address] &  Therapy  [address]  P: [phone]  F: [phone] [] [address]  P: [phone]  F: [phone] [] [address]  [facility]   [address]   [place]: [phone]  F: [phone]      Physical Therapy Daily Treatment Note    Date:  10/29/2020  Patient Name:  Inderjit Fisher    :  1960  MRN: [id number]  Physician: Salvador Cloudfurt: Deidre Westbrook (limit 30 Rx per year)  Medical Diagnosis: Acute bilateral knee pain M25.561,M25.562                 Rehab Codes: M79.604, M79.605, M25.561, M25.562, M25.662, R26.2, M62.81  Onset date: 2019                     Next 's appt. : 3 months\    Visit# / total visits:      Cancels/No Shows: 0/0    Subjective:    Pain:  [x]? Yes  []? No  Location: B calves       Pain Rating: (0-10 scale) \"sore\"/10  Pain altered Tx:  []? Yes  [x]? No  Action:  Comments: Patient states overall having less pain today and feet hurt less as well. Having one of \"his good days\".       Objective:  Modalities:   Precautions: Neuropathy-decreased sensation B feet  Exercises: B knees, LE  Exercise Reps/ Time Weight/ Level Comments   SciFit 5m  L3          Gastroc stretch 3x20\"  Wedge   Heel raises  2x10      Hip abduction 10x ea  Added 10/29   HS curls 10x ea  Added 10/29         Seated      HS stretch 3x20\"  Stool    LAQ 20x     Marches 20x  TrA iso   HS curls 2x10  Lime          Supine          Add sets 15x 3sec     SAQ 20x2\"       SLR 15x       Bridge 15x       Clamshells 20x Lime HS stretch  3x20\"  Strap    4 way ankle 10x Lime           Prone      HS curls 15x 2lb    Hip ext 15x      Quad stretch 3x20\"                 Other:      Specific Instructions for next treatment:  ankle AROM ex, prone ex; Progress LE ex, stretches, strengthening, OK for heat to decrease pain, muscle tightness        Treatment Charges: Mins Units   []  Modalities     [x]  Ther Exercise 50 3   []  Manual Therapy     []  Ther Activities     []  Aquatics     []  Vasocompression     []  Other     Total Treatment time 50 3       Assessment: [x] Progressing toward goals. Progressed program with added standing exs for further LE strengthening with no issues. Also increased reps for majority of mat program with patient noting minimal increased fatigue, however no complaints of increased pain or symptom onset. Educated patient to continue with exercises and stretches in HEP as able. [] No change. [] Other:  [x] Patient would continue to benefit from skilled physical therapy services in order to: decrease pain, increase strength and ROM, and increase functional mobility, including steps    STG: (to be met in 10 treatments)  1. ? Pain: B LE 4/10 average pain, 7/10 at worst  2. ? ROM: L knee ext 5°  3. ? Strength:B hips 4-/5, B knee flex 4/5, B knee ext 4-/5, B DF 4+/5  4. ? Function: LEFS 56% loss of LE function. 5. Patient to be independent with home exercise program as demonstrated by performance with correct form without cues.     LTG: (to be met in 18 treatments)  1. ? Pain: B LE 2/10 average pain, 5/10 at worst  2. ? Strength:B hips 4/5, B knee flex 4+/5, B knee ext 4/5  3. ? Function: LEFS 36% loss of LE function. 4. Pt report improved tolerance to steps                     Patient goals: \"get help\"    Pt. Education:  [x] Yes  [] No  [] Reviewed Prior HEP/Ed  Method of Education: [x] Verbal  [x] Demo  [x] Written  Comprehension of Education:  [x] Verbalizes understanding.   [] Demonstrates understanding. [x] Needs review. [] Demonstrates/verbalizes HEP/Ed previously given. HEP 10/22/20: SAQ, LAQ, kiana, HS stretch, SLR     Plan: [x] Continue current frequency toward long and short term goals.     [x] Specific Instructions for subsequent treatments: , progress LE strengthening/stretching    Frequency:  2 x/week for 18 visits      Time In: 902 am            Time Out: 955 am    Electronically signed by:  Jaylyn Nelson PTA

## 2020-10-30 NOTE — TELEPHONE ENCOUNTER
Last visit:   Last Med refill:   Does patient have enough medication for 72 hours: No:     Next Visit Date:  Future Appointments   Date Time Provider Maureen Adrianna   11/3/2020  9:00 AM Mari Caceres PTA [facility]   11/5/2020  9:00 AM Mari Caceres PTA [facility]   11/10/2020  2:15 PM Nadiya Ramos MD [address] [place] [address] Maintenance   Topic Date Due    Colon cancer screen colonoscopy  06/11/2017    Hepatitis B vaccine (1 of 3 - Risk 3-dose series) 01/16/2021 (Originally 10/6/1981)    Diabetic retinal exam  06/12/2021 (Originally 10/6/1972)    Diabetic foot exam  06/02/2021    Potassium monitoring  06/04/2021    Creatinine monitoring  06/04/2021    A1C test (Diabetic or Prediabetic)  06/08/2021    Diabetic microalbuminuria test  06/08/2021    Lipid screen  06/08/2021    DTaP/Tdap/Td vaccine (2 - Td) 11/24/2025    Flu vaccine  Completed    Shingles Vaccine  Completed    Pneumococcal 0-64 years Vaccine  Completed    Hepatitis C screen  Completed    HIV screen  Completed    Hepatitis A vaccine  Aged Out    Hib vaccine  Aged Out    Meningococcal (ACWY) vaccine  Aged Out       Hemoglobin A1C (%)   Date Value   06/08/2020 6.7 (H)   01/16/2020 7.6   09/24/2019 11.7             ( goal A1C is < 7)   Microalb/Crt.  Ratio (mcg/mg creat)   Date Value   06/08/2020 30 (H)     LDL Cholesterol (mg/dL)   Date Value   06/08/2020 83   05/21/2019 104       (goal LDL is <100)   AST (U/L)   Date Value   05/20/2020 12     ALT (U/L)   Date Value   05/20/2020 11     BUN (mg/dL)   Date Value   06/04/2020 25 (H)     BP Readings from Last 3 Encounters:   10/13/20 120/78   09/11/20 134/88   06/30/20 (!) 160/95          (goal 120/80)    All Future Testing planned in [facility]  Lab Frequency Next Occurrence   XR LUMBAR SPINE (MIN 4 VIEWS) Once 03/05/2020   PSA, Diagnostic Once 07/10/2021   Cologuard (For External Results Only) Once 09/11/2021               Patient Active Problem List: DM2 (diabetes mellitus, type 2) (HCC)     Diverticulosis     DM (diabetes mellitus) ([facility]     HTN (hypertension)     Elevated liver enzymes     Scrotal abscess     Abdominal abscess     Hyperplastic colon polyp     Lower abdominal pain     Diabetic foot (HCC)     Infected hernioplasty mesh (HCC)     Cocaine abuse ([facility]     Chest pain     BPH with obstruction/lower urinary tract symptoms           Please address the medication refill and close the encounter. If I can be of assistance, please route to the applicable pool. Thank you.

## 2020-11-02 NOTE — TELEPHONE ENCOUNTER
Frequency Next Occurrence   XR LUMBAR SPINE (MIN 4 VIEWS) Once 03/05/2020   PSA, Diagnostic Once 07/10/2021   Cologuard (For External Results Only) Once 09/11/2021               Patient Active Problem List:     DM2 (diabetes mellitus, type 2) ([facility]     Diverticulosis     DM (diabetes mellitus) ([facility]     HTN (hypertension)     Elevated liver enzymes     Scrotal abscess     Abdominal abscess     Hyperplastic colon polyp     Lower abdominal pain     Diabetic foot ([facility]     Infected hernioplasty mesh ([facility]     Cocaine abuse ([facility]     Chest pain     BPH with obstruction/lower urinary tract symptoms

## 2020-11-03 ENCOUNTER — HOSPITAL ENCOUNTER (OUTPATIENT)
Dept: PHYSICAL THERAPY | Age: 60
Setting detail: THERAPIES SERIES
Discharge: HOME | End: 2020-11-03
Payer: MEDICAID

## 2020-11-03 PROCEDURE — 97110 THERAPEUTIC EXERCISES: CPT

## 2020-11-03 RX ORDER — TRAZODONE HYDROCHLORIDE 50 MG/1
TABLET ORAL
Qty: 30 TABLET | Refills: 0 | Status: SHIPPED | OUTPATIENT
Start: 2020-11-03 | End: 2020-12-02

## 2020-11-03 NOTE — FLOWSHEET NOTE
[] [facility]) - [facility] &  Therapy  [address]  [phone]  F: [phone] [] [address]  [facility]   [address]  P: [phone]  F: [phone] [] [address] &  Therapy  [address] [facility]  P: [phone]  F: [phone] [] [address]  P: [phone]  F: [phone] [] [address]  [facility]   [address]   [place]: [phone]  F: [phone]      Physical Therapy Daily Treatment Note    Date:  11/3/2020  Patient Name:  Terrance Barahona    :  1960  MRN: [id number]  Physician: Salvador Connerfurt: Jesus Simpson (limit 30 Rx per year)  Medical Diagnosis: Acute bilateral knee pain M25.561,M25.562                 Rehab Codes: M79.604, M79.605, M25.561, M25.562, M25.662, R26.2, M62.81  Onset date: 2019                     Next 's appt. : 3 months\    Visit# / total visits:      Cancels/No Shows: 0/0    Subjective:    Pain:  [x]? Yes  []? No  Location: B calves       Pain Rating: (0-10 scale) \"sore\"/10  Pain altered Tx:  []? Yes  [x]? No  Action:  Comments: Patient arrives stating he continues to feel pretty good today, having \"a good day and a good week\".       Objective:  Modalities:   Precautions: Neuropathy-decreased sensation B feet  Exercises: B knees, LE  Exercise Reps/ Time Weight/ Level Comments   SciFit 6m  L3          Gastroc stretch 4x20\"  Wedge   Heel raises  2x10      Hip abduction 10x ea     HS curls 10x ea     Step ups 10x ea 4\" 6\" next visit Added 11/3   Marches  20x  Added 11/3         Seated      HS stretch 3x20\"  Stool    LAQ 20x 2lb     Marches 20x 2lb TrA iso   HS curls 2x10  Lime          Supine          Add sets 15x 3sec     SAQ 20x2\" 2lb   Reviewed Prior HEP/Ed  Method of Education: [x] Verbal  [x] Demo  [x] Written  Comprehension of Education:  [x] Verbalizes understanding. [] Demonstrates understanding. [x] Needs review. [] Demonstrates/verbalizes HEP/Ed previously given. HEP 10/22/20: SAQ, LAQ, bridges, HS stretch, SLR     Plan: [x] Continue current frequency toward long and short term goals.     [x] Specific Instructions for subsequent treatments: , progress LE strengthening/stretching, issue new HP/tband    Frequency:  2 x/week for 18 visits      Time In: 905 am            Time Out: 955 am    Electronically signed by:  Carlos aSmpson PTA

## 2020-11-05 ENCOUNTER — APPOINTMENT (OUTPATIENT)
Dept: PHYSICAL THERAPY | Age: 60
End: 2020-11-05
Payer: MEDICAID

## 2020-11-09 NOTE — TELEPHONE ENCOUNTER
E-scribe request for Diclofenac gel . Please review and e-scribe if applicable. Next Visit Date:  11/10/2020    Health Maintenance   Topic Date Due    Colon cancer screen colonoscopy  06/11/2017    Hepatitis B vaccine (1 of 3 - Risk 3-dose series) 01/16/2021 (Originally 10/6/1981)    Diabetic retinal exam  06/12/2021 (Originally 10/6/1972)    Diabetic foot exam  06/02/2021    Potassium monitoring  06/04/2021    Creatinine monitoring  06/04/2021    A1C test (Diabetic or Prediabetic)  06/08/2021    Diabetic microalbuminuria test  06/08/2021    Lipid screen  06/08/2021    DTaP/Tdap/Td vaccine (2 - Td) 11/24/2025    Flu vaccine  Completed    Shingles Vaccine  Completed    Pneumococcal 0-64 years Vaccine  Completed    Hepatitis C screen  Completed    HIV screen  Completed    Hepatitis A vaccine  Aged Out    Hib vaccine  Aged Out    Meningococcal (ACWY) vaccine  Aged Out             (applicable per patient's age: Cancer Screenings, Depression Screening, Fall Risk Screening, Immunizations)    Hemoglobin A1C (%)   Date Value   06/08/2020 6.7 (H)   01/16/2020 7.6   09/24/2019 11.7     Microalb/Crt.  Ratio (mcg/mg creat)   Date Value   06/08/2020 30 (H)     LDL Cholesterol (mg/dL)   Date Value   06/08/2020 83     AST (U/L)   Date Value   05/20/2020 12     ALT (U/L)   Date Value   05/20/2020 11     BUN (mg/dL)   Date Value   06/04/2020 25 (H)      (goal A1C is < 7)   (goal LDL is <100) need 30-50% reduction from baseline     BP Readings from Last 3 Encounters:   10/13/20 120/78   09/11/20 134/88   06/30/20 (!) 160/95    (goal /80)      All Future Testing planned in [facility]:  Lab Frequency Next Occurrence   XR LUMBAR SPINE (MIN 4 VIEWS) Once 03/05/2020   PSA, Diagnostic Once 07/10/2021   Cologuard (For External Results Only) Once 09/11/2021       Next Visit Date:  Future Appointments   Date Time Provider Maureen Rodas   11/10/2020  1:00 PM Alon Every, PTA [facility] 11/10/2020  2:15 PM MD Mouna Pryor [facility]   11/12/2020  9:00 AM DANA Nassar PT [facility]            Patient Active Problem List:     DM2 (diabetes mellitus, type 2) ([facility]     Diverticulosis     DM (diabetes mellitus) ([facility]     HTN (hypertension)     Elevated liver enzymes     Scrotal abscess     Abdominal abscess     Hyperplastic colon polyp     Lower abdominal pain     Diabetic foot ([facility]     Infected hernioplasty mesh (HCC)     Cocaine abuse ([facility]     Chest pain     BPH with obstruction/lower urinary tract symptoms

## 2020-11-10 ENCOUNTER — OFFICE VISIT (OUTPATIENT)
Dept: FAMILY MEDICINE CLINIC | Age: 60
End: 2020-11-10
Payer: MEDICAID

## 2020-11-10 ENCOUNTER — HOSPITAL ENCOUNTER (OUTPATIENT)
Dept: PHYSICAL THERAPY | Age: 60
Setting detail: THERAPIES SERIES
Discharge: HOME | End: 2020-11-10
Payer: MEDICAID

## 2020-11-10 VITALS
BODY MASS INDEX: 26.96 KG/M2 | TEMPERATURE: 96.7 F | HEART RATE: 82 BPM | HEIGHT: 69 IN | WEIGHT: 182 LBS | DIASTOLIC BLOOD PRESSURE: 76 MMHG | SYSTOLIC BLOOD PRESSURE: 118 MMHG

## 2020-11-10 PROCEDURE — 97110 THERAPEUTIC EXERCISES: CPT

## 2020-11-10 PROCEDURE — 99213 OFFICE O/P EST LOW 20 MIN: CPT | Performed by: STUDENT IN AN ORGANIZED HEALTH CARE EDUCATION/TRAINING PROGRAM

## 2020-11-10 RX ORDER — OMEPRAZOLE 20 MG/1
20 CAPSULE, DELAYED RELEASE ORAL DAILY
Qty: 30 CAPSULE | Refills: 1 | Status: SHIPPED | OUTPATIENT
Start: 2020-11-10 | End: 2021-01-11

## 2020-11-10 ASSESSMENT — ENCOUNTER SYMPTOMS
ABDOMINAL PAIN: 0
VOMITING: 0
COUGH: 0
NAUSEA: 0
SHORTNESS OF BREATH: 0
SORE THROAT: 0
DIARRHEA: 0
CONSTIPATION: 0
CHEST TIGHTNESS: 0

## 2020-11-10 NOTE — PROGRESS NOTES
Visit Information    Have you changed or started any medications since your last visit including any over-the-counter medicines, vitamins, or herbal medicines? no   Have you stopped taking any of your medications? Is so, why? -  no  Are you having any side effects from any of your medications? - no    Have you seen any other physician or provider since your last visit?  no   Have you had any other diagnostic tests since your last visit?  no   Have you been seen in the emergency room and/or had an admission in a hospital since we last saw you?  no   Have you had your routine dental cleaning in the past 6 months?  no     Do you have an active MyChart account? If no, what is the barrier?   Yes    Patient Care Team:  Mara Worley MD as PCP - General (Family Medicine)    Medical History Review  Past Medical, Family, and Social History reviewed and does not contribute to the patient presenting condition    Health Maintenance   Topic Date Due    Colon cancer screen colonoscopy  06/11/2017    Hepatitis B vaccine (1 of 3 - Risk 3-dose series) 01/16/2021 (Originally 10/6/1981)    Diabetic retinal exam  06/12/2021 (Originally 10/6/1972)    Diabetic foot exam  06/02/2021    Potassium monitoring  06/04/2021    Creatinine monitoring  06/04/2021    A1C test (Diabetic or Prediabetic)  06/08/2021    Diabetic microalbuminuria test  06/08/2021    Lipid screen  06/08/2021    DTaP/Tdap/Td vaccine (2 - Td) 11/24/2025    Flu vaccine  Completed    Shingles Vaccine  Completed    Pneumococcal 0-64 years Vaccine  Completed    Hepatitis C screen  Completed    HIV screen  Completed    Hepatitis A vaccine  Aged Out    Hib vaccine  Aged Out    Meningococcal (ACWY) vaccine  Aged Out

## 2020-11-10 NOTE — PROGRESS NOTES
26.88 kg/m²    BP Readings from Last 3 Encounters:   11/10/20 118/76   10/13/20 120/78   09/11/20 134/88     Physical Exam  Vitals signs and nursing note reviewed. Constitutional:       Appearance: He is well-developed. Cardiovascular:      Rate and Rhythm: Normal rate and regular rhythm. Heart sounds: Normal heart sounds. No murmur. No friction rub. No gallop. Pulmonary:      Effort: Pulmonary effort is normal. No respiratory distress. Breath sounds: Normal breath sounds. No wheezing or rales. Chest:      Chest wall: No tenderness. Abdominal:      General: Bowel sounds are normal. There is no distension. Palpations: Abdomen is soft. There is no mass. Tenderness: There is no abdominal tenderness. There is no guarding. Musculoskeletal:      Comments: HANDGRIP 5/5  PT ABLE TO ABDUCT AND ADDUCT WRIST  PT ABLE TO FLEX AND EXTEND WRIST   Skin:     Capillary Refill: Capillary refill takes less than 2 seconds. Neurological:      Mental Status: He is alert and oriented to person, place, and time. Lab Results   Component Value Date    WBC 7.8 06/04/2020    HGB 12.8 (L) 06/04/2020    HCT 36.0 (L) 06/04/2020     06/04/2020    CHOL 153 06/08/2020    TRIG 114 06/08/2020    HDL 47 06/08/2020    ALT 11 05/20/2020    AST 12 05/20/2020     (L) 06/04/2020    K 4.4 06/04/2020    CL 97 (L) 06/04/2020    CREATININE 0.62 (L) 06/04/2020    BUN 25 (H) 06/04/2020    CO2 23 06/04/2020    TSH 0.84 03/15/2020    INR 1.3 11/11/2016    LABA1C 6.7 (H) 06/08/2020    LABMICR 30 (H) 06/08/2020     Lab Results   Component Value Date    CALCIUM 9.4 06/04/2020    PHOS 2.3 (L) 10/29/2016     Lab Results   Component Value Date    LDLCHOLESTEROL 83 06/08/2020       Assessment and Plan:    1. Bilateral hand pain  - Diclofenac Sod&Camphor-Menthol 1.5 & 4-10 % THPK; Apply 1 applicator topically 2 times daily  Dispense: 1 each; Refill: 1    2. Multiple joint pain  - Vitamin D 25 Hydroxy; Future    3. GERD (gastroesophageal reflux disease)  - omeprazole (PRILOSEC) 20 MG delayed release capsule; Take 1 capsule by mouth daily  Dispense: 30 capsule; Refill: 1    4. MEHDI KNEE PAIN  - [facility]    Requested Prescriptions     Signed Prescriptions Disp Refills    omeprazole (PRILOSEC) 20 MG delayed release capsule 30 capsule 1     Sig: Take 1 capsule by mouth daily    Diclofenac Sod&Camphor-Menthol 1.5 & 4-10 % THPK 1 each 1     Sig: Apply 1 applicator topically 2 times daily       Medications Discontinued During This Encounter   Medication Reason    tamsulosin (FLOMAX) 0.4 MG capsule LIST CLEANUP    omeprazole (PRILOSEC) 20 MG delayed release capsule REORDER       No follow-ups on file. Ildefonsojen Bill received counseling on the following healthy behaviors: nutrition and exercise  Reviewed prior labs and health maintenance  Continue current medications, diet and exercise. Discussed use, benefit, and side effects of prescribed medications. Barriers to medication compliance addressed. Patient given educational materials - see patient instructions  Was a self-tracking handout given in paper form or via [employer]? Yes    Requested Prescriptions     Signed Prescriptions Disp Refills    omeprazole (PRILOSEC) 20 MG delayed release capsule 30 capsule 1     Sig: Take 1 capsule by mouth daily    Diclofenac Sod&Camphor-Menthol 1.5 & 4-10 % THPK 1 each 1     Sig: Apply 1 applicator topically 2 times daily       All patient questions answered. Patient voiced understanding. Quality Measures    Body mass index is 26.88 kg/m². Elevated. Weight control planned discussed Healthy diet and regular exercise. BP: 118/76 Blood pressure is normal. Treatment plan consists of Weight Reduction and DASH Eating Plan.     Lab Results   Component Value Date    LDLCHOLESTEROL 83 06/08/2020    (goal LDL reduction with dx if diabetes is 50% LDL reduction)      PHQ Scores 10/13/2020 1/16/2020 5/20/2019 11/20/2017 PHQ2 Score 0 0 4 0   PHQ9 Score 0 0 10 0     Interpretation of Total Score Depression Severity: 1-4 = Minimal depression, 5-9 = Mild depression, 10-14 = Moderate depression, 15-19 = Moderately severe depression, 20-27 = Severe depression

## 2020-11-12 ENCOUNTER — HOSPITAL ENCOUNTER (OUTPATIENT)
Dept: PHYSICAL THERAPY | Age: 60
Setting detail: THERAPIES SERIES
Discharge: HOME | End: 2020-11-12
Payer: MEDICAID

## 2020-11-12 PROCEDURE — 97110 THERAPEUTIC EXERCISES: CPT

## 2020-11-12 NOTE — FLOWSHEET NOTE
[] [facility]) - [facility] &  Therapy  [address]  [phone]  F: [phone] [] [address]  [facility]   [address]  P: [phone]  F: [phone] [] [address] &  Therapy  [address]  P: [phone]  F: [phone] [] [address]  P: [phone]  F: [phone] [] [address]  [facility]   [address]   [place]: [phone]  F: [phone]      Physical Therapy Daily Treatment Note    Date:  2020  Patient Name:  Nicolas Kaplan    :  1960  MRN: [id number]  Physician: Heyward Frederickson, Merlinda Pickett                                     Insurance: [employer] (limit 30 Rx per year)  Medical Diagnosis: Acute bilateral knee pain M25.561,M25.562                 Rehab Codes: M79.604, M79.605, M25.561, M25.562, M25.662, R26.2, M62.81  Onset date: 2019                     Next 's appt. : 3 months\    Visit# / total visits:      Cancels/No Shows: 0/0    Subjective:    Pain:  [x]? Yes  []? No  Location: B calves       Pain Rating: (0-10 scale) 2/10  Pain altered Tx:  []? Yes  [x]? No  Action:  Comments: Patient arrives stating no significant symptoms or pain this date, notes his R ankle is a little sore. States strength and ability to go up stairs easier has improved since starting PT, pain is fairly unchanged.       Objective:  Modalities:   Precautions: Neuropathy-decreased sensation B feet  Exercises: B knees, LE  Exercise Reps/ Time Weight/ Level Comments   SciFit 6m  L3          Gastroc stretch 3x20\"  Wedge   HS stretch 3x20\"  Stool   Heel raises  2x10      Hip abduction 20x ea 2lb    HS curls 20x ea 2lb    Step ups 10x ea 6\"    Lat step ups 10x ea 6\"    Marches  20x 2lb    Leg press 2x10 30lb    Squats Seated      HS stretch 3x20\"  Stool    LAQ 20x 2lb     Marches 20x 2lb TrA iso   HS curls 2x10  Blue           Supine          Add sets       SAQ 20x2\" 2lb     SLR 20x 2lb      Bridge 2x10       Clamshells 20x Blue    4 way ankle 10x Lime           Prone      HS curls 20x 2lb    Hip ext 20x  2lb     Quad stretch 3x20\"           Sidelying      Clamshell 20x Blue    Hip abduction 15x  Next          Other:      Specific Instructions for next treatment:  ankle AROM ex, prone ex; Progress LE ex, stretches, strengthening, OK for heat to decrease pain, muscle tightness        Treatment Charges: Mins Units   []  Modalities     [x]  Ther Exercise 50 3   []  Manual Therapy     []  Ther Activities     []  Aquatics     []  Vasocompression     []  Other     Total Treatment time 50 3       Assessment: [x] Progressing toward goals. Added leg press this date for continued LE strengthening with patient noting some soreness in rhianna knees post, but overall no increased pain. Also added 2lb ankle weights to standing program for increased strengthening with no pain noted, however stated feeling \"heavy\" and fatigued. Improved recall for proper technique and progression through program this date. Patient pleased with progress thus far. Educated to ice at home if soreness persists with progressions made this date with good verbal understanding noted. [] No change. [] Other:  [x] Patient would continue to benefit from skilled physical therapy services in order to: decrease pain, increase strength and ROM, and increase functional mobility, including steps    STG: (to be met in 10 treatments)  1. ? Pain: B LE 4/10 average pain, 7/10 at worst  2. ? ROM: L knee ext 5°  3. ? Strength:B hips 4-/5, B knee flex 4/5, B knee ext 4-/5, B DF 4+/5  4. ? Function: LEFS 56% loss of LE function.     5. Patient to be independent with home exercise program as demonstrated by performance with correct form without cues.     LTG: (to be met in 18 treatments)  1. ? Pain: B LE 2/10 average pain, 5/10 at worst  2. ? Strength:B hips 4/5, B knee flex 4+/5, B knee ext 4/5  3. ? Function: LEFS 36% loss of LE function. 4. Pt report improved tolerance to steps                     Patient goals: \"get help\"    Pt. Education:  [x] Yes  [] No  [x] Reviewed Prior HEP/Ed  Method of Education: [x] Verbal  [x] Demo  [x] Written  Comprehension of Education:  [x] Verbalizes understanding. [] Demonstrates understanding. [] Needs review. [x] Demonstrates/verbalizes HEP/Ed previously given. HEP 10/22/20: SAQ, LAQ, bridges, HS stretch, SLR  HEP 11/10/20: 4 way ankle, gastroc stretch, SL hip abduction, SL clamshells     Plan: [x] Continue current frequency toward long and short term goals.     [x] Specific Instructions for subsequent treatments:  progress LE strengthening/stretching, standing squats, SL hip abduction     Frequency:  2 x/week for 18 visits      Time In: 901 am            Time Out:  955 am    Electronically signed by:  Renita Frank PTA

## 2020-11-17 ENCOUNTER — HOSPITAL ENCOUNTER (OUTPATIENT)
Dept: PHYSICAL THERAPY | Age: 60
Setting detail: THERAPIES SERIES
Discharge: HOME | End: 2020-11-17
Payer: MEDICAID

## 2020-11-17 PROCEDURE — 97110 THERAPEUTIC EXERCISES: CPT

## 2020-11-17 NOTE — FLOWSHEET NOTE
Function: LEFS 36% loss of LE function. 4. Pt report improved tolerance to steps                     Patient goals: \"get help\"    Pt. Education:  [x] Yes  [] No  [x] Reviewed Prior HEP/Ed  Method of Education: [x] Verbal  [x] Demo  [x] Written  Comprehension of Education:  [x] Verbalizes understanding. [] Demonstrates understanding. [] Needs review. [x] Demonstrates/verbalizes HEP/Ed previously given. HEP 10/22/20: SAQ, LAQ, bridges, HS stretch, SLR  HEP 11/10/20: 4 way ankle, gastroc stretch, SL hip abduction, SL clamshells     Plan: [x] Continue current frequency toward long and short term goals.     [x] Specific Instructions for subsequent treatments:  progress LE strengthening/stretching     Frequency:  2 x/week for 18 visits      Time In: 805 am            Time Out:  850 am    Electronically signed by:  Kitty Hamilton PTA

## 2020-11-18 NOTE — TELEPHONE ENCOUNTER
(MIN 4 VIEWS) Once 03/05/2020   PSA, Diagnostic Once 07/10/2021   Cologuard (For External Results Only) Once 09/11/2021   Vitamin D 25 Hydroxy Once 11/10/2020               Patient Active Problem List:     DM2 (diabetes mellitus, type 2) ([facility]     Diverticulosis     DM (diabetes mellitus) ([facility]     HTN (hypertension)     Elevated liver enzymes     Scrotal abscess     Abdominal abscess     Hyperplastic colon polyp     Lower abdominal pain     Diabetic foot ([facility]     Infected hernioplasty mesh ([facility]     Cocaine abuse ([facility]     Chest pain     BPH with obstruction/lower urinary tract symptoms

## 2020-11-19 ENCOUNTER — HOSPITAL ENCOUNTER (OUTPATIENT)
Dept: PHYSICAL THERAPY | Age: 60
Setting detail: THERAPIES SERIES
Discharge: HOME | End: 2020-11-19
Payer: MEDICAID

## 2020-11-19 ENCOUNTER — APPOINTMENT (OUTPATIENT)
Dept: PHYSICAL THERAPY | Age: 60
End: 2020-11-19
Payer: MEDICAID

## 2020-11-19 PROCEDURE — 97110 THERAPEUTIC EXERCISES: CPT

## 2020-11-19 NOTE — FLOWSHEET NOTE
[] [facility]) - [facility] &  Therapy  [address]  [phone]  F: [phone] [] [address]  [facility]   [address]  P: [phone]  F: [phone] [] [address] &  Therapy  [address]  P: [phone]  F: [phone] [] [address]  P: [phone]  F: [phone] [] [address]  [facility]   [address]   [place]: [phone]  F: [phone]      Physical Therapy Daily Treatment Note    Date:  2020  Patient Name:  Seven Haq    :  1960  MRN: [id number]  Physician: Bert Haywood: Hoa Kinza (limit 30 Rx per year)  Medical Diagnosis: Acute bilateral knee pain M25.561,M25.562                 Rehab Codes: M79.604, M79.605, M25.561, M25.562, M25.662, R26.2, M62.81  Onset date: 2019                     Next 's appt. : 3 months\    Visit# / total visits:      Cancels/No Shows: 0/0    Subjective:    Pain:  [x]? Yes  []? No  Location: B calves       Pain Rating: (0-10 scale) 2/10 R knee  Pain altered Tx:  []? Yes  [x]? No  Action:  Comments: Patient notes more pain through his feet today.      Objective:  Modalities:   Precautions: Neuropathy-decreased sensation B feet  Exercises: B knees, LE  Exercise Reps/ Time Weight/ Level Comments   SciFit 6m  L3          Gastroc stretch 3x20\"  Wedge   HS stretch 3x20\"  Stool   Heel raises  2x10  2lb    Hip abduction 20x ea 2lb    HS curls 20x ea 2lb    Step ups 15x ea 6\"    Lat step ups 15x ea 6\"    Marches  20x 2lb    Leg press 2x10 30lb    Squats 10x           Seated      HS stretch 3x20\"  Modified to standing    LAQ 20x 2lb     Marches HEP  TrA iso   HS curls 2x10  Blue           Supine          Add sets goals: \"get help\"    Pt. Education:  [x] Yes  [] No  [x] Reviewed Prior HEP/Ed  Method of Education: [x] Verbal  [x] Demo  [x] Written  Comprehension of Education:  [x] Verbalizes understanding. [] Demonstrates understanding. [] Needs review. [x] Demonstrates/verbalizes HEP/Ed previously given. HEP 10/22/20: SAQ, LAQ, bridges, HS stretch, SLR  HEP 11/10/20: 4 way ankle, gastroc stretch, SL hip abduction, SL clamshells     Plan: [x] Continue current frequency toward long and short term goals.     [x] Specific Instructions for subsequent treatments:  progress LE strengthening/stretching     Frequency:  2 x/week for 18 visits      Time In: 805 am            Time Out:  900 am    Electronically signed by:  Emelia Kumar PTA

## 2020-11-24 ENCOUNTER — HOSPITAL ENCOUNTER (OUTPATIENT)
Dept: PHYSICAL THERAPY | Age: 60
Setting detail: THERAPIES SERIES
Discharge: HOME | End: 2020-11-24
Payer: MEDICAID

## 2020-11-24 PROCEDURE — 97110 THERAPEUTIC EXERCISES: CPT

## 2020-11-24 NOTE — TELEPHONE ENCOUNTER
Pierre Request for pending medication. Last Visit Date:11/10/2020  Next Visit Date:  Future Appointments   Date Time Provider Maureen Adrianna   12/1/2020 10:00 AM Dashawn Daubs, PTA [facility]   12/3/2020 10:00 AM Dashawn Daubs, PTA [facility]   12/7/2020  2:00 PM Kathleen Pandey MD [facility]   12/8/2020  8:30 AM Yasir Marshall Nina Maintenance   Topic Date Due    Colon cancer screen colonoscopy  06/11/2017    Hepatitis B vaccine (1 of 3 - Risk 3-dose series) 01/16/2021 (Originally 10/6/1981)    Diabetic retinal exam  06/12/2021 (Originally 10/6/1972)    Diabetic foot exam  06/02/2021    Potassium monitoring  06/04/2021    Creatinine monitoring  06/04/2021    A1C test (Diabetic or Prediabetic)  06/08/2021    Diabetic microalbuminuria test  06/08/2021    Lipid screen  06/08/2021    DTaP/Tdap/Td vaccine (2 - Td) 11/24/2025    Flu vaccine  Completed    Shingles Vaccine  Completed    Hepatitis C screen  Completed    HIV screen  Completed    Hepatitis A vaccine  Aged Out    Hib vaccine  Aged Out    Meningococcal (ACWY) vaccine  Aged Out    Pneumococcal 0-64 years Vaccine  Aged Out       Hemoglobin A1C (%)   Date Value   06/08/2020 6.7 (H)   01/16/2020 7.6   09/24/2019 11.7             ( goal A1C is < 7)   Microalb/Crt.  Ratio (mcg/mg creat)   Date Value   06/08/2020 30 (H)     LDL Cholesterol (mg/dL)   Date Value   06/08/2020 83       (goal LDL is <100)   AST (U/L)   Date Value   05/20/2020 12     ALT (U/L)   Date Value   05/20/2020 11     BUN (mg/dL)   Date Value   06/04/2020 25 (H)     BP Readings from Last 3 Encounters:   11/10/20 118/76   10/13/20 120/78   09/11/20 134/88          (goal 120/80)    All Future Testing planned in [facility]  Lab Frequency Next Occurrence   XR LUMBAR SPINE (MIN 4 VIEWS) Once 03/05/2020   PSA, Diagnostic Once 07/10/2021   Cologuatobias (For External Results Only) Once 09/11/2021   Vitamin D 25 Hydroxy Once 11/10/2021       Next Visit Date:  Future Appointments   Date Time Provider Maureen Rodas   12/1/2020 10:00 AM Madhaydena Flirt, PTA [facility]   12/3/2020 10:00 AM Madonna Flirt, PTA [facility]   12/7/2020  2:00 PM [facility]   12/8/2020  8:30 AM Carlee Thompson PT [facility] [address] [place] [address]         Patient Active Problem List:     DM2 (diabetes mellitus, type 2) ([address]     Diverticulosis     DM (diabetes mellitus) ([address]     HTN (hypertension)     Elevated liver enzymes     Scrotal abscess     Abdominal abscess     Hyperplastic colon polyp     Lower abdominal pain     Diabetic foot ([address]     Infected hernioplasty mesh (HCC)     Cocaine abuse ([address]     Chest pain     BPH with obstruction/lower urinary tract symptoms

## 2020-11-24 NOTE — FLOWSHEET NOTE
[] [facility]) - [facility]  [facility]  [address]  [phone]  F: [phone] [] [address]  [facility]   [address]  P: [phone]  F: [phone] [] [address] &  Therapy  [address] [facility]  P: [phone]  F: [phone] [] [address]  P: [phone]  F: [phone] [] [address]  [facility]   [address]   [place]: [phone]  F: [phone]      Physical Therapy Daily Treatment Note    Date:  2020  Patient Name:  Vicki Bashir    :  1960  MRN: [id number]  Physician: Gurdeep Sol                                     Insurance: [facility] (limit 30 Rx per year)  Medical Diagnosis: Acute bilateral knee pain M25.561,M25.562                 Rehab Codes: M79.604, M79.605, M25.561, M25.562, M25.662, R26.2, M62.81  Onset date: 2019                     Next 's appt. : 3 months\    Visit# / total visits: 10/18     Cancels/No Shows: 0/0    Subjective:    Pain:  [x]? Yes  []? No  Location: B calves       Pain Rating: (0-10 scale) 0/10   Pain altered Tx:  []? Yes  [x]? No  Action:  Comments: Patient states he just got another cortizone shot in his feet, so they are feeling good. States hips felt a little weak on his walk into the hospital but overall no pain, mostly just sore.       Objective:  Modalities:   Precautions: Neuropathy-decreased sensation B feet  Exercises: B knees, LE  Exercise Reps/ Time Weight/ Level Comments   SciFit 6m  L4          Gastroc stretch 3x20\"  Wedge   HS stretch 3x20\"  Stool   Heel raises  2x10  2lb    HS curls 20x ea 2lb    4 way hip 10x Blue Progressed to tband    Step ups 15x ea 6\"    Lat step ups 15x ea 6\"    Marches  20x 2lb    Leg press 2x10 30lb    Squats 10x Seated      HS stretch   Modified to standing    LAQ 20x 2lb     Marches HEP  TrA iso   HS curls 2x10  Plum Progressed to plum 11/24         Supine          Add sets       SAQ 20x2\" 2lb     SLR 20x 2lb      Bridge 2x10       Clamshells 20x Plum Progressed to plum 11/24   4 way ankle 10x Lime           Prone      HS curls 20x 2lb    Hip ext 20x  2lb     Quad stretch 3x20\"           Sidelying      Clamshell 20x Plum Progressed to plum 11/24   Hip abduction 2x10 2lb          Other:      Specific Instructions for next treatment:  ankle AROM ex, prone ex; Progress LE ex, stretches, strengthening, OK for heat to decrease pain, muscle tightness        Treatment Charges: Mins Units   []  Modalities     [x]  Ther Exercise 40 3   []  Manual Therapy     []  Ther Activities     []  Aquatics     []  Vasocompression     []  Other     Total Treatment time 40 3       Assessment: [x] Progressing toward goals. Completed program as noted, holding various exs due to assessment of STG as noted below. Patient with good progress, meeting all STG and pleased with progress. Continued progressing resistances for various mat exs as well as progressed to 4 way hip with tband for increased LE strengthening and stability with patient stating min increased fatigue, but no increased pain or issues. [] No change. [] Other:  [x] Patient would continue to benefit from skilled physical therapy services in order to: decrease pain, increase strength and ROM, and increase functional mobility, including steps    STG: (to be met in 10 treatments)  1. ? Pain: B LE 4/10 average pain, 7/10 at worst -- Progress towards, \"7/10 at worst in the last week, average up to 6/10\"  2. ? ROM: L knee ext 5° -- MET 1°  3. ? Strength:B hips 4-/5 B hip 4+/5,  B knee flex 4/5 B knee flex 4/5, B knee ext 4-/5 R knee 5/5, L knee 4+/5, B DF 4+/5 DF 5/5 MET ALL STRENGTH  4. ? Function: LEFS 56% loss of LE function. MET 50% impairment    5.  Patient to be independent with home exercise program as demonstrated by performance with correct form without cues. - MET     LTG: (to be met in 18 treatments)  1. ? Pain: B LE 2/10 average pain, 5/10 at worst  2. ? Strength:B hips 4/5, B knee flex 4+/5 , B knee ext 4/5   3. ? Function: LEFS 36% loss of LE function. 4. Pt report improved tolerance to steps                     Patient goals: \"get help\"    Pt. Education:  [x] Yes  [] No  [x] Reviewed Prior HEP/Ed  Method of Education: [x] Verbal  [x] Demo  [x] Written  Comprehension of Education:  [x] Verbalizes understanding. [] Demonstrates understanding. [] Needs review. [x] Demonstrates/verbalizes HEP/Ed previously given. HEP 10/22/20: SAQ, LAQ, bridges, HS stretch, SLR  HEP 11/10/20: 4 way ankle, gastroc stretch, SL hip abduction, SL clamshells     Plan: [x] Continue current frequency toward long and short term goals.     [x] Specific Instructions for subsequent treatments:  progress LE strengthening/stretching     Frequency:  2 x/week for 18 visits      Time In: 763 am            Time Out:  952 am    Electronically signed by:  Christian Carlin PTA

## 2020-11-25 RX ORDER — GABAPENTIN 800 MG/1
TABLET ORAL
Qty: 90 TABLET | Refills: 0 | Status: SHIPPED | OUTPATIENT
Start: 2020-11-25 | End: 2020-12-22

## 2020-12-01 ENCOUNTER — HOSPITAL ENCOUNTER (OUTPATIENT)
Dept: PHYSICAL THERAPY | Age: 60
Setting detail: THERAPIES SERIES
Discharge: HOME | End: 2020-12-01
Payer: MEDICAID

## 2020-12-01 NOTE — FLOWSHEET NOTE
[x] [facility]) - [facility] &  Therapy  [address]    [phone]  F: [phone]   [] [address]  [facility]   [address]  P: [phone]  F: [phone]  [] [address] &  Therapy  [address] [facility]  P: [phone]  F: [phone] [] [address]  P: [phone]  F: [phone]  [] [address]  [facility]   [address]   [place]: [phone]  F: [phone]   [] [facility]  [address] [facility] [address]  [place]: [phone]   F: [phone]     Physical Therapy Cancel/No Show note    Date: 2020  Patient: Caroline Miguel  : 1960  MRN: [id number]    Cancels/No Shows to date:     For today's appointment patient:    [x]  Cancelled    [] Rescheduled appointment    [] No-show     Reason given by patient:    [x]  Patient ill    []  Conflicting appointment    [] No transportation      [] Conflict with work    [] No reason given    [] Weather related    [] COVID-19    [x] Other:      Comments: Patient states he has a cold.        [x] Next appointment was confirmed    Electronically signed by: Lottie Walton PTA

## 2020-12-02 RX ORDER — TRAZODONE HYDROCHLORIDE 50 MG/1
TABLET ORAL
Qty: 30 TABLET | Refills: 0 | Status: SHIPPED | OUTPATIENT
Start: 2020-12-02 | End: 2021-01-04

## 2020-12-02 NOTE — TELEPHONE ENCOUNTER
Last visit: 11-3-2020  Last Med refill:  11-3-2020  Does patient have enough medication for 72 hours: No     Next Visit Date:  Future Appointments   Date Time Provider Maureen Adrianna   12/3/2020 10:00 AM Gm Mi PTA [facility]   12/7/2020  2:00 [place] [address] [facility]   12/8/2020  8:30 AM Kim [facility] Maintenance   Topic Date Due    Colon cancer screen colonoscopy  06/11/2017    Hepatitis B vaccine (1 of 3 - Risk 3-dose series) 01/16/2021 (Originally 10/6/1981)    Diabetic retinal exam  06/12/2021 (Originally 10/6/1972)    Diabetic foot exam  06/02/2021    Potassium monitoring  06/04/2021    Creatinine monitoring  06/04/2021    A1C test (Diabetic or Prediabetic)  06/08/2021    Diabetic microalbuminuria test  06/08/2021    Lipid screen  06/08/2021    DTaP/Tdap/Td vaccine (2 - Td) 11/24/2025    Flu vaccine  Completed    Shingles Vaccine  Completed    Hepatitis C screen  Completed    HIV screen  Completed    Hepatitis A vaccine  Aged Out    Hib vaccine  Aged Out    Meningococcal (ACWY) vaccine  Aged Out    Pneumococcal 0-64 years Vaccine  Aged Out       Hemoglobin A1C (%)   Date Value   06/08/2020 6.7 (H)   01/16/2020 7.6   09/24/2019 11.7             ( goal A1C is < 7)   Microalb/Crt.  Ratio (mcg/mg creat)   Date Value   06/08/2020 30 (H)     LDL Cholesterol (mg/dL)   Date Value   06/08/2020 83   05/21/2019 104       (goal LDL is <100)   AST (U/L)   Date Value   05/20/2020 12     ALT (U/L)   Date Value   05/20/2020 11     BUN (mg/dL)   Date Value   06/04/2020 25 (H)     BP Readings from Last 3 Encounters:   11/10/20 118/76   10/13/20 120/78   09/11/20 134/88          (goal 120/80)    All Future Testing planned in [facility]  Lab Frequency Next Occurrence   XR LUMBAR SPINE (MIN 4 VIEWS) Once 03/05/2020   PSA, Diagnostic Once 07/10/2021   Cologuard (For External Results Only) Once 09/11/2021   Vitamin D 25 Hydroxy Once 11/10/2021               Patient Active Problem List:     DM2 (diabetes mellitus, type 2) ([facility]     Diverticulosis     DM (diabetes mellitus) ([facility]     HTN (hypertension)     Elevated liver enzymes     Scrotal abscess     Abdominal abscess     Hyperplastic colon polyp     Lower abdominal pain     Diabetic foot ([facility]     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

## 2020-12-03 ENCOUNTER — HOSPITAL ENCOUNTER (OUTPATIENT)
Dept: PHYSICAL THERAPY | Age: 60
Setting detail: THERAPIES SERIES
Discharge: HOME | End: 2020-12-03
Payer: MEDICAID

## 2020-12-08 ENCOUNTER — HOSPITAL ENCOUNTER (OUTPATIENT)
Dept: PHYSICAL THERAPY | Age: 60
Setting detail: THERAPIES SERIES
Discharge: HOME | End: 2020-12-08
Payer: MEDICAID

## 2020-12-08 PROCEDURE — 97110 THERAPEUTIC EXERCISES: CPT

## 2020-12-08 NOTE — FLOWSHEET NOTE
[] [facility]) - [facility] &  Therapy  [address]  [phone]  F: [phone] [] [address] [place] [address]  [facility]   [address]  P: [phone]  F: [phone] [] [address] &  Therapy  [address] [facility]  P: [phone]  F: [phone] [] [address]  P: [phone]  F: [phone] [] [address]  [facility]   [address]   [place]: [phone]  F: [phone]      Physical Therapy Daily Treatment Note    Date:  2020  Patient Name:  Octavio Feliz    :  1960  MRN: [id number]  Physician: Cedric Sofia                                     Insurance: [employer] (limit 30 Rx per year)  Medical Diagnosis: Acute bilateral knee pain M25.561,M25.562                 Rehab Codes: M79.604, M79.605, M25.561, M25.562, M25.662, R26.2, M62.81  Onset date: 2019                     Next 's appt. : 3 months\    Visit# / total visits:      Cancels/No Shows: 2/0    Subjective:    Pain:  [x]? Yes  []? No  Location: B calves       Pain Rating: (0-10 scale) 2/10   Pain altered Tx:  []? Yes  [x]? No  Action:  Comments: Patient returns from short lapse in appts, states feeling about the same. Notes mostly feeling sore.         Objective:  Modalities:   Precautions: Neuropathy-decreased sensation B feet  Exercises: B knees, LE  Exercise Reps/ Time Weight/ Level Comments   SciFit 6m  L4          Gastroc stretch 3x20\"  Wedge   HS stretch 3x20\"  Stool   Heel raises  2x10  2lb    HS curls 20x ea 2lb    4 way hip 10x Blue    Step ups 20x ea 6\"    Lat step ups 20x ea 6\"    Marches  20x 2lb    Leg press 2x10 30lb    Squats 10x           Seated      HS stretch   Modified to standing    LAQ 20x 2lb     Marches HEP  TrA iso   HS curls 2x10 Plum          Supine          Add sets       SAQ 20x2\" 2lb     SLR 20x 2lb      Bridge 2x10       Clamshells 20x [place]    4 way ankle 10x Lime           Prone      HS curls 20x 2lb    Hip ext 20x  2lb     Quad stretch 3x20\"           Sidelying      Clamshell 20x [place]    Hip abduction 2x10 2lb          Other:      Specific Instructions for next treatment:  ankle AROM ex, prone ex; Progress LE ex, stretches, strengthening, OK for heat to decrease pain, muscle tightness        Treatment Charges: Mins Units   []  Modalities     [x]  Ther Exercise 45 3   []  Manual Therapy     []  Ther Activities     []  Aquatics     []  Vasocompression     []  Other     Total Treatment time 45 3       Assessment: [x] Progressing toward goals. No progressions this date as patient fatigued with current program due to short lapse in PT appts. Able to complete program as noted with no increased pain or onset of symptoms. Patient with good recall for program progression this date, minimal verbal cues for proper technique with good carryover noted. [] No change. [] Other:  [x] Patient would continue to benefit from skilled physical therapy services in order to: decrease pain, increase strength and ROM, and increase functional mobility, including steps    STG: (to be met in 10 treatments)  1. ? Pain: B LE 4/10 average pain, 7/10 at worst -- Progress towards, \"7/10 at worst in the last week, average up to 6/10\"  2. ? ROM: L knee ext 5° -- MET 1°  3. ? Strength:B hips 4-/5 B hip 4+/5,  B knee flex 4/5 B knee flex 4/5, B knee ext 4-/5 R knee 5/5, L knee 4+/5, B DF 4+/5 DF 5/5 MET ALL STRENGTH  4. ? Function: LEFS 56% loss of LE function. MET 50% impairment    5. Patient to be independent with home exercise program as demonstrated by performance with correct form without cues.  - MET     LTG: (to be met in 18 treatments)  1. ? Pain: B LE 2/10 average pain, 5/10 at worst  2. ? Strength:B hips 4/5, B knee flex 4+/5 , B knee ext 4/5

## 2020-12-10 ENCOUNTER — HOSPITAL ENCOUNTER (OUTPATIENT)
Dept: PHYSICAL THERAPY | Age: 60
Setting detail: THERAPIES SERIES
Discharge: HOME | End: 2020-12-10
Payer: MEDICAID

## 2020-12-10 NOTE — FLOWSHEET NOTE
[x] [facility]) - [facility] &  Therapy  [address]    [phone]  F: [phone]   [] [address] [facility]  [facility] 36   [address]  P: [phone]  F: [phone]  [] [facility] &  Therapy  1500 [facility]  P: [phone]  F: [phone] [] [address] [facility]  P: [phone]  F: [phone]  [] [address]  [facility]   [address]   [place]: [phone]  F: [phone]   [] [facility]  [address] [facility] [address]  [place]: [phone]   F: [phone]     Physical Therapy Cancel/No Show note    Date: 12/10/2020  Patient: Yates Simmonds  : 1960  MRN: [id number]    Cancels/No Shows to date: 3/0    For today's appointment patient:    [x]  Cancelled    [] Rescheduled appointment    [] No-show     Reason given by patient:    []  Patient ill    []  Conflicting appointment    [] No transportation      [] Conflict with work    [] No reason given    [] Weather related    [] COVID-19    [x] Other:      Comments: Patient stated he had a family emergency.         [x] Next appointment was confirmed    Electronically signed by: Yariel Young PTA

## 2020-12-11 ENCOUNTER — TELEPHONE (OUTPATIENT)
Dept: FAMILY MEDICINE CLINIC | Age: 60
End: 2020-12-11

## 2020-12-15 ENCOUNTER — APPOINTMENT (OUTPATIENT)
Dept: PHYSICAL THERAPY | Age: 60
End: 2020-12-15
Payer: MEDICAID

## 2020-12-15 ENCOUNTER — HOSPITAL ENCOUNTER (OUTPATIENT)
Dept: PHYSICAL THERAPY | Age: 60
Setting detail: THERAPIES SERIES
Discharge: HOME | End: 2020-12-15
Payer: MEDICAID

## 2020-12-15 PROCEDURE — 97110 THERAPEUTIC EXERCISES: CPT

## 2020-12-15 NOTE — FLOWSHEET NOTE
[] [facility]) - [facility] &  Therapy  [address]  [phone]  F: [phone] [] [address] [place] [address]  [facility]   [address]  P: [phone]  F: [phone] [] [address] &  Therapy  [address] [facility] [address]  P: [phone]  F: [phone] [] [address]  P: [phone]  F: [phone] [] [address]  [facility]   [address]   [place]: [phone]  F: [phone]      Physical Therapy Daily Treatment Note    Date:  12/15/2020  Patient Name:  Anne-Marie Curry    :  1960  MRN: [id number]  Physician: Bert Vieira: [place] [facility] (limit 30 Rx per year)  Medical Diagnosis: Acute bilateral knee pain M25.561,M25.562                 Rehab Codes: M79.604, M79.605, M25.561, M25.562, M25.662, R26.2, M62.81  Onset date: 2019                     Next 's appt. : 3 months\    Visit# / total visits:      Cancels/No Shows: 2/0    Subjective:    Pain:  [x]? Yes  []? No  Location: B calves       Pain Rating: (0-10 scale) 2/10   Pain altered Tx:  []? Yes  [x]? No  Action:  Comments: Patient states he has a cramp in the back of his L leg but overall not feeling too bad.           Objective:  Modalities:   Precautions: Neuropathy-decreased sensation B feet  Exercises: B knees, LE  Exercise Reps/ Time Weight/ Level Comments   SciFit 6m  L4          Gastroc stretch 3x20\"  Wedge   HS stretch 3x20\"  Stool   Heel raises  2x10  2lb    HS curls 20x ea 3lb    4 way hip 15x Blue Increased reps 12/15   Step ups 20x ea 6\"    Lat step ups 20x ea 6\"    Step downs  15x ea 6\" Added 12/15   Marches  20x 3lb    Leg press 2x10 30lb    Squats 10x     Lunges 10x ea  Added 12/15               Seated      HS stretch Modified to standing    LAQ 20x 3lb  Increased weight 12/15   Marches HEP  TrA iso   HS curls HEP Plum          Supine          Add sets       SAQ       SLR 20x 3lb  Increased weight 12/15   Bridge 2x10       4 way ankle  Lime           Prone   Increased weight 12/15   HS curls 20x 3lb    Hip ext 20x  3lb     Quad stretch 3x20\"           Sidelying   Increased weight 12/15   Clamshell 20x [place]    Hip abduction 2x10 3lb          Other:      Specific Instructions for next treatment:  ankle AROM ex, prone ex; Progress LE ex, stretches, strengthening, OK for heat to decrease pain, muscle tightness        Treatment Charges: Mins Units   []  Modalities     [x]  Ther Exercise 45 3   []  Manual Therapy     []  Ther Activities     []  Aquatics     []  Vasocompression     []  Other     Total Treatment time 45 3       Assessment: [x] Progressing toward goals. Progressed resistance and reps with program as noted above with no pain or onset of symptoms per patient. Patient continues to demonstrate improved strength and activity tolerance. Minimal fatigue noted with prone exs due to increased weight, but able to complete all reps. [] No change. [] Other:  [x] Patient would continue to benefit from skilled physical therapy services in order to: decrease pain, increase strength and ROM, and increase functional mobility, including steps    STG: (to be met in 10 treatments)  1. ? Pain: B LE 4/10 average pain, 7/10 at worst -- Progress towards, \"7/10 at worst in the last week, average up to 6/10\"  2. ? ROM: L knee ext 5° -- MET 1°  3. ? Strength:B hips 4-/5 B hip 4+/5,  B knee flex 4/5 B knee flex 4/5, B knee ext 4-/5 R knee 5/5, L knee 4+/5, B DF 4+/5 DF 5/5 MET ALL STRENGTH  4. ? Function: LEFS 56% loss of LE function. MET 50% impairment    5. Patient to be independent with home exercise program as demonstrated by performance with correct form without cues.  - MET     LTG: (to be met in 18 treatments)  1. ? Pain: B LE 2/10 average pain, 5/10 at worst  2. ? Strength:B hips 4/5, B knee flex 4+/5 , B knee ext 4/5   3. ? Function: LEFS 36% loss of LE function. 4. Pt report improved tolerance to steps                     Patient goals: \"get help\"    Pt. Education:  [x] Yes  [] No  [x] Reviewed Prior HEP/Ed  Method of Education: [x] Verbal  [x] Demo  [x] Written  Comprehension of Education:  [x] Verbalizes understanding. [] Demonstrates understanding. [] Needs review. [x] Demonstrates/verbalizes HEP/Ed previously given. HEP 10/22/20: SAQ, LAQ, bridges, HS stretch, SLR  HEP 11/10/20: 4 way ankle, gastroc stretch, SL hip abduction, SL clamshells     Plan: [x] Continue current frequency toward long and short term goals.     [x] Specific Instructions for subsequent treatments:  progress LE strengthening/stretching, SLS, rebounder     Frequency:  2 x/week for 18 visits      Time In:  140 pm            Time Out:  225 pm    Electronically signed by:  Mari Caceres PTA

## 2020-12-17 ENCOUNTER — HOSPITAL ENCOUNTER (OUTPATIENT)
Dept: PHYSICAL THERAPY | Age: 60
Setting detail: THERAPIES SERIES
Discharge: HOME | End: 2020-12-17
Payer: MEDICAID

## 2020-12-17 PROCEDURE — 97110 THERAPEUTIC EXERCISES: CPT

## 2020-12-17 RX ORDER — PEN NEEDLE, DIABETIC 31 G X1/4"
1 NEEDLE, DISPOSABLE MISCELLANEOUS DAILY
Qty: 100 EACH | Refills: 3 | Status: SHIPPED | OUTPATIENT
Start: 2020-12-17 | End: 2021-10-19 | Stop reason: SDUPTHER

## 2020-12-17 NOTE — FLOWSHEET NOTE
[] [facility]) - [facility] &  Therapy  [address]  [phone]  F: [phone] [] [address] [place] [address]  [facility]   [address]  P: [phone]  F: [phone] [] [address] &  Therapy  [address] [facility] [address]  P: [phone]  F: [phone] [] [address]  P: [phone]  F: [phone] [] [address]  [facility]   [address]   [place]: [phone]  F: [phone]      Physical Therapy Daily Treatment Note    Date:  2020  Patient Name:  Era Councilman    :  1960  MRN: [id number]  Physician: Gurdeep Adams Elbe                                     Insurance: [employer] (limit 30 Rx per year)  Medical Diagnosis: Acute bilateral knee pain M25.561,M25.562                 Rehab Codes: M79.604, M79.605, M25.561, M25.562, M25.662, R26.2, M62.81  Onset date: 2019                     Next 's appt. : 3 months\    Visit# / total visits:      Cancels/No Shows: 2/0    Subjective:    Pain:  [x]? Yes  []? No  Location: B calves       Pain Rating: (0-10 scale) 4-5/10   Pain altered Tx:  []? Yes  [x]? No  Action:  Comments: Patient reports overall not feeling too bad at arrival, just feeling more stiff this date.            Objective:  Modalities:   Precautions: Neuropathy-decreased sensation B feet  Exercises: B knees, LE  Exercise Reps/ Time Weight/ Level Comments   SciFit 6m  L4          Gastroc stretch 3x20\"  Wedge   HS stretch 3x20\"  Stool   Heel raises       HS curls 20x ea 3lb    4 way hip 15x Blue Increased reps 12/15   Step ups 20x ea 6\"    Lat step ups 20x ea 6\"    Step downs  15x ea 6\" Added 12/15   Marches  20x 3lb    Leg press 2x10 30lb    Squats 10x     Lunges 10x ea  Added 12/15   SLS            Seated      HS stretch Modified to standing    LAQ 20x 3lb  Increased weight 12/15   Marches HEP  TrA iso   HS curls HEP Plum          Supine       Held supine 12/17   Add sets       SAQ       SLR 20x 3lb     Bridge 2x10       4 way ankle  Lime           Prone   Increased weight 12/15   HS curls 20x 3lb    Hip ext 20x  3lb     Quad stretch HEP           Sidelying   Increased weight 12/15   Clamshell 20x [place]    Hip abduction 2x10 3lb          Other:      Specific Instructions for next treatment:  ankle AROM ex, prone ex; Progress LE ex, stretches, strengthening, OK for heat to decrease pain, muscle tightness        Treatment Charges: Mins Units   []  Modalities     [x]  Ther Exercise 45 3   []  Manual Therapy     []  Ther Activities     []  Aquatics     []  Vasocompression     []  Other     Total Treatment time 45 3       Assessment: [x] Progressing toward goals. Initiated single leg balance exercise this date with patient noting some difficulty, but able to complete with min UE assist. Verbal cues to maintain abdominal activation during balance ex with good carryover post and improved balance as well. Patient with minimally increased fatigue due to progressions, however feels good with program and progressions. [] No change. [] Other:  [x] Patient would continue to benefit from skilled physical therapy services in order to: decrease pain, increase strength and ROM, and increase functional mobility, including steps    STG: (to be met in 10 treatments)  1. ? Pain: B LE 4/10 average pain, 7/10 at worst -- Progress towards, \"7/10 at worst in the last week, average up to 6/10\"  2. ? ROM: L knee ext 5° -- MET 1°  3. ? Strength:B hips 4-/5 B hip 4+/5,  B knee flex 4/5 B knee flex 4/5, B knee ext 4-/5 R knee 5/5, L knee 4+/5, B DF 4+/5 DF 5/5 MET ALL STRENGTH  4. ? Function: LEFS 56% loss of LE function. MET 50% impairment    5.  Patient to be independent with home exercise program as demonstrated by performance with correct form without cues. - MET     LTG: (to be met in 18 treatments)  1. ? Pain: B LE 2/10 average pain, 5/10 at worst  2. ? Strength:B hips 4/5, B knee flex 4+/5 , B knee ext 4/5   3. ? Function: LEFS 36% loss of LE function. 4. Pt report improved tolerance to steps                     Patient goals: \"get help\"    Pt. Education:  [x] Yes  [] No  [x] Reviewed Prior HEP/Ed  Method of Education: [x] Verbal  [x] Demo  [x] Written  Comprehension of Education:  [x] Verbalizes understanding. [] Demonstrates understanding. [] Needs review. [x] Demonstrates/verbalizes HEP/Ed previously given. HEP 10/22/20: SAQ, LAQ, bridges, HS stretch, SLR  HEP 11/10/20: 4 way ankle, gastroc stretch, SL hip abduction, SL clamshells     Plan: [x] Continue current frequency toward long and short term goals.     [x] Specific Instructions for subsequent treatments:  progress LE strengthening/stretching, SLS, rebounder     Frequency:  2 x/week for 18 visits      Time In:  1000am            Time Out: 4272 am    Electronically signed by:  Shyanne Ag PTA

## 2020-12-17 NOTE — TELEPHONE ENCOUNTER
Last visit: 11/10/20  Last Med refill:   Does patient have enough medication for 72 hours:     Next Visit Date:  Future Appointments   Date Time Provider Maureen Hanleyi   12/18/2020  8:30 AM Alfonso Sun, OT [facility]   12/22/2020  9:00 AM Mynor Elizondo, PTA [facility]   Topic Date Due    Colon cancer screen colonoscopy  06/11/2017    Hepatitis B vaccine (1 of 3 - Risk 3-dose series) 01/16/2021 (Originally 10/6/1981)    Diabetic retinal exam  06/12/2021 (Originally 10/6/1972)    Diabetic foot exam  06/02/2021    Potassium monitoring  06/04/2021    Creatinine monitoring  06/04/2021    A1C test (Diabetic or Prediabetic)  06/08/2021    Diabetic microalbuminuria test  06/08/2021    Lipid screen  06/08/2021    DTaP/Tdap/Td vaccine (2 - Td) 11/24/2025    Flu vaccine  Completed    Shingles Vaccine  Completed    Pneumococcal 0-64 years Vaccine  Completed    Hepatitis C screen  Completed    HIV screen  Completed    Hepatitis A vaccine  Aged Out    Hib vaccine  Aged Out    Meningococcal (ACWY) vaccine  Aged Out       Hemoglobin A1C (%)   Date Value   06/08/2020 6.7 (H)   01/16/2020 7.6   09/24/2019 11.7             ( goal A1C is < 7)   Microalb/Crt.  Ratio (mcg/mg creat)   Date Value   06/08/2020 30 (H)     LDL Cholesterol (mg/dL)   Date Value   06/08/2020 83   05/21/2019 104       (goal LDL is <100)   AST (U/L)   Date Value   05/20/2020 12     ALT (U/L)   Date Value   05/20/2020 11     BUN (mg/dL)   Date Value   06/04/2020 25 (H)     BP Readings from Last 3 Encounters:   11/10/20 118/76   10/13/20 120/78   09/11/20 134/88          (goal 120/80)    All Future Testing planned in [facility]  Lab Frequency Next Occurrence   XR LUMBAR SPINE (MIN 4 VIEWS) Once 03/05/2020   PSA, Diagnostic Once 07/10/2021   Cologuard (For External Results Only) Once 09/11/2021   Vitamin D 25 Hydroxy Once 11/10/2021   PT functional capacity evaluation (FCE) Once 06/11/2021 Patient Active Problem List:     DM2 (diabetes mellitus, type 2) ([facility]     Diverticulosis     DM (diabetes mellitus) ([facility]     HTN (hypertension)     Elevated liver enzymes     Scrotal abscess     Abdominal abscess     Hyperplastic colon polyp     Lower abdominal pain     Diabetic foot ([facility]     [facility]     Coc[facility] ([facility]     Chest pain     BPH with obstruction/lower urinary tract symptoms

## 2020-12-18 ENCOUNTER — HOSPITAL ENCOUNTER (OUTPATIENT)
Dept: OCCUPATIONAL THERAPY | Age: 60
Setting detail: THERAPIES SERIES
Discharge: HOME | End: 2020-12-18
Payer: MEDICAID

## 2020-12-18 PROCEDURE — 97750 PHYSICAL PERFORMANCE TEST: CPT

## 2020-12-18 NOTE — PROGRESS NOTES
Occupational [facility]  Rehabilitation Services   Occupational Therapy Functional Capacity Evaluation  Date: 20  Patient Name: Cristal Lee      MRN: [id number]  Account: [de-identified]   : 1960  (64 y.o.)  Gender: male     Patient referred for a Functional Capacity Evaluation that was completed; refer to report for findings.     [facility]  [address] [place] [address] [address]   [address], [address]  Phone: [phone]  Fax: [phone]

## 2020-12-21 RX ORDER — INSULIN GLARGINE 100 [IU]/ML
INJECTION, SOLUTION SUBCUTANEOUS
Qty: 5 PEN | Refills: 4 | Status: SHIPPED | OUTPATIENT
Start: 2020-12-21 | End: 2021-08-19 | Stop reason: SDUPTHER

## 2020-12-21 NOTE — TELEPHONE ENCOUNTER
lantus pending for refill     Health Maintenance   Topic Date Due    Colon cancer screen colonoscopy  06/11/2017    Hepatitis B vaccine (1 of 3 - Risk 3-dose series) 01/16/2021 (Originally 10/6/1981)    Diabetic retinal exam  06/12/2021 (Originally 10/6/1972)    Diabetic foot exam  06/02/2021    Potassium monitoring  06/04/2021    Creatinine monitoring  06/04/2021    A1C test (Diabetic or Prediabetic)  06/08/2021    Diabetic microalbuminuria test  06/08/2021    Lipid screen  06/08/2021    DTaP/Tdap/Td vaccine (2 - Td) 11/24/2025    Flu vaccine  Completed    Shingles Vaccine  Completed    Pneumococcal 0-64 years Vaccine  Completed    Hepatitis C screen  Completed    HIV screen  Completed    Hepatitis A vaccine  Aged Out    Hib vaccine  Aged Out    Meningococcal (ACWY) vaccine  Aged Out             (applicable per patient's age: Cancer Screenings, Depression Screening, Fall Risk Screening, Immunizations)    Hemoglobin A1C (%)   Date Value   06/08/2020 6.7 (H)   01/16/2020 7.6   09/24/2019 11.7     Microalb/Crt.  Ratio (mcg/mg creat)   Date Value   06/08/2020 30 (H)     LDL Cholesterol (mg/dL)   Date Value   06/08/2020 83     AST (U/L)   Date Value   05/20/2020 12     ALT (U/L)   Date Value   05/20/2020 11     BUN (mg/dL)   Date Value   06/04/2020 25 (H)      (goal A1C is < 7)   (goal LDL is <100) need 30-50% reduction from baseline     BP Readings from Last 3 Encounters:   11/10/20 118/76   10/13/20 120/78   09/11/20 134/88    (goal /80)      All Future Testing planned in [facility]:  Lab Frequency Next Occurrence   XR LUMBAR SPINE (MIN 4 VIEWS) Once 03/05/2020   PSA, Diagnostic Once 07/10/2021   Cologuard (For External Results Only) Once 09/11/2021   Vitamin D 25 Hydroxy Once 11/10/2021   PT functional capacity evaluation (FCE) Once 06/11/2021       Next Visit Date:  Future Appointments   Date Time Provider Maureen Rodas   12/22/2020  9:00 AM Scott Martin, PTA [facility] Patient Active Problem List:     DM2 (diabetes mellitus, type 2) ([facility]     Diverticulosis     DM (diabetes mellitus) ([facility]     HTN (hypertension)     Elevated liver enzymes     Scrotal abscess     Abdominal abscess     Hyperplastic colon polyp     Lower abdominal pain     Diabetic foot ([facility]     [facility]     Cocaine abuse ([facility]     Chest pain     BPH with obstruction/lower urinary tract symptoms

## 2020-12-22 ENCOUNTER — HOSPITAL ENCOUNTER (OUTPATIENT)
Dept: PHYSICAL THERAPY | Age: 60
Setting detail: THERAPIES SERIES
Discharge: HOME | End: 2020-12-22
Payer: MEDICAID

## 2020-12-22 PROCEDURE — 97110 THERAPEUTIC EXERCISES: CPT

## 2020-12-22 RX ORDER — GABAPENTIN 800 MG/1
TABLET ORAL
Qty: 90 TABLET | Refills: 0 | Status: SHIPPED | OUTPATIENT
Start: 2020-12-22 | End: 2021-01-21

## 2020-12-22 NOTE — TELEPHONE ENCOUNTER
Gabapentin pending for refill     Health Maintenance   Topic Date Due    Colon cancer screen colonoscopy  06/11/2017    Hepatitis B vaccine (1 of 3 - Risk 3-dose series) 01/16/2021 (Originally 10/6/1981)    Diabetic retinal exam  06/12/2021 (Originally 10/6/1972)    Diabetic foot exam  06/02/2021    Potassium monitoring  06/04/2021    Creatinine monitoring  06/04/2021    A1C test (Diabetic or Prediabetic)  06/08/2021    Diabetic microalbuminuria test  06/08/2021    Lipid screen  06/08/2021    DTaP/Tdap/Td vaccine (2 - Td) 11/24/2025    Flu vaccine  Completed    Shingles Vaccine  Completed    Pneumococcal 0-64 years Vaccine  Completed    Hepatitis C screen  Completed    HIV screen  Completed    Hepatitis A vaccine  Aged Out    Hib vaccine  Aged Out    Meningococcal (ACWY) vaccine  Aged Out             (applicable per patient's age: Cancer Screenings, Depression Screening, Fall Risk Screening, Immunizations)    Hemoglobin A1C (%)   Date Value   06/08/2020 6.7 (H)   01/16/2020 7.6   09/24/2019 11.7     Microalb/Crt.  Ratio (mcg/mg creat)   Date Value   06/08/2020 30 (H)     LDL Cholesterol (mg/dL)   Date Value   06/08/2020 83     AST (U/L)   Date Value   05/20/2020 12     ALT (U/L)   Date Value   05/20/2020 11     BUN (mg/dL)   Date Value   06/04/2020 25 (H)      (goal A1C is < 7)   (goal LDL is <100) need 30-50% reduction from baseline     BP Readings from Last 3 Encounters:   11/10/20 118/76   10/13/20 120/78   09/11/20 134/88    (goal /80)      All Future Testing planned in [facility]:  Lab Frequency Next Occurrence   XR LUMBAR SPINE (MIN 4 VIEWS) Once 03/05/2020   PSA, Diagnostic Once 07/10/2021   Cologuard (For External Results Only) Once 09/11/2021   Vitamin D 25 Hydroxy Once 11/10/2021   PT functional capacity evaluation (FCE) Once 06/11/2021       Next Visit Date:  Future Appointments   Date Time Provider Maureen Rodas   12/22/2020  9:00 AM Joe Rivera PTA [facility] Patient Active Problem List:     DM2 (diabetes mellitus, type 2) ([facility]     Diverticulosis     DM (diabetes mellitus) ([facility]     HTN (hypertension)     Elevated liver enzymes     Scrotal abscess     Abdominal abscess     Hyperplastic colon polyp     Lower abdominal pain     Diabetic foot ([facility]     Infected [facility] mesh ([email or web address]     Cocaine abuse ([email or web address]     Chest pain     BPH with obstruction/lower urinary tract symptoms

## 2020-12-22 NOTE — FLOWSHEET NOTE
[] [facility]) - [facility] &  Therapy  [address]  [phone]  F: [phone] [] [address]  [employer] [address]   [address]  P: [phone]  F: [phone] [] [address] &  Therapy  [address]  P: [phone]  F: [phone] [] [address]  P: [phone]  F: [phone] [] [address]  [facility]   [address]   [place]: [phone]  F: [phone]      Physical Therapy Daily Treatment Note    Date:  2020  Patient Name:  Deandre Pulido    :  1960  MRN: [id number]  Physician: Salvador Poncefurt: Tylor Cox (limit 30 Rx per year)  Medical Diagnosis: Acute bilateral knee pain M25.561,M25.562                 Rehab Codes: M79.604, M79.605, M25.561, M25.562, M25.662, R26.2, M62.81  Onset date: 2019                     Next 's appt. : 3 months\    Visit# / total visits:      Cancels/No Shows: 2/0    Subjective:    Pain:  [x]? Yes  []? No  Location: B calves       Pain Rating: (0-10 scale) 4-5/10   Pain altered Tx:  []? Yes  [x]? No  Action:  Comments: Patient arrives noting he had a functional capacity examination last week that was quite difficult, but he made it through. Overall, feeling well this date with some pain in his feet. Went for cortizone shots in his feet yesterday.             Objective:  Modalities:   Precautions: Neuropathy-decreased sensation B feet  Exercises: B knees, LE  Exercise Reps/ Time Weight/ Level Comments   SciFit 6m  L4          Gastroc stretch 3x20\"  Wedge   HS stretch 3x20\"  Stool   Heel raises       HS curls  3lb    4 way hip 15x Blue    Step ups 20x ea 6\"    Lat step ups 20x ea 6\"    Step downs  15x ea 6\"    Marches   3lb    Leg press 2x10 30lb    Squats 15x  Increased reps 12/22   Lunges 10x ea     SLS 2x20\" ea  Added 12/17, increased hold time 12/22         Seated      HS stretch   Modified to standing    LAQ 20x 3lb     Marches HEP  TrA iso   HS curls HEP Plum          Supine       Held supine 12/22   Add sets       SAQ       SLR 20x 3lb     Bridge 2x10       4 way ankle  Lime           Prone      HS curls 20x 3lb    Hip ext 20x  3lb     Quad stretch HEP           Sidelying      Clamshell 20x [place]    Hip abduction 2x10 3lb          Other:      Specific Instructions for next treatment:  ankle AROM ex, prone ex; Progress LE ex, stretches, strengthening, OK for heat to decrease pain, muscle tightness        Treatment Charges: Mins Units   []  Modalities     [x]  Ther Exercise 50 3   []  Manual Therapy     []  Ther Activities     []  Aquatics     []  Vasocompression     []  Other     Total Treatment time 50 3       Assessment: [x] Progressing toward goals. Continued with program as noted with progression in hold time for single leg balance this date for further improvement of strength and stability in rhianna LE. Patient requires minimal UE assist throughout single leg balance, however no LOB noted. Able to increase reps for squats as well to further strengthen rhianna LE with fair tolerance. Patient noted increased fatigue throughout treatment this date, however able to complete program with minimal rest breaks. [] No change.      [] Other:  [x] Patient would continue to benefit from skilled physical therapy services in order to: decrease pain, increase strength and ROM, and increase functional mobility, including steps    STG: (to be met in 10 treatments)  1. ? Pain: B LE 4/10 average pain, 7/10 at worst -- Progress towards, \"7/10 at worst in the last week, average up to 6/10\"  2. ? ROM: L knee ext 5° -- MET 1°  3. ? Strength:B hips 4-/5 B hip 4+/5,  B knee flex 4/5 B knee flex 4/5, B knee ext 4-/5 R knee 5/5, L knee 4+/5, B DF 4+/5 DF 5/5 MET ALL STRENGTH  4. ? Function: LEFS 56% loss of LE function. MET 50% impairment    5. Patient to be independent with home exercise program as demonstrated by performance with correct form without cues. - MET     LTG: (to be met in 18 treatments)  1. ? Pain: B LE 2/10 average pain, 5/10 at worst  2. ? Strength:B hips 4/5, B knee flex 4+/5 , B knee ext 4/5   3. ? Function: LEFS 36% loss of LE function. 4. Pt report improved tolerance to steps                     Patient goals: \"get help\"    Pt. Education:  [x] Yes  [] No  [x] Reviewed Prior HEP/Ed  Method of Education: [x] Verbal  [x] Demo  [x] Written  Comprehension of Education:  [x] Verbalizes understanding. [] Demonstrates understanding. [] Needs review. [x] Demonstrates/verbalizes HEP/Ed previously given. HEP 10/22/20: SAQ, LAQ, bridges, HS stretch, SLR  HEP 11/10/20: 4 way ankle, gastroc stretch, SL hip abduction, SL clamshells     Plan: [x] Continue current frequency toward long and short term goals.     [x] Specific Instructions for subsequent treatments:  progress LE strengthening/stretching, rebounder     Frequency:  2 x/week for 18 visits      Time In:  900 am            Time Out: 955 am    Electronically signed by:  Shyanne Ag PTA

## 2020-12-29 ENCOUNTER — HOSPITAL ENCOUNTER (OUTPATIENT)
Dept: PHYSICAL THERAPY | Age: 60
Setting detail: THERAPIES SERIES
Discharge: HOME | End: 2020-12-29
Payer: MEDICAID

## 2020-12-29 PROCEDURE — 97110 THERAPEUTIC EXERCISES: CPT

## 2021-01-02 DIAGNOSIS — E11.9 TYPE 2 DIABETES MELLITUS WITHOUT COMPLICATION, WITH LONG-TERM CURRENT USE OF INSULIN (HCC): ICD-10-CM

## 2021-01-02 DIAGNOSIS — Z79.4 TYPE 2 DIABETES MELLITUS WITHOUT COMPLICATION, WITH LONG-TERM CURRENT USE OF INSULIN (HCC): ICD-10-CM

## 2021-01-04 RX ORDER — TRAZODONE HYDROCHLORIDE 50 MG/1
TABLET ORAL
Qty: 30 TABLET | Refills: 0 | Status: SHIPPED | OUTPATIENT
Start: 2021-01-04 | End: 2021-02-04

## 2021-01-04 NOTE — TELEPHONE ENCOUNTER
Pierre Request for pending medication. Last Visit Date:11/10/2020  Next Visit Date:  Future Appointments   Date Time Provider Maureen Adrianna   1/5/2021 10:00 AM Stein Leavens, [facility]   1/7/2021 10:00 AM Stein Leavens, PTA [facility]   1/12/2021 10:00 AM Stein Leavens, PTA [facility]   1/12/2021 10:45 AM Lorain Brunner, PT [facility] PT [facility]       Health Maintenance   Topic Date Due    Colon cancer screen colonoscopy  06/11/2017    Hepatitis B vaccine (1 of 3 - Risk 3-dose series) 01/16/2021 (Originally 10/6/1981)    Diabetic retinal exam  06/12/2021 (Originally 10/6/1972)    Diabetic foot exam  06/02/2021    Potassium monitoring  06/04/2021    Creatinine monitoring  06/04/2021    A1C test (Diabetic or Prediabetic)  06/08/2021    Diabetic microalbuminuria test  06/08/2021    Lipid screen  06/08/2021    DTaP/Tdap/Td vaccine (2 - Td) 11/24/2025    Flu vaccine  Completed    Shingles Vaccine  Completed    Pneumococcal 0-64 years Vaccine  Completed    Hepatitis C screen  Completed    HIV screen  Completed    Hepatitis A vaccine  Aged Out    Hib vaccine  Aged Out    Meningococcal (ACWY) vaccine  Aged Out       Hemoglobin A1C (%)   Date Value   06/08/2020 6.7 (H)   01/16/2020 7.6   09/24/2019 11.7             ( goal A1C is < 7)   Microalb/Crt.  Ratio (mcg/mg creat)   Date Value   06/08/2020 30 (H)     LDL Cholesterol (mg/dL)   Date Value   06/08/2020 83       (goal LDL is <100)   AST (U/L)   Date Value   05/20/2020 12     ALT (U/L)   Date Value   05/20/2020 11     BUN (mg/dL)   Date Value   06/04/2020 25 (H)     BP Readings from Last 3 Encounters:   11/10/20 118/76   10/13/20 120/78   09/11/20 134/88          (goal 120/80)    All Future Testing planned in [facility]  Lab Frequency Next Occurrence   XR LUMBAR SPINE (MIN 4 VIEWS) Once 03/05/2020   PSA, Diagnostic Once 07/10/2021   Cologuard (For External Results Only) Once 09/11/2021   Vitamin D 25 Hydroxy Once 11/10/2021   PT functional capacity evaluation (FCE) Once 06/11/2021       Next Visit Date:  Future Appointments   Date Time Provider Maureen Rodas   1/5/2021 10:00 AM Vivek Page, PTA [facility]   1/7/2021 10:00 AM Vivek Page, PTA [facility]   1/12/2021 10:00 AM Vivek Gottlieb, PTA [facility]   1/12/2021 10:45 AM Flor Nails PT [facility]         Patient Active Problem List:     DM2 (diabetes mellitus, type 2) ([address]     Diverticulosis     DM (diabetes mellitus) ([address]     HTN (hypertension)     Elevated liver enzymes     Scrotal abscess     Abdominal abscess     Hyperplastic colon polyp     Lower abdominal pain     Diabetic foot ([address]     Infected hernioplasty mesh ([facility])     Cocaine abuse ([address]     Chest pain     BPH with obstruction/lower urinary tract symptoms

## 2021-01-05 ENCOUNTER — HOSPITAL ENCOUNTER (OUTPATIENT)
Dept: PHYSICAL THERAPY | Age: 61
Setting detail: THERAPIES SERIES
Discharge: HOME | End: 2021-01-05
Payer: MEDICAID

## 2021-01-05 PROCEDURE — 97110 THERAPEUTIC EXERCISES: CPT

## 2021-01-05 NOTE — FLOWSHEET NOTE
[] [facility]) - [facility] &  Therapy  [address]  [phone]  F: [phone] [] [address]  [facility]   [address]  P: [phone]  F: [phone] [] [address] &  Therapy  [address] [facility]  P: [phone]  F: [phone] [] [address]  P: [phone]  F: [phone] [] [address]  [facility]   [address]   [place]: [phone]  F: [phone]      Physical Therapy Daily Treatment Note    Date:  2021  Patient Name:  Mary Robbins    :  1960  MRN: [id number]  Physician: Willie Mclainrt: Juliet Sommer (limit 30 Rx per year)  Medical Diagnosis: Acute bilateral knee pain M25.561,M25.562                 Rehab Codes: M79.604, M79.605, M25.561, M25.562, M25.662, R26.2, M62.81  Onset date: 2019                     Next 's appt. : 3 months     Visit# / total visits: 16/18     Cancels/No Shows: 2/0    Subjective:    Pain:  [x]? Yes  []? No  Location: B calves       Pain Rating: (0-10 scale) \"very stiff\"/10 -- rhianna knees   Pain altered Tx:  []? Yes  [x]? No  Action:  Comments: Patient arrives stating most symptoms in knees at arrival. Notes mostly stiffness, not pain.            Objective:  Modalities:   Precautions: Neuropathy-decreased sensation B feet  Exercises: B knees, LE  Exercise Reps/ Time Weight/ Level Comments   SciFit 6m  L4          Gastroc stretch 3x20\"  Wedge   HS stretch 3x20\"  Stool   Heel raises       HS curls  3lb    4 way hip 15x Blue    Step ups 20x ea 6\"    Lat step ups 20x ea 6\"    Step downs  15x ea 6\"    Marches   3lb    Leg press 2x10 30lb    Squats 15x     Lunges 15x ea     SLS 2x20\" ea  Min UE assist    Rebounder  10x ea 5\" blue ball Added 1/5               Seated      HS stretch   Modified to standing    LAQ 20x 3lb     Marches HEP  TrA iso   HS curls HEP Plum          Supine          SLR 20x 3lb     Bridge 2x10             Prone      HS curls 20x 3lb    Hip ext 20x  3lb           Sidelying      Clamshell 20x [place]    Hip abduction 2x10 3lb          Other:      Specific Instructions for next treatment:  ankle AROM ex, prone ex; Progress LE ex, stretches, strengthening, OK for heat to decrease pain, muscle tightness        Treatment Charges: Mins Units   []  Modalities     [x]  Ther Exercise 45 3   []  Manual Therapy     []  Ther Activities     []  Aquatics     []  Vasocompression     []  Other     Total Treatment time 45 3       Assessment: [x] Progressing toward goals. Pt was able to tolerate progressions with addition of SL rebounder this date for strengthening and stabilization of Jacob LE. Difficulty noted however, pt had no increased pain with exercise. Issued written HEP and theraband this date for standing exercises with good verbal understanding for use. [] No change. [] Other:  [x] Patient would continue to benefit from skilled physical therapy services in order to: decrease pain, increase strength and ROM, and increase functional mobility, including steps    STG: (to be met in 10 treatments)  1. ? Pain: B LE 4/10 average pain, 7/10 at worst -- Progress towards, \"7/10 at worst in the last week, average up to 6/10\"  2. ? ROM: L knee ext 5° -- MET 1°  3. ? Strength:B hips 4-/5 B hip 4+/5,  B knee flex 4/5 B knee flex 4/5, B knee ext 4-/5 R knee 5/5, L knee 4+/5, B DF 4+/5 DF 5/5 MET ALL STRENGTH  4. ? Function: LEFS 56% loss of LE function. MET 50% impairment    5. Patient to be independent with home exercise program as demonstrated by performance with correct form without cues.  - MET     LTG: (to be met in 18 treatments)  1. ? Pain: B LE 2/10 average pain, 5/10 at worst  2. ? Strength:B hips 4/5, B knee flex 4+/5 , B knee ext 4/5   3. ? Function: LEFS 36% loss of LE function. 4. Pt report improved tolerance to steps                     Patient goals: \"get help\"    Pt. Education:  [x] Yes  [] No  [] Reviewed Prior HEP/Ed  Method of Education: [x] Verbal  [x] Demo  [x] Written  Comprehension of Education:  [x] Verbalizes understanding. [] Demonstrates understanding. [] Needs review. [x] Demonstrates/verbalizes HEP/Ed previously given. HEP 10/22/20: SAQ, LAQ, bridges, HS stretch, SLR  HEP 11/10/20: 4 way ankle, gastroc stretch, SL hip abduction, SL clamshells  HEP 1/05/21: SLS, lunges, squat, 3 way hip with loop, blue theraband. Plan: [x] Continue current frequency toward long and short term goals.     [x] Specific Instructions for subsequent treatments:  progress LE strengthening/stretching     Frequency:  2 x/week for 18 visits      Time In:  1005 am            Time Out: 1052 am    Electronically signed by:  Davida Pineda PTA

## 2021-01-07 ENCOUNTER — HOSPITAL ENCOUNTER (OUTPATIENT)
Dept: PHYSICAL THERAPY | Age: 61
Setting detail: THERAPIES SERIES
Discharge: HOME | End: 2021-01-07
Payer: MEDICAID

## 2021-01-07 PROCEDURE — 97110 THERAPEUTIC EXERCISES: CPT

## 2021-01-07 NOTE — FLOWSHEET NOTE
pain, 5/10 at worst  2. ? Strength:B hips 4/5, B knee flex 4+/5 , B knee ext 4/5   3. ? Function: LEFS 36% loss of LE function. 4. Pt report improved tolerance to steps                     Patient goals: \"get help\"    Pt. Education:  [x] Yes  [] No  [x] Reviewed Prior HEP/Ed  Method of Education: [x] Verbal  [x] Demo  [] Written  Comprehension of Education:  [x] Verbalizes understanding. [] Demonstrates understanding. [] Needs review. [x] Demonstrates/verbalizes HEP/Ed previously given. HEP 10/22/20: SAQ, LAQ, bridges, HS stretch, SLR  HEP 11/10/20: 4 way ankle, gastroc stretch, SL hip abduction, SL clamshells  HEP 1/05/21: SLS, lunges, squat, 3 way hip with loop, blue theraband. Plan: [x] Continue current frequency toward long and short term goals.     [x] Specific Instructions for subsequent treatments:  Discharge following     Frequency:  2 x/week for 18 visits      Time In:  838 am            Time Out: 1050 am    Electronically signed by:  Nona Li PTA

## 2021-01-09 DIAGNOSIS — Z79.4 TYPE 2 DIABETES MELLITUS WITHOUT COMPLICATION, WITH LONG-TERM CURRENT USE OF INSULIN (HCC): Chronic | ICD-10-CM

## 2021-01-09 DIAGNOSIS — E11.9 TYPE 2 DIABETES MELLITUS WITHOUT COMPLICATION, WITH LONG-TERM CURRENT USE OF INSULIN (HCC): Chronic | ICD-10-CM

## 2021-01-09 DIAGNOSIS — I10 ESSENTIAL HYPERTENSION: ICD-10-CM

## 2021-01-10 RX ORDER — LISINOPRIL 40 MG/1
TABLET ORAL
Qty: 30 TABLET | Refills: 5 | Status: SHIPPED | OUTPATIENT
Start: 2021-01-10 | End: 2021-07-08

## 2021-01-10 NOTE — TELEPHONE ENCOUNTER
Please address the medication refill and close the encounter. If I can be of assistance, please route to the applicable pool. Thank you. Last visit: 11-  Last Med refill: 9-9-20  Does patient have enough medication for 72 hours: No:     Next Visit Date:  Future Appointments   Date Time Provider Maureen Rodas   1/12/2021 10:00 AM Nona Li, PTA [facility]   1/12/2021 10:45 AM Kimberly Flores, PT [facility] PT [facility] [address] Maintenance   Topic Date Due    Colon cancer screen colonoscopy  06/11/2017    Hepatitis B vaccine (1 of 3 - Risk 3-dose series) 01/16/2021 (Originally 10/6/1981)    Diabetic retinal exam  06/12/2021 (Originally 10/6/1972)    Diabetic foot exam  06/02/2021    Potassium monitoring  06/04/2021    Creatinine monitoring  06/04/2021    A1C test (Diabetic or Prediabetic)  06/08/2021    Diabetic microalbuminuria test  06/08/2021    Lipid screen  06/08/2021    DTaP/Tdap/Td vaccine (2 - Td) 11/24/2025    Flu vaccine  Completed    Shingles Vaccine  Completed    Pneumococcal 0-64 years Vaccine  Completed    Hepatitis C screen  Completed    HIV screen  Completed    Hepatitis A vaccine  Aged Out    Hib vaccine  Aged Out    Meningococcal (ACWY) vaccine  Aged Out       Hemoglobin A1C (%)   Date Value   06/08/2020 6.7 (H)   01/16/2020 7.6   09/24/2019 11.7             ( goal A1C is < 7)   Microalb/Crt.  Ratio (mcg/mg creat)   Date Value   06/08/2020 30 (H)     LDL Cholesterol (mg/dL)   Date Value   06/08/2020 83   05/21/2019 104       (goal LDL is <100)   AST (U/L)   Date Value   05/20/2020 12     ALT (U/L)   Date Value   05/20/2020 11     BUN (mg/dL)   Date Value   06/04/2020 25 (H)     BP Readings from Last 3 Encounters:   11/10/20 118/76   10/13/20 120/78   09/11/20 134/88          (goal 120/80)    All Future Testing planned in [facility]  Lab Frequency Next Occurrence   XR LUMBAR SPINE (MIN 4 VIEWS) Once 03/05/2020   PSA, Diagnostic Once 07/10/2021 Cologuard (For External Results Only) Once 09/11/2021   Vitamin D 25 Hydroxy Once 11/10/2021   PT functional capacity evaluation (FCE) Once 06/11/2021               Patient Active Problem List:     DM2 (diabetes mellitus, type 2) ([facility]     Diverticulosis     DM (diabetes mellitus) ([facility]     HTN (hypertension)     Elevated liver enzymes     Scrotal abscess     Abdominal abscess     Hyperplastic colon polyp     Lower abdominal pain     Diabetic foot ([facility]     Infected hernioplasty mesh ([facility]     Cocaine abuse ([facility]     Chest pain     BPH with obstruction/lower urinary tract symptoms

## 2021-01-11 DIAGNOSIS — K21.9 GASTROESOPHAGEAL REFLUX DISEASE, UNSPECIFIED WHETHER ESOPHAGITIS PRESENT: ICD-10-CM

## 2021-01-11 RX ORDER — OMEPRAZOLE 20 MG/1
CAPSULE, DELAYED RELEASE ORAL
Qty: 30 CAPSULE | Refills: 0 | Status: SHIPPED | OUTPATIENT
Start: 2021-01-11 | End: 2021-02-09

## 2021-01-11 NOTE — TELEPHONE ENCOUNTER
Last visit: 11/11/20  Last Med refill: 11/11/20  Does patient have enough medication for 72 hours: No:     Next Visit Date: No future appointments scheduled. Future Appointments   Date Time Provider Maureen Rodas   1/12/2021 10:00 AM Marnatasha Mercury, PTA [facility]   1/12/2021 10:45 AM [place], PT [facility] PT [facility] [address] Maintenance   Topic Date Due    Colon cancer screen colonoscopy  06/11/2017    Hepatitis B vaccine (1 of 3 - Risk 3-dose series) 01/16/2021 (Originally 10/6/1981)    Diabetic retinal exam  06/12/2021 (Originally 10/6/1972)    Diabetic foot exam  06/02/2021    Potassium monitoring  06/04/2021    Creatinine monitoring  06/04/2021    A1C test (Diabetic or Prediabetic)  06/08/2021    Diabetic microalbuminuria test  06/08/2021    Lipid screen  06/08/2021    DTaP/Tdap/Td vaccine (2 - Td) 11/24/2025    Flu vaccine  Completed    Shingles Vaccine  Completed    Pneumococcal 0-64 years Vaccine  Completed    Hepatitis C screen  Completed    HIV screen  Completed    Hepatitis A vaccine  Aged Out    Hib vaccine  Aged Out    Meningococcal (ACWY) vaccine  Aged Out       Hemoglobin A1C (%)   Date Value   06/08/2020 6.7 (H)   01/16/2020 7.6   09/24/2019 11.7             ( goal A1C is < 7)   Microalb/Crt.  Ratio (mcg/mg creat)   Date Value   06/08/2020 30 (H)     LDL Cholesterol (mg/dL)   Date Value   06/08/2020 83   05/21/2019 104       (goal LDL is <100)   AST (U/L)   Date Value   05/20/2020 12     ALT (U/L)   Date Value   05/20/2020 11     BUN (mg/dL)   Date Value   06/04/2020 25 (H)     BP Readings from Last 3 Encounters:   11/10/20 118/76   10/13/20 120/78   09/11/20 134/88          (goal 120/80)    All Future Testing planned in [facility]  Lab Frequency Next Occurrence   XR LUMBAR SPINE (MIN 4 VIEWS) Once 03/05/2020   PSA, Diagnostic Once 07/10/2021   Cologuard (For External Results Only) Once 09/11/2021   Vitamin D 25 Hydroxy Once 11/10/2021   PT functional capacity evaluation (FCE) Once 06/11/2021               Patient Active Problem List:     DM2 (diabetes mellitus, type 2) ([facility]     Diverticulosis     DM (diabetes mellitus) ([facility]     HTN (hypertension)     Elevated liver enzymes     Scrotal abscess     Abdominal abscess     Hyperplastic colon polyp     Lower abdominal pain     Diabetic foot ([facility]     [facility]     Cocaine abuse ([facility]     Chest pain     BPH with obstruction/lower urinary tract symptoms

## 2021-01-12 ENCOUNTER — HOSPITAL ENCOUNTER (OUTPATIENT)
Dept: PHYSICAL THERAPY | Age: 61
Setting detail: THERAPIES SERIES
Discharge: HOME | End: 2021-01-12
Payer: MEDICAID

## 2021-01-12 ENCOUNTER — APPOINTMENT (OUTPATIENT)
Dept: PHYSICAL THERAPY | Age: 61
End: 2021-01-12
Payer: MEDICAID

## 2021-01-12 PROCEDURE — 97110 THERAPEUTIC EXERCISES: CPT

## 2021-01-12 NOTE — FLOWSHEET NOTE
[x] [facility]) - [facility] [place] [facility]  [address]  [phone]  F: [phone] [] [address]  [facility]   [address]  P: [phone]  F: [phone] [] [address] &  Therapy  [address]  P: [phone]  F: [phone] [] [address]  P: [phone]  F: [phone] [] [address]  [facility]   [address]   [place]: [phone]  F: [phone]      Physical Therapy Daily Treatment Note    Date:  2021  Patient Name:  Maricruz Shanks    :  1960  MRN: [id number]  Physician: Ankit Johnson                                     Insurance: [employer] (limit 30 Rx per year)  Medical Diagnosis: Acute bilateral knee pain M25.561,M25.562                 Rehab Codes: M79.604, M79.605, M25.561, M25.562, M25.662, R26.2, M62.81  Onset date: 2019                     Next 's appt. : 3 months     Visit# / total visits:      Cancels/No Shows: 2/0    Subjective:    Pain:  [x]? Yes  []? No  Location: B calves       Pain Rating: (0-10 scale) \"soreness\"/10 -- rhianna knees   Pain altered Tx:  []? Yes  [x]? No  Action:  Comments: Patient arrives stating just mostly tired today and hasn't woken up yet. Overall states some pain in his feet, had injection in them yesterday, but really just \"soreness\" in knees.              Objective:  Modalities:   Precautions: Neuropathy-decreased sensation B feet  Exercises: B knees, LE  Exercise Reps/ Time Weight/ Level Comments   SciFit 5m  L4          Gastroc stretch 3x20\"  Wedge   HS stretch 3x20\"  Stool   Heel raises       HS curls  3lb    4 way hip 15x Blue    Step ups 20x ea 6\"    Lat step ups 20x ea 6\"    Step downs  20x ea 6\"    Marches   3lb    Leg press 2x10 30lb    Squats 15x MET     LTG: (to be met in 18 treatments) -- See discharge note this date. 1. ? Pain: B LE 2/10 average pain, 5/10 at worst  2. ? Strength:B hips 4/5, B knee flex 4+/5 , B knee ext 4/5   3. ? Function: LEFS 36% loss of LE function. 4. Pt report improved tolerance to steps                     Patient goals: \"get help\"    Pt. Education:  [x] Yes  [] No  [x] Reviewed Prior HEP/Ed  Method of Education: [x] Verbal  [x] Demo  [] Written  Comprehension of Education:  [x] Verbalizes understanding. [] Demonstrates understanding. [] Needs review. [x] Demonstrates/verbalizes HEP/Ed previously given. HEP 10/22/20: SAQ, LAQ, bridges, HS stretch, SLR  HEP 11/10/20: 4 way ankle, gastroc stretch, SL hip abduction, SL clamshells  HEP 1/05/21: SLS, lunges, squat, 3 way hip with loop, blue theraband. Plan: [] Continue current frequency toward long and short term goals. [x] Specific Instructions for subsequent treatments:  Discharge following this visit.      Frequency:  2 x/week for 18 visits      Time In:  1000 am            Time Out: 1058 am    Electronically signed by:  Mariela Francis PTA

## 2021-01-12 NOTE — DISCHARGE SUMMARY
[x] [facility]  [address]  [phone]  F: [phone]         Physical Therapy Discharge Note    Date: 2021      Patient: John Gastelum  : 1960  MRN: [id number]    Physician: Rico Martínez                                     Insurance: [employer] (limit 30 Rx per year)  Medical Diagnosis: Acute bilateral knee pain M25.561,M25.562                 Rehab Codes: M79.604, M79.605, M25.561, M25.562, M25.662, R26.2, M62.81  Onset date: 2019                     Next Dr's appt. : 3 months   Total visits attended: 25  Cancels/No shows: 3/0  Date of initial visit: 10/20/2020                Date of final visit: 2021      Subjective:  Pain:  [x] Yes  [] No  Location: B knees,  Pain Rating: (0-10 scale) 0/10  Pain altered Tx:  [x] No  [] Yes  Action:  Comments: Pt reports his knees have improved w/therapy, most times has no pain, but cont to get sharp pains with sit-stands and at night. These pains lasts 15-20 min, gets at least 1x per day. Pain at worst 8-9/10. Pt cont w/pain when gets up at night. Knees are just a little sore with going up steps, no pain in knees when going down steps as long as doesn't go too fast, but now his hips are bothering him w/ascending and descending steps. Objective:  Test Measurements:   STRENGTH  STRENGTH     LEFT RIGHT   HIP FLEX 4+ 4+        KNEE FLEX 4+ 5   KNEE EXT 4+ 5        ANKLE DF 5 5     Function: LEFS 46% loss of LE function    Assessment:  STG: (to be met in 10 treatments)  1. ? Pain: B LE 4/10 average pain, 7/10 at worst -- Progress towards  2. ? ROM: L knee ext 5° -- MET 1°  3. ? Strength:B hips 4-/5 B hip 4+/5,  B knee flex 4/5 B knee flex 4/5, B knee ext 4-/5 R knee 5/5, L knee 4+/5, B DF 4+/5 DF 5/5-MET   4. ? Function: LEFS 56% loss of LE function.  MET   5. Patient to be independent with home exercise program as demonstrated by performance with correct form without cues. - MET  LTG: (to be met in 18 treatments)  6. ? Pain: B LE 2/10 average pain, 5/10 at worst -Progress Towards   7. ? Strength: B hips 4/5, B knee flex 4+/5 , B knee ext 4/5-Met  8. ? Function: LEFS 36% loss of LE function-Progress Towards   9. Pt report improved tolerance to steps -Progress Towards    Treatment to Date:  [x] Therapeutic Exercise    [] Modalities:  [] Therapeutic Activity    [] Ultrasound  [] Electrical Stimulation  [] Gait Training     [] Massage       [] Lumbar/Cervical Traction  [] Neuromuscular Re-education [] Cold/hotpack [] Iontophoresis: 4 mg/mL  [x] Instruction in Home Exercise Program                     Dexamethasone Sodium  [] Manual Therapy             Phosphate 40-80 mAmin  [] Aquatic Therapy                   [] Vasocompression/    [] Other:             Game Ready    Discharge Status:     [] Pt recovered from conditions. Treatment goals were met. [x] Pt received maximum benefit. No further therapy indicated at this time. [x] Pt to continue exercise/home instructions independently. [] Therapy interrupted due to:    [] Pt has 2 or more no shows/cancels, is discontinued per our policy. [x] Pt has completed prescribed number of treatment sessions. [] Other:         Electronically signed by Toña Capone PT on 1/12/2021 at 10:36 AM        If you have any questions or concerns, please don't hesitate to call.   Thank you for your referral.

## 2021-01-17 DIAGNOSIS — R39.16 BENIGN PROSTATIC HYPERPLASIA (BPH) WITH STRAINING ON URINATION: ICD-10-CM

## 2021-01-17 DIAGNOSIS — N40.1 BENIGN PROSTATIC HYPERPLASIA (BPH) WITH STRAINING ON URINATION: ICD-10-CM

## 2021-01-18 NOTE — TELEPHONE ENCOUNTER
Please address the medication refill and close the encounter. If I can be of assistance, please route to the applicable pool. Thank you. Last visit: 11/10/2020  Last Med refill: 9/16/2020  Does patient have enough medication for 72 hours: No:     Next Visit Date:  No future appointments. Health Maintenance   Topic Date Due    Hepatitis B vaccine (1 of 3 - Risk 3-dose series) 10/06/1981    Colon cancer screen colonoscopy  06/11/2017    Diabetic retinal exam  06/12/2021 (Originally 10/6/1972)    Diabetic foot exam  06/02/2021    Potassium monitoring  06/04/2021    Creatinine monitoring  06/04/2021    A1C test (Diabetic or Prediabetic)  06/08/2021    Diabetic microalbuminuria test  06/08/2021    Lipid screen  06/08/2021    DTaP/Tdap/Td vaccine (2 - Td) 11/24/2025    Flu vaccine  Completed    Shingles Vaccine  Completed    Pneumococcal 0-64 years Vaccine  Completed    Hepatitis C screen  Completed    HIV screen  Completed    Hepatitis A vaccine  Aged Out    Hib vaccine  Aged Out    Meningococcal (ACWY) vaccine  Aged Out       Hemoglobin A1C (%)   Date Value   06/08/2020 6.7 (H)   01/16/2020 7.6   09/24/2019 11.7             ( goal A1C is < 7)   Microalb/Crt.  Ratio (mcg/mg creat)   Date Value   06/08/2020 30 (H)     LDL Cholesterol (mg/dL)   Date Value   06/08/2020 83   05/21/2019 104       (goal LDL is <100)   AST (U/L)   Date Value   05/20/2020 12     ALT (U/L)   Date Value   05/20/2020 11     BUN (mg/dL)   Date Value   06/04/2020 25 (H)     BP Readings from Last 3 Encounters:   11/10/20 118/76   10/13/20 120/78   09/11/20 134/88          (goal 120/80)    All Future Testing planned in [facility]  Lab Frequency Next Occurrence   XR LUMBAR SPINE (MIN 4 VIEWS) Once 03/05/2020   PSA, Diagnostic Once 07/10/2021   Cologuard (For External Results Only) Once 09/11/2021   Vitamin D 25 Hydroxy Once 11/10/2021   PT functional capacity evaluation (FCE) Once 06/11/2021               Patient Active Problem List:     DM2 (diabetes mellitus, type 2) ([facility]     Diverticulosis     DM (diabetes mellitus) ([facility]     HTN (hypertension)     Elevated liver enzymes     Scrotal abscess     Abdominal abscess     Hyperplastic colon polyp     Lower abdominal pain     Diabetic foot ([facility]     [facility]     Cocaine abuse ([facility]     Chest pain     BPH with obstruction/lower urinary tract symptoms

## 2021-01-19 RX ORDER — DOXAZOSIN MESYLATE 4 MG/1
TABLET ORAL
Qty: 30 TABLET | Refills: 3 | Status: SHIPPED | OUTPATIENT
Start: 2021-01-19 | End: 2021-05-20

## 2021-01-21 DIAGNOSIS — E11.42 DIABETIC POLYNEUROPATHY ASSOCIATED WITH TYPE 2 DIABETES MELLITUS (HCC): ICD-10-CM

## 2021-01-21 RX ORDER — GABAPENTIN 800 MG/1
TABLET ORAL
Qty: 90 TABLET | Refills: 0 | Status: SHIPPED | OUTPATIENT
Start: 2021-01-21 | End: 2021-02-16

## 2021-01-21 NOTE — TELEPHONE ENCOUNTER
Last visit: 11/10/20  Last Med refill:   Does patient have enough medication for 72 hours:     Next Visit Date:  No future appointments. Health Maintenance   Topic Date Due    Hepatitis B vaccine (1 of 3 - Risk 3-dose series) 10/06/1981    Colon cancer screen colonoscopy  06/11/2017    Diabetic retinal exam  06/12/2021 (Originally 10/6/1972)    Diabetic foot exam  06/02/2021    Potassium monitoring  06/04/2021    Creatinine monitoring  06/04/2021    A1C test (Diabetic or Prediabetic)  06/08/2021    Diabetic microalbuminuria test  06/08/2021    Lipid screen  06/08/2021    DTaP/Tdap/Td vaccine (2 - Td) 11/24/2025    Flu vaccine  Completed    Shingles Vaccine  Completed    Pneumococcal 0-64 years Vaccine  Completed    Hepatitis C screen  Completed    HIV screen  Completed    Hepatitis A vaccine  Aged Out    Hib vaccine  Aged Out    Meningococcal (ACWY) vaccine  Aged Out       Hemoglobin A1C (%)   Date Value   06/08/2020 6.7 (H)   01/16/2020 7.6   09/24/2019 11.7             ( goal A1C is < 7)   Microalb/Crt.  Ratio (mcg/mg creat)   Date Value   06/08/2020 30 (H)     LDL Cholesterol (mg/dL)   Date Value   06/08/2020 83   05/21/2019 104       (goal LDL is <100)   AST (U/L)   Date Value   05/20/2020 12     ALT (U/L)   Date Value   05/20/2020 11     BUN (mg/dL)   Date Value   06/04/2020 25 (H)     BP Readings from Last 3 Encounters:   11/10/20 118/76   10/13/20 120/78   09/11/20 134/88          (goal 120/80)    All Future Testing planned in [facility]  Lab Frequency Next Occurrence   XR LUMBAR SPINE (MIN 4 VIEWS) Once 03/05/2020   PSA, Diagnostic Once 07/10/2021   Cologuard (For External Results Only) Once 09/11/2021   Vitamin D 25 Hydroxy Once 11/10/2021   PT functional capacity evaluation (FCE) Once 06/11/2021               Patient Active Problem List:     DM2 (diabetes mellitus, type 2) ([facility]     Diverticulosis     DM (diabetes mellitus) ([facility]     HTN (hypertension)     Elevated liver enzymes Scrotal abscess     Abdominal abscess     Hyperplastic colon polyp     Lower abdominal pain     Diabetic foot (HCC)     Infected hernioplasty mesh (HCC)     Cocaine abuse ([facility]     Chest pain     BPH with obstruction/lower urinary tract symptoms

## 2021-02-02 NOTE — TELEPHONE ENCOUNTER
Last visit:  11/10/20  Last Med refill:  01/04/21  Does patient have enough medication for 72 hours: Yes    Next Visit Date:  No future appointments. Health Maintenance   Topic Date Due    Hepatitis B vaccine (1 of 3 - Risk 3-dose series) 10/06/1981    Colon cancer screen colonoscopy  06/11/2017    Diabetic retinal exam  06/12/2021 (Originally 10/6/1972)    Diabetic foot exam  06/02/2021    Potassium monitoring  06/04/2021    Creatinine monitoring  06/04/2021    A1C test (Diabetic or Prediabetic)  06/08/2021    Diabetic microalbuminuria test  06/08/2021    Lipid screen  06/08/2021    DTaP/Tdap/Td vaccine (2 - Td) 11/24/2025    Flu vaccine  Completed    Shingles Vaccine  Completed    Pneumococcal 0-64 years Vaccine  Completed    Hepatitis C screen  Completed    HIV screen  Completed    Hepatitis A vaccine  Aged Out    Hib vaccine  Aged Out    Meningococcal (ACWY) vaccine  Aged Out       Hemoglobin A1C (%)   Date Value   06/08/2020 6.7 (H)   01/16/2020 7.6   09/24/2019 11.7             ( goal A1C is < 7)   Microalb/Crt.  Ratio (mcg/mg creat)   Date Value   06/08/2020 30 (H)     LDL Cholesterol (mg/dL)   Date Value   06/08/2020 83   05/21/2019 104       (goal LDL is <100)   AST (U/L)   Date Value   05/20/2020 12     ALT (U/L)   Date Value   05/20/2020 11     BUN (mg/dL)   Date Value   06/04/2020 25 (H)     BP Readings from Last 3 Encounters:   11/10/20 118/76   10/13/20 120/78   09/11/20 134/88          (goal 120/80)    All Future Testing planned in [facility]  Lab Frequency Next Occurrence   XR LUMBAR SPINE (MIN 4 VIEWS) Once 03/05/2020   PSA, Diagnostic Once 07/10/2021   Cologuard (For External Results Only) Once 09/11/2021   Vitamin D 25 Hydroxy Once 11/10/2021   PT functional capacity evaluation (FCE) Once 06/11/2021               Patient Active Problem List:     DM2 (diabetes mellitus, type 2) ([facility]     Diverticulosis     DM (diabetes mellitus) ([facility]     HTN (hypertension)     Elevated liver enzymes     Scrotal abscess     Abdominal abscess     Hyperplastic colon polyp     Lower abdominal pain     Diabetic foot (HCC)     Infected hernioplasty mesh (HCC)     Cocaine abuse ([facility]     Chest pain     BPH with obstruction/lower urinary tract symptoms

## 2021-02-04 RX ORDER — TRAZODONE HYDROCHLORIDE 50 MG/1
TABLET ORAL
Qty: 30 TABLET | Refills: 0 | Status: SHIPPED | OUTPATIENT
Start: 2021-02-04 | End: 2021-03-04

## 2021-02-09 DIAGNOSIS — K21.9 GASTROESOPHAGEAL REFLUX DISEASE, UNSPECIFIED WHETHER ESOPHAGITIS PRESENT: ICD-10-CM

## 2021-02-11 RX ORDER — OMEPRAZOLE 20 MG/1
CAPSULE, DELAYED RELEASE ORAL
Qty: 30 CAPSULE | Refills: 3 | Status: SHIPPED | OUTPATIENT
Start: 2021-02-11 | End: 2021-06-02

## 2021-02-15 DIAGNOSIS — E11.42 DIABETIC POLYNEUROPATHY ASSOCIATED WITH TYPE 2 DIABETES MELLITUS (HCC): ICD-10-CM

## 2021-02-16 NOTE — TELEPHONE ENCOUNTER
enzymes     Scrotal abscess     Abdominal abscess     Hyperplastic colon polyp     Lower abdominal pain     Diabetic foot (HCC)     Infected hernioplasty mesh (HCC)     Cocaine abuse ([facility]     Chest pain     BPH with obstruction/lower urinary tract symptoms

## 2021-02-16 NOTE — TELEPHONE ENCOUNTER
Last visit: 11/10/20  Last Med refill: 12/17/20  Does patient have enough medication for 72 hours: Yes    Next Visit Date:  No future appointments. Health Maintenance   Topic Date Due    Hepatitis B vaccine (1 of 3 - Risk 3-dose series) 10/06/1981    Colon cancer screen colonoscopy  06/11/2017    Diabetic retinal exam  06/12/2021 (Originally 10/6/1972)    Diabetic foot exam  06/02/2021    Potassium monitoring  06/04/2021    Creatinine monitoring  06/04/2021    A1C test (Diabetic or Prediabetic)  06/08/2021    Diabetic microalbuminuria test  06/08/2021    Lipid screen  06/08/2021    DTaP/Tdap/Td vaccine (2 - Td) 11/24/2025    Flu vaccine  Completed    Shingles Vaccine  Completed    Pneumococcal 0-64 years Vaccine  Completed    Hepatitis C screen  Completed    HIV screen  Completed    Hepatitis A vaccine  Aged Out    Hib vaccine  Aged Out    Meningococcal (ACWY) vaccine  Aged Out       Hemoglobin A1C (%)   Date Value   06/08/2020 6.7 (H)   01/16/2020 7.6   09/24/2019 11.7             ( goal A1C is < 7)   Microalb/Crt.  Ratio (mcg/mg creat)   Date Value   06/08/2020 30 (H)     LDL Cholesterol (mg/dL)   Date Value   06/08/2020 83   05/21/2019 104       (goal LDL is <100)   AST (U/L)   Date Value   05/20/2020 12     ALT (U/L)   Date Value   05/20/2020 11     BUN (mg/dL)   Date Value   06/04/2020 25 (H)     BP Readings from Last 3 Encounters:   11/10/20 118/76   10/13/20 120/78   09/11/20 134/88          (goal 120/80)    All Future Testing planned in [facility]  Lab Frequency Next Occurrence   XR LUMBAR SPINE (MIN 4 VIEWS) Once 03/05/2020   PSA, Diagnostic Once 07/10/2021   Cologuard (For External Results Only) Once 09/11/2021   Vitamin D 25 Hydroxy Once 11/10/2021   PT functional capacity evaluation (FCE) Once 06/11/2021               Patient Active Problem List:     DM2 (diabetes mellitus, type 2) ([facility]     Diverticulosis     DM (diabetes mellitus) ([facility]     HTN (hypertension)     Elevated liver enzymes     Scrotal abscess     Abdominal abscess     Hyperplastic colon polyp     Lower abdominal pain     Diabetic foot (HCC)     Infected hernioplasty mesh (HCC)     Cocaine abuse ([facility]     Chest pain     BPH with obstruction/lower urinary tract symptoms

## 2021-02-17 RX ORDER — GABAPENTIN 800 MG/1
TABLET ORAL
Qty: 90 TABLET | Refills: 0 | Status: SHIPPED | OUTPATIENT
Start: 2021-02-17 | End: 2021-03-24

## 2021-02-17 RX ORDER — CALCIUM CITRATE/VITAMIN D3 200MG-6.25
TABLET ORAL
Qty: 50 STRIP | Refills: 2 | Status: SHIPPED | OUTPATIENT
Start: 2021-02-17 | End: 2021-08-19 | Stop reason: SDUPTHER

## 2021-03-04 RX ORDER — TRAZODONE HYDROCHLORIDE 50 MG/1
TABLET ORAL
Qty: 30 TABLET | Refills: 0 | Status: SHIPPED | OUTPATIENT
Start: 2021-03-04 | End: 2021-04-01

## 2021-03-04 NOTE — TELEPHONE ENCOUNTER
([facility]     HTN (hypertension)     Elevated liver enzymes     Scrotal abscess     Abdominal abscess     Hyperplastic colon polyp     Lower abdominal pain     Diabetic foot (HCC)     Infected hernioplasty mesh (HCC)     Cocaine abuse ([facility]     Chest pain     BPH with obstruction/lower urinary tract symptoms

## 2021-03-23 DIAGNOSIS — E11.42 DIABETIC POLYNEUROPATHY ASSOCIATED WITH TYPE 2 DIABETES MELLITUS (HCC): ICD-10-CM

## 2021-03-24 RX ORDER — GABAPENTIN 800 MG/1
TABLET ORAL
Qty: 90 TABLET | Refills: 0 | Status: SHIPPED | OUTPATIENT
Start: 2021-03-24 | End: 2021-04-19

## 2021-03-24 NOTE — TELEPHONE ENCOUNTER
Escribe Request for pending medication. Last Visit Date: 11/10/2020  Next Visit Date:  No future appointments. Health Maintenance   Topic Date Due    COVID-19 Vaccine (1) Never done    Hepatitis B vaccine (1 of 3 - Risk 3-dose series) Never done    Colon cancer screen colonoscopy  06/11/2017    Diabetic retinal exam  06/12/2021 (Originally 10/6/1970)    Diabetic foot exam  06/02/2021    Potassium monitoring  06/04/2021    Creatinine monitoring  06/04/2021    A1C test (Diabetic or Prediabetic)  06/08/2021    Diabetic microalbuminuria test  06/08/2021    Lipid screen  06/08/2021    DTaP/Tdap/Td vaccine (2 - Td) 11/24/2025    Flu vaccine  Completed    Shingles Vaccine  Completed    Pneumococcal 0-64 years Vaccine  Completed    Hepatitis C screen  Completed    HIV screen  Completed    Hepatitis A vaccine  Aged Out    Hib vaccine  Aged Out    Meningococcal (ACWY) vaccine  Aged Out       Hemoglobin A1C (%)   Date Value   06/08/2020 6.7 (H)   01/16/2020 7.6   09/24/2019 11.7             ( goal A1C is < 7)   Microalb/Crt. Ratio (mcg/mg creat)   Date Value   06/08/2020 30 (H)     LDL Cholesterol (mg/dL)   Date Value   06/08/2020 83       (goal LDL is <100)   AST (U/L)   Date Value   05/20/2020 12     ALT (U/L)   Date Value   05/20/2020 11     BUN (mg/dL)   Date Value   06/04/2020 25 (H)     BP Readings from Last 3 Encounters:   11/10/20 118/76   10/13/20 120/78   09/11/20 134/88          (goal 120/80)    All Future Testing planned in [facility]  Lab Frequency Next Occurrence   PSA, Diagnostic Once 07/10/2021   Cologuard (For External Results Only) Once 09/11/2021   Vitamin D 25 Hydroxy Once 11/10/2021   PT functional capacity evaluation (FCE) Once 06/11/2021       Next Visit Date:  No future appointments.       Patient Active Problem List:     DM2 (diabetes mellitus, type 2) ([facility]     Diverticulosis     DM (diabetes mellitus) ([facility] [address]     HTN (hypertension)     Elevated liver enzymes     Scrotal abscess Abdominal abscess     Hyperplastic colon polyp     Lower abdominal pain     Diabetic foot (HCC)     Infected hernioplasty mesh (HCC)     Cocaine abuse ([facility] [address]     Chest pain     BPH with obstruction/lower urinary tract symptoms

## 2021-04-01 RX ORDER — TRAZODONE HYDROCHLORIDE 50 MG/1
TABLET ORAL
Qty: 30 TABLET | Refills: 0 | Status: SHIPPED | OUTPATIENT
Start: 2021-04-01 | End: 2021-05-06

## 2021-04-01 NOTE — TELEPHONE ENCOUNTER
Trazodone pending for refill     Health Maintenance   Topic Date Due    COVID-19 Vaccine (1) Never done    Hepatitis B vaccine (1 of 3 - Risk 3-dose series) Never done    Colon cancer screen colonoscopy  06/11/2017    Diabetic retinal exam  06/12/2021 (Originally 10/6/1970)    Diabetic foot exam  06/02/2021    Potassium monitoring  06/04/2021    Creatinine monitoring  06/04/2021    A1C test (Diabetic or Prediabetic)  06/08/2021    Diabetic microalbuminuria test  06/08/2021    Lipid screen  06/08/2021    DTaP/Tdap/Td vaccine (2 - Td) 11/24/2025    Flu vaccine  Completed    Shingles Vaccine  Completed    Pneumococcal 0-64 years Vaccine  Completed    Hepatitis C screen  Completed    HIV screen  Completed    Hepatitis A vaccine  Aged Out    Hib vaccine  Aged Out    Meningococcal (ACWY) vaccine  Aged Out             (applicable per patient's age: Cancer Screenings, Depression Screening, Fall Risk Screening, Immunizations)    Hemoglobin A1C (%)   Date Value   06/08/2020 6.7 (H)   01/16/2020 7.6   09/24/2019 11.7     Microalb/Crt. Ratio (mcg/mg creat)   Date Value   06/08/2020 30 (H)     LDL Cholesterol (mg/dL)   Date Value   06/08/2020 83     AST (U/L)   Date Value   05/20/2020 12     ALT (U/L)   Date Value   05/20/2020 11     BUN (mg/dL)   Date Value   06/04/2020 25 (H)      (goal A1C is < 7)   (goal LDL is <100) need 30-50% reduction from baseline     BP Readings from Last 3 Encounters:   11/10/20 118/76   10/13/20 120/78   09/11/20 134/88    (goal /80)      All Future Testing planned in [facility]:  Lab Frequency Next Occurrence   PSA, Diagnostic Once 07/10/2021   Cologuard (For External Results Only) Once 09/11/2021   Vitamin D 25 Hydroxy Once 11/10/2021   PT functional capacity evaluation (FCE) Once 06/11/2021       Next Visit Date:  No future appointments.          Patient Active Problem List:     DM2 (diabetes mellitus, type 2) ([facility] [address]     Diverticulosis     DM (diabetes mellitus) ([facility] [address]     HTN (hypertension)     Elevated liver enzymes     Scrotal abscess     Abdominal abscess     Hyperplastic colon polyp     Lower abdominal pain     Diabetic foot (HCC)     Infected hernioplasty mesh (HCC)     Cocaine abuse (HCC)     Chest pain     BPH with obstruction/lower urinary tract symptoms

## 2021-04-06 ENCOUNTER — IMMUNIZATION (OUTPATIENT)
Dept: PRIMARY CARE CLINIC | Age: 61
End: 2021-04-06
Payer: MEDICAID

## 2021-04-06 PROCEDURE — 0001A COVID-19, PFIZER VACCINE 30MCG/0.3ML DOSE: CPT | Performed by: INTERNAL MEDICINE

## 2021-04-06 PROCEDURE — 91300 COVID-19, PFIZER VACCINE 30MCG/0.3ML DOSE: CPT | Performed by: INTERNAL MEDICINE

## 2021-04-18 DIAGNOSIS — E11.42 DIABETIC POLYNEUROPATHY ASSOCIATED WITH TYPE 2 DIABETES MELLITUS (HCC): ICD-10-CM

## 2021-04-19 RX ORDER — GABAPENTIN 800 MG/1
TABLET ORAL
Qty: 90 TABLET | Refills: 0 | Status: SHIPPED | OUTPATIENT
Start: 2021-04-19 | End: 2021-05-19

## 2021-04-19 NOTE — TELEPHONE ENCOUNTER
Last visit: 11/10/20  Last Med refill:   Does patient have enough medication for 72 hours:     Next Visit Date:  Future Appointments   Date Time Provider Maureen Rodas   4/26/2021  3:20 PM [facility] [address] Maintenance   Topic Date Due    Hepatitis B vaccine (1 of 3 - Risk 3-dose series) Never done    Colon cancer screen colonoscopy  06/11/2017    Diabetic retinal exam  06/12/2021 (Originally 10/6/1970)    COVID-19 Vaccine (2 - Pfizer 2-dose series) 04/27/2021    Diabetic foot exam  06/02/2021    Potassium monitoring  06/04/2021    Creatinine monitoring  06/04/2021    A1C test (Diabetic or Prediabetic)  06/08/2021    Diabetic microalbuminuria test  06/08/2021    Lipid screen  06/08/2021    DTaP/Tdap/Td vaccine (2 - Td) 11/24/2025    Flu vaccine  Completed    Shingles Vaccine  Completed    Pneumococcal 0-64 years Vaccine  Completed    Hepatitis C screen  Completed    HIV screen  Completed    Hepatitis A vaccine  Aged Out    Hib vaccine  Aged Out    Meningococcal (ACWY) vaccine  Aged Out       Hemoglobin A1C (%)   Date Value   06/08/2020 6.7 (H)   01/16/2020 7.6   09/24/2019 11.7             ( goal A1C is < 7)   Microalb/Crt.  Ratio (mcg/mg creat)   Date Value   06/08/2020 30 (H)     LDL Cholesterol (mg/dL)   Date Value   06/08/2020 83   05/21/2019 104       (goal LDL is <100)   AST (U/L)   Date Value   05/20/2020 12     ALT (U/L)   Date Value   05/20/2020 11     BUN (mg/dL)   Date Value   06/04/2020 25 (H)     BP Readings from Last 3 Encounters:   11/10/20 118/76   10/13/20 120/78   09/11/20 134/88          (goal 120/80)    All Future Testing planned in [facility]  Lab Frequency Next Occurrence   PSA, Diagnostic Once 07/10/2021   Cologuard (For External Results Only) Once 09/11/2021   Vitamin D 25 Hydroxy Once 11/10/2021   PT functional capacity evaluation (FCE) Once 06/11/2021               Patient Active Problem List:     DM2 (diabetes mellitus, type 2) ([facility] [address]     Diverticulosis     DM (diabetes mellitus) ([facility]     HTN (hypertension)     Elevated liver enzymes     Scrotal abscess     Abdominal abscess     Hyperplastic colon polyp     Lower abdominal pain     Diabetic foot (HCC)     Infected hernioplasty mesh (HCC)     Cocaine abuse ([facility]     Chest pain     BPH with obstruction/lower urinary tract symptoms

## 2021-04-26 ENCOUNTER — IMMUNIZATION (OUTPATIENT)
Dept: PRIMARY CARE CLINIC | Age: 61
End: 2021-04-26
Payer: MEDICAID

## 2021-04-26 PROCEDURE — 0002A COVID-19, PFIZER VACCINE 30MCG/0.3ML DOSE: CPT | Performed by: INTERNAL MEDICINE

## 2021-04-26 PROCEDURE — 91300 COVID-19, PFIZER VACCINE 30MCG/0.3ML DOSE: CPT | Performed by: INTERNAL MEDICINE

## 2021-05-03 NOTE — TELEPHONE ENCOUNTER
E-scribe request for traZADone. Please review and e-scribe if applicable. Last Visit Date:  11/10/2020  Next Visit Date:  Visit date not found    Hemoglobin A1C (%)   Date Value   06/08/2020 6.7 (H)   01/16/2020 7.6   09/24/2019 11.7             ( goal A1C is < 7)   Microalb/Crt.  Ratio (mcg/mg creat)   Date Value   06/08/2020 30 (H)     LDL Cholesterol (mg/dL)   Date Value   06/08/2020 83       (goal LDL is <100)   AST (U/L)   Date Value   05/20/2020 12     ALT (U/L)   Date Value   05/20/2020 11     BUN (mg/dL)   Date Value   06/04/2020 25 (H)     BP Readings from Last 3 Encounters:   11/10/20 118/76   10/13/20 120/78   09/11/20 134/88          (goal 120/80)        Patient Active Problem List:     DM2 (diabetes mellitus, type 2) (HCC)     Diverticulosis     DM (diabetes mellitus) ([address]     HTN (hypertension)     Elevated liver enzymes     Scrotal abscess     Abdominal abscess     Hyperplastic colon polyp     Lower abdominal pain     Diabetic foot ([address]     Infected hernioplasty mesh (HCC)     Cocaine abuse ([address]     Chest pain     BPH with obstruction/lower urinary tract symptoms      ----Shandra Hope

## 2021-05-06 RX ORDER — TRAZODONE HYDROCHLORIDE 50 MG/1
TABLET ORAL
Qty: 30 TABLET | Refills: 0 | Status: SHIPPED | OUTPATIENT
Start: 2021-05-06 | End: 2021-06-02

## 2021-05-19 DIAGNOSIS — E11.42 DIABETIC POLYNEUROPATHY ASSOCIATED WITH TYPE 2 DIABETES MELLITUS (HCC): ICD-10-CM

## 2021-05-19 RX ORDER — GABAPENTIN 800 MG/1
TABLET ORAL
Qty: 90 TABLET | Refills: 0 | Status: SHIPPED | OUTPATIENT
Start: 2021-05-19 | End: 2021-06-18

## 2021-05-19 NOTE — TELEPHONE ENCOUNTER
Gabapentin pending for refill     Health Maintenance   Topic Date Due    Hepatitis B vaccine (1 of 3 - Risk 3-dose series) Never done    Colon cancer screen colonoscopy  06/11/2017    Diabetic foot exam  06/02/2021    Diabetic retinal exam  06/12/2021 (Originally 10/6/1970)    Potassium monitoring  06/04/2021    Creatinine monitoring  06/04/2021    A1C test (Diabetic or Prediabetic)  06/08/2021    Diabetic microalbuminuria test  06/08/2021    Lipid screen  06/08/2021    DTaP/Tdap/Td vaccine (2 - Td) 11/24/2025    Pneumococcal 0-64 years Vaccine (2 of 2) 10/06/2027    Flu vaccine  Completed    Shingles Vaccine  Completed    COVID-19 Vaccine  Completed    Hepatitis C screen  Completed    HIV screen  Completed    Hepatitis A vaccine  Aged Out    Hib vaccine  Aged Out    Meningococcal (ACWY) vaccine  Aged Out             (applicable per patient's age: Cancer Screenings, Depression Screening, Fall Risk Screening, Immunizations)    Hemoglobin A1C (%)   Date Value   06/08/2020 6.7 (H)   01/16/2020 7.6   09/24/2019 11.7     Microalb/Crt. Ratio (mcg/mg creat)   Date Value   06/08/2020 30 (H)     LDL Cholesterol (mg/dL)   Date Value   06/08/2020 83     AST (U/L)   Date Value   05/20/2020 12     ALT (U/L)   Date Value   05/20/2020 11     BUN (mg/dL)   Date Value   06/04/2020 25 (H)      (goal A1C is < 7)   (goal LDL is <100) need 30-50% reduction from baseline     BP Readings from Last 3 Encounters:   11/10/20 118/76   10/13/20 120/78   09/11/20 134/88    (goal /80)      All Future Testing planned in [facility]:  Lab Frequency Next Occurrence   PSA, Diagnostic Once 07/10/2021   Cologuard (For External Results Only) Once 09/11/2021   Vitamin D 25 Hydroxy Once 11/10/2021   PT functional capacity evaluation (FCE) Once 06/11/2021       Next Visit Date:  No future appointments.          Patient Active Problem List:     DM2 (diabetes mellitus, type 2) ([facility] [address]     Diverticulosis     DM (diabetes mellitus) ([facility] [address] HTN (hypertension)     Elevated liver enzymes     Scrotal abscess     Abdominal abscess     Hyperplastic colon polyp     Lower abdominal pain     Diabetic foot (HCC)     Infected hernioplasty mesh (HCC)     Cocaine abuse (HCC)     Chest pain     BPH with obstruction/lower urinary tract symptoms

## 2021-05-20 DIAGNOSIS — R39.16 BENIGN PROSTATIC HYPERPLASIA (BPH) WITH STRAINING ON URINATION: ICD-10-CM

## 2021-05-20 DIAGNOSIS — N40.1 BENIGN PROSTATIC HYPERPLASIA (BPH) WITH STRAINING ON URINATION: ICD-10-CM

## 2021-05-20 RX ORDER — DOXAZOSIN MESYLATE 4 MG/1
TABLET ORAL
Qty: 30 TABLET | Refills: 2 | Status: SHIPPED | OUTPATIENT
Start: 2021-05-20 | End: 2021-08-19 | Stop reason: SDUPTHER

## 2021-05-20 NOTE — TELEPHONE ENCOUNTER
E-scribe request for doxazosin. Please review and e-scribe if applicable. Last Visit Date: 11/10/2020  Next Visit Date:  Visit date not found    Hemoglobin A1C (%)   Date Value   06/08/2020 6.7 (H)   01/16/2020 7.6   09/24/2019 11.7             ( goal A1C is < 7)   Microalb/Crt.  Ratio (mcg/mg creat)   Date Value   06/08/2020 30 (H)     LDL Cholesterol (mg/dL)   Date Value   06/08/2020 83       (goal LDL is <100)   AST (U/L)   Date Value   05/20/2020 12     ALT (U/L)   Date Value   05/20/2020 11     BUN (mg/dL)   Date Value   06/04/2020 25 (H)     BP Readings from Last 3 Encounters:   11/10/20 118/76   10/13/20 120/78   09/11/20 134/88          (goal 120/80)        Patient Active Problem List:     DM2 (diabetes mellitus, type 2) (HCC)     Diverticulosis     DM (diabetes mellitus) ([address]     HTN (hypertension)     Elevated liver enzymes     Scrotal abscess     Abdominal abscess     Hyperplastic colon polyp     Lower abdominal pain     Diabetic foot ([address]     Infected hernioplasty mesh (HCC)     Cocaine abuse ([address]     Chest pain     BPH with obstruction/lower urinary tract symptoms

## 2021-06-01 DIAGNOSIS — K21.9 GASTROESOPHAGEAL REFLUX DISEASE, UNSPECIFIED WHETHER ESOPHAGITIS PRESENT: ICD-10-CM

## 2021-06-02 NOTE — TELEPHONE ENCOUNTER
Please address the medication refill and close the encounter. If I can be of assistance, please route to the applicable pool. Thank you. Last visit: 11/10/2020  Last Med refill: 2/11/21 omeprazole    5/6/21 trazodone  Does patient have enough medication for 72 hours: No:     Next Visit Date:  No future appointments. Health Maintenance   Topic Date Due    Hepatitis B vaccine (1 of 3 - Risk 3-dose series) Never done    Colon cancer screen colonoscopy  06/11/2017    Diabetic foot exam  06/02/2021    Potassium monitoring  06/04/2021    Creatinine monitoring  06/04/2021    A1C test (Diabetic or Prediabetic)  06/08/2021    Diabetic microalbuminuria test  06/08/2021    Lipid screen  06/08/2021    Diabetic retinal exam  06/12/2021 (Originally 10/6/1970)    DTaP/Tdap/Td vaccine (2 - Td or Tdap) 11/24/2025    Pneumococcal 0-64 years Vaccine (2 of 2) 10/06/2027    Flu vaccine  Completed    Shingles Vaccine  Completed    COVID-19 Vaccine  Completed    Hepatitis C screen  Completed    HIV screen  Completed    Hepatitis A vaccine  Aged Out    Hib vaccine  Aged Out    Meningococcal (ACWY) vaccine  Aged Out       Hemoglobin A1C (%)   Date Value   06/08/2020 6.7 (H)   01/16/2020 7.6   09/24/2019 11.7             ( goal A1C is < 7)   Microalb/Crt.  Ratio (mcg/mg creat)   Date Value   06/08/2020 30 (H)     LDL Cholesterol (mg/dL)   Date Value   06/08/2020 83   05/21/2019 104       (goal LDL is <100)   AST (U/L)   Date Value   05/20/2020 12     ALT (U/L)   Date Value   05/20/2020 11     BUN (mg/dL)   Date Value   06/04/2020 25 (H)     BP Readings from Last 3 Encounters:   11/10/20 118/76   10/13/20 120/78   09/11/20 134/88          (goal 120/80)    All Future Testing planned in [facility]  Lab Frequency Next Occurrence   PSA, Diagnostic Once 07/10/2021   Cologuard (For External Results Only) Once 09/11/2021   Vitamin D 25 Hydroxy Once 11/10/2021   PT functional capacity evaluation (FCE) Once 06/11/2021 Patient Active Problem List:     DM2 (diabetes mellitus, type 2) ([facility] [address]     Diverticulosis     DM (diabetes mellitus) ([facility] [address]     HTN (hypertension)     Elevated liver enzymes     Scrotal abscess     Abdominal abscess     Hyperplastic colon polyp     Lower abdominal pain     Diabetic foot ([facility] [address]     In[facility] ([facility]     Cocaine abuse ([facility]     Chest pain     BPH with obstruction/lower urinary tract symptoms

## 2021-06-03 RX ORDER — OMEPRAZOLE 20 MG/1
CAPSULE, DELAYED RELEASE ORAL
Qty: 30 CAPSULE | Refills: 5 | Status: SHIPPED | OUTPATIENT
Start: 2021-06-03 | End: 2021-08-19 | Stop reason: SDUPTHER

## 2021-06-03 RX ORDER — TRAZODONE HYDROCHLORIDE 50 MG/1
TABLET ORAL
Qty: 30 TABLET | Refills: 5 | Status: SHIPPED | OUTPATIENT
Start: 2021-06-03 | End: 2021-08-19 | Stop reason: SDUPTHER

## 2021-06-17 DIAGNOSIS — E11.42 DIABETIC POLYNEUROPATHY ASSOCIATED WITH TYPE 2 DIABETES MELLITUS (HCC): ICD-10-CM

## 2021-06-18 RX ORDER — GABAPENTIN 800 MG/1
TABLET ORAL
Qty: 90 TABLET | Refills: 0 | Status: SHIPPED | OUTPATIENT
Start: 2021-06-18 | End: 2021-08-19 | Stop reason: SDUPTHER

## 2021-06-18 NOTE — TELEPHONE ENCOUNTER
Abdominal abscess     Hyperplastic colon polyp     Lower abdominal pain     Diabetic foot (HCC)     Infected hernioplasty mesh (HCC)     Cocaine abuse ([facility] [address]     Chest pain     BPH with obstruction/lower urinary tract symptoms

## 2021-07-06 DIAGNOSIS — I10 ESSENTIAL HYPERTENSION: ICD-10-CM

## 2021-07-06 DIAGNOSIS — Z79.4 TYPE 2 DIABETES MELLITUS WITHOUT COMPLICATION, WITH LONG-TERM CURRENT USE OF INSULIN (HCC): Chronic | ICD-10-CM

## 2021-07-06 DIAGNOSIS — E11.9 TYPE 2 DIABETES MELLITUS WITHOUT COMPLICATION, WITH LONG-TERM CURRENT USE OF INSULIN (HCC): Chronic | ICD-10-CM

## 2021-07-07 NOTE — TELEPHONE ENCOUNTER
E-scribe request for lisinopril. Please review and e-scribe if applicable. Last Visit Date: 11/10/2020  Next Visit Date:  Visit date not found    Hemoglobin A1C (%)   Date Value   06/08/2020 6.7 (H)   01/16/2020 7.6   09/24/2019 11.7             ( goal A1C is < 7)   Microalb/Crt.  Ratio (mcg/mg creat)   Date Value   06/08/2020 30 (H)     LDL Cholesterol (mg/dL)   Date Value   06/08/2020 83       (goal LDL is <100)   AST (U/L)   Date Value   05/20/2020 12     ALT (U/L)   Date Value   05/20/2020 11     BUN (mg/dL)   Date Value   06/04/2020 25 (H)     BP Readings from Last 3 Encounters:   11/10/20 118/76   10/13/20 120/78   09/11/20 134/88          (goal 120/80)        Patient Active Problem List:     DM2 (diabetes mellitus, type 2) (HCC)     Diverticulosis     DM (diabetes mellitus) ([address]     HTN (hypertension)     Elevated liver enzymes     Scrotal abscess     Abdominal abscess     Hyperplastic colon polyp     Lower abdominal pain     Diabetic foot ([address]     Infected hernioplasty mesh (HCC)     Cocaine abuse ([address]     Chest pain     BPH with obstruction/lower urinary tract symptoms

## 2021-07-08 RX ORDER — LISINOPRIL 40 MG/1
TABLET ORAL
Qty: 30 TABLET | Refills: 4 | Status: SHIPPED | OUTPATIENT
Start: 2021-07-08 | End: 2021-07-09

## 2021-08-05 NOTE — TELEPHONE ENCOUNTER
PA request for Alfuzosin     PA processed and submitted to pt insurance, waiting for response in regards to coverage no

## 2021-08-19 ENCOUNTER — OFFICE VISIT (OUTPATIENT)
Dept: FAMILY MEDICINE CLINIC | Age: 61
End: 2021-08-19
Payer: MEDICAID

## 2021-08-19 VITALS
SYSTOLIC BLOOD PRESSURE: 136 MMHG | BODY MASS INDEX: 23.63 KG/M2 | DIASTOLIC BLOOD PRESSURE: 92 MMHG | HEART RATE: 80 BPM | WEIGHT: 160 LBS

## 2021-08-19 DIAGNOSIS — I73.9 PERIPHERAL ARTERIAL DISEASE (HCC): ICD-10-CM

## 2021-08-19 DIAGNOSIS — M67.80 CYST OF TENDON SHEATH: ICD-10-CM

## 2021-08-19 DIAGNOSIS — Z79.4 TYPE 2 DIABETES MELLITUS WITHOUT COMPLICATION, WITH LONG-TERM CURRENT USE OF INSULIN (HCC): Primary | ICD-10-CM

## 2021-08-19 DIAGNOSIS — Z13.220 SCREENING FOR HYPERLIPIDEMIA: ICD-10-CM

## 2021-08-19 DIAGNOSIS — K21.9 GASTROESOPHAGEAL REFLUX DISEASE, UNSPECIFIED WHETHER ESOPHAGITIS PRESENT: ICD-10-CM

## 2021-08-19 DIAGNOSIS — N40.1 BENIGN PROSTATIC HYPERPLASIA (BPH) WITH STRAINING ON URINATION: ICD-10-CM

## 2021-08-19 DIAGNOSIS — I10 ESSENTIAL HYPERTENSION: ICD-10-CM

## 2021-08-19 DIAGNOSIS — E11.42 DIABETIC POLYNEUROPATHY ASSOCIATED WITH TYPE 2 DIABETES MELLITUS (HCC): ICD-10-CM

## 2021-08-19 DIAGNOSIS — R39.16 BENIGN PROSTATIC HYPERPLASIA (BPH) WITH STRAINING ON URINATION: ICD-10-CM

## 2021-08-19 DIAGNOSIS — E11.9 TYPE 2 DIABETES MELLITUS WITHOUT COMPLICATION, WITH LONG-TERM CURRENT USE OF INSULIN (HCC): Primary | ICD-10-CM

## 2021-08-19 DIAGNOSIS — N52.39 POST-PROCEDURAL ERECTILE DYSFUNCTION, UNSPECIFIED TYPE: ICD-10-CM

## 2021-08-19 LAB — HBA1C MFR BLD: 8.1 %

## 2021-08-19 PROCEDURE — 83036 HEMOGLOBIN GLYCOSYLATED A1C: CPT

## 2021-08-19 PROCEDURE — 93922 UPR/L XTREMITY ART 2 LEVELS: CPT

## 2021-08-19 PROCEDURE — 99214 OFFICE O/P EST MOD 30 MIN: CPT

## 2021-08-19 PROCEDURE — 3052F HG A1C>EQUAL 8.0%<EQUAL 9.0%: CPT

## 2021-08-19 RX ORDER — FLUTICASONE PROPIONATE 50 MCG
1 SPRAY, SUSPENSION (ML) NASAL DAILY
Qty: 1 BOTTLE | Refills: 2 | Status: SHIPPED | OUTPATIENT
Start: 2021-08-19 | End: 2022-08-09 | Stop reason: SDUPTHER

## 2021-08-19 RX ORDER — DOXAZOSIN MESYLATE 4 MG/1
TABLET ORAL
Qty: 30 TABLET | Refills: 2 | Status: SHIPPED | OUTPATIENT
Start: 2021-08-19 | End: 2021-11-17

## 2021-08-19 RX ORDER — TRAZODONE HYDROCHLORIDE 50 MG/1
TABLET ORAL
Qty: 30 TABLET | Refills: 3 | Status: SHIPPED | OUTPATIENT
Start: 2021-08-19 | End: 2022-08-09 | Stop reason: SDUPTHER

## 2021-08-19 RX ORDER — OMEPRAZOLE 20 MG/1
CAPSULE, DELAYED RELEASE ORAL
Qty: 30 CAPSULE | Refills: 5 | Status: SHIPPED | OUTPATIENT
Start: 2021-08-19 | End: 2022-03-29 | Stop reason: SDUPTHER

## 2021-08-19 RX ORDER — CALCIUM CITRATE/VITAMIN D3 200MG-6.25
TABLET ORAL
Qty: 50 STRIP | Refills: 3 | Status: SHIPPED | OUTPATIENT
Start: 2021-08-19 | End: 2022-08-09 | Stop reason: SDUPTHER

## 2021-08-19 RX ORDER — TADALAFIL 10 MG/1
5 TABLET ORAL PRN
Qty: 30 TABLET | Refills: 0 | Status: SHIPPED | OUTPATIENT
Start: 2021-08-19 | End: 2022-05-03 | Stop reason: SDUPTHER

## 2021-08-19 RX ORDER — ALFUZOSIN HYDROCHLORIDE 10 MG/1
10 TABLET, EXTENDED RELEASE ORAL DAILY
Qty: 90 TABLET | Refills: 1 | Status: SHIPPED | OUTPATIENT
Start: 2021-08-19 | End: 2022-02-23

## 2021-08-19 RX ORDER — BLOOD PRESSURE TEST KIT
KIT MISCELLANEOUS
Qty: 120 EACH | Refills: 3 | Status: SHIPPED | OUTPATIENT
Start: 2021-08-19 | End: 2022-08-09 | Stop reason: SDUPTHER

## 2021-08-19 RX ORDER — GABAPENTIN 800 MG/1
TABLET ORAL
Qty: 90 TABLET | Refills: 0 | Status: SHIPPED | OUTPATIENT
Start: 2021-08-19 | End: 2021-08-31

## 2021-08-19 RX ORDER — LISINOPRIL 40 MG/1
TABLET ORAL
Qty: 30 TABLET | Refills: 1 | Status: SHIPPED | OUTPATIENT
Start: 2021-08-19 | End: 2022-02-02

## 2021-08-19 RX ORDER — INSULIN GLARGINE 100 [IU]/ML
INJECTION, SOLUTION SUBCUTANEOUS
Qty: 5 PEN | Refills: 4 | Status: SHIPPED | OUTPATIENT
Start: 2021-08-19 | End: 2022-03-29 | Stop reason: SDUPTHER

## 2021-08-19 RX ORDER — LANCETS 30 GAUGE
EACH MISCELLANEOUS
Qty: 120 EACH | Refills: 3 | Status: SHIPPED | OUTPATIENT
Start: 2021-08-19 | End: 2022-08-09 | Stop reason: SDUPTHER

## 2021-08-19 RX ORDER — DOCUSATE SODIUM 100 MG/1
100 CAPSULE, LIQUID FILLED ORAL 2 TIMES DAILY PRN
Qty: 60 CAPSULE | Refills: 2 | Status: SHIPPED | OUTPATIENT
Start: 2021-08-19 | End: 2022-08-09 | Stop reason: SDUPTHER

## 2021-08-19 SDOH — ECONOMIC STABILITY: FOOD INSECURITY: WITHIN THE PAST 12 MONTHS, YOU WORRIED THAT YOUR FOOD WOULD RUN OUT BEFORE YOU GOT MONEY TO BUY MORE.: NEVER TRUE

## 2021-08-19 SDOH — ECONOMIC STABILITY: FOOD INSECURITY: WITHIN THE PAST 12 MONTHS, THE FOOD YOU BOUGHT JUST DIDN'T LAST AND YOU DIDN'T HAVE MONEY TO GET MORE.: NEVER TRUE

## 2021-08-19 ASSESSMENT — PATIENT HEALTH QUESTIONNAIRE - PHQ9
SUM OF ALL RESPONSES TO PHQ9 QUESTIONS 1 & 2: 0
SUM OF ALL RESPONSES TO PHQ QUESTIONS 1-9: 0
SUM OF ALL RESPONSES TO PHQ QUESTIONS 1-9: 0
2. FEELING DOWN, DEPRESSED OR HOPELESS: 0
1. LITTLE INTEREST OR PLEASURE IN DOING THINGS: 0
SUM OF ALL RESPONSES TO PHQ QUESTIONS 1-9: 0

## 2021-08-19 ASSESSMENT — ENCOUNTER SYMPTOMS
ABDOMINAL PAIN: 0
CHEST TIGHTNESS: 0
SHORTNESS OF BREATH: 0
COUGH: 0
BLOOD IN STOOL: 0

## 2021-08-19 ASSESSMENT — SOCIAL DETERMINANTS OF HEALTH (SDOH): HOW HARD IS IT FOR YOU TO PAY FOR THE VERY BASICS LIKE FOOD, HOUSING, MEDICAL CARE, AND HEATING?: NOT VERY HARD

## 2021-08-19 NOTE — PROGRESS NOTES
Objective:    BP (!) 136/92   Pulse 80   Wt 160 lb (72.6 kg)   BMI 23.63 kg/m²    BP Readings from Last 3 Encounters:   08/19/21 (!) 136/92   11/10/20 118/76   10/13/20 120/78       Physical Exam  Constitutional:       Appearance: Normal appearance. He is normal weight. Cardiovascular:      Rate and Rhythm: Normal rate and regular rhythm. Pulses:           Dorsalis pedis pulses are 2+ on the right side and 2+ on the left side. Posterior tibial pulses are 2+ on the right side and 2+ on the left side. Heart sounds: Normal heart sounds. Pulmonary:      Effort: Pulmonary effort is normal.      Breath sounds: Normal breath sounds. Musculoskeletal:      Right knee: No swelling. Left knee: No swelling. Right lower leg: Tenderness present. No swelling. No edema. Left lower leg: Tenderness present. No swelling. No edema. Right ankle: No swelling or ecchymosis. Normal pulse. Left ankle: No swelling or ecchymosis. Normal pulse. Right foot: No deformity, Charcot foot or foot drop. Left foot: No deformity, Charcot foot or foot drop. Comments: Knees and lower calves are cold to touch bilaterally. Hair is absent on inferior half of calves. Feet:      Right foot:      Protective Sensation: 10 sites tested. 9 sites sensed. Skin integrity: Skin integrity normal.      Toenail Condition: Right toenails are normal.      Left foot:      Protective Sensation: 10 sites tested. 10 sites sensed. Skin integrity: Skin integrity normal.      Toenail Condition: Left toenails are normal.   Neurological:      Mental Status: He is alert.          Lab Results   Component Value Date    WBC 7.8 06/04/2020    HGB 12.8 (L) 06/04/2020    HCT 36.0 (L) 06/04/2020     06/04/2020    CHOL 153 06/08/2020    TRIG 114 06/08/2020    HDL 47 06/08/2020    ALT 11 05/20/2020    AST 12 05/20/2020     (L) 06/04/2020    K 4.4 06/04/2020    CL 97 (L) 06/04/2020    CREATININE 0.62 (L) 06/04/2020    BUN 25 (H) 06/04/2020    CO2 23 06/04/2020    TSH 0.84 03/15/2020    INR 1.3 11/11/2016    LABA1C 8.1 08/19/2021    LABMICR 30 (H) 06/08/2020     Lab Results   Component Value Date    CALCIUM 9.4 06/04/2020    PHOS 2.3 (L) 10/29/2016     Lab Results   Component Value Date    LDLCHOLESTEROL 83 06/08/2020       Assessment and Plan:    1. Type 2 diabetes mellitus without complication, with long-term current use of insulin ([facility])  HbA1C at today's visit is 8.1%, patient has been losing weight and will continue to make healthy food choices.   -Discussed exercise, particularly resistance training, as patient is limited with aerobic exercises due to neuropathy.   - Blood work ordered and medications refilled today. - Patient says last retinal eye exam was done in January 2021  - Diabetic foot exam done at today's visit. - POCT glycosylated hemoglobin (Hb A1C)  -  DIABETES FOOT EXAM  - Microalbumin, Ur; Future  - Basic Metabolic Panel; Future  - docusate sodium (COLACE) 100 MG capsule; Take 1 capsule by mouth 2 times daily as needed for Constipation  Dispense: 60 capsule; Refill: 2  - Alcohol Swabs PADS; Use as needed while checking blood sugar  Dispense: 120 each; Refill: 3  - lisinopril (PRINIVIL;ZESTRIL) 40 MG tablet; TAKE ONE TABLET BY MOUTH DAILY  Dispense: 30 tablet; Refill: 1  - insulin glargine (LANTUS SOLOSTAR) 100 UNIT/ML injection pen; INJECT 40 UNITS INTO THE SKIN NIGHTLY  Dispense: 5 pen; Refill: 4  - Lancets MISC; Check blood sugar 4 times daily (AC HS)  Dispense: 120 each; Refill: 3  - insulin lispro (HUMALOG) 100 UNIT/ML injection vial; Inject 13 Units into the skin 3 times daily (before meals)  Dispense: 1 vial; Refill: 3  - metFORMIN (GLUCOPHAGE) 1000 MG tablet; TAKE ONE TABLET BY MOUTH TWICE A DAY WITH A MEAL  Dispense: 60 tablet; Refill: 3  - blood glucose test strips (TRUE METRIX BLOOD GLUCOSE TEST) strip; USE ONE STRIP TO TEST DAILY AS NEEDED  Dispense: 50 strip;  Refill: 3    2. Peripheral arterial disease (HCC)  Skin is cold to touch on knees and inferior portion of calves, with decreased hair growth on lower calves x 3+ months.   -Claudication present   -Concerning for peripheral arterial disease, given symptoms presentation and patients history of diabetes  -Will order SHAHEED and follow up in one month. - [address] - [place] Non-Invas Physiologic STD Extremity ART 1-2 Level    3. Cyst of tendon sheath  <1cm nodule present on right palm, very mild pain. -Informed patient it is most likely a tendon sheath cyst which requires no treatment unless it becomes very bothersome to patient.  -Told patient to monitor for growth or if symptoms worsen we can reassess    4. Screening for hyperlipidemia  - Lipid Panel; Future    5. Benign prostatic hyperplasia (BPH) with straining on urination  Medication refill  - doxazosin (CARDURA) 4 MG tablet; TAKE ONE TABLET BY MOUTH DAILY  Dispense: 30 tablet; Refill: 2  - alfuzosin (UROXATRAL) 10 MG extended release tablet; Take 1 tablet by mouth daily Take at night. Dispense: 90 tablet; Refill: 1    6. Essential hypertension  Hypertension is well controlled, patient checks blood pressure regularly at home. -Medication refill.   - lisinopril (PRINIVIL;ZESTRIL) 40 MG tablet; TAKE ONE TABLET BY MOUTH DAILY  Dispense: 30 tablet; Refill: 1    7. Gastroesophageal reflux disease, unspecified whether esophagitis present  Medication refill   - omeprazole (PRILOSEC) 20 MG delayed release capsule; TAKE ONE CAPSULE BY MOUTH DAILY  Dispense: 30 capsule; Refill: 5    8. Post-procedural erectile dysfunction, unspecified type  Medication refill   - tadalafil (CIALIS) 10 MG tablet; Take 0.5 tablets by mouth as needed for Erectile Dysfunction  Dispense: 30 tablet; Refill: 0    9.  Diabetic polyneuropathy associated with type 2 diabetes mellitus (HCC)  Medication refill.  -Handicap placard provided, as per patients request, due to neuropathy.   - gabapentin (NEURONTIN) 800 MG tablet; Take one tablet by mouth once daily  Dispense: 90 tablet; Refill: 0  - Handicap Placard MIS; by Does not apply route Is a permanent condition. Please dispense a five year placard. Dispense: 1 each; Refill: 0      Requested Prescriptions     Signed Prescriptions Disp Refills    docusate sodium (COLACE) 100 MG capsule 60 capsule 2     Sig: Take 1 capsule by mouth 2 times daily as needed for Constipation    doxazosin (CARDURA) 4 MG tablet 30 tablet 2     Sig: TAKE ONE TABLET BY MOUTH DAILY    Alcohol Swabs PADS 120 each 3     Sig: Use as needed while checking blood sugar    lisinopril (PRINIVIL;ZESTRIL) 40 MG tablet 30 tablet 1     Sig: TAKE ONE TABLET BY MOUTH DAILY    insulin glargine (LANTUS SOLOSTAR) 100 UNIT/ML injection pen 5 pen 4     Sig: INJECT 40 UNITS INTO THE SKIN NIGHTLY    Lancets MISC 120 each 3     Sig: Check blood sugar 4 times daily (AC HS)    insulin lispro (HUMALOG) 100 UNIT/ML injection vial 1 vial 3     Sig: Inject 13 Units into the skin 3 times daily (before meals)    metFORMIN (GLUCOPHAGE) 1000 MG tablet 60 tablet 3     Sig: TAKE ONE TABLET BY MOUTH TWICE A DAY WITH A MEAL    omeprazole (PRILOSEC) 20 MG delayed release capsule 30 capsule 5     Sig: TAKE ONE CAPSULE BY MOUTH DAILY    tadalafil (CIALIS) 10 MG tablet 30 tablet 0     Sig: Take 0.5 tablets by mouth as needed for Erectile Dysfunction    traZODone (DESYREL) 50 MG tablet 30 tablet 3     Sig: TAKE ONE TABLET BY MOUTH ONCE NIGHTLY    blood glucose test strips (TRUE METRIX BLOOD GLUCOSE TEST) strip 50 strip 3     Sig: USE ONE STRIP TO TEST DAILY AS NEEDED    gabapentin (NEURONTIN) 800 MG tablet 90 tablet 0     Sig: Take one tablet by mouth once daily    fluticasone (FLONASE) 50 MCG/ACT nasal spray 1 Bottle 2     Si spray by Each Nostril route daily    alfuzosin (UROXATRAL) 10 MG extended release tablet 90 tablet 1     Sig: Take 1 tablet by mouth daily Take at night.     Handicap Placard [facility] 1 each 0     Sig: by Does not apply route Is a permanent condition. Please dispense a five year placard. Medications Discontinued During This Encounter   Medication Reason    nitroGLYCERIN (NITROSTAT) 0.4 MG SL tablet LIST CLEANUP    aspirin EC 81 MG EC tablet LIST CLEANUP    diclofenac sodium (VOLTAREN) 1 % GEL LIST CLEANUP    blood glucose test strips (ASCENSIA AUTODISC VI;ONE TOUCH ULTRA TEST VI) strip LIST CLEANUP    insulin aspart (NOVOLOG FLEXPEN) 100 UNIT/ML injection pen LIST CLEANUP    potassium chloride (KLOR-CON M) 20 MEQ extended release tablet LIST CLEANUP    blood glucose monitor strips LIST CLEANUP    DULoxetine (CYMBALTA) 20 MG extended release capsule LIST CLEANUP    docusate sodium (COLACE) 100 MG capsule REORDER    Lancets MISC REORDER    Alcohol Swabs PADS REORDER    fluticasone (FLONASE) 50 MCG/ACT nasal spray REORDER    insulin lispro (HUMALOG) 100 UNIT/ML injection vial REORDER    alfuzosin (UROXATRAL) 10 MG extended release tablet REORDER    tadalafil (CIALIS) 10 MG tablet REORDER    LANTUS SOLOSTAR 100 UNIT/ML injection pen REORDER    TRUE METRIX BLOOD GLUCOSE TEST strip REORDER    metFORMIN (GLUCOPHAGE) 1000 MG tablet REORDER    doxazosin (CARDURA) 4 MG tablet REORDER    traZODone (DESYREL) 50 MG tablet REORDER    omeprazole (PRILOSEC) 20 MG delayed release capsule REORDER    gabapentin (NEURONTIN) 800 MG tablet REORDER    lisinopril (PRINIVIL;ZESTRIL) 40 MG tablet REORDER       Marcelo received counseling on the following healthy behaviors: nutrition, exercise and medication adherence    Discussed use,benefit, and side effects of prescribed medications. Barriers to medication compliance addressed. All patient questions answered. Pt voiced understanding. Return in about 4 weeks (around 9/16/2021), or if symptoms worsen or fail to improve, for SHAHEED results. Disclaimer: Some orall of this note was transcribed using voice-recognition software. This may cause typographical errors occasionally. Although all effort is made to fix these errors, please do not hesitate to contact our office if there Corey Hull concern with the understanding of this note.

## 2021-08-23 DIAGNOSIS — E11.9 TYPE 2 DIABETES MELLITUS WITHOUT COMPLICATION, WITH LONG-TERM CURRENT USE OF INSULIN (HCC): ICD-10-CM

## 2021-08-23 DIAGNOSIS — Z79.4 TYPE 2 DIABETES MELLITUS WITHOUT COMPLICATION, WITH LONG-TERM CURRENT USE OF INSULIN (HCC): ICD-10-CM

## 2021-08-23 NOTE — TELEPHONE ENCOUNTER
E-scribe request for insulin lispro. Please review and e-scribe if applicable. Last Visit Date:  08/19/2021  Next Visit Date:  9/21/2021    Hemoglobin A1C (%)   Date Value   08/19/2021 8.1   06/08/2020 6.7 (H)   01/16/2020 7.6             ( goal A1C is < 7)   Microalb/Crt.  Ratio (mcg/mg creat)   Date Value   06/08/2020 30 (H)     LDL Cholesterol (mg/dL)   Date Value   06/08/2020 83       (goal LDL is <100)   AST (U/L)   Date Value   05/20/2020 12     ALT (U/L)   Date Value   05/20/2020 11     BUN (mg/dL)   Date Value   06/04/2020 25 (H)     BP Readings from Last 3 Encounters:   08/19/21 (!) 136/92   11/10/20 118/76   10/13/20 120/78          (goal 120/80)        Patient Active Problem List:     DM2 (diabetes mellitus, type 2) (HCC)     Diverticulosis     DM (diabetes mellitus) ([address]     HTN (hypertension)     Elevated liver enzymes     Scrotal abscess     Abdominal abscess     Hyperplastic colon polyp     Lower abdominal pain     Diabetic foot ([address]     Infected hernioplasty mesh (HCC)     Cocaine abuse ([address]     Chest pain     BPH with obstruction/lower urinary tract symptoms      ----Pablo Lara

## 2021-08-25 ENCOUNTER — TELEPHONE (OUTPATIENT)
Dept: FAMILY MEDICINE CLINIC | Age: 61
End: 2021-08-25

## 2021-08-25 NOTE — TELEPHONE ENCOUNTER
Patient called in stating his prescription for gabapentin was sent in wrong, patient states he takes this medication 3 times a day not one time a day. Pharmacy will only fill for 30 days.     Please advise

## 2021-08-30 NOTE — TELEPHONE ENCOUNTER
Dr Viry Marcelino originally prescribed the gabapentin, this medication is on his medication list. He is stating he takes this 3 times a day.     Thank you

## 2021-08-30 NOTE — TELEPHONE ENCOUNTER
Can you please confirm with patient who prescribed it to him TID and when? I don't see anything in his chart about us ever giving him Gabapentin TID.      Thanks,     Dr. Ivette Brooks

## 2021-08-31 DIAGNOSIS — E11.42 DIABETIC POLYNEUROPATHY ASSOCIATED WITH TYPE 2 DIABETES MELLITUS (HCC): ICD-10-CM

## 2021-08-31 DIAGNOSIS — Z79.4 TYPE 2 DIABETES MELLITUS WITHOUT COMPLICATION, WITH LONG-TERM CURRENT USE OF INSULIN (HCC): ICD-10-CM

## 2021-08-31 DIAGNOSIS — E11.9 TYPE 2 DIABETES MELLITUS WITHOUT COMPLICATION, WITH LONG-TERM CURRENT USE OF INSULIN (HCC): ICD-10-CM

## 2021-08-31 RX ORDER — GABAPENTIN 800 MG/1
TABLET ORAL
Qty: 90 TABLET | Refills: 2 | Status: SHIPPED | OUTPATIENT
Start: 2021-08-31 | End: 2022-01-11 | Stop reason: SDUPTHER

## 2021-10-19 RX ORDER — PEN NEEDLE, DIABETIC 31 G X1/4"
1 NEEDLE, DISPOSABLE MISCELLANEOUS DAILY
Qty: 100 EACH | Refills: 3 | Status: SHIPPED | OUTPATIENT
Start: 2021-10-19 | End: 2022-08-09 | Stop reason: SDUPTHER

## 2021-10-19 NOTE — TELEPHONE ENCOUNTER
E-scribe request for pen needle. Please review and e-scribe if applicable. Last Visit Date:  08/19/2021  Next Visit Date:  10/20/2021    Hemoglobin A1C (%)   Date Value   08/19/2021 8.1   06/08/2020 6.7 (H)   01/16/2020 7.6             ( goal A1C is < 7)   Microalb/Crt.  Ratio (mcg/mg creat)   Date Value   06/08/2020 30 (H)     LDL Cholesterol (mg/dL)   Date Value   06/08/2020 83       (goal LDL is <100)   AST (U/L)   Date Value   05/20/2020 12     ALT (U/L)   Date Value   05/20/2020 11     BUN (mg/dL)   Date Value   06/04/2020 25 (H)     BP Readings from Last 3 Encounters:   08/19/21 (!) 136/92   11/10/20 118/76   10/13/20 120/78          (goal 120/80)        Patient Active Problem List:     DM2 (diabetes mellitus, type 2) (HCC)     Diverticulosis     DM (diabetes mellitus) ([address]     HTN (hypertension)     Elevated liver enzymes     Scrotal abscess     Abdominal abscess     Hyperplastic colon polyp     Lower abdominal pain     Diabetic foot ([address]     Infected hernioplasty mesh (HCC)     Cocaine abuse ([address]     Chest pain     BPH with obstruction/lower urinary tract symptoms      ----Geetha Barr

## 2021-11-17 DIAGNOSIS — R39.16 BENIGN PROSTATIC HYPERPLASIA (BPH) WITH STRAINING ON URINATION: ICD-10-CM

## 2021-11-17 DIAGNOSIS — N40.1 BENIGN PROSTATIC HYPERPLASIA (BPH) WITH STRAINING ON URINATION: ICD-10-CM

## 2021-11-17 RX ORDER — DOXAZOSIN MESYLATE 4 MG/1
TABLET ORAL
Qty: 30 TABLET | Refills: 2 | Status: SHIPPED | OUTPATIENT
Start: 2021-11-17

## 2021-11-17 NOTE — TELEPHONE ENCOUNTER
CARDURA pending for refill     Health Maintenance   Topic Date Due    Diabetic retinal exam  Never done    Hepatitis B vaccine (1 of 3 - Risk 3-dose series) Never done    Colon cancer screen colonoscopy  06/11/2017    Potassium monitoring  06/04/2021    Creatinine monitoring  06/04/2021    Diabetic microalbuminuria test  06/08/2021    Lipid screen  06/08/2021    Flu vaccine (1) 09/01/2021    COVID-19 Vaccine (3 - Booster for Pfizer series) 10/26/2021    Diabetic foot exam  08/19/2022    A1C test (Diabetic or Prediabetic)  08/19/2022    DTaP/Tdap/Td vaccine (2 - Td or Tdap) 11/24/2025    Pneumococcal 0-64 years Vaccine (2 of 2 - PPSV23) 10/06/2027    Shingles Vaccine  Completed    Hepatitis C screen  Completed    HIV screen  Completed    Hepatitis A vaccine  Aged Out    Hib vaccine  Aged Out    Meningococcal (ACWY) vaccine  Aged Out             (applicable per patient's age: Cancer Screenings, Depression Screening, Fall Risk Screening, Immunizations)    Hemoglobin A1C (%)   Date Value   08/19/2021 8.1   06/08/2020 6.7 (H)   01/16/2020 7.6     Microalb/Crt.  Ratio (mcg/mg creat)   Date Value   06/08/2020 30 (H)     LDL Cholesterol (mg/dL)   Date Value   06/08/2020 83     AST (U/L)   Date Value   05/20/2020 12     ALT (U/L)   Date Value   05/20/2020 11     BUN (mg/dL)   Date Value   06/04/2020 25 (H)      (goal A1C is < 7)   (goal LDL is <100) need 30-50% reduction from baseline     BP Readings from Last 3 Encounters:   08/19/21 (!) 136/92   11/10/20 118/76   10/13/20 120/78    (goal /80)      All Future Testing planned in [facility]:  Lab Frequency Next Occurrence   PT functional capacity evaluation (FCE) Once 06/11/2021   Microalbumin, Ur Once 02/19/2022   Lipid Panel Once 89/72/7592   Basic Metabolic Panel Once 17/24/5204       Next Visit Date:  Future Appointments   Date Time Provider Maureen Rodas   12/3/2021  2:30 PM Priyanka Aldridge MD [facility]            Patient Active Problem List:     DM2 (diabetes mellitus, type 2) ([facility]     Diverticulosis     DM (diabetes mellitus) ([facility]     HTN (hypertension)     Elevated liver enzymes     Scrotal abscess     Abdominal abscess     Hyperplastic colon polyp     Lower abdominal pain     Diabetic foot ([facility]     Infected hernioplasty mesh ([facility]     Cocaine abuse ([facility]     Chest pain     BPH with obstruction/lower urinary tract symptoms

## 2021-12-06 DIAGNOSIS — E11.42 DIABETIC POLYNEUROPATHY ASSOCIATED WITH TYPE 2 DIABETES MELLITUS (HCC): ICD-10-CM

## 2021-12-06 RX ORDER — GABAPENTIN 800 MG/1
TABLET ORAL
Qty: 90 TABLET | Refills: 2 | OUTPATIENT
Start: 2021-12-06

## 2021-12-06 NOTE — TELEPHONE ENCOUNTER
Attempted to call patient, Someone picked up the phone and then hung up patient needs an appointment before medication can be refilled.

## 2021-12-28 ENCOUNTER — HOSPITAL ENCOUNTER (OUTPATIENT)
Age: 61
Discharge: HOME | End: 2021-12-28
Payer: MEDICAID

## 2021-12-28 DIAGNOSIS — Z13.220 SCREENING FOR HYPERLIPIDEMIA: ICD-10-CM

## 2021-12-28 DIAGNOSIS — E11.9 TYPE 2 DIABETES MELLITUS WITHOUT COMPLICATION, WITH LONG-TERM CURRENT USE OF INSULIN (HCC): ICD-10-CM

## 2021-12-28 DIAGNOSIS — Z79.4 TYPE 2 DIABETES MELLITUS WITHOUT COMPLICATION, WITH LONG-TERM CURRENT USE OF INSULIN (HCC): ICD-10-CM

## 2021-12-28 LAB
ANION GAP SERPL CALCULATED.3IONS-SCNC: 15 MMOL/L (ref 9–17)
BUN BLDV-MCNC: 32 MG/DL (ref 6–20)
BUN/CREAT BLD: ABNORMAL (ref 9–20)
CALCIUM SERPL-MCNC: 10 MG/DL (ref 8.6–10.4)
CHLORIDE BLD-SCNC: 103 MMOL/L (ref 98–107)
CHOLESTEROL/HDL RATIO: 3
CHOLESTEROL: 187 MG/DL
CO2: 21 MMOL/L (ref 20–31)
CREAT SERPL-MCNC: 0.86 MG/DL (ref 0.7–1.2)
CREATININE URINE: 90.3 MG/DL (ref 39–259)
GFR AFRICAN AMERICAN: >60 ML/MIN
GFR NON-AFRICAN AMERICAN: >60 ML/MIN
GFR SERPL CREATININE-BSD FRML MDRD: ABNORMAL ML/MIN/{1.73_M2}
GFR SERPL CREATININE-BSD FRML MDRD: ABNORMAL ML/MIN/{1.73_M2}
GLUCOSE BLD-MCNC: 235 MG/DL (ref 70–99)
HDLC SERPL-MCNC: 62 MG/DL
LDL CHOLESTEROL: 90 MG/DL (ref 0–130)
MICROALBUMIN/CREAT 24H UR: 63 MG/L
MICROALBUMIN/CREAT UR-RTO: 70 MCG/MG CREAT
POTASSIUM SERPL-SCNC: 4.6 MMOL/L (ref 3.7–5.3)
SODIUM BLD-SCNC: 139 MMOL/L (ref 135–144)
TRIGL SERPL-MCNC: 177 MG/DL
VLDLC SERPL CALC-MCNC: ABNORMAL MG/DL (ref 1–30)

## 2021-12-28 PROCEDURE — 36415 COLL VENOUS BLD VENIPUNCTURE: CPT

## 2021-12-28 PROCEDURE — 80048 BASIC METABOLIC PNL TOTAL CA: CPT

## 2021-12-28 PROCEDURE — 82570 ASSAY OF URINE CREATININE: CPT

## 2021-12-28 PROCEDURE — 80061 LIPID PANEL: CPT

## 2021-12-28 PROCEDURE — 82043 UR ALBUMIN QUANTITATIVE: CPT

## 2022-01-02 DIAGNOSIS — Z79.4 TYPE 2 DIABETES MELLITUS WITHOUT COMPLICATION, WITH LONG-TERM CURRENT USE OF INSULIN (HCC): ICD-10-CM

## 2022-01-02 DIAGNOSIS — E11.9 TYPE 2 DIABETES MELLITUS WITHOUT COMPLICATION, WITH LONG-TERM CURRENT USE OF INSULIN (HCC): ICD-10-CM

## 2022-01-03 NOTE — TELEPHONE ENCOUNTER
Last visit: 8/19/21  Last Med refill: 12/4/21  Does patient have enough medication for 72 hours: Yes    Next Visit Date:  Future Appointments   Date Time Provider Maureen Rodas   1/11/2022  1:30 PM Nathan Narvaez MD [address] [place] [address] Maintenance   Topic Date Due    Diabetic retinal exam  Never done    Hepatitis B vaccine (1 of 3 - Risk 3-dose series) Never done    Colon cancer screen colonoscopy  06/11/2017    Flu vaccine (1) 09/01/2021    COVID-19 Vaccine (3 - Booster for Pfizer series) 10/26/2021    Diabetic foot exam  08/19/2022    A1C test (Diabetic or Prediabetic)  08/19/2022    Depression Screen  08/19/2022    Diabetic microalbuminuria test  12/28/2022    Lipid screen  12/28/2022    Potassium monitoring  12/28/2022    Creatinine monitoring  12/28/2022    DTaP/Tdap/Td vaccine (2 - Td or Tdap) 11/24/2025    Pneumococcal 0-64 years Vaccine (2 of 2 - PPSV23) 10/06/2027    Shingles Vaccine  Completed    Hepatitis C screen  Completed    HIV screen  Completed    Hepatitis A vaccine  Aged Out    Hib vaccine  Aged Out    Meningococcal (ACWY) vaccine  Aged Out       Hemoglobin A1C (%)   Date Value   08/19/2021 8.1   06/08/2020 6.7 (H)   01/16/2020 7.6             ( goal A1C is < 7)   Microalb/Crt.  Ratio (mcg/mg creat)   Date Value   12/28/2021 70 (H)     LDL Cholesterol (mg/dL)   Date Value   12/28/2021 90   06/08/2020 83       (goal LDL is <100)   AST (U/L)   Date Value   05/20/2020 12     ALT (U/L)   Date Value   05/20/2020 11     BUN (mg/dL)   Date Value   12/28/2021 32 (H)     BP Readings from Last 3 Encounters:   08/19/21 (!) 136/92   11/10/20 118/76   10/13/20 120/78          (goal 120/80)    All Future Testing planned in [facility]  Lab Frequency Next Occurrence               Patient Active Problem List:     DM2 (diabetes mellitus, type 2) (HCC)     Diverticulosis     DM (diabetes mellitus) (HCC)     HTN (hypertension)     Elevated liver enzymes     Scrotal abscess Abdominal abscess     Hyperplastic colon polyp     Lower abdominal pain     Diabetic foot (HCC)     Infected hernioplasty mesh (HCC)     Cocaine abuse ([facility]     Chest pain     BPH with obstruction/lower urinary tract symptoms

## 2022-01-11 ENCOUNTER — OFFICE VISIT (OUTPATIENT)
Dept: FAMILY MEDICINE CLINIC | Age: 62
End: 2022-01-11
Payer: MEDICAID

## 2022-01-11 VITALS
WEIGHT: 189 LBS | HEART RATE: 89 BPM | BODY MASS INDEX: 27.91 KG/M2 | SYSTOLIC BLOOD PRESSURE: 137 MMHG | DIASTOLIC BLOOD PRESSURE: 83 MMHG

## 2022-01-11 DIAGNOSIS — J06.9 VIRAL URI: Primary | ICD-10-CM

## 2022-01-11 DIAGNOSIS — E11.42 DIABETIC POLYNEUROPATHY ASSOCIATED WITH TYPE 2 DIABETES MELLITUS (HCC): ICD-10-CM

## 2022-01-11 DIAGNOSIS — E11.9 TYPE 2 DIABETES MELLITUS WITHOUT COMPLICATION, WITH LONG-TERM CURRENT USE OF INSULIN (HCC): ICD-10-CM

## 2022-01-11 DIAGNOSIS — Z79.4 TYPE 2 DIABETES MELLITUS WITHOUT COMPLICATION, WITH LONG-TERM CURRENT USE OF INSULIN (HCC): ICD-10-CM

## 2022-01-11 PROCEDURE — 99442 PR PHYS/QHP TELEPHONE EVALUATION 11-20 MIN: CPT

## 2022-01-11 RX ORDER — ECHINACEA PURPUREA EXTRACT 125 MG
2 TABLET ORAL PRN
Qty: 1 EACH | Refills: 1 | Status: SHIPPED | OUTPATIENT
Start: 2022-01-11 | End: 2022-08-09 | Stop reason: SDUPTHER

## 2022-01-11 RX ORDER — GABAPENTIN 800 MG/1
TABLET ORAL
Qty: 90 TABLET | Refills: 2 | Status: CANCELLED | OUTPATIENT
Start: 2022-01-11 | End: 2022-02-10

## 2022-01-11 RX ORDER — GUAIFENESIN 600 MG/1
600 TABLET, EXTENDED RELEASE ORAL 2 TIMES DAILY
Qty: 30 TABLET | Refills: 0 | Status: SHIPPED | OUTPATIENT
Start: 2022-01-11 | End: 2022-01-26

## 2022-01-11 RX ORDER — GABAPENTIN 800 MG/1
TABLET ORAL
Qty: 90 TABLET | Refills: 0 | Status: SHIPPED | OUTPATIENT
Start: 2022-01-11 | End: 2022-03-29 | Stop reason: SDUPTHER

## 2022-01-11 NOTE — PROGRESS NOTES
Carlos Santa is a 63 y.o. male evaluated via telephone on 1/11/2022. Consent:  He and/or health care decision maker is aware that that he may receive a bill for this telephone service, depending on his insurance coverage, and has provided verbal consent to proceed: Yes      Documentation:  I communicated with the patient and/or health care decision maker about covid symptoms. Details of this discussion including any medical advice provided: Patient has complained of nasal congestion, sore throat and non productive cough for the past 4 days, accompanied by diarrhea 2 days ago. Patient denies any fever, chills, nausea, vomiting. Patient states Robitussin has not provided much relief. Patient is fully vaccinated against Covid and has received his booster a couple months ago. Informed patient that he should get tested for Covid-19 due to the prevalence of new variant. Will provide medication for symptomatic relief. Patient agreeable to plan. Informed him that should his symptoms worsen or if he develops shortness of breath, extreme fatigue, decreased oral intake/dehydration to go to the ED. Patient also needs refill of medications. Will fill for short duration. Patient is aware he needs to come back in after symptoms resolve to further discuss use of gabapentin. I affirm this is a Patient Initiated Episode with a Patient who has not had a related appointment within my department in the past 7 days or scheduled within the next 24 hours. Patient identification was verified at the start of the visit: Yes    Total Time: minutes: 11-21 minutes    The visit was conducted pursuant to the emergency declaration under the [facility], [id number] waiver authority and the [employer] General Act. Patient identification was verified, and a caregiver was present when appropriate.  The patient was located in a state where the provider was credentialed to provide care.     Note: not billable if this call serves to triage the patient into an appointment for the relevant concern      Marilee Galloway MD

## 2022-01-11 NOTE — PROGRESS NOTES
Attending Physician Statement  I have discussed the care of RubDenitauezincluding pertinent history and exam findings,  with the resident. I have reviewed the key elements of all parts of the encounter with the resident. I agree with the assessment, plan and orders as documented by the resident.   (GE Modifier)    Viral URI- suspect COVID X 4 days/ Symptomatic care/NAsal Saline/Mucinex/ER if sx worsen

## 2022-01-11 NOTE — PROGRESS NOTES
Visit Information    Have you changed or started any medications since your last visit including any over-the-counter medicines, vitamins, or herbal medicines? no   Have you stopped taking any of your medications? Is so, why? -  no  Are you having any side effects from any of your medications? - no    Have you seen any other physician or provider since your last visit?  no   Have you had any other diagnostic tests since your last visit?  no   Have you been seen in the emergency room and/or had an admission in a hospital since we last saw you?  no   Have you had your routine dental cleaning in the past 6 months?  no     Do you have an active MyChart account? If no, what is the barrier?   Yes    Patient Care Team:  René Burgess MD as PCP - General (Family Medicine)    Medical History Review  Past Medical, Family, and Social History reviewed and does not contribute to the patient presenting condition    Health Maintenance   Topic Date Due    Diabetic retinal exam  Never done    Hepatitis B vaccine (1 of 3 - Risk 3-dose series) Never done    Colon cancer screen colonoscopy  06/11/2017    Flu vaccine (1) 09/01/2021    Diabetic foot exam  08/19/2022    A1C test (Diabetic or Prediabetic)  08/19/2022    Depression Screen  08/19/2022    Diabetic microalbuminuria test  12/28/2022    Lipid screen  12/28/2022    Potassium monitoring  12/28/2022    Creatinine monitoring  12/28/2022    DTaP/Tdap/Td vaccine (2 - Td or Tdap) 11/24/2025    Pneumococcal 0-64 years Vaccine (2 of 2 - PPSV23) 10/06/2027    Shingles Vaccine  Completed    COVID-19 Vaccine  Completed    Hepatitis C screen  Completed    HIV screen  Completed    Hepatitis A vaccine  Aged Out    Hib vaccine  Aged Out    Meningococcal (ACWY) vaccine  Aged Out

## 2022-01-11 NOTE — PATIENT INSTRUCTIONS
Thank you for letting us take care of you today. We hope all your questions were addressed. If a question was overlooked or something else comes to mind after you return home, please contact a member of your Care Team listed below. Your Care Team at [place] [address] is Team #1  Bayron Nicholson MD (Faculty)  Yaritza Villanueva MD(Resident)  Priscilla Acuna MD (Resident)  Elba Franklin., [facility]  Anthony Lazo., Tish Butcher., [facility] office)  Anabella Hutchins, [facility] (Clinical Practice Manager)  Shruthi Pacheco [facility] (Clinical Pharmacist)     Office phone number: [phone]    If you need to get in right away due to illness, please be advised we have \"Same Day\" appointments available Monday-Friday. Please call us at [phone] option #3 to schedule your \"Same Day\" appointment.

## 2022-01-12 ENCOUNTER — TELEPHONE (OUTPATIENT)
Dept: FAMILY MEDICINE CLINIC | Age: 62
End: 2022-01-12

## 2022-01-12 NOTE — TELEPHONE ENCOUNTER
I had a phone encounter with patient yesterday. And at that visit I told him I would prescribe him Mucinex and nasal spray. It was sent to his pharmacy yesterday. Thanks.

## 2022-01-12 NOTE — TELEPHONE ENCOUNTER
E-scribe request for Mucinex. Please review and e-scribe if applicable. Last Visit Date:  01/11/22  Next Visit Date:  Visit date not found    Hemoglobin A1C (%)   Date Value   08/19/2021 8.1   06/08/2020 6.7 (H)   01/16/2020 7.6             ( goal A1C is < 7)   Microalb/Crt.  Ratio (mcg/mg creat)   Date Value   12/28/2021 70 (H)     LDL Cholesterol (mg/dL)   Date Value   12/28/2021 90       (goal LDL is <100)   AST (U/L)   Date Value   05/20/2020 12     ALT (U/L)   Date Value   05/20/2020 11     BUN (mg/dL)   Date Value   12/28/2021 32 (H)     BP Readings from Last 3 Encounters:   01/11/22 137/83   08/19/21 (!) 136/92   11/10/20 118/76          (goal 120/80)        Patient Active Problem List:     DM2 (diabetes mellitus, type 2) (HCC)     Diverticulosis     DM (diabetes mellitus) ([address]     HTN (hypertension)     Elevated liver enzymes     Scrotal abscess     Abdominal abscess     Hyperplastic colon polyp     Lower abdominal pain     Diabetic foot ([address]     Infected hernioplasty mesh (HCC)     Cocaine abuse ([address]     Chest pain     BPH with obstruction/lower urinary tract symptoms      ----Adolph Farm

## 2022-01-24 ENCOUNTER — NURSE ONLY (OUTPATIENT)
Dept: FAMILY MEDICINE CLINIC | Age: 62
End: 2022-01-24
Payer: MEDICAID

## 2022-01-24 DIAGNOSIS — Z23 NEED FOR INFLUENZA VACCINATION: Primary | ICD-10-CM

## 2022-01-24 PROCEDURE — 90686 IIV4 VACC NO PRSV 0.5 ML IM: CPT

## 2022-01-24 NOTE — PROGRESS NOTES
Patient here today for nurse visit for Flu immunization. Administered injection in right deltoid. Patient tolerated procedure well. VIS given.

## 2022-01-24 NOTE — PATIENT INSTRUCTIONS
Thank you for letting us take care of you today. We hope all your questions were addressed. If a question was overlooked or something else comes to mind after you return home, please contact a member of your Care Team listed below. Your Care Team at [place] [address] is Team #2  Nixon Alcaraz DO (Faculty)  Andria Hernandez (Faculty)  Dakotah Arredondo MD (Resident)  Catherine Quick MD (Resident)  Shannon Danielle MD (Resident)  Dacia Campbell MD (Resident)  Sadaf Cali., LUCA Vasquez.,  BRAXTON Kaplan., [facility] office)  Anabella Hutchins, [address] (Clinical Practice Manager)  Shruthi Pacheco [facility] (Clinical Pharmacist)     Office phone number: [phone]    If you need to get in right away due to illness, please be advised we have \"Same Day\" appointments available Monday-Friday. Please call us at [phone] option #3 to schedule your \"Same Day\" appointment. Patient Education        Influenza (Flu) Vaccine (Inactivated or Recombinant): What You Need to Know  Why get vaccinated? Influenza vaccine can prevent influenza (flu). Flu is a contagious disease that spreads around the [facility] every year, usually between October and May. Anyone can get the flu, but it is more dangerous for some people. Infants and young children, people 72 years and older, pregnant people, and people with certain health conditions or a weakened immune system are at greatest risk of flu complications. Pneumonia, bronchitis, sinus infections, and ear infections are examples of flu-related complications. If you have a medical condition, such as heart disease, cancer, or diabetes, flu can make it worse. Flu can cause fever and chills, sore throat, muscle aches, fatigue, cough, headache, and runny or stuffy nose. Some people may have vomiting and diarrhea, though this is more common in children than adults. Each year, thousands of people in the [facility] die from flu, and many more are hospitalized.  Flu vaccine prevents millions of illnesses and flu-related visits to the doctor each year. Influenza vaccines  CDC recommends everyone 6 months and older get vaccinated every flu season. Children 6 months through 6years of age may need 2 doses during a single flu season. Everyone else needs only 1 dose each flu season. It takes about 2 weeks for protection to develop after vaccination. There are many flu viruses, and they are always changing. Each year a new flu vaccine is made to protect against the influenza viruses believed to be likely to cause disease in the upcoming flu season. Even when the vaccine doesn't exactly match these viruses, it may still provide some protection. Influenza vaccine does not cause flu. Influenza vaccine may be given at the same time as other vaccines. Talk with your health care provider  Tell your vaccination provider if the person getting the vaccine:  · Has had an allergic reaction after a previous dose of influenza vaccine, or has any severe, life-threatening allergies  · Has ever had Guillain-Barré Syndrome (also called \"GBS\")  In some cases, your health care provider may decide to postpone influenza vaccination until a future visit. Influenza vaccine can be administered at any time during pregnancy. People who are or will be pregnant during influenza season should receive inactivated influenza vaccine. People with minor illnesses, such as a cold, may be vaccinated. People who are moderately or severely ill should usually wait until they recover before getting influenza vaccine. Your health care provider can give you more information. Risks of a vaccine reaction  · Soreness, redness, and swelling where the shot is given, fever, muscle aches, and headache can happen after influenza vaccination. · There may be a very small increased risk of Guillain-Barré Syndrome (GBS) after inactivated influenza vaccine (the flu shot).   Yas Rodriguez children who get the flu shot along with pneumococcal vaccine (PCV13) and/or DTaP vaccine at the same time might be slightly more likely to have a seizure caused by fever. Tell your health care provider if a child who is getting flu vaccine has ever had a seizure. People sometimes faint after medical procedures, including vaccination. Tell your provider if you feel dizzy or have vision changes or ringing in the ears. As with any medicine, there is a very remote chance of a vaccine causing a severe allergic reaction, other serious injury, or death. What if there is a serious problem? An allergic reaction could occur after the vaccinated person leaves the clinic. If you see signs of a severe allergic reaction (hives, swelling of the face and throat, difficulty breathing, a fast heartbeat, dizziness, or weakness), call 9-1-1 and get the person to the nearest hospital.  For other signs that concern you, call your health care provider. Adverse reactions should be reported to the Vaccine Adverse Event Reporting System (VAERS). Your health care provider will usually file this report, or you can do it yourself. Visit the VAERS website at www.vaers. hhs.gov or call 8-482.803.3188. VAERS is only for reporting reactions, and VAERS staff members do not give medical advice. The National Vaccine Injury Compensation Program  The National Vaccine Injury Compensation Program (VICP) is a federal program that was created to compensate people who may have been injured by certain vaccines. Claims regarding alleged injury or death due to vaccination have a time limit for filing, which may be as short as two years. Visit the VICP website at www.hrsa.gov/vaccinecompensation or call 5-404.267.3623 to learn about the program and about filing a claim. How can I learn more? · Ask your health care provider. · Call your local or state health department.   · Visit the website of the Food and Drug Administration (FDA) for vaccine package inserts and additional information at www.fda.gov/vaccines-blood-biologics/vaccines. · Contact the Centers for Disease Control and Prevention (CDC):  ? Call 4-095-289-7880 (1-718-2-927-GVO-INFO) or  ? Visit CDC's website at www.cdc.gov/flu. Vaccine Information Statement  Inactivated Influenza Vaccine  8/6/2021  [address] ANABELL Patterson [id number]-[id number]  [facility] for Disease Control and Prevention  Many vaccine information statements are available in [place] and other languages. See www.immunize.org/vis. Hojas de información sobre vacunas están disponibles en español y en muchos otros idiomas. Visite www.immunize.org/vis. Care instructions adapted under license by [facility] ([facility]). If you have questions about a medical condition or this instruction, always ask your healthcare professional. [address] any warranty or liability for your use of this information.

## 2022-02-02 DIAGNOSIS — E11.9 TYPE 2 DIABETES MELLITUS WITHOUT COMPLICATION, WITH LONG-TERM CURRENT USE OF INSULIN (HCC): ICD-10-CM

## 2022-02-02 DIAGNOSIS — Z79.4 TYPE 2 DIABETES MELLITUS WITHOUT COMPLICATION, WITH LONG-TERM CURRENT USE OF INSULIN (HCC): ICD-10-CM

## 2022-02-02 DIAGNOSIS — I10 ESSENTIAL HYPERTENSION: ICD-10-CM

## 2022-02-02 RX ORDER — LISINOPRIL 20 MG/1
TABLET ORAL
Qty: 60 TABLET | Refills: 5 | Status: SHIPPED | OUTPATIENT
Start: 2022-02-02

## 2022-02-02 NOTE — TELEPHONE ENCOUNTER
Last visit: 1/11/2022  Last Med refill: 9/18/2021  Does patient have enough medication for 72 hours: No:     Next Visit Date:  No future appointments. Health Maintenance   Topic Date Due    Diabetic retinal exam  Never done    Hepatitis B vaccine (1 of 3 - Risk 3-dose series) Never done    Colon cancer screen colonoscopy  06/11/2017    Diabetic foot exam  08/19/2022    A1C test (Diabetic or Prediabetic)  08/19/2022    Depression Screen  08/19/2022    Diabetic microalbuminuria test  12/28/2022    Lipid screen  12/28/2022    Potassium monitoring  12/28/2022    Creatinine monitoring  12/28/2022    DTaP/Tdap/Td vaccine (2 - Td or Tdap) 11/24/2025    Pneumococcal 0-64 years Vaccine (2 of 2 - PPSV23) 10/06/2027    Flu vaccine  Completed    Shingles Vaccine  Completed    COVID-19 Vaccine  Completed    Hepatitis C screen  Completed    HIV screen  Completed    Hepatitis A vaccine  Aged Out    Hib vaccine  Aged Out    Meningococcal (ACWY) vaccine  Aged Out       Hemoglobin A1C (%)   Date Value   08/19/2021 8.1   06/08/2020 6.7 (H)   01/16/2020 7.6             ( goal A1C is < 7)   Microalb/Crt.  Ratio (mcg/mg creat)   Date Value   12/28/2021 70 (H)     LDL Cholesterol (mg/dL)   Date Value   12/28/2021 90   06/08/2020 83       (goal LDL is <100)   AST (U/L)   Date Value   05/20/2020 12     ALT (U/L)   Date Value   05/20/2020 11     BUN (mg/dL)   Date Value   12/28/2021 32 (H)     BP Readings from Last 3 Encounters:   01/11/22 137/83   08/19/21 (!) 136/92   11/10/20 118/76          (goal 120/80)    All Future Testing planned in [facility]  Lab Frequency Next Occurrence               Patient Active Problem List:     DM2 (diabetes mellitus, type 2) (HCC)     Diverticulosis     DM (diabetes mellitus) (HCC)     HTN (hypertension)     Elevated liver enzymes     Scrotal abscess     Abdominal abscess     Hyperplastic colon polyp     Lower abdominal pain     Diabetic foot ([address]     Infected hernioplasty mesh ([address] Cocaine abuse (HCC)     Chest pain     BPH with obstruction/lower urinary tract symptoms

## 2022-02-23 DIAGNOSIS — R39.16 BENIGN PROSTATIC HYPERPLASIA (BPH) WITH STRAINING ON URINATION: ICD-10-CM

## 2022-02-23 DIAGNOSIS — N40.1 BENIGN PROSTATIC HYPERPLASIA (BPH) WITH STRAINING ON URINATION: ICD-10-CM

## 2022-02-23 RX ORDER — ALFUZOSIN HYDROCHLORIDE 10 MG/1
TABLET, EXTENDED RELEASE ORAL
Qty: 30 TABLET | Refills: 0 | Status: SHIPPED | OUTPATIENT
Start: 2022-02-23 | End: 2022-07-20 | Stop reason: SDUPTHER

## 2022-02-23 NOTE — TELEPHONE ENCOUNTER
alfuzosin pending for refill     Health Maintenance   Topic Date Due    Diabetic retinal exam  Never done    Hepatitis B vaccine (1 of 3 - Risk 3-dose series) Never done    Colorectal Cancer Screen  06/11/2017    Diabetic foot exam  08/19/2022    A1C test (Diabetic or Prediabetic)  08/19/2022    Depression Screen  08/19/2022    Diabetic microalbuminuria test  12/28/2022    Lipid screen  12/28/2022    Potassium monitoring  12/28/2022    Creatinine monitoring  12/28/2022    DTaP/Tdap/Td vaccine (2 - Td or Tdap) 11/24/2025    Pneumococcal 0-64 years Vaccine (2 of 2 - PPSV23) 10/06/2027    Flu vaccine  Completed    Shingles Vaccine  Completed    COVID-19 Vaccine  Completed    Hepatitis C screen  Completed    HIV screen  Completed    Hepatitis A vaccine  Aged Out    Hib vaccine  Aged Out    Meningococcal (ACWY) vaccine  Aged Out             (applicable per patient's age: Cancer Screenings, Depression Screening, Fall Risk Screening, Immunizations)    Hemoglobin A1C (%)   Date Value   08/19/2021 8.1   06/08/2020 6.7 (H)   01/16/2020 7.6     Microalb/Crt. Ratio (mcg/mg creat)   Date Value   12/28/2021 70 (H)     LDL Cholesterol (mg/dL)   Date Value   12/28/2021 90     AST (U/L)   Date Value   05/20/2020 12     ALT (U/L)   Date Value   05/20/2020 11     BUN (mg/dL)   Date Value   12/28/2021 32 (H)      (goal A1C is < 7)   (goal LDL is <100) need 30-50% reduction from baseline     BP Readings from Last 3 Encounters:   01/11/22 137/83   08/19/21 (!) 136/92   11/10/20 118/76    (goal /80)      All Future Testing planned in [facility]:  Lab Frequency Next Occurrence       Next Visit Date:  No future appointments.          Patient Active Problem List:     DM2 (diabetes mellitus, type 2) ([facility]     Diverticulosis     DM (diabetes mellitus) ([facility]     HTN (hypertension)     Elevated liver enzymes     Scrotal abscess     Abdominal abscess     Hyperplastic colon polyp     Lower abdominal pain     Diabetic foot [facility])     Infected hernioplasty mesh ([facility]     Cocaine abuse ([facility]     Chest pain     BPH with obstruction/lower urinary tract symptoms

## 2022-03-29 ENCOUNTER — OFFICE VISIT (OUTPATIENT)
Dept: FAMILY MEDICINE CLINIC | Age: 62
End: 2022-03-29
Payer: MEDICARE

## 2022-03-29 VITALS
DIASTOLIC BLOOD PRESSURE: 73 MMHG | TEMPERATURE: 96.5 F | BODY MASS INDEX: 25.84 KG/M2 | HEART RATE: 88 BPM | WEIGHT: 175 LBS | SYSTOLIC BLOOD PRESSURE: 122 MMHG

## 2022-03-29 DIAGNOSIS — E11.9 TYPE 2 DIABETES MELLITUS WITHOUT COMPLICATION, WITH LONG-TERM CURRENT USE OF INSULIN (HCC): Primary | ICD-10-CM

## 2022-03-29 DIAGNOSIS — K21.9 GASTROESOPHAGEAL REFLUX DISEASE, UNSPECIFIED WHETHER ESOPHAGITIS PRESENT: ICD-10-CM

## 2022-03-29 DIAGNOSIS — Z79.4 TYPE 2 DIABETES MELLITUS WITHOUT COMPLICATION, WITH LONG-TERM CURRENT USE OF INSULIN (HCC): Primary | ICD-10-CM

## 2022-03-29 DIAGNOSIS — G44.221 CHRONIC TENSION-TYPE HEADACHE, INTRACTABLE: ICD-10-CM

## 2022-03-29 DIAGNOSIS — I10 PRIMARY HYPERTENSION: ICD-10-CM

## 2022-03-29 DIAGNOSIS — E11.42 DIABETIC POLYNEUROPATHY ASSOCIATED WITH TYPE 2 DIABETES MELLITUS (HCC): ICD-10-CM

## 2022-03-29 LAB — HBA1C MFR BLD: 10 %

## 2022-03-29 PROCEDURE — 3046F HEMOGLOBIN A1C LEVEL >9.0%: CPT | Performed by: STUDENT IN AN ORGANIZED HEALTH CARE EDUCATION/TRAINING PROGRAM

## 2022-03-29 PROCEDURE — G8482 FLU IMMUNIZE ORDER/ADMIN: HCPCS | Performed by: STUDENT IN AN ORGANIZED HEALTH CARE EDUCATION/TRAINING PROGRAM

## 2022-03-29 PROCEDURE — G8427 DOCREV CUR MEDS BY ELIG CLIN: HCPCS | Performed by: STUDENT IN AN ORGANIZED HEALTH CARE EDUCATION/TRAINING PROGRAM

## 2022-03-29 PROCEDURE — 99211 OFF/OP EST MAY X REQ PHY/QHP: CPT | Performed by: STUDENT IN AN ORGANIZED HEALTH CARE EDUCATION/TRAINING PROGRAM

## 2022-03-29 PROCEDURE — G8419 CALC BMI OUT NRM PARAM NOF/U: HCPCS | Performed by: STUDENT IN AN ORGANIZED HEALTH CARE EDUCATION/TRAINING PROGRAM

## 2022-03-29 PROCEDURE — 3017F COLORECTAL CA SCREEN DOC REV: CPT | Performed by: STUDENT IN AN ORGANIZED HEALTH CARE EDUCATION/TRAINING PROGRAM

## 2022-03-29 PROCEDURE — 83036 HEMOGLOBIN GLYCOSYLATED A1C: CPT | Performed by: STUDENT IN AN ORGANIZED HEALTH CARE EDUCATION/TRAINING PROGRAM

## 2022-03-29 PROCEDURE — 99213 OFFICE O/P EST LOW 20 MIN: CPT | Performed by: STUDENT IN AN ORGANIZED HEALTH CARE EDUCATION/TRAINING PROGRAM

## 2022-03-29 PROCEDURE — 2022F DILAT RTA XM EVC RTNOPTHY: CPT | Performed by: STUDENT IN AN ORGANIZED HEALTH CARE EDUCATION/TRAINING PROGRAM

## 2022-03-29 PROCEDURE — 1036F TOBACCO NON-USER: CPT | Performed by: STUDENT IN AN ORGANIZED HEALTH CARE EDUCATION/TRAINING PROGRAM

## 2022-03-29 RX ORDER — OMEPRAZOLE 20 MG/1
CAPSULE, DELAYED RELEASE ORAL
Qty: 30 CAPSULE | Refills: 5 | Status: SHIPPED | OUTPATIENT
Start: 2022-03-29 | End: 2022-07-14

## 2022-03-29 RX ORDER — IBUPROFEN 800 MG/1
800 TABLET ORAL EVERY 8 HOURS PRN
Qty: 30 TABLET | Refills: 0 | Status: SHIPPED | OUTPATIENT
Start: 2022-03-29 | End: 2022-08-09 | Stop reason: SDUPTHER

## 2022-03-29 RX ORDER — INSULIN GLARGINE 100 [IU]/ML
INJECTION, SOLUTION SUBCUTANEOUS
Qty: 5 PEN | Refills: 4 | Status: SHIPPED | OUTPATIENT
Start: 2022-03-29 | End: 2022-08-09 | Stop reason: SDUPTHER

## 2022-03-29 RX ORDER — GABAPENTIN 800 MG/1
TABLET ORAL
Qty: 90 TABLET | Refills: 0 | Status: SHIPPED | OUTPATIENT
Start: 2022-03-29 | End: 2022-05-18

## 2022-03-29 ASSESSMENT — PATIENT HEALTH QUESTIONNAIRE - PHQ9
SUM OF ALL RESPONSES TO PHQ QUESTIONS 1-9: 0
SUM OF ALL RESPONSES TO PHQ QUESTIONS 1-9: 0
1. LITTLE INTEREST OR PLEASURE IN DOING THINGS: 0
SUM OF ALL RESPONSES TO PHQ9 QUESTIONS 1 & 2: 0
SUM OF ALL RESPONSES TO PHQ QUESTIONS 1-9: 0
SUM OF ALL RESPONSES TO PHQ QUESTIONS 1-9: 0
2. FEELING DOWN, DEPRESSED OR HOPELESS: 0

## 2022-03-29 ASSESSMENT — ENCOUNTER SYMPTOMS
APNEA: 0
COLOR CHANGE: 0
COUGH: 0
SHORTNESS OF BREATH: 0
CHEST TIGHTNESS: 0

## 2022-03-29 NOTE — PROGRESS NOTES
Visit Information    Have you changed or started any medications since your last visit including any over-the-counter medicines, vitamins, or herbal medicines? no   Have you stopped taking any of your medications? Is so, why? -  no  Are you having any side effects from any of your medications? - no    Have you seen any other physician or provider since your last visit?  no   Have you had any other diagnostic tests since your last visit?  no   Have you been seen in the emergency room and/or had an admission in a hospital since we last saw you?  no   Have you had your routine dental cleaning in the past 6 months?  no     Do you have an active MyChart account? If no, what is the barrier?   No:     Patient Care Team:  René Burgess MD as PCP - General (Family Medicine)    Medical History Review  Past Medical, Family, and Social History reviewed and does not contribute to the patient presenting condition    Health Maintenance   Topic Date Due    Diabetic retinal exam  Never done    Hepatitis B vaccine (1 of 3 - Risk 3-dose series) Never done    Colorectal Cancer Screen  06/11/2017    Diabetic foot exam  08/19/2022    A1C test (Diabetic or Prediabetic)  08/19/2022    Depression Screen  08/19/2022    Diabetic microalbuminuria test  12/28/2022    Lipid screen  12/28/2022    Potassium monitoring  12/28/2022    Creatinine monitoring  12/28/2022    DTaP/Tdap/Td vaccine (2 - Td or Tdap) 11/24/2025    Pneumococcal 0-64 years Vaccine (2 of 2 - PPSV23) 10/06/2027    Flu vaccine  Completed    Shingles Vaccine  Completed    COVID-19 Vaccine  Completed    Hepatitis C screen  Completed    HIV screen  Completed    Hepatitis A vaccine  Aged Out    Hib vaccine  Aged Out    Meningococcal (ACWY) vaccine  Aged Out

## 2022-03-29 NOTE — PROGRESS NOTES
Attending Physician Statement  I have discussed the care of Galileo Schwarz 63 y.o. male, including pertinent history and exam findings, with the resident Dr. Tessa Gerber MD.    History and Exam:   Chief Complaint   Patient presents with    Dizziness     patient is having dizziness    Headache     patient states hssving head pain in back of head       Past Medical History:   Diagnosis Date    [facility]el obstruction [facility]     Cocaine abuse ([facility] 3/11/2020    Diabetes mellitus ([facility]     Diabetic polyneuropathy associated with type 2 diabetes mellitus ([facility] 3/29/2022    Diverticulitis     Diverticulosis     GERD (gastroesophageal reflux disease)     Hypertension     MIGUEL (obstructive sleep apnea)     Osteoarthritis     Renal lithiasis     Rheumatoid arteritis ([facility]     Type II or unspecified type diabetes mellitus without mention of complication, not stated as uncontrolled      No Known Allergies   I have seen and examined the patient and the key elements of the encounter have been performed by me.   BP Readings from Last 3 Encounters:   03/29/22 122/73   01/11/22 137/83   08/19/21 (!) 136/92     /73 (Site: Left Upper Arm, Position: Sitting, Cuff Size: Medium Adult)   Pulse 88   Temp 96.5 °F (35.8 °C) (Temporal)   Wt 175 lb (79.4 kg)   BMI 25.84 kg/m²   Lab Results   Component Value Date    WBC 7.8 06/04/2020    HGB 12.8 (L) 06/04/2020    HCT 36.0 (L) 06/04/2020     06/04/2020    CHOL 187 12/28/2021    TRIG 177 (H) 12/28/2021    HDL 62 12/28/2021    ALT 11 05/20/2020    AST 12 05/20/2020     12/28/2021    K 4.6 12/28/2021     12/28/2021    CREATININE 0.86 12/28/2021    BUN 32 (H) 12/28/2021    CO2 21 12/28/2021    TSH 0.84 03/15/2020    INR 1.3 11/11/2016    LABA1C 10.0 03/29/2022    LABMICR 70 (H) 12/28/2021     Lab Results   Component Value Date    LABPROT 7.0 01/04/2012    LABALBU 3.7 05/20/2020     No results found for: IRON, TIBC, FERRITIN  Lab Results   Component Value Date LDLCHOLESTEROL 90 12/28/2021     I agree with the assessment, plan and the diagnosis of    Diagnosis Orders   1. Type 2 diabetes mellitus without complication, with long-term current use of insulin (HCC)  POCT glycosylated hemoglobin (Hb A1C)    insulin glargine (LANTUS SOLOSTAR) 100 UNIT/ML injection pen   2. Primary hypertension     3. Chronic tension-type headache, intractable  ibuprofen (ADVIL;MOTRIN) 800 MG tablet   4. Diabetic polyneuropathy associated with type 2 diabetes mellitus (HCC)  gabapentin (NEURONTIN) 800 MG tablet   5. Gastroesophageal reflux disease, unspecified whether esophagitis present  omeprazole (PRILOSEC) 20 MG delayed release capsule    . I agree with orders as documented by the resident. More than 25 minutes spent  in face to face encounter with the patient and more than half in counseling. Patient's questions were answered. Patient Voiced understanding to the counseling. Return in about 6 weeks (around 5/10/2022) for routine follow up .    (GC Modifier)-Dr. Rj James MD

## 2022-03-29 NOTE — PATIENT INSTRUCTIONS
Thank you for letting us take care of you today. We hope all your questions were addressed. If a question was overlooked or something else comes to mind after you return home, please contact a member of your Care Team listed below. Your Care Team at [place] [address] is Team #5  Suann Phoenix, MD (Faculty)  Kenneth Munguia MD (Resident)  Vijay Rosen MD (Resident)  Maryann Cabrera MD (Resident)  CATARINO Rodríguez ,LUCA HOLLEY, LUCA Yadav ([facility] office)  [facility] office)  Azael Hughes (Clinical Practice Manager)  Janie Ludwig [facility] (Clinical Pharmacist)       Office phone number: [phone]    If you need to get in right away due to illness, please be advised we have \"Same Day\" appointments available Monday-Friday. Please call us at [phone] option #3 to schedule your \"Same Day\" appointment.

## 2022-03-29 NOTE — PROGRESS NOTES
Subjective:    Kedar Avina is a 63 y.o. male with  has a past medical history of Bowel obstruction ([address] Cocaine abuse ([address] Diabetes mellitus ([address] Diabetic polyneuropathy associated with type 2 diabetes mellitus ([address] Diverticulitis, Diverticulosis, GERD (gastroesophageal reflux disease), Hypertension, MIGUEL (obstructive sleep apnea), Osteoarthritis, Renal lithiasis, Rheumatoid arteritis ([address] and Type II or unspecified type diabetes mellitus without mention of complication, not stated as uncontrolled. Presented to the office today for:  Chief Complaint   Patient presents with    Dizziness     patient is having dizziness    Headache     patient states hssving head pain in back of head       HPI  Is a 70-year-old male who present to the office today for concerns of tension headache. Patient states he has been extremely stressed for the last month which is when the headache started, does not have a proper place to live right now and is bouncing around in family's accommodations. Is waiting for his own accommodation. Patient denies any visual difficulties, chest pain, shortness of breath. Hemoglobin A1c is 10.0, compared to last A1c which was 8.1 in August 2021. No other concerns today afebrile, vital signs stable. Review of Systems   Constitutional: Negative for activity change, appetite change, chills, fatigue and fever. Respiratory: Negative for apnea, cough, chest tightness and shortness of breath. Musculoskeletal: Negative for joint swelling. Skin: Negative for color change, pallor, rash and wound. Neurological: Positive for headaches. Negative for dizziness, tremors, facial asymmetry and light-headedness. Psychiatric/Behavioral: Negative for agitation, behavioral problems and confusion. The patient is nervous/anxious.                   The patient has a   Family History   Problem Relation Age of Onset    Diabetes Father     Diabetes Other     Heart Attack Other     Hypertension Other     Diabetes Brother        Objective:    /73 (Site: Left Upper Arm, Position: Sitting, Cuff Size: Medium Adult)   Pulse 88   Temp 96.5 °F (35.8 °C) (Temporal)   Wt 175 lb (79.4 kg)   BMI 25.84 kg/m²    BP Readings from Last 3 Encounters:   03/29/22 122/73   01/11/22 137/83   08/19/21 (!) 136/92       Physical Exam  Vitals and nursing note reviewed. Constitutional:       Appearance: Normal appearance. He is normal weight. Cardiovascular:      Rate and Rhythm: Normal rate and regular rhythm. Pulses: Normal pulses. Heart sounds: Normal heart sounds. Pulmonary:      Effort: Pulmonary effort is normal.      Breath sounds: Normal breath sounds. Abdominal:      General: Abdomen is flat. Bowel sounds are normal.      Palpations: Abdomen is soft. Neurological:      General: No focal deficit present. Mental Status: He is alert and oriented to person, place, and time. Mental status is at baseline. Psychiatric:         Mood and Affect: Mood normal.         Behavior: Behavior normal.         Thought Content: Thought content normal.         Judgment: Judgment normal.         Lab Results   Component Value Date    WBC 7.8 06/04/2020    HGB 12.8 (L) 06/04/2020    HCT 36.0 (L) 06/04/2020     06/04/2020    CHOL 187 12/28/2021    TRIG 177 (H) 12/28/2021    HDL 62 12/28/2021    ALT 11 05/20/2020    AST 12 05/20/2020     12/28/2021    K 4.6 12/28/2021     12/28/2021    CREATININE 0.86 12/28/2021    BUN 32 (H) 12/28/2021    CO2 21 12/28/2021    TSH 0.84 03/15/2020    INR 1.3 11/11/2016    LABA1C 10.0 03/29/2022    LABMICR 70 (H) 12/28/2021     Lab Results   Component Value Date    CALCIUM 10.0 12/28/2021    PHOS 2.3 (L) 10/29/2016     Lab Results   Component Value Date    LDLCHOLESTEROL 90 12/28/2021       Assessment and Plan:    1.  Type 2 diabetes mellitus without complication, with long-term current use of insulin (HCC)  - POCT glycosylated hemoglobin (Hb A1C)- 10.0   - insulin glargine (LANTUS SOLOSTAR) 100 UNIT/ML injection pen; INJECT 40 UNITS INTO THE SKIN NIGHTLY  Dispense: 5 pen; Refill: 4    2. Primary hypertension  -Compliant with medication, blood pressure 122/73. 3. Chronic tension-type headache, intractable  - ibuprofen (ADVIL;MOTRIN) 800 MG tablet; Take 1 tablet by mouth every 8 hours as needed for Pain  Dispense: 30 tablet; Refill: 0    4. Diabetic polyneuropathy associated with type 2 diabetes mellitus (HCC)  - gabapentin (NEURONTIN) 800 MG tablet; Take one tablet by mouth three times daily  Dispense: 90 tablet; Refill: 0    5. Gastroesophageal reflux disease, unspecified whether esophagitis present  - omeprazole (PRILOSEC) 20 MG delayed release capsule; TAKE ONE CAPSULE BY MOUTH DAILY  Dispense: 30 capsule; Refill: 5          Requested Prescriptions     Signed Prescriptions Disp Refills    ibuprofen (ADVIL;MOTRIN) 800 MG tablet 30 tablet 0     Sig: Take 1 tablet by mouth every 8 hours as needed for Pain    insulin glargine (LANTUS SOLOSTAR) 100 UNIT/ML injection pen 5 pen 4     Sig: INJECT 40 UNITS INTO THE SKIN NIGHTLY    gabapentin (NEURONTIN) 800 MG tablet 90 tablet 0     Sig: Take one tablet by mouth three times daily    omeprazole (PRILOSEC) 20 MG delayed release capsule 30 capsule 5     Sig: TAKE ONE CAPSULE BY MOUTH DAILY       Medications Discontinued During This Encounter   Medication Reason    ibuprofen (ADVIL;MOTRIN) 800 MG tablet REORDER    insulin glargine (LANTUS SOLOSTAR) 100 UNIT/ML injection pen REORDER    omeprazole (PRILOSEC) 20 MG delayed release capsule REORDER    gabapentin (NEURONTIN) 800 MG tablet REORDER       Marcelo received counseling on the following healthy behaviors: nutrition, exercise and medication adherence    Discussed use,benefit, and side effects of prescribed medications. Barriers to medication compliance addressed. All patient questions answered. Pt voiced understanding.      Return in about 6 weeks (around 5/10/2022) for routine follow up . Disclaimer: Some orall of this note was transcribed using voice-recognition software. This may cause typographical errors occasionally. Although all effort is made to fix these errors, please do not hesitate to contact our office if there Abbey Gonzalez concern with the understanding of this note.

## 2022-04-26 ENCOUNTER — TELEPHONE (OUTPATIENT)
Dept: FAMILY MEDICINE CLINIC | Age: 62
End: 2022-04-26

## 2022-04-26 NOTE — TELEPHONE ENCOUNTER
Patient called office with c/o leg cramping for a few months, and ear clogging for about two weeks. Patient has upcoming appointment on 05/11/22. Writer asked patient if he needed to be seen sooner, and patient declined.

## 2022-04-27 DIAGNOSIS — E11.42 DIABETIC POLYNEUROPATHY ASSOCIATED WITH TYPE 2 DIABETES MELLITUS (HCC): ICD-10-CM

## 2022-04-27 NOTE — TELEPHONE ENCOUNTER
Last visit: 3/29/22  Last Med refill: 3/29/22  Does patient have enough medication for 72 hours: Yes    Next Visit Date:  Future Appointments   Date Time Provider Maureen Adrianna   5/11/2022  2:00 PM Samuel Nguyen MD [address] [place] [address] Maintenance   Topic Date Due    Diabetic retinal exam  Never done    Hepatitis B vaccine (1 of 3 - Risk 3-dose series) Never done    Pneumococcal 0-64 years Vaccine (2 - PCV) 06/27/2014    Colorectal Cancer Screen  06/11/2017    Annual Wellness Visit (AWV)  03/30/2022    A1C test (Diabetic or Prediabetic)  06/29/2022    Diabetic foot exam  08/19/2022    Diabetic microalbuminuria test  12/28/2022    Lipids  12/28/2022    Potassium  12/28/2022    Creatinine  12/28/2022    Depression Screen  03/29/2023    DTaP/Tdap/Td vaccine (2 - Td or Tdap) 11/24/2025    Flu vaccine  Completed    Shingles Vaccine  Completed    COVID-19 Vaccine  Completed    Hepatitis C screen  Completed    HIV screen  Completed    Hepatitis A vaccine  Aged Out    Hib vaccine  Aged Out    Meningococcal (ACWY) vaccine  Aged Out       Hemoglobin A1C (%)   Date Value   03/29/2022 10.0   08/19/2021 8.1   06/08/2020 6.7 (H)             ( goal A1C is < 7)   Microalb/Crt.  Ratio (mcg/mg creat)   Date Value   12/28/2021 70 (H)     LDL Cholesterol (mg/dL)   Date Value   12/28/2021 90   06/08/2020 83       (goal LDL is <100)   AST (U/L)   Date Value   05/20/2020 12     ALT (U/L)   Date Value   05/20/2020 11     BUN (mg/dL)   Date Value   12/28/2021 32 (H)     BP Readings from Last 3 Encounters:   03/29/22 122/73   01/11/22 137/83   08/19/21 (!) 136/92          (goal 120/80)    All Future Testing planned in [facility]  Lab Frequency Next Occurrence               Patient Active Problem List:     DM2 (diabetes mellitus, type 2) (HCC)     Diverticulosis     DM (diabetes mellitus) (HCC)     HTN (hypertension)     Elevated liver enzymes     Scrotal abscess     Abdominal abscess     Hyperplastic colon polyp     Lower abdominal pain     Diabetic foot (HCC)     Infected hernioplasty mesh (HCC)     Cocaine abuse ([address]     Chest pain     BPH with obstruction/lower urinary tract symptoms     Chronic tension-type headache, intractable     Diabetic polyneuropathy associated with type 2 diabetes mellitus ([address]     Gastroesophageal reflux disease

## 2022-05-03 DIAGNOSIS — N52.39 POST-PROCEDURAL ERECTILE DYSFUNCTION, UNSPECIFIED TYPE: ICD-10-CM

## 2022-05-03 DIAGNOSIS — E11.9 TYPE 2 DIABETES MELLITUS WITHOUT COMPLICATION, WITH LONG-TERM CURRENT USE OF INSULIN (HCC): Chronic | ICD-10-CM

## 2022-05-03 DIAGNOSIS — I10 ESSENTIAL HYPERTENSION: ICD-10-CM

## 2022-05-03 DIAGNOSIS — Z79.4 TYPE 2 DIABETES MELLITUS WITHOUT COMPLICATION, WITH LONG-TERM CURRENT USE OF INSULIN (HCC): Chronic | ICD-10-CM

## 2022-05-03 RX ORDER — LISINOPRIL 40 MG/1
TABLET ORAL
Qty: 30 TABLET | Refills: 4 | OUTPATIENT
Start: 2022-05-03

## 2022-05-03 RX ORDER — LISINOPRIL 20 MG/1
TABLET ORAL
Qty: 60 TABLET | Refills: 5 | Status: CANCELLED | OUTPATIENT
Start: 2022-05-03

## 2022-05-03 RX ORDER — TADALAFIL 10 MG/1
5 TABLET ORAL PRN
Qty: 30 TABLET | Refills: 0 | Status: SHIPPED | OUTPATIENT
Start: 2022-05-03 | End: 2022-08-09 | Stop reason: SDUPTHER

## 2022-05-03 NOTE — TELEPHONE ENCOUNTER
Last visit: 3-29-22  Last Med refill: 2-2-22  Does patient have enough medication for 72 hours: No:     Next Visit Date:  Future Appointments   Date Time Provider Maureen Adrianna   5/11/2022  2:00 PM Ramona Cruz MD [address] [place] [address] Maintenance   Topic Date Due    Annual Wellness Visit (AWV)  Never done    Diabetic retinal exam  Never done    Hepatitis B vaccine (1 of 3 - Risk 3-dose series) Never done    Pneumococcal 0-64 years Vaccine (2 - PCV) 06/27/2014    Colorectal Cancer Screen  06/11/2017    A1C test (Diabetic or Prediabetic)  06/29/2022    Diabetic foot exam  08/19/2022    Diabetic microalbuminuria test  12/28/2022    Lipids  12/28/2022    Potassium  12/28/2022    Creatinine  12/28/2022    Depression Screen  03/29/2023    DTaP/Tdap/Td vaccine (2 - Td or Tdap) 11/24/2025    Flu vaccine  Completed    Shingles vaccine  Completed    COVID-19 Vaccine  Completed    Hepatitis C screen  Completed    HIV screen  Completed    Hepatitis A vaccine  Aged Out    Hib vaccine  Aged Out    Meningococcal (ACWY) vaccine  Aged Out       Hemoglobin A1C (%)   Date Value   03/29/2022 10.0   08/19/2021 8.1   06/08/2020 6.7 (H)             ( goal A1C is < 7)   Microalb/Crt.  Ratio (mcg/mg creat)   Date Value   12/28/2021 70 (H)     LDL Cholesterol (mg/dL)   Date Value   12/28/2021 90   06/08/2020 83       (goal LDL is <100)   AST (U/L)   Date Value   05/20/2020 12     ALT (U/L)   Date Value   05/20/2020 11     BUN (mg/dL)   Date Value   12/28/2021 32 (H)     BP Readings from Last 3 Encounters:   03/29/22 122/73   01/11/22 137/83   08/19/21 (!) 136/92          (goal 120/80)    All Future Testing planned in [facility]  Lab Frequency Next Occurrence               Patient Active Problem List:     DM2 (diabetes mellitus, type 2) (HCC)     Diverticulosis     DM (diabetes mellitus) (HCC)     HTN (hypertension)     Elevated liver enzymes     Scrotal abscess     Abdominal abscess     Hyperplastic colon polyp     Lower abdominal pain     Diabetic foot (HCC)     Infected hernioplasty mesh (HCC)     Cocaine abuse ([facility]     Chest pain     BPH with obstruction/lower urinary tract symptoms     Chronic tension-type headache, intractable     Diabetic polyneuropathy associated with type 2 diabetes mellitus ([facility]     Gastroesophageal reflux disease           Please address the medication refill and close the encounter. If I can be of assistance, please route to the applicable pool. Thank you.

## 2022-05-18 RX ORDER — GABAPENTIN 800 MG/1
TABLET ORAL
Qty: 90 TABLET | Refills: 0 | Status: SHIPPED | OUTPATIENT
Start: 2022-05-18 | End: 2022-07-06 | Stop reason: SDUPTHER

## 2022-06-30 NOTE — FLOWSHEET NOTE
[x] [facility]) [facility]  [facility]  [address]    [phone]  F: [phone]   [] [address]  [facility]   [address]  P: [phone]  F: [phone]  [] [place]  [address]  P: [phone]  F: [phone] [] [address]  P: [phone]  F: [phone]  [] [address]  [address] [facility] [address]   [address]   [place]: [phone]  F: [phone]   [] [facility]  [address] [facility] [address]  [place]: [phone]   F: [phone]     Physical Therapy Cancel/No Show note    Date: 12/3/2020  Patient: Aneudy uNr  : 1960  MRN: [id number]    Cancels/No Shows to date:     For today's appointment patient:    [x]  Cancelled    [] Rescheduled appointment    [] No-show     Reason given by patient:    []  Patient ill    []  Conflicting appointment    [] No transportation      [] Conflict with work    [] No reason given    [] Weather related    [] COVID-19    [x] Other:      Comments: Patient had to take a family member to the ER.         [x] Next appointment was confirmed    Electronically signed by: Ame Schmidt PTA Impaired gait/Post Op/Other

## 2022-07-06 ENCOUNTER — OFFICE VISIT (OUTPATIENT)
Dept: FAMILY MEDICINE CLINIC | Age: 62
End: 2022-07-06
Payer: MEDICARE

## 2022-07-06 VITALS
BODY MASS INDEX: 24.56 KG/M2 | SYSTOLIC BLOOD PRESSURE: 122 MMHG | OXYGEN SATURATION: 96 % | HEART RATE: 83 BPM | HEIGHT: 69 IN | DIASTOLIC BLOOD PRESSURE: 80 MMHG | TEMPERATURE: 96.8 F | WEIGHT: 165.8 LBS

## 2022-07-06 DIAGNOSIS — Z79.4 TYPE 2 DIABETES MELLITUS WITHOUT COMPLICATION, WITH LONG-TERM CURRENT USE OF INSULIN (HCC): ICD-10-CM

## 2022-07-06 DIAGNOSIS — E11.42 DIABETIC POLYNEUROPATHY ASSOCIATED WITH TYPE 2 DIABETES MELLITUS (HCC): ICD-10-CM

## 2022-07-06 DIAGNOSIS — E11.9 TYPE 2 DIABETES MELLITUS WITHOUT COMPLICATION, WITH LONG-TERM CURRENT USE OF INSULIN (HCC): ICD-10-CM

## 2022-07-06 DIAGNOSIS — R25.2 MUSCLE CRAMPING: Primary | ICD-10-CM

## 2022-07-06 PROCEDURE — 99213 OFFICE O/P EST LOW 20 MIN: CPT

## 2022-07-06 PROCEDURE — 3046F HEMOGLOBIN A1C LEVEL >9.0%: CPT

## 2022-07-06 RX ORDER — GABAPENTIN 800 MG/1
TABLET ORAL
Qty: 90 TABLET | Refills: 0 | Status: SHIPPED | OUTPATIENT
Start: 2022-07-06 | End: 2022-08-11 | Stop reason: SDUPTHER

## 2022-07-06 ASSESSMENT — ENCOUNTER SYMPTOMS
ABDOMINAL PAIN: 0
SHORTNESS OF BREATH: 0
DIARRHEA: 0
CONSTIPATION: 0

## 2022-07-06 NOTE — PROGRESS NOTES
Attending Physician Statement  I have discussed the care of Meng Wesley 63 y.o. male, including pertinent history and exam findings, with the resident Dr. Devika Luke MD.    History and Exam:   Chief Complaint   Patient presents with    Diabetes      - Patient states his left leg has been cramping and spazing        Past Medical History:   Diagnosis Date    Bowel obstruction ([facility]     Cocaine abuse ([facility] 3/11/2020    Diabetes mellitus ([facility]     Diabetic polyneuropathy associated with type 2 diabetes mellitus ([facility] 3/29/2022    Diverticulitis     Diverticulosis     GERD (gastroesophageal reflux disease)     Hypertension     MIGUEL (obstructive sleep apnea)     Osteoarthritis     Renal lithiasis     Rheumatoid arteritis ([facility]     Type II or unspecified type diabetes mellitus without mention of complication, not stated as uncontrolled      No Known Allergies   I have seen and examined the patient and the key elements of the encounter have been performed by me.   BP Readings from Last 3 Encounters:   07/06/22 122/80   03/29/22 122/73   01/11/22 137/83     /80 (Site: Left Upper Arm, Position: Sitting, Cuff Size: Medium Adult) Comment: manual  Pulse 83   Temp 96.8 °F (36 °C)   Ht 5' 9.02\" (1.753 m)   Wt 165 lb 12.8 oz (75.2 kg)   SpO2 96%   BMI 24.47 kg/m²   Lab Results   Component Value Date    WBC 7.8 06/04/2020    HGB 12.8 (L) 06/04/2020    HCT 36.0 (L) 06/04/2020     06/04/2020    CHOL 187 12/28/2021    TRIG 177 (H) 12/28/2021    HDL 62 12/28/2021    ALT 11 05/20/2020    AST 12 05/20/2020     12/28/2021    K 4.6 12/28/2021     12/28/2021    CREATININE 0.86 12/28/2021    BUN 32 (H) 12/28/2021    CO2 21 12/28/2021    TSH 0.84 03/15/2020    INR 1.3 11/11/2016    LABA1C 10.0 03/29/2022    LABMICR 70 (H) 12/28/2021     Lab Results   Component Value Date    LABPROT 7.0 01/04/2012    LABALBU 3.7 05/20/2020     No results found for: IRON, TIBC, FERRITIN  Lab Results Component Value Date    LDLCHOLESTEROL 90 12/28/2021     I agree with the assessment, plan and the diagnosis of    Diagnosis Orders   1. Muscle cramping  Basic Metabolic Panel    Magnesium    Vitamin B12   2. Type 2 diabetes mellitus without complication, with long-term current use of insulin (HCC)  Basic Metabolic Panel    Magnesium    Vitamin B12    metFORMIN (GLUCOPHAGE) 1000 MG tablet   3. Diabetic polyneuropathy associated with type 2 diabetes mellitus (HCC)  gabapentin (NEURONTIN) 800 MG tablet    . I agree with orders as documented by the resident. More than 25 minutes spent  in face to face encounter with the patient and more than half in counseling. Patient's questions were answered. Patient Voiced understanding to the counseling. Return in about 4 weeks (around 8/3/2022), or if symptoms worsen or fail to improve, for lab results.    (GC Modifier)-Dr. Eric Angulo MD

## 2022-07-06 NOTE — PROGRESS NOTES
[facility]    Family Medicine Residency Program - Outpatient Note      Subjective:    Eveiln Merida is a 63 y.o. male with  has a past medical history of Bowel obstruction ([address] Cocaine abuse ([address] Diabetes mellitus ([address] Diabetic polyneuropathy associated with type 2 diabetes mellitus ([address] Diverticulitis, Diverticulosis, GERD (gastroesophageal reflux disease), Hypertension, MIGUEL (obstructive sleep apnea), Osteoarthritis, Renal lithiasis, Rheumatoid arteritis ([address] and Type II or unspecified type diabetes mellitus without mention of complication, not stated as uncontrolled. Presented to the office today for:  Chief Complaint   Patient presents with    Diabetes      - Patient states his left leg has been cramping and spazing        HPI    24-year-old male scented to the clinic with concerns of muscle cramping in the lower extremities for the past 1 month, states has recently been getting significantly worse accompanied by shooting nerve pains running all the way down the leg. Patient recently discontinued gabapentin and metformin back in May stating that he ran out of the prescription did not bother to refill it. Patient admits that diet is poor. But does drink enough water throughout the day. We will check electrolytes and B12 as patient was on metformin. Will resume metformin as recent HbA1c was greater than 10%. Patient is compliant with all other diabetes medications. Review of Systems   Constitutional: Negative for chills and fever. Eyes: Negative for visual disturbance. Respiratory: Negative for shortness of breath. Cardiovascular: Negative for chest pain and leg swelling. Gastrointestinal: Negative for abdominal pain, constipation and diarrhea. Musculoskeletal: Positive for myalgias. Neurological: Negative for dizziness, weakness and numbness.         Neuropathy     The patient has a   Family History   Problem Relation Age of Onset    Diabetes Father    Gallo Alcantara Diabetes Other     Heart Attack Other     Hypertension Other     Diabetes Brother        Objective:    /80 (Site: Left Upper Arm, Position: Sitting, Cuff Size: Medium Adult) Comment: manual  Pulse 83   Temp 96.8 °F (36 °C)   Ht 5' 9.02\" (1.753 m)   Wt 165 lb 12.8 oz (75.2 kg)   SpO2 96%   BMI 24.47 kg/m²    BP Readings from Last 3 Encounters:   07/06/22 122/80   03/29/22 122/73   01/11/22 137/83       Physical Exam  Constitutional:       Appearance: Normal appearance. Cardiovascular:      Rate and Rhythm: Normal rate and regular rhythm. Heart sounds: Normal heart sounds. No murmur heard. No gallop. Pulmonary:      Effort: Pulmonary effort is normal.      Breath sounds: Normal breath sounds. Abdominal:      Palpations: Abdomen is soft. Tenderness: There is no abdominal tenderness. Musculoskeletal:      Right lower leg: No edema. Left lower leg: No edema. Skin:     General: Skin is warm. Neurological:      Mental Status: He is alert. Psychiatric:         Mood and Affect: Mood normal.         Lab Results   Component Value Date    WBC 7.8 06/04/2020    HGB 12.8 (L) 06/04/2020    HCT 36.0 (L) 06/04/2020     06/04/2020    CHOL 187 12/28/2021    TRIG 177 (H) 12/28/2021    HDL 62 12/28/2021    ALT 11 05/20/2020    AST 12 05/20/2020     12/28/2021    K 4.6 12/28/2021     12/28/2021    CREATININE 0.86 12/28/2021    BUN 32 (H) 12/28/2021    CO2 21 12/28/2021    TSH 0.84 03/15/2020    INR 1.3 11/11/2016    LABA1C 10.0 03/29/2022    LABMICR 70 (H) 12/28/2021     Lab Results   Component Value Date    CALCIUM 10.0 12/28/2021    PHOS 2.3 (L) 10/29/2016     Lab Results   Component Value Date    LDLCHOLESTEROL 90 12/28/2021       Assessment and Plan:    1. Type 2 diabetes mellitus without complication, with long-term current use of insulin (HCC)  Refill of metformin, will check B12 due to risk of deficiency with metformin use. - Basic Metabolic Panel;  Future  - Magnesium; Future  - Vitamin B12; Future  - metFORMIN (GLUCOPHAGE) 1000 MG tablet; TAKE ONE TABLET BY MOUTH TWICE A DAY WITH A MEAL  Dispense: 90 tablet; Refill: 3    2. Muscle cramping  Cramping in the lower extremities for about 1 month or so getting significantly worse associated with neuropathy. Will assess electrolytes and vitamin deficiencies. May be secondary to stopping metformin and gabapentin abruptly. - Basic Metabolic Panel; Future  - Magnesium; Future  - Vitamin B12; Future    3. Diabetic polyneuropathy associated with type 2 diabetes mellitus (HCC)  Medication refill.  - gabapentin (NEURONTIN) 800 MG tablet; TAKE ONE TABLET BY MOUTH THREE TIMES A DAY  Dispense: 90 tablet; Refill: 0          Requested Prescriptions     Signed Prescriptions Disp Refills    metFORMIN (GLUCOPHAGE) 1000 MG tablet 90 tablet 3     Sig: TAKE ONE TABLET BY MOUTH TWICE A DAY WITH A MEAL    gabapentin (NEURONTIN) 800 MG tablet 90 tablet 0     Sig: TAKE ONE TABLET BY MOUTH THREE TIMES A DAY       Medications Discontinued During This Encounter   Medication Reason    metFORMIN (GLUCOPHAGE) 1000 MG tablet REORDER    gabapentin (NEURONTIN) 800 MG tablet REORDER       Marcelo received counseling on the following healthy behaviors: nutrition, exercise and medication adherence    Discussed use,benefit, and side effects of prescribed medications. Barriers to medication compliance addressed. All patient questions answered. Pt voiced understanding. Return in about 4 weeks (around 8/3/2022), or if symptoms worsen or fail to improve, for lab results. Disclaimer: Some orall of this note was transcribed using voice-recognition software. This may cause typographical errors occasionally. Although all effort is made to fix these errors, please do not hesitate to contact our office if there Larance Cave concern with the understanding of this note.

## 2022-07-06 NOTE — PATIENT INSTRUCTIONS
Thank you for letting us take care of you today. We hope all your questions were addressed. If a question was overlooked or something else comes to mind after you return home, please contact a member of your Care Team listed below. Your Care Team at [place] [address] is Team #1  Kevin Landaverde MD (Faculty)  Heather Hunter MD(Resident)  Sherman De Los Santos MD (Resident)  Federico Packer DO (Resident)  Quentin Dumont., LISA Mora., A  Jordan Reynolds., CATARINO Guido., Tye Shelby., [facility] office)  Miles Lopez, [address] (Clinical Practice Manager)  Jerrel Epley, [address] [facility] (Clinical Pharmacist)     Office phone number: [phone]    If you need to get in right away due to illness, please be advised we have \"Same Day\" appointments available Monday-Friday. Please call us at [phone] option #3 to schedule your \"Same Day\" appointment.

## 2022-07-06 NOTE — PROGRESS NOTES
Diabetic visit information    BP Readings from Last 3 Encounters:   07/06/22 122/80   03/29/22 122/73   01/11/22 137/83       Hemoglobin A1C (%)   Date Value   03/29/2022 10.0   08/19/2021 8.1   06/08/2020 6.7 (H)     Microalb/Crt. Ratio (mcg/mg creat)   Date Value   12/28/2021 70 (H)     LDL Cholesterol (mg/dL)   Date Value   12/28/2021 90               Have you changed or started any medications since your last visit including any over-the-counter medicines, vitamins, or herbal medicines? no   Have you stopped taking any of your medications? Is so, why? -  no  Are you having any side effects from any of your medications? - no    Have you seen any other physician or provider since your last visit?  no   Have you had any other diagnostic tests since your last visit?  no   Have you been seen in the emergency room and/or had an admission in a hospital since we last saw you? No-    Have you had your annual diabetic retinal (eye) exam? No   (ensure copy of exam is in the chart)    Have you had your routine dental cleaning in the past 6 months? no    Do you have an active MyChart account? If not, what are your barriers? No: declined    Patient Care Team:  Karie John MD as PCP - General (Family Medicine)    Medical history Review  Past Medical, Family, and Social History reviewed and does not contribute to the patient presenting condition.     Health Maintenance   Topic Date Due    Annual Wellness Visit (AWV)  Never done    Diabetic retinal exam  Never done    Hepatitis B vaccine (1 of 3 - Risk 3-dose series) Never done    Prostate Specific Antigen (PSA) Screening or Monitoring  Never done    Pneumococcal 0-64 years Vaccine (2 - PCV) 06/27/2014    Colorectal Cancer Screen  06/11/2017    A1C test (Diabetic or Prediabetic)  06/29/2022    Diabetic foot exam  08/19/2022    Flu vaccine (1) 09/01/2022    Diabetic microalbuminuria test  12/28/2022    Lipids  12/28/2022    Depression Screen  03/29/2023    DTaP/Tdap/Td vaccine (2 - Td or Tdap) 11/24/2025    Shingles vaccine  Completed    COVID-19 Vaccine  Completed    Hepatitis C screen  Completed    HIV screen  Completed    Hepatitis A vaccine  Aged Out    Hib vaccine  Aged Out    Meningococcal (ACWY) vaccine  Aged Out

## 2022-07-14 DIAGNOSIS — K21.9 GASTROESOPHAGEAL REFLUX DISEASE, UNSPECIFIED WHETHER ESOPHAGITIS PRESENT: ICD-10-CM

## 2022-07-14 RX ORDER — OMEPRAZOLE 20 MG/1
CAPSULE, DELAYED RELEASE ORAL
Qty: 30 CAPSULE | Refills: 5 | Status: SHIPPED | OUTPATIENT
Start: 2022-07-14 | End: 2022-08-09 | Stop reason: SDUPTHER

## 2022-07-14 NOTE — TELEPHONE ENCOUNTER
E-scribe request for med refill. Please review and e-scribe if applicable. Last Visit Date:  07/06/2022  Next Visit Date:  8/9/2022    Hemoglobin A1C (%)   Date Value   03/29/2022 10.0   08/19/2021 8.1   06/08/2020 6.7 (H)             ( goal A1C is < 7)   Microalb/Crt.  Ratio (mcg/mg creat)   Date Value   12/28/2021 70 (H)     LDL Cholesterol (mg/dL)   Date Value   12/28/2021 90       (goal LDL is <100)   AST (U/L)   Date Value   05/20/2020 12     ALT (U/L)   Date Value   05/20/2020 11     BUN (mg/dL)   Date Value   12/28/2021 32 (H)     BP Readings from Last 3 Encounters:   07/06/22 122/80   03/29/22 122/73   01/11/22 137/83          (goal 120/80)        Patient Active Problem List:     DM2 (diabetes mellitus, type 2) (HCC)     Diverticulosis     DM (diabetes mellitus) ([address]     HTN (hypertension)     Elevated liver enzymes     Scrotal abscess     Abdominal abscess     Hyperplastic colon polyp     Lower abdominal pain     Diabetic foot ([address]     Infected hernioplasty mesh (HCC)     Cocaine abuse ([address]     Chest pain     BPH with obstruction/lower urinary tract symptoms     Chronic tension-type headache, intractable     Diabetic polyneuropathy associated with type 2 diabetes mellitus ([address]     Gastroesophageal reflux disease      ----Alexis Meeks

## 2022-07-15 DIAGNOSIS — R39.16 BENIGN PROSTATIC HYPERPLASIA (BPH) WITH STRAINING ON URINATION: ICD-10-CM

## 2022-07-15 DIAGNOSIS — N40.1 BENIGN PROSTATIC HYPERPLASIA (BPH) WITH STRAINING ON URINATION: ICD-10-CM

## 2022-07-15 NOTE — TELEPHONE ENCOUNTER
E-scribe request for med refills. Please review and e-scribe if applicable. Last Visit Date:  7/6/22  Next Visit Date:  8/9/2022    Hemoglobin A1C (%)   Date Value   03/29/2022 10.0   08/19/2021 8.1   06/08/2020 6.7 (H)             ( goal A1C is < 7)   Microalb/Crt.  Ratio (mcg/mg creat)   Date Value   12/28/2021 70 (H)     LDL Cholesterol (mg/dL)   Date Value   12/28/2021 90       (goal LDL is <100)   AST (U/L)   Date Value   05/20/2020 12     ALT (U/L)   Date Value   05/20/2020 11     BUN (mg/dL)   Date Value   12/28/2021 32 (H)     BP Readings from Last 3 Encounters:   07/06/22 122/80   03/29/22 122/73   01/11/22 137/83          (goal 120/80)        Patient Active Problem List:     DM2 (diabetes mellitus, type 2) (HCC)     Diverticulosis     DM (diabetes mellitus) ([address]     HTN (hypertension)     Elevated liver enzymes     Scrotal abscess     Abdominal abscess     Hyperplastic colon polyp     Lower abdominal pain     Diabetic foot ([address]     Infected hernioplasty mesh (HCC)     Cocaine abuse ([address]     Chest pain     BPH with obstruction/lower urinary tract symptoms     Chronic tension-type headache, intractable     Diabetic polyneuropathy associated with type 2 diabetes mellitus ([address]     Gastroesophageal reflux disease      ----Win Thornton

## 2022-07-20 RX ORDER — ALFUZOSIN HYDROCHLORIDE 10 MG/1
TABLET, EXTENDED RELEASE ORAL
Qty: 30 TABLET | Refills: 1 | Status: SHIPPED | OUTPATIENT
Start: 2022-07-20 | End: 2022-08-09 | Stop reason: SDUPTHER

## 2022-07-25 ENCOUNTER — HOSPITAL ENCOUNTER (OUTPATIENT)
Age: 62
Setting detail: SPECIMEN
Discharge: HOME | End: 2022-07-25

## 2022-07-25 DIAGNOSIS — Z79.4 TYPE 2 DIABETES MELLITUS WITHOUT COMPLICATION, WITH LONG-TERM CURRENT USE OF INSULIN (HCC): ICD-10-CM

## 2022-07-25 DIAGNOSIS — R25.2 MUSCLE CRAMPING: ICD-10-CM

## 2022-07-25 DIAGNOSIS — E11.9 TYPE 2 DIABETES MELLITUS WITHOUT COMPLICATION, WITH LONG-TERM CURRENT USE OF INSULIN (HCC): ICD-10-CM

## 2022-07-25 LAB
ANION GAP SERPL CALCULATED.3IONS-SCNC: 16 MMOL/L (ref 9–17)
BUN BLDV-MCNC: 15 MG/DL (ref 6–20)
CALCIUM SERPL-MCNC: 9.1 MG/DL (ref 8.6–10.4)
CHLORIDE BLD-SCNC: 103 MMOL/L (ref 98–107)
CO2: 22 MMOL/L (ref 20–31)
CREAT SERPL-MCNC: 0.67 MG/DL (ref 0.7–1.2)
GFR AFRICAN AMERICAN: >60 ML/MIN
GFR NON-AFRICAN AMERICAN: >60 ML/MIN
GFR SERPL CREATININE-BSD FRML MDRD: ABNORMAL ML/MIN/{1.73_M2}
GLUCOSE BLD-MCNC: 265 MG/DL (ref 70–99)
MAGNESIUM: 1.7 MG/DL (ref 1.6–2.6)
POTASSIUM SERPL-SCNC: 4.8 MMOL/L (ref 3.7–5.3)
SODIUM BLD-SCNC: 141 MMOL/L (ref 135–144)

## 2022-07-26 LAB — VITAMIN B-12: 597 PG/ML (ref 232–1245)

## 2022-08-09 ENCOUNTER — OFFICE VISIT (OUTPATIENT)
Dept: FAMILY MEDICINE CLINIC | Age: 62
End: 2022-08-09
Payer: MEDICARE

## 2022-08-09 DIAGNOSIS — E11.9 TYPE 2 DIABETES MELLITUS WITHOUT COMPLICATION, WITH LONG-TERM CURRENT USE OF INSULIN (HCC): ICD-10-CM

## 2022-08-09 DIAGNOSIS — K21.9 GASTROESOPHAGEAL REFLUX DISEASE, UNSPECIFIED WHETHER ESOPHAGITIS PRESENT: ICD-10-CM

## 2022-08-09 DIAGNOSIS — N40.1 BENIGN PROSTATIC HYPERPLASIA (BPH) WITH STRAINING ON URINATION: ICD-10-CM

## 2022-08-09 DIAGNOSIS — L85.3 XEROSIS OF SKIN: ICD-10-CM

## 2022-08-09 DIAGNOSIS — N52.39 POST-PROCEDURAL ERECTILE DYSFUNCTION, UNSPECIFIED TYPE: ICD-10-CM

## 2022-08-09 DIAGNOSIS — M75.21 BICEPS TENDINITIS OF RIGHT UPPER EXTREMITY: Primary | ICD-10-CM

## 2022-08-09 DIAGNOSIS — R39.16 BENIGN PROSTATIC HYPERPLASIA (BPH) WITH STRAINING ON URINATION: ICD-10-CM

## 2022-08-09 DIAGNOSIS — Z79.4 TYPE 2 DIABETES MELLITUS WITHOUT COMPLICATION, WITH LONG-TERM CURRENT USE OF INSULIN (HCC): ICD-10-CM

## 2022-08-09 LAB — HBA1C MFR BLD: 8.4 %

## 2022-08-09 PROCEDURE — 99214 OFFICE O/P EST MOD 30 MIN: CPT

## 2022-08-09 PROCEDURE — 3052F HG A1C>EQUAL 8.0%<EQUAL 9.0%: CPT

## 2022-08-09 PROCEDURE — 83036 HEMOGLOBIN GLYCOSYLATED A1C: CPT

## 2022-08-09 RX ORDER — DOCUSATE SODIUM 100 MG/1
100 CAPSULE, LIQUID FILLED ORAL 2 TIMES DAILY PRN
Qty: 60 CAPSULE | Refills: 2 | Status: SHIPPED | OUTPATIENT
Start: 2022-08-09

## 2022-08-09 RX ORDER — FLUTICASONE PROPIONATE 50 MCG
1 SPRAY, SUSPENSION (ML) NASAL DAILY
Qty: 1 EACH | Refills: 2 | Status: SHIPPED | OUTPATIENT
Start: 2022-08-09

## 2022-08-09 RX ORDER — ACETAMINOPHEN 500 MG
1000 TABLET ORAL EVERY 6 HOURS PRN
Qty: 60 TABLET | Refills: 0 | Status: SHIPPED | OUTPATIENT
Start: 2022-08-09

## 2022-08-09 RX ORDER — LANCETS 30 GAUGE
EACH MISCELLANEOUS
Qty: 120 EACH | Refills: 3 | Status: SHIPPED | OUTPATIENT
Start: 2022-08-09

## 2022-08-09 RX ORDER — ALFUZOSIN HYDROCHLORIDE 10 MG/1
TABLET, EXTENDED RELEASE ORAL
Qty: 30 TABLET | Refills: 1 | Status: SHIPPED | OUTPATIENT
Start: 2022-08-09

## 2022-08-09 RX ORDER — BLOOD PRESSURE TEST KIT
KIT MISCELLANEOUS
Qty: 120 EACH | Refills: 3 | Status: SHIPPED | OUTPATIENT
Start: 2022-08-09

## 2022-08-09 RX ORDER — OMEPRAZOLE 20 MG/1
CAPSULE, DELAYED RELEASE ORAL
Qty: 30 CAPSULE | Refills: 5 | Status: SHIPPED | OUTPATIENT
Start: 2022-08-09

## 2022-08-09 RX ORDER — CALCIUM CITRATE/VITAMIN D3 200MG-6.25
TABLET ORAL
Qty: 50 STRIP | Refills: 3 | Status: SHIPPED | OUTPATIENT
Start: 2022-08-09

## 2022-08-09 RX ORDER — INSULIN GLARGINE 100 [IU]/ML
INJECTION, SOLUTION SUBCUTANEOUS
Qty: 5 PEN | Refills: 4 | Status: SHIPPED | OUTPATIENT
Start: 2022-08-09

## 2022-08-09 RX ORDER — TRAZODONE HYDROCHLORIDE 50 MG/1
TABLET ORAL
Qty: 30 TABLET | Refills: 3 | Status: SHIPPED | OUTPATIENT
Start: 2022-08-09

## 2022-08-09 RX ORDER — IBUPROFEN 800 MG/1
800 TABLET ORAL EVERY 8 HOURS PRN
Qty: 30 TABLET | Refills: 0 | Status: SHIPPED | OUTPATIENT
Start: 2022-08-09 | End: 2022-08-23

## 2022-08-09 RX ORDER — PEN NEEDLE, DIABETIC 31 G X1/4"
1 NEEDLE, DISPOSABLE MISCELLANEOUS DAILY
Qty: 100 EACH | Refills: 3 | Status: SHIPPED | OUTPATIENT
Start: 2022-08-09

## 2022-08-09 RX ORDER — TADALAFIL 10 MG/1
5 TABLET ORAL PRN
Qty: 30 TABLET | Refills: 0 | Status: SHIPPED | OUTPATIENT
Start: 2022-08-09

## 2022-08-09 RX ORDER — ECHINACEA PURPUREA EXTRACT 125 MG
2 TABLET ORAL PRN
Qty: 1 EACH | Refills: 1 | Status: SHIPPED | OUTPATIENT
Start: 2022-08-09 | End: 2022-08-23

## 2022-08-09 ASSESSMENT — ENCOUNTER SYMPTOMS: COUGH: 0

## 2022-08-09 NOTE — PROGRESS NOTES
[facility] 47    Family Medicine Residency Program - Outpatient Note      Subjective:    Kylah Fitzpatrick is a 63 y.o. male with  has a past medical history of Bowel obstruction ([address] Cocaine abuse ([address] Diabetes mellitus ([address] Diabetic polyneuropathy associated with type 2 diabetes mellitus ([address] Diverticulitis, Diverticulosis, GERD (gastroesophageal reflux disease), Hypertension, MIGUEL (obstructive sleep apnea), Osteoarthritis, Renal lithiasis, Rheumatoid arteritis ([address] and Type II or unspecified type diabetes mellitus without mention of complication, not stated as uncontrolled. Presented to the office today for:  Chief Complaint   Patient presents with    Diabetes     A1C running, pt states BS have been doing well at home, needs a new meter, script pending     Shoulder Pain     RT shoulder - states dislocated years ago has been bothering him more lately especially with weather changes     Medication Refill     Needs all medications refilled - medications pending        HPI    Patient is a 65-year-old male presenting to the clinic with complaints of right shoulder pain. Patient states that he dislocated that shoulder when he was 27years old and has problems ever since. Patient has never previously seen a physician for the right shoulder pain. States he was taking ibuprofen 800 mg which did provide relief, but patient ran out of medication. Patient also does have history of rheumatoid arthritis, previously saw a rheumatologist no longer following with anybody states that he did not find it beneficial.    Is also requesting medication refills which will be provided at today's visit. Discussed labs with patient, informed that everything was within normal limits. HbA1c was conducted today showing improvement from prior HbA1c. During visit patient was offered new referral for rheumatology regarding RA, which was declined.   Was also offered to be assessed for freestyle bernabe to avoid bicep tendinitis, patient says that he did have relief with ibuprofen 800 mg. Informed patient to take as needed. Patient did not want to try Tylenol. Informed patient that he could do stretches to help with this as well as alternate between ice and heating pad. During to clinic if symptoms do not improve or worsen. - ibuprofen (ADVIL;MOTRIN) 800 MG tablet; Take 1 tablet by mouth every 8 hours as needed for Pain  Dispense: 30 tablet; Refill: 0    2. Type 2 diabetes mellitus without complication, with long-term current use of insulin ([facility])  HbA1c today has decreased from 10 to 8.1%. Continue current medication regimen. Patient refills provided at today's visit. Patient was offered possible freestyle bernabe, which he declined and says he is okay with doing the needle sticks. - POCT glycosylated hemoglobin (Hb A1C)  - metFORMIN (GLUCOPHAGE) 1000 MG tablet; TAKE ONE TABLET BY MOUTH TWICE A DAY WITH A MEAL  Dispense: 90 tablet; Refill: 3  - insulin glargine (LANTUS SOLOSTAR) 100 UNIT/ML injection pen; INJECT 40 UNITS INTO THE SKIN NIGHTLY  Dispense: 5 pen; Refill: 4  - insulin lispro (HUMALOG) 100 UNIT/ML injection vial; Inject 13 Units into the skin in the morning and 13 Units at noon and 13 Units in the evening. Inject before meals. Inject 13 Units into the skin 3 times daily (before meals). Dispense: 3 each; Refill: 2  - docusate sodium (COLACE) 100 MG capsule; Take 1 capsule by mouth 2 times daily as needed for Constipation  Dispense: 60 capsule; Refill: 2  - Alcohol Swabs PADS; Use as needed while checking blood sugar  Dispense: 120 each; Refill: 3  - Lancets MISC; Check blood sugar 4 times daily (AC HS)  Dispense: 120 each; Refill: 3  - blood glucose test strips (TRUE METRIX BLOOD GLUCOSE TEST) strip; USE ONE STRIP TO TEST DAILY AS NEEDED  Dispense: 50 strip; Refill: 3  - Insulin Syringe-Needle U-100 30G X 1/2\" 0.5 ML MISC; 1 each by Does not apply route daily  Dispense: 120 each;  Refill: 3  - blood glucose monitor kit and supplies; Test 4 times a day & as needed for symptoms of irregular blood glucose. Dispense: 1 kit; Refill: 0    3. Benign prostatic hyperplasia (BPH) with straining on urination  Medication refill  - alfuzosin (UROXATRAL) 10 MG extended release tablet; TAKE ONE TABLET BY MOUTH ONCE NIGHTLY  Dispense: 30 tablet; Refill: 1    4. Gastroesophageal reflux disease, unspecified whether esophagitis present  Medication refill  - omeprazole (PRILOSEC) 20 MG delayed release capsule; TAKE ONE CAPSULE BY MOUTH DAILY  Dispense: 30 capsule; Refill: 5    5. Post-procedural erectile dysfunction, unspecified type  - tadalafil (CIALIS) 10 MG tablet; Take 0.5 tablets by mouth as needed for Erectile Dysfunction  Dispense: 30 tablet; Refill: 0    6. Xerosis of skin  - Emollient (EUCERIN SMOOTHING REPAIR) LOTN; Apply 1 ampule topically 2 times daily  Dispense: 1 each; Refill: 1          Requested Prescriptions     Signed Prescriptions Disp Refills    alfuzosin (UROXATRAL) 10 MG extended release tablet 30 tablet 1     Sig: TAKE ONE TABLET BY MOUTH ONCE NIGHTLY    omeprazole (PRILOSEC) 20 MG delayed release capsule 30 capsule 5     Sig: TAKE ONE CAPSULE BY MOUTH DAILY    metFORMIN (GLUCOPHAGE) 1000 MG tablet 90 tablet 3     Sig: TAKE ONE TABLET BY MOUTH TWICE A DAY WITH A MEAL    tadalafil (CIALIS) 10 MG tablet 30 tablet 0     Sig: Take 0.5 tablets by mouth as needed for Erectile Dysfunction    insulin glargine (LANTUS SOLOSTAR) 100 UNIT/ML injection pen 5 pen 4     Sig: INJECT 40 UNITS INTO THE SKIN NIGHTLY    insulin lispro (HUMALOG) 100 UNIT/ML injection vial 3 each 2     Sig: Inject 13 Units into the skin in the morning and 13 Units at noon and 13 Units in the evening. Inject before meals. Inject 13 Units into the skin 3 times daily (before meals).     Insulin Pen Needle ([employer] PEN NEEDLES) 31G X 6 MM MISC 100 each 3     Si each by Does not apply route daily    docusate sodium (COLACE) 100 MG capsule 60 capsule 2     Sig: Take 1 capsule by mouth 2 times daily as needed for Constipation    Alcohol Swabs PADS 120 each 3     Sig: Use as needed while checking blood sugar    Lancets MISC 120 each 3     Sig: Check blood sugar 4 times daily (AC HS)    traZODone (DESYREL) 50 MG tablet 30 tablet 3     Sig: TAKE ONE TABLET BY MOUTH ONCE NIGHTLY    blood glucose test strips (TRUE METRIX BLOOD GLUCOSE TEST) strip 50 strip 3     Sig: USE ONE STRIP TO TEST DAILY AS NEEDED    fluticasone (FLONASE) 50 MCG/ACT nasal spray 1 each 2     Si spray by Each Nostril route in the morning. Emollient (EUCERIN SMOOTHING REPAIR) LOTN 1 each 1     Sig: Apply 1 ampule topically 2 times daily    acetaminophen (APAP EXTRA STRENGTH) 500 MG tablet 60 tablet 0     Sig: Take 2 tablets by mouth every 6 hours as needed for Pain    Insulin Syringe-Needle U-100 30G X 1/2\" 0.5 ML MISC 120 each 3     Si each by Does not apply route daily    blood glucose monitor kit and supplies 1 kit 0     Sig: Test 4 times a day & as needed for symptoms of irregular blood glucose.     ibuprofen (ADVIL;MOTRIN) 800 MG tablet 30 tablet 0     Sig: Take 1 tablet by mouth every 8 hours as needed for Pain    sodium chloride (ALTAMIST SPRAY) 0.65 % nasal spray 1 each 1     Si sprays by Nasal route as needed for Congestion       Medications Discontinued During This Encounter   Medication Reason    Blood Pressure Monitoring (ADULT BLOOD PRESSURE CUFF LG) KIT     glucose monitoring kit (FREESTYLE) monitoring kit     Handicap Placard MISC     blood glucose monitor kit and supplies REORDER    Insulin Syringe-Needle U-100 30G X 1/2\" 0.5 ML MISC REORDER    acetaminophen (APAP EXTRA STRENGTH) 500 MG tablet REORDER    Emollient (EUCERIN SMOOTHING REPAIR) LOTN REORDER    docusate sodium (COLACE) 100 MG capsule REORDER    Alcohol Swabs PADS REORDER    Lancets MISC REORDER    traZODone (DESYREL) 50 MG tablet REORDER    blood glucose test strips (TRUE METRIX BLOOD GLUCOSE TEST) strip REORDER    fluticasone (FLONASE) 50 MCG/ACT nasal spray REORDER    Insulin Pen Needle ([employer] PEN NEEDLES) 31G X 6 MM MISC REORDER    sodium chloride (ALTAMIST SPRAY) 0.65 % nasal spray REORDER    insulin lispro (HUMALOG) 100 UNIT/ML injection vial REORDER    ibuprofen (ADVIL;MOTRIN) 800 MG tablet REORDER    insulin glargine (LANTUS SOLOSTAR) 100 UNIT/ML injection pen REORDER    tadalafil (CIALIS) 10 MG tablet REORDER    metFORMIN (GLUCOPHAGE) 1000 MG tablet REORDER    omeprazole (PRILOSEC) 20 MG delayed release capsule REORDER    alfuzosin (UROXATRAL) 10 MG extended release tablet REORDER       Marcelo received counseling on the following healthy behaviors: nutrition, exercise and medication adherence    Discussed use,benefit, and side effects of prescribed medications. Barriers to medication compliance addressed. All patient questions answered. Pt voiced understanding. Return if symptoms worsen or fail to improve. Disclaimer: Some orall of this note was transcribed using voice-recognition software. This may cause typographical errors occasionally. Although all effort is made to fix these errors, please do not hesitate to contact our office if there Leon Libman concern with the understanding of this note.

## 2022-08-09 NOTE — PROGRESS NOTES
Diabetic visit information    BP Readings from Last 3 Encounters:   07/06/22 122/80   03/29/22 122/73   01/11/22 137/83       Hemoglobin A1C (%)   Date Value   03/29/2022 10.0   08/19/2021 8.1   06/08/2020 6.7 (H)     Microalb/Crt. Ratio (mcg/mg creat)   Date Value   12/28/2021 70 (H)     LDL Cholesterol (mg/dL)   Date Value   12/28/2021 90               Have you changed or started any medications since your last visit including any over-the-counter medicines, vitamins, or herbal medicines? no   Have you stopped taking any of your medications? Is so, why? -  no  Are you having any side effects from any of your medications? - no    Have you seen any other physician or provider since your last visit?  no   Have you had any other diagnostic tests since your last visit?  no   Have you been seen in the emergency room and/or had an admission in a hospital since we last saw you?  no     Have you had your annual diabetic retinal (eye) exam? No   (ensure copy of exam is in the chart)    Have you had your routine dental cleaning in the past 6 months? no    Do you have an active MyChart account? If not, what are your barriers? No    Patient Care Team:  Lilo Mcdowell MD as PCP - General (Family Medicine)    Medical history Review  Past Medical, Family, and Social History reviewed and does not contribute to the patient presenting condition.     Health Maintenance   Topic Date Due    Annual Wellness Visit (AWV)  Never done    Diabetic retinal exam  Never done    Hepatitis B vaccine (1 of 3 - Risk 3-dose series) Never done    Prostate Specific Antigen (PSA) Screening or Monitoring  Never done    Pneumococcal 0-64 years Vaccine (2 - PCV) 06/27/2014    Colorectal Cancer Screen  06/11/2017    A1C test (Diabetic or Prediabetic)  06/29/2022    Diabetic foot exam  08/19/2022    Flu vaccine (1) 09/01/2022    Diabetic microalbuminuria test  12/28/2022    Lipids  12/28/2022    Depression Screen  03/29/2023    DTaP/Tdap/Td vaccine (2 - Td or Tdap) 11/24/2025    Shingles vaccine  Completed    COVID-19 Vaccine  Completed    Hepatitis C screen  Completed    HIV screen  Completed    Hepatitis A vaccine  Aged Out    Hib vaccine  Aged Out    Meningococcal (ACWY) vaccine  Aged Out

## 2022-08-11 DIAGNOSIS — E11.42 DIABETIC POLYNEUROPATHY ASSOCIATED WITH TYPE 2 DIABETES MELLITUS (HCC): ICD-10-CM

## 2022-08-11 RX ORDER — GABAPENTIN 800 MG/1
TABLET ORAL
Qty: 90 TABLET | Refills: 0 | Status: SHIPPED | OUTPATIENT
Start: 2022-08-11 | End: 2022-09-20

## 2022-08-11 NOTE — TELEPHONE ENCOUNTER
E-scribe request for gabapentin. Please review and e-scribe if applicable. Last Visit Date:  08/09/2022  Next Visit Date:  Visit date not found    Hemoglobin A1C (%)   Date Value   08/09/2022 8.4   03/29/2022 10.0   08/19/2021 8.1             ( goal A1C is < 7)   Microalb/Crt.  Ratio (mcg/mg creat)   Date Value   12/28/2021 70 (H)     LDL Cholesterol (mg/dL)   Date Value   12/28/2021 90       (goal LDL is <100)   AST (U/L)   Date Value   05/20/2020 12     ALT (U/L)   Date Value   05/20/2020 11     BUN (mg/dL)   Date Value   07/25/2022 15     BP Readings from Last 3 Encounters:   07/06/22 122/80   03/29/22 122/73   01/11/22 137/83          (goal 120/80)        Patient Active Problem List:     DM2 (diabetes mellitus, type 2) (HCC)     Diverticulosis     DM (diabetes mellitus) ([address]     HTN (hypertension)     Elevated liver enzymes     Scrotal abscess     Abdominal abscess     Hyperplastic colon polyp     Lower abdominal pain     Diabetic foot ([address]     Infected hernioplasty mesh (HCC)     Cocaine abuse ([address]     Chest pain     BPH with obstruction/lower urinary tract symptoms     Chronic tension-type headache, intractable     Diabetic polyneuropathy associated with type 2 diabetes mellitus ([address]     Gastroesophageal reflux disease      ----Wolf Champion

## 2022-08-15 ENCOUNTER — TELEPHONE (OUTPATIENT)
Dept: FAMILY MEDICINE CLINIC | Age: 62
End: 2022-08-15

## 2022-08-24 ENCOUNTER — TELEPHONE (OUTPATIENT)
Dept: FAMILY MEDICINE CLINIC | Age: 62
End: 2022-08-24

## 2022-08-24 NOTE — TELEPHONE ENCOUNTER
Patient called stating that he was having some swelling of the finger along with a red line, and yellow dot in it. Patient states that it is possibly infected, writer advised him to go to the ER as he needs to be seen for treatment.

## 2022-09-17 DIAGNOSIS — E11.42 DIABETIC POLYNEUROPATHY ASSOCIATED WITH TYPE 2 DIABETES MELLITUS (HCC): ICD-10-CM

## 2022-09-19 NOTE — TELEPHONE ENCOUNTER
Last visit: 8/9/22  Last Med refill: 8/11/22  Does patient have enough medication for 72 hours: No:     Next Visit Date:  No future appointments. Health Maintenance   Topic Date Due    Diabetic retinal exam  Never done    Hepatitis B vaccine (1 of 3 - Risk 3-dose series) Never done    Pneumococcal 0-64 years Vaccine (2 - PCV) 06/27/2014    Colorectal Cancer Screen  06/11/2017    Annual Wellness Visit (AWV)  Never done    Diabetic foot exam  08/19/2022    Flu vaccine (1) 09/01/2022    Diabetic microalbuminuria test  12/28/2022    Lipids  12/28/2022    Depression Screen  03/29/2023    A1C test (Diabetic or Prediabetic)  08/09/2023    DTaP/Tdap/Td vaccine (2 - Td or Tdap) 11/24/2025    Shingles vaccine  Completed    COVID-19 Vaccine  Completed    Hepatitis C screen  Completed    HIV screen  Completed    Hepatitis A vaccine  Aged Out    Hib vaccine  Aged Out    Meningococcal (ACWY) vaccine  Aged Out       Hemoglobin A1C (%)   Date Value   08/09/2022 8.4   03/29/2022 10.0   08/19/2021 8.1             ( goal A1C is < 7)   Microalb/Crt.  Ratio (mcg/mg creat)   Date Value   12/28/2021 70 (H)     LDL Cholesterol (mg/dL)   Date Value   12/28/2021 90   06/08/2020 83       (goal LDL is <100)   AST (U/L)   Date Value   05/20/2020 12     ALT (U/L)   Date Value   05/20/2020 11     BUN (mg/dL)   Date Value   07/25/2022 15     BP Readings from Last 3 Encounters:   07/06/22 122/80   03/29/22 122/73   01/11/22 137/83          (goal 120/80)    All Future Testing planned in [facility]  Lab Frequency Next Occurrence               Patient Active Problem List:     DM2 (diabetes mellitus, type 2) (HCC)     Diverticulosis     DM (diabetes mellitus) (HCC)     HTN (hypertension)     Elevated liver enzymes     Scrotal abscess     Abdominal abscess     Hyperplastic colon polyp     Lower abdominal pain     Diabetic foot (HCC)     Infected hernioplasty mesh (HCC)     Cocaine abuse ([address]     Chest pain     BPH with obstruction/lower urinary tract symptoms     Chronic tension-type headache, intractable     Diabetic polyneuropathy associated with type 2 diabetes mellitus (HCC)     Gastroesophageal reflux disease

## 2022-09-20 RX ORDER — GABAPENTIN 800 MG/1
TABLET ORAL
Qty: 90 TABLET | Refills: 1 | Status: SHIPPED | OUTPATIENT
Start: 2022-09-20 | End: 2022-10-26 | Stop reason: SDUPTHER

## 2022-10-10 NOTE — PROGRESS NOTES
Attending Physician Statement  I have discussed the care of Emelia Yadav 64 y.o. male, including pertinent history and exam findings, with the resident Dr. Francisca Mishra MD.    History and Exam:   Chief Complaint   Patient presents with    Other     Jamesetta Evener     Past Medical History:   Diagnosis Date    Bowel obstruction ([facility] [address] [address]     [facility] 3/11/2020    Diabetes mellitus [facility]     Diverticulitis     Diverticulosis     GERD (gastroesophageal reflux disease)     Hypertension     MIGUEL (obstructive sleep apnea)     Osteoarthritis     Renal lithiasis     Rheumatoid arteritis ([facility] [address]     Type II or unspecified type diabetes mellitus without mention of complication, not stated as uncontrolled      No Known Allergies   I have seen and examined the patient and the key elements of the encounter have been performed by me. BP Readings from Last 3 Encounters:   11/10/20 118/76   10/13/20 120/78   09/11/20 134/88     /76 (Site: Right Upper Arm, Position: Sitting, Cuff Size: Medium Adult)   Pulse 82   Temp 96.7 °F (35.9 °C) (Temporal)   Ht 5' 9\" (1.753 m)   Wt 182 lb (82.6 kg)   BMI 26.88 kg/m²   Lab Results   Component Value Date    WBC 7.8 06/04/2020    HGB 12.8 (L) 06/04/2020    HCT 36.0 (L) 06/04/2020     06/04/2020    CHOL 153 06/08/2020    TRIG 114 06/08/2020    HDL 47 06/08/2020    ALT 11 05/20/2020    AST 12 05/20/2020     (L) 06/04/2020    K 4.4 06/04/2020    CL 97 (L) 06/04/2020    CREATININE 0.62 (L) 06/04/2020    BUN 25 (H) 06/04/2020    CO2 23 06/04/2020    TSH 0.84 03/15/2020    INR 1.3 11/11/2016    LABA1C 6.7 (H) 06/08/2020    LABMICR 30 (H) 06/08/2020     Lab Results   Component Value Date    LABPROT 7.0 01/04/2012    LABALBU 3.7 05/20/2020     No results found for: IRON, TIBC, FERRITIN  Lab Results   Component Value Date    LDLCHOLESTEROL 83 06/08/2020     I agree with the assessment, plan and the diagnosis of    Diagnosis Orders   1.  Bilateral hand pain Patient returned called to RN  She was out to lunch  I read him the notes in the encounter and he didn't have any questions      Patient can be reached at [phone] Diclofenac Sod&Camphor-Menthol 1.5 & 4-10 % THPK   2. Multiple joint pain  Vitamin D 25 Hydroxy   3. GERD (gastroesophageal reflux disease)  omeprazole (PRILOSEC) 20 MG delayed release capsule   4. Acute bilateral knee pain  [facility] - Ollie Simpson    . I agree with orders as documented by the resident. More than 25 minutes spent  in face to face encounter with the patient and more than half in counseling. Patient's questions were answered. Patient Voiced understanding to the counseling. Return in about 4 weeks (around 12/8/2020).    (GC Modifier)-Dr. Mala Yu MD

## 2022-10-26 ENCOUNTER — OFFICE VISIT (OUTPATIENT)
Dept: FAMILY MEDICINE CLINIC | Age: 62
End: 2022-10-26
Payer: MEDICARE

## 2022-10-26 VITALS
DIASTOLIC BLOOD PRESSURE: 80 MMHG | OXYGEN SATURATION: 100 % | HEIGHT: 69 IN | BODY MASS INDEX: 24.56 KG/M2 | HEART RATE: 88 BPM | SYSTOLIC BLOOD PRESSURE: 129 MMHG | WEIGHT: 165.8 LBS

## 2022-10-26 DIAGNOSIS — E11.42 DIABETIC POLYNEUROPATHY ASSOCIATED WITH TYPE 2 DIABETES MELLITUS (HCC): ICD-10-CM

## 2022-10-26 DIAGNOSIS — Z23 NEEDS FLU SHOT: ICD-10-CM

## 2022-10-26 DIAGNOSIS — R25.2 MUSCLE CRAMPS: Primary | ICD-10-CM

## 2022-10-26 PROCEDURE — 3052F HG A1C>EQUAL 8.0%<EQUAL 9.0%: CPT

## 2022-10-26 PROCEDURE — 3078F DIAST BP <80 MM HG: CPT

## 2022-10-26 PROCEDURE — 99213 OFFICE O/P EST LOW 20 MIN: CPT

## 2022-10-26 PROCEDURE — G0008 ADMIN INFLUENZA VIRUS VAC: HCPCS | Performed by: FAMILY MEDICINE

## 2022-10-26 PROCEDURE — 3074F SYST BP LT 130 MM HG: CPT

## 2022-10-26 RX ORDER — CALCIUM CARBONATE 300MG(750)
400 TABLET,CHEWABLE ORAL DAILY
Qty: 30 TABLET | Refills: 3 | Status: SHIPPED | OUTPATIENT
Start: 2022-10-26

## 2022-10-26 RX ORDER — GABAPENTIN 800 MG/1
TABLET ORAL
Qty: 90 TABLET | Refills: 1 | Status: SHIPPED | OUTPATIENT
Start: 2022-10-26 | End: 2022-11-25

## 2022-10-26 ASSESSMENT — ENCOUNTER SYMPTOMS
VOMITING: 0
CONSTIPATION: 0
ABDOMINAL PAIN: 0
DIARRHEA: 0
NAUSEA: 0

## 2022-10-26 NOTE — PROGRESS NOTES
DIABETES and HYPERTENSION visit    BP Readings from Last 3 Encounters:   07/06/22 122/80   03/29/22 122/73   01/11/22 137/83        Hemoglobin A1C (%)   Date Value   08/09/2022 8.4   03/29/2022 10.0   08/19/2021 8.1     Microalb/Crt. Ratio (mcg/mg creat)   Date Value   12/28/2021 70 (H)     LDL Cholesterol (mg/dL)   Date Value   12/28/2021 90     HDL (mg/dL)   Date Value   12/28/2021 62     BUN (mg/dL)   Date Value   07/25/2022 15     Creatinine (mg/dL)   Date Value   07/25/2022 0.67 (L)     Glucose (mg/dL)   Date Value   07/25/2022 265 (H)   01/10/2012 115 (H)            Have you changed or started any medications since your last visit including any over-the-counter medicines, vitamins, or herbal medicines? no   Have you stopped taking any of your medications? Is so, why? -  no  Are you having any side effects from any of your medications? - no    Have you seen any other physician or provider since your last visit?  no   Have you had any other diagnostic tests since your last visit?  no   Have you been seen in the emergency room and/or had an admission in a hospital since we last saw you?  no   Have you had your routine dental cleaning in the past 6 months?  no     Have you had your annual diabetic retinal (eye) exam? No   (ensure copy of exam is in the chart)    Do you have an active MyChart account? If no, what is the barrier?   No: Pending     Patient Care Team:  Felecia Patton MD as PCP - General (Family Medicine)    Medical History Review  Past Medical, Family, and Social History reviewed and does not contribute to the patient presenting condition    Health Maintenance   Topic Date Due    Diabetic retinal exam  Never done    Colorectal Cancer Screen  06/11/2017    Annual Wellness Visit (AWV)  Never done    COVID-19 Vaccine (5 - Booster for Miller Peter series) 06/24/2022    Flu vaccine (1) 08/01/2022    Diabetic foot exam  08/19/2022    Diabetic microalbuminuria test  12/28/2022    Lipids  12/28/2022    Depression Screen  03/29/2023    A1C test (Diabetic or Prediabetic)  08/09/2023    DTaP/Tdap/Td vaccine (2 - Td or Tdap) 11/24/2025    Shingles vaccine  Completed    Pneumococcal 0-64 years Vaccine  Completed    Hepatitis C screen  Completed    HIV screen  Completed    Hepatitis A vaccine  Aged Out    Hib vaccine  Aged Out    Meningococcal (ACWY) vaccine  Aged Out

## 2022-10-26 NOTE — PROGRESS NOTES
[facility]    Family Medicine Residency Program - Outpatient Note      Subjective:    Mira Tobias is a 63 y.o. male with  has a past medical history of Bowel obstruction ([address] Cocaine abuse ([address] Diabetes mellitus ([address] Diabetic polyneuropathy associated with type 2 diabetes mellitus ([address] Diverticulitis, Diverticulosis, GERD (gastroesophageal reflux disease), Hypertension, MIGUEL (obstructive sleep apnea), Osteoarthritis, Renal lithiasis, Rheumatoid arteritis ([address] and Type II or unspecified type diabetes mellitus without mention of complication, not stated as uncontrolled. Presented to the office today for:  Chief Complaint   Patient presents with    Leg Pain     Patient would like to discuss leg and ear pains / feeling weak all the time        HPI    Patient is a 77-year-old male presenting to the clinic with complaints of lower extremity muscle cramping, patient axis there is neuropathy. Upon further questioning patient describes that this is primarily worse in the morning when he is getting out of bed causing him to have an abnormal gait due to tension and tightness in his calves and tendons in his feet. Museum levels were ordered on prior visit and they are borderline low normal at 1.6. Review of Systems   Constitutional:  Negative for fatigue and fever. Cardiovascular:  Negative for chest pain, palpitations and leg swelling. Gastrointestinal:  Negative for abdominal pain, constipation, diarrhea, nausea and vomiting. Genitourinary:  Negative for difficulty urinating. Musculoskeletal:  Positive for myalgias. Neurological:  Negative for headaches.        The patient has a   Family History   Problem Relation Age of Onset    Diabetes Father     Diabetes Other     Heart Attack Other     Hypertension Other     Diabetes Brother        Objective:    /80 (Site: Left Upper Arm, Position: Sitting, Cuff Size: Medium Adult) Comment: manual  Pulse 88   Ht 5' 9\" (1.753 m) Wt 165 lb 12.8 oz (75.2 kg)   SpO2 100%   BMI 24.48 kg/m²    BP Readings from Last 3 Encounters:   10/26/22 129/80   07/06/22 122/80   03/29/22 122/73       Physical Exam  Constitutional:       Appearance: Normal appearance. He is normal weight. Cardiovascular:      Rate and Rhythm: Normal rate and regular rhythm. Pulses: Normal pulses. Heart sounds: Normal heart sounds. No murmur heard. No gallop. Pulmonary:      Effort: Pulmonary effort is normal.      Breath sounds: Normal breath sounds. No wheezing, rhonchi or rales. Musculoskeletal:      Right lower leg: No edema. Left lower leg: No edema. Neurological:      General: No focal deficit present. Mental Status: He is alert. Psychiatric:         Mood and Affect: Mood normal.       Lab Results   Component Value Date    WBC 7.8 06/04/2020    HGB 12.8 (L) 06/04/2020    HCT 36.0 (L) 06/04/2020     06/04/2020    CHOL 187 12/28/2021    TRIG 177 (H) 12/28/2021    HDL 62 12/28/2021    ALT 11 05/20/2020    AST 12 05/20/2020     07/25/2022    K 4.8 07/25/2022     07/25/2022    CREATININE 0.67 (L) 07/25/2022    BUN 15 07/25/2022    CO2 22 07/25/2022    TSH 0.84 03/15/2020    INR 1.3 11/11/2016    LABA1C 8.4 08/09/2022    LABMICR 70 (H) 12/28/2021     Lab Results   Component Value Date    CALCIUM 9.1 07/25/2022    PHOS 2.3 (L) 10/29/2016     Lab Results   Component Value Date    LDLCHOLESTEROL 90 12/28/2021       Assessment and Plan:    1. Needs flu shot  - Influenza, AFLURIA, (age 1 y+), IM, Preservative Free, 0.5 mL    2. Muscle cramps  Continues to have lower extremity muscle cramping, did discuss stretches with him on prior visit which was reiterated on this visit to properly stretch of the tendons in the feet and calves. Magnesium levels were borderline low normal.  We will do trial with magnesium 400 mg daily and reassess patient within a few months.   - Magnesium 400 MG TABS; Take 400 mg by mouth daily  Dispense: 30 tablet; Refill: 3    3. Diabetic polyneuropathy associated with type 2 diabetes mellitus (HCC)  Medication refill  - gabapentin (NEURONTIN) 800 MG tablet; TAKE ONE TABLET BY MOUTH THREE TIMES A DAY  Dispense: 90 tablet; Refill: 1          Requested Prescriptions     Signed Prescriptions Disp Refills    Magnesium 400 MG TABS 30 tablet 3     Sig: Take 400 mg by mouth daily    gabapentin (NEURONTIN) 800 MG tablet 90 tablet 1     Sig: TAKE ONE TABLET BY MOUTH THREE TIMES A DAY       Medications Discontinued During This Encounter   Medication Reason    gabapentin (NEURONTIN) 800 MG tablet Asha Headley received counseling on the following healthy behaviors: nutrition, exercise and medication adherence    Discussed use,benefit, and side effects of prescribed medications. Barriers to medication compliance addressed. All patient questions answered. Pt voiced understanding. Return in about 3 months (around 1/26/2023), or if symptoms worsen or fail to improve, for muscle cramps . Disclaimer: Some orall of this note was transcribed using voice-recognition software. This may cause typographical errors occasionally. Although all effort is made to fix these errors, please do not hesitate to contact our office if there Bourne Peers concern with the understanding of this note.

## 2022-10-26 NOTE — PATIENT INSTRUCTIONS
Thank you for letting us take care of you today. We hope all your questions were addressed. If a question was overlooked or something else comes to mind after you return home, please contact a member of your Care Team listed below. Your Care Team at [place] [address] is Team #2  Huseyin Rock DO (Faculty)  Ching Matthews (Faculty)  Lux Cao MD (Resident)  Cooper Gaines MD (Resident)  Jessica Clayton MD (Resident)  Nadege Davenport MD (Resident)  Alberto Angela., LISA Jimenez.,  BRAXTON Bernard., CATARINO Marino., [facility] office)  Obi Ordaz, [address] (Clinical Practice Manager)  Deonte Lopez [facility] (Clinical Pharmacist)     Office phone number: [phone]    If you need to get in right away due to illness, please be advised we have \"Same Day\" appointments available Monday-Friday. Please call us at [phone] option #3 to schedule your \"Same Day\" appointment.

## 2022-11-14 DIAGNOSIS — M75.21 BICEPS TENDINITIS OF RIGHT UPPER EXTREMITY: ICD-10-CM

## 2022-11-14 DIAGNOSIS — Z79.4 TYPE 2 DIABETES MELLITUS WITHOUT COMPLICATION, WITH LONG-TERM CURRENT USE OF INSULIN (HCC): ICD-10-CM

## 2022-11-14 DIAGNOSIS — N40.1 BENIGN PROSTATIC HYPERPLASIA (BPH) WITH STRAINING ON URINATION: ICD-10-CM

## 2022-11-14 DIAGNOSIS — R39.16 BENIGN PROSTATIC HYPERPLASIA (BPH) WITH STRAINING ON URINATION: ICD-10-CM

## 2022-11-14 DIAGNOSIS — E11.9 TYPE 2 DIABETES MELLITUS WITHOUT COMPLICATION, WITH LONG-TERM CURRENT USE OF INSULIN (HCC): ICD-10-CM

## 2022-11-15 NOTE — TELEPHONE ENCOUNTER
Last visit:   Last Med refill:   Does patient have enough medication for 72 hours: No:     Next Visit Date:  No future appointments. Health Maintenance   Topic Date Due    Diabetic retinal exam  Never done    Colorectal Cancer Screen  06/11/2017    Annual Wellness Visit (AWV)  Never done    COVID-19 Vaccine (5 - Booster for Pfizer series) 06/24/2022    Diabetic foot exam  08/19/2022    Diabetic microalbuminuria test  12/28/2022    Lipids  12/28/2022    Depression Screen  03/29/2023    A1C test (Diabetic or Prediabetic)  08/09/2023    DTaP/Tdap/Td vaccine (2 - Td or Tdap) 11/24/2025    Flu vaccine  Completed    Shingles vaccine  Completed    Pneumococcal 0-64 years Vaccine  Completed    Hepatitis C screen  Completed    HIV screen  Completed    Hepatitis A vaccine  Aged Out    Hib vaccine  Aged Out    Meningococcal (ACWY) vaccine  Aged Out       Hemoglobin A1C (%)   Date Value   08/09/2022 8.4   03/29/2022 10.0   08/19/2021 8.1             ( goal A1C is < 7)   Microalb/Crt.  Ratio (mcg/mg creat)   Date Value   12/28/2021 70 (H)     LDL Cholesterol (mg/dL)   Date Value   12/28/2021 90   06/08/2020 83       (goal LDL is <100)   AST (U/L)   Date Value   05/20/2020 12     ALT (U/L)   Date Value   05/20/2020 11     BUN (mg/dL)   Date Value   07/25/2022 15     BP Readings from Last 3 Encounters:   10/26/22 129/80   07/06/22 122/80   03/29/22 122/73          (goal 120/80)    All Future Testing planned in [facility]  Lab Frequency Next Occurrence               Patient Active Problem List:     DM2 (diabetes mellitus, type 2) (HCC)     Diverticulosis     DM (diabetes mellitus) (HCC)     HTN (hypertension)     Elevated liver enzymes     Scrotal abscess     Abdominal abscess     Hyperplastic colon polyp     Lower abdominal pain     Diabetic foot ([address]     Infected hernioplasty mesh (HCC)     Cocaine abuse ([address]     Chest pain     BPH with obstruction/lower urinary tract symptoms     Chronic tension-type headache, intractable Diabetic polyneuropathy associated with type 2 diabetes mellitus [address]     Gastroesophageal reflux disease         Please address the medication refill and close the encounter. If I can be of assistance, please route to the applicable pool. Thank you.

## 2022-11-16 RX ORDER — IBUPROFEN 800 MG/1
TABLET ORAL
Qty: 30 TABLET | Refills: 0 | Status: SHIPPED | OUTPATIENT
Start: 2022-11-16

## 2022-11-16 RX ORDER — DIPHENHYDRAMINE HCL 25 MG
TABLET ORAL
Qty: 1 KIT | Refills: 0 | Status: SHIPPED | OUTPATIENT
Start: 2022-11-16

## 2022-11-16 RX ORDER — ALFUZOSIN HYDROCHLORIDE 10 MG/1
TABLET, EXTENDED RELEASE ORAL
Qty: 30 TABLET | Refills: 1 | Status: SHIPPED | OUTPATIENT
Start: 2022-11-16

## 2022-12-06 DIAGNOSIS — N52.39 POST-PROCEDURAL ERECTILE DYSFUNCTION, UNSPECIFIED TYPE: ICD-10-CM

## 2022-12-06 RX ORDER — TADALAFIL 10 MG/1
TABLET ORAL
Qty: 30 TABLET | Refills: 0 | Status: SHIPPED | OUTPATIENT
Start: 2022-12-06

## 2022-12-06 NOTE — TELEPHONE ENCOUNTER
Please address the medication refill and close the encounter. If I can be of assistance, please route to the applicable pool. Thank you. Last visit: 10-26-22  Last Med refill: 8-9-22  Does patient have enough medication for 72 hours: No:     Next Visit Date:  No future appointments. Health Maintenance   Topic Date Due    Diabetic retinal exam  Never done    Colorectal Cancer Screen  06/11/2017    Annual Wellness Visit (AWV)  Never done    COVID-19 Vaccine (5 - Booster for Pfizer series) 06/24/2022    Diabetic foot exam  08/19/2022    Diabetic microalbuminuria test  12/28/2022    Lipids  12/28/2022    Depression Screen  03/29/2023    A1C test (Diabetic or Prediabetic)  08/09/2023    DTaP/Tdap/Td vaccine (2 - Td or Tdap) 11/24/2025    Flu vaccine  Completed    Shingles vaccine  Completed    Pneumococcal 0-64 years Vaccine  Completed    Hepatitis C screen  Completed    HIV screen  Completed    Hepatitis A vaccine  Aged Out    Hib vaccine  Aged Out    Meningococcal (ACWY) vaccine  Aged Out       Hemoglobin A1C (%)   Date Value   08/09/2022 8.4   03/29/2022 10.0   08/19/2021 8.1             ( goal A1C is < 7)   Microalb/Crt.  Ratio (mcg/mg creat)   Date Value   12/28/2021 70 (H)     LDL Cholesterol (mg/dL)   Date Value   12/28/2021 90   06/08/2020 83       (goal LDL is <100)   AST (U/L)   Date Value   05/20/2020 12     ALT (U/L)   Date Value   05/20/2020 11     BUN (mg/dL)   Date Value   07/25/2022 15     BP Readings from Last 3 Encounters:   10/26/22 129/80   07/06/22 122/80   03/29/22 122/73          (goal 120/80)    All Future Testing planned in [facility]  Lab Frequency Next Occurrence               Patient Active Problem List:     DM2 (diabetes mellitus, type 2) (HCC)     Diverticulosis     DM (diabetes mellitus) ([address]     HTN (hypertension)     Elevated liver enzymes     Scrotal abscess     Abdominal abscess     Hyperplastic colon polyp     Lower abdominal pain     Diabetic foot ([address]     Infected hernioplasty mesh ([facility]     Cocaine abuse ([facility]     Chest pain     BPH with obstruction/lower urinary tract symptoms     Chronic tension-type headache, intractable     Diabetic polyneuropathy associated with type 2 diabetes mellitus ([facility]     Gastroesophageal reflux disease

## 2022-12-08 DIAGNOSIS — E11.9 TYPE 2 DIABETES MELLITUS WITHOUT COMPLICATION, WITH LONG-TERM CURRENT USE OF INSULIN (HCC): ICD-10-CM

## 2022-12-08 DIAGNOSIS — Z79.4 TYPE 2 DIABETES MELLITUS WITHOUT COMPLICATION, WITH LONG-TERM CURRENT USE OF INSULIN (HCC): ICD-10-CM

## 2022-12-08 NOTE — TELEPHONE ENCOUNTER
Patient contacting office because he lost his glucometer. He would like a new one sent to his pharmacy. Order pending, please advise.

## 2022-12-16 RX ORDER — DIPHENHYDRAMINE HCL 25 MG
TABLET ORAL
Qty: 1 KIT | Refills: 0 | Status: SHIPPED | OUTPATIENT
Start: 2022-12-16

## 2022-12-16 RX ORDER — CALCIUM CITRATE/VITAMIN D3 200MG-6.25
TABLET ORAL
Qty: 50 STRIP | Refills: 3 | Status: SHIPPED | OUTPATIENT
Start: 2022-12-16

## 2022-12-19 NOTE — ANESTHESIA POSTPROCEDURE EVALUATION
Department of Anesthesiology  Postprocedure Note    Patient: Radha Brown  MRN: [id number]  [place]: 1960  Date of evaluation: 6/30/2020  Time:  11:44 AM     Procedure Summary     Date:  06/30/20 Room / Location:  [address]    Anesthesia Start:  1109 Anesthesia Stop:  8415    Procedure:  CYSTOSCOPY (N/A Bladder) Diagnosis:  (GROSS HEMATURIA, DYSURIA)    Surgeon:  Patricia Rivas MD Responsible Provider:  Tiffanie Urrutia MD    Anesthesia Type:  general, MAC ASA Status:  3          Anesthesia Type: general, MAC    Manuel Phase I:      Manuel Phase II: Manuel Score: 10    Last vitals: Reviewed and per EMR flowsheets.        Anesthesia Post Evaluation    Patient location during evaluation: PACU  Patient participation: complete - patient participated  Level of consciousness: awake  Pain score: 0  Airway patency: patent  Nausea & Vomiting: no vomiting and no nausea  Complications: no  Cardiovascular status: blood pressure returned to baseline  Respiratory status: acceptable  Hydration status: euvolemic POCUS

## 2022-12-22 ENCOUNTER — TELEPHONE (OUTPATIENT)
Dept: FAMILY MEDICINE CLINIC | Age: 62
End: 2022-12-22

## 2022-12-22 NOTE — TELEPHONE ENCOUNTER
Patient [address] [facility] is asking is the patient testing his blood sugar 1x daily or 4x daily sig(direction) need to stated it.

## 2022-12-29 ENCOUNTER — OFFICE VISIT (OUTPATIENT)
Dept: FAMILY MEDICINE CLINIC | Age: 62
End: 2022-12-29
Payer: MEDICARE

## 2022-12-29 VITALS
BODY MASS INDEX: 25.55 KG/M2 | TEMPERATURE: 98.1 F | SYSTOLIC BLOOD PRESSURE: 138 MMHG | WEIGHT: 173 LBS | HEART RATE: 99 BPM | DIASTOLIC BLOOD PRESSURE: 77 MMHG

## 2022-12-29 DIAGNOSIS — Z13.220 SCREENING FOR HYPERLIPIDEMIA: ICD-10-CM

## 2022-12-29 DIAGNOSIS — Z79.4 TYPE 2 DIABETES MELLITUS WITHOUT COMPLICATION, WITH LONG-TERM CURRENT USE OF INSULIN (HCC): ICD-10-CM

## 2022-12-29 DIAGNOSIS — E11.9 TYPE 2 DIABETES MELLITUS WITHOUT COMPLICATION, WITH LONG-TERM CURRENT USE OF INSULIN (HCC): ICD-10-CM

## 2022-12-29 DIAGNOSIS — M71.122 OTHER INFECTIVE BURSITIS, LEFT ELBOW: Primary | ICD-10-CM

## 2022-12-29 LAB — HBA1C MFR BLD: 6.6 %

## 2022-12-29 PROCEDURE — 99211 OFF/OP EST MAY X REQ PHY/QHP: CPT

## 2022-12-29 PROCEDURE — 83036 HEMOGLOBIN GLYCOSYLATED A1C: CPT

## 2022-12-29 RX ORDER — IBUPROFEN 600 MG/1
1 TABLET ORAL PRN
Qty: 1 KIT | Refills: 1 | Status: SHIPPED | OUTPATIENT
Start: 2022-12-29

## 2022-12-29 RX ORDER — CALCIUM CITRATE/VITAMIN D3 200MG-6.25
TABLET ORAL
Qty: 200 STRIP | Refills: 3 | Status: SHIPPED | OUTPATIENT
Start: 2022-12-29

## 2022-12-29 RX ORDER — CEPHALEXIN 500 MG/1
500 CAPSULE ORAL 2 TIMES DAILY
Qty: 10 CAPSULE | Refills: 0 | Status: SHIPPED | OUTPATIENT
Start: 2022-12-29 | End: 2023-01-03

## 2022-12-29 ASSESSMENT — ENCOUNTER SYMPTOMS
CHEST TIGHTNESS: 0
NAUSEA: 0
VOMITING: 0
ABDOMINAL PAIN: 0

## 2022-12-29 NOTE — PATIENT INSTRUCTIONS
Thank you for letting us take care of you today. We hope all your questions were addressed. If a question was overlooked or something else comes to mind after you return home, please contact a member of your Care Team listed below. Your Care Team at [place] [address] is Team #1  Ant Thomas MD (Faculty)  Damian Mishra (Resident)  Cinthya Thomas MD (Resident)  Aristeo Do, CATARINO Bob, CATARINO  [facility] office)  Dionisio Glaser, [address] (Clinical Practice Manager)  Letitia Acuna [facility] (Clinical Pharmacist)     Office phone number: [phone]    If you need to get in right away due to illness, please be advised we have \"Same Day\" appointments available Monday-Friday. Please call us at [phone] option #3 to schedule your \"Same Day\" appointment.

## 2022-12-29 NOTE — PROGRESS NOTES
[facility]    Family Medicine Residency Program - Outpatient Note      Subjective:    Ben Cesar is a 63 y.o. male with  has a past medical history of Bowel obstruction ([address] Cocaine abuse ([address] Diabetes mellitus ([address] Diabetic polyneuropathy associated with type 2 diabetes mellitus ([address] Diverticulitis, Diverticulosis, GERD (gastroesophageal reflux disease), Hypertension, MIGUEL (obstructive sleep apnea), Osteoarthritis, Renal lithiasis, Rheumatoid arteritis ([address] and Type II or unspecified type diabetes mellitus without mention of complication, not stated as uncontrolled. Presented to the office today for:  Chief Complaint   Patient presents with    Elbow Pain     Patient states he woke up and his left elbow was swollen    Orders     Patient states he is in need for a glucometer        HPI    Patient is a 63year old male presenting to the clinic for left elbow swelling that started this morning. Patient says he had some discomfort for a few days but now it is quite painful/tender and swollen. No hx of gout. Has never had this happen before. Patient also mentions over the past month he had 2 episodes of hypoglycemia requiring him to go to the hospital. Patient says this was due to not having enough blood glucose strips (prescription was for once a day but patient checks glucose TID). Review of Systems   Constitutional:  Negative for chills and fever. Respiratory:  Negative for chest tightness. Cardiovascular:  Negative for chest pain. Gastrointestinal:  Negative for abdominal pain, nausea and vomiting. Genitourinary:  Negative for difficulty urinating. Musculoskeletal:         Left elbow swelling and pain   Neurological:  Negative for dizziness.        The patient has a   Family History   Problem Relation Age of Onset    Diabetes Father     Diabetes Other     Heart Attack Other     Hypertension Other     Diabetes Brother        Objective:    /77 (Site: Left Upper Arm, Position: Sitting, Cuff Size: Medium Adult)   Pulse 99   Temp 98.1 °F (36.7 °C) (Oral)   Wt 173 lb (78.5 kg)   BMI 25.55 kg/m²    BP Readings from Last 3 Encounters:   12/29/22 138/77   10/26/22 129/80   07/06/22 122/80       Physical Exam  Constitutional:       Appearance: Normal appearance. Cardiovascular:      Rate and Rhythm: Normal rate and regular rhythm. Pulses: Normal pulses. Heart sounds: Normal heart sounds. No murmur heard. No gallop. Pulmonary:      Effort: Pulmonary effort is normal.      Breath sounds: Normal breath sounds. No wheezing, rhonchi or rales. Musculoskeletal:      Comments: Left elbow swelling, induration, mild erythema and break in skin   Skin:     General: Skin is warm. Neurological:      Mental Status: He is alert. Psychiatric:         Mood and Affect: Mood normal.       Lab Results   Component Value Date    WBC 7.8 06/04/2020    HGB 12.8 (L) 06/04/2020    HCT 36.0 (L) 06/04/2020     06/04/2020    CHOL 187 12/28/2021    TRIG 177 (H) 12/28/2021    HDL 62 12/28/2021    ALT 11 05/20/2020    AST 12 05/20/2020     07/25/2022    K 4.8 07/25/2022     07/25/2022    CREATININE 0.67 (L) 07/25/2022    BUN 15 07/25/2022    CO2 22 07/25/2022    TSH 0.84 03/15/2020    INR 1.3 11/11/2016    LABA1C 8.4 08/09/2022    LABMICR 70 (H) 12/28/2021     Lab Results   Component Value Date    CALCIUM 9.1 07/25/2022    PHOS 2.3 (L) 10/29/2016     Lab Results   Component Value Date    LDLCHOLESTEROL 90 12/28/2021       Assessment and Plan:    1. Type 2 diabetes mellitus without complication, with long-term current use of insulin (HCC)  A1C today is 6.6% from 8.4% in August 2022. Will continue current regimen. Patient also provided glucagon in case there are any future episodes of hypoglycemia. Will also provide test strips for three times a day.    - POCT glycosylated hemoglobin (Hb A1C)  - Microalbumin, Ur; Future  - TRUE METRIX BLOOD GLUCOSE TEST strip; Check blood sugar 3 times a day and if symptoms of hypoglycemia  Dispense: 200 strip; Refill: 3  - Glucagon, rDNA, (GLUCAGON EMERGENCY) 1 MG KIT; Inject 1 mg as directed as needed (for hypoglycemia)  Dispense: 1 kit; Refill: 1    2. Other infective bursitis, left elbow  Suspected infective bursitis of left elbow. Will do trial with 5 days of Keflex and reassess. Patient may require aspiration if not resolution. - cephALEXin (KEFLEX) 500 MG capsule; Take 1 capsule by mouth 2 times daily for 5 days  Dispense: 10 capsule; Refill: 0    3. Screening for hyperlipidemia  - Lipid Panel; Future          Requested Prescriptions     Signed Prescriptions Disp Refills    TRUE METRIX BLOOD GLUCOSE TEST strip 200 strip 3     Sig: Check blood sugar 3 times a day and if symptoms of hypoglycemia    cephALEXin (KEFLEX) 500 MG capsule 10 capsule 0     Sig: Take 1 capsule by mouth 2 times daily for 5 days    Glucagon, rDNA, (GLUCAGON EMERGENCY) 1 MG KIT 1 kit 1     Sig: Inject 1 mg as directed as needed (for hypoglycemia)       Medications Discontinued During This Encounter   Medication Reason    blood glucose test strips (TRUE METRIX BLOOD GLUCOSE TEST) strip REORDER       Marcelo received counseling on the following healthy behaviors: nutrition, exercise and medication adherence    Discussed use,benefit, and side effects of prescribed medications. Barriers to medication compliance addressed. All patient questions answered. Pt voiced understanding. Return in about 5 days (around 1/3/2023), or if symptoms worsen or fail to improve, for elbow. Disclaimer: Some orall of this note was transcribed using voice-recognition software. This may cause typographical errors occasionally. Although all effort is made to fix these errors, please do not hesitate to contact our office if there [place] concern with the understanding of this note.

## 2022-12-29 NOTE — PROGRESS NOTES
Diabetic visit information    BP Readings from Last 3 Encounters:   10/26/22 129/80   07/06/22 122/80   03/29/22 122/73       Hemoglobin A1C (%)   Date Value   08/09/2022 8.4   03/29/2022 10.0   08/19/2021 8.1     Microalb/Crt. Ratio (mcg/mg creat)   Date Value   12/28/2021 70 (H)     LDL Cholesterol (mg/dL)   Date Value   12/28/2021 90               Have you changed or started any medications since your last visit including any over-the-counter medicines, vitamins, or herbal medicines? no   Have you stopped taking any of your medications? Is so, why? -  no  Are you having any side effects from any of your medications? - no    Have you seen any other physician or provider since your last visit?  no   Have you had any other diagnostic tests since your last visit?  no   Have you been seen in the emergency room and/or had an admission in a hospital since we last saw you?  no     Have you had your annual diabetic retinal (eye) exam?   (ensure copy of exam is in the chart)    Have you had your routine dental cleaning in the past 6 months? no    Do you have an active [employer] account? If not, what are your barriers? No:     Patient Care Team:  Chelle Mcgill MD as PCP - General (Family Medicine)    Medical history Review  Past Medical, Family, and Social History reviewed and does not contribute to the patient presenting condition.     Health Maintenance   Topic Date Due    Diabetic retinal exam  Never done    Diabetic Alb to Cr ratio (uACR) test  11/24/2016    Colorectal Cancer Screen  06/11/2017    Annual Wellness Visit (AWV)  Never done    COVID-19 Vaccine (5 - Booster for Pfizer series) 06/24/2022    Diabetic foot exam  08/19/2022    Lipids  12/28/2022    Depression Screen  03/29/2023    GFR test (Diabetes, CKD 3-4, OR last GFR 15-59)  07/25/2023    A1C test (Diabetic or Prediabetic)  08/09/2023    DTaP/Tdap/Td vaccine (2 - Td or Tdap) 11/24/2025    Flu vaccine  Completed    Shingles vaccine  Completed Pneumococcal 0-64 years Vaccine  Completed    Hepatitis C screen  Completed    HIV screen  Completed    Hepatitis A vaccine  Aged Out    Hib vaccine  Aged Out    Meningococcal (ACWY) vaccine  Aged Out

## 2022-12-29 NOTE — PROGRESS NOTES
Attending Physician Statement  I  have discussed the care of Ismael Clayton including pertinent history and exam findings with the resident. I agree with the assessment, plan and orders as documented by the resident. /77 (Site: Left Upper Arm, Position: Sitting, Cuff Size: Medium Adult)   Pulse 99   Temp 98.1 °F (36.7 °C) (Oral)   Wt 173 lb (78.5 kg)   BMI 25.55 kg/m²    BP Readings from Last 3 Encounters:   12/29/22 138/77   10/26/22 129/80   07/06/22 122/80     Wt Readings from Last 3 Encounters:   12/29/22 173 lb (78.5 kg)   10/26/22 165 lb 12.8 oz (75.2 kg)   07/06/22 165 lb 12.8 oz (75.2 kg)          Diagnosis Orders   1. Other infective bursitis, left elbow  cephALEXin (KEFLEX) 500 MG capsule      2. Type 2 diabetes mellitus without complication, with long-term current use of insulin (HCC)  POCT glycosylated hemoglobin (Hb A1C)    Microalbumin, Ur    TRUE METRIX BLOOD GLUCOSE TEST strip    Glucagon, rDNA, (GLUCAGON EMERGENCY) 1 MG KIT      3.  Screening for hyperlipidemia  Lipid Panel          Iram Sharp DO 12/30/2022 3:35 PM

## 2022-12-30 ENCOUNTER — TELEPHONE (OUTPATIENT)
Dept: FAMILY MEDICINE CLINIC | Age: 62
End: 2022-12-30

## 2022-12-30 DIAGNOSIS — Z79.4 TYPE 2 DIABETES MELLITUS WITHOUT COMPLICATION, WITH LONG-TERM CURRENT USE OF INSULIN (HCC): ICD-10-CM

## 2022-12-30 DIAGNOSIS — E11.9 TYPE 2 DIABETES MELLITUS WITHOUT COMPLICATION, WITH LONG-TERM CURRENT USE OF INSULIN (HCC): ICD-10-CM

## 2022-12-30 NOTE — TELEPHONE ENCOUNTER
Writer called the patient to inform him about his next appointment, patient stated that he can not receive his test strips because his insurance only want to cover only 100 test strips not the 200. Patient need new prescription for test strip.

## 2023-01-03 RX ORDER — CALCIUM CITRATE/VITAMIN D3 200MG-6.25
TABLET ORAL
Qty: 100 STRIP | Refills: 3 | Status: SHIPPED | OUTPATIENT
Start: 2023-01-03

## 2023-01-16 DIAGNOSIS — R39.16 BENIGN PROSTATIC HYPERPLASIA (BPH) WITH STRAINING ON URINATION: ICD-10-CM

## 2023-01-16 DIAGNOSIS — N40.1 BENIGN PROSTATIC HYPERPLASIA (BPH) WITH STRAINING ON URINATION: ICD-10-CM

## 2023-01-16 DIAGNOSIS — E11.42 DIABETIC POLYNEUROPATHY ASSOCIATED WITH TYPE 2 DIABETES MELLITUS (HCC): ICD-10-CM

## 2023-01-16 NOTE — TELEPHONE ENCOUNTER
Last visit: 12/29/22  Last Med refill: 12/26/22  Does patient have enough medication for 72 hours: Yes  Next Visit Date:  No future appointments. Health Maintenance   Topic Date Due    Diabetic retinal exam  Never done    Colorectal Cancer Screen  06/11/2017    Annual Wellness Visit (AWV)  Never done    COVID-19 Vaccine (5 - Booster for Pfizer series) 06/24/2022    Diabetic foot exam  08/19/2022    Diabetic Alb to Cr ratio (uACR) test  12/28/2022    Lipids  12/28/2022    Depression Screen  03/29/2023    GFR test (Diabetes, CKD 3-4, OR last GFR 15-59)  07/25/2023    A1C test (Diabetic or Prediabetic)  12/29/2023    DTaP/Tdap/Td vaccine (2 - Td or Tdap) 11/24/2025    Flu vaccine  Completed    Shingles vaccine  Completed    Pneumococcal 0-64 years Vaccine  Completed    Hepatitis C screen  Completed    HIV screen  Completed    Hepatitis A vaccine  Aged Out    Hib vaccine  Aged Out    Meningococcal (ACWY) vaccine  Aged Out       Hemoglobin A1C (%)   Date Value   12/29/2022 6.6   08/09/2022 8.4   03/29/2022 10.0             ( goal A1C is < 7)   Microalb/Crt.  Ratio (mcg/mg creat)   Date Value   12/28/2021 70 (H)     LDL Cholesterol (mg/dL)   Date Value   12/28/2021 90   06/08/2020 83       (goal LDL is <100)   AST (U/L)   Date Value   05/20/2020 12     ALT (U/L)   Date Value   05/20/2020 11     BUN (mg/dL)   Date Value   07/25/2022 15     BP Readings from Last 3 Encounters:   12/29/22 138/77   10/26/22 129/80   07/06/22 122/80          (goal 120/80)    All Future Testing planned in [facility]  Lab Frequency Next Occurrence   Microalbumin, Ur Once 01/29/2023   Lipid Panel Once 12/29/2022               Patient Active Problem List:     DM2 (diabetes mellitus, type 2) ([facility]     Diverticulosis     DM (diabetes mellitus) ([facility] [address]     HTN (hypertension)     Elevated liver enzymes     Scrotal abscess     Abdominal abscess     Hyperplastic colon polyp     Lower abdominal pain     Diabetic foot (HCC)     Infected hernioplasty mesh (HCC)     Cocaine abuse (HCC)     Chest pain     BPH with obstruction/lower urinary tract symptoms     Chronic tension-type headache, intractable     Diabetic polyneuropathy associated with type 2 diabetes mellitus (HCC)     Gastroesophageal reflux disease

## 2023-01-20 RX ORDER — GABAPENTIN 800 MG/1
TABLET ORAL
Qty: 90 TABLET | Refills: 1 | Status: SHIPPED | OUTPATIENT
Start: 2023-01-20 | End: 2023-02-15

## 2023-01-20 RX ORDER — ALFUZOSIN HYDROCHLORIDE 10 MG/1
TABLET, EXTENDED RELEASE ORAL
Qty: 30 TABLET | Refills: 1 | Status: SHIPPED | OUTPATIENT
Start: 2023-01-20

## 2023-01-27 ENCOUNTER — OFFICE VISIT (OUTPATIENT)
Dept: FAMILY MEDICINE CLINIC | Age: 63
End: 2023-01-27
Payer: MEDICARE

## 2023-01-27 VITALS
BODY MASS INDEX: 26.66 KG/M2 | DIASTOLIC BLOOD PRESSURE: 79 MMHG | HEART RATE: 83 BPM | WEIGHT: 180 LBS | HEIGHT: 69 IN | SYSTOLIC BLOOD PRESSURE: 144 MMHG | TEMPERATURE: 97.3 F

## 2023-01-27 DIAGNOSIS — E11.9 TYPE 2 DIABETES MELLITUS WITHOUT COMPLICATION, WITH LONG-TERM CURRENT USE OF INSULIN (HCC): Primary | ICD-10-CM

## 2023-01-27 DIAGNOSIS — K21.9 GASTROESOPHAGEAL REFLUX DISEASE, UNSPECIFIED WHETHER ESOPHAGITIS PRESENT: ICD-10-CM

## 2023-01-27 DIAGNOSIS — Z79.4 TYPE 2 DIABETES MELLITUS WITHOUT COMPLICATION, WITH LONG-TERM CURRENT USE OF INSULIN (HCC): Primary | ICD-10-CM

## 2023-01-27 DIAGNOSIS — I73.9 INTERMITTENT CLAUDICATION (HCC): ICD-10-CM

## 2023-01-27 LAB — HBA1C MFR BLD: 6.3 %

## 2023-01-27 PROCEDURE — 3074F SYST BP LT 130 MM HG: CPT

## 2023-01-27 PROCEDURE — 3044F HG A1C LEVEL LT 7.0%: CPT

## 2023-01-27 PROCEDURE — 83036 HEMOGLOBIN GLYCOSYLATED A1C: CPT

## 2023-01-27 PROCEDURE — 99213 OFFICE O/P EST LOW 20 MIN: CPT

## 2023-01-27 PROCEDURE — 3078F DIAST BP <80 MM HG: CPT

## 2023-01-27 RX ORDER — OMEPRAZOLE 20 MG/1
CAPSULE, DELAYED RELEASE ORAL
Qty: 30 CAPSULE | Refills: 5 | Status: SHIPPED | OUTPATIENT
Start: 2023-01-27

## 2023-01-27 SDOH — ECONOMIC STABILITY: FOOD INSECURITY: WITHIN THE PAST 12 MONTHS, YOU WORRIED THAT YOUR FOOD WOULD RUN OUT BEFORE YOU GOT MONEY TO BUY MORE.: NEVER TRUE

## 2023-01-27 SDOH — ECONOMIC STABILITY: FOOD INSECURITY: WITHIN THE PAST 12 MONTHS, THE FOOD YOU BOUGHT JUST DIDN'T LAST AND YOU DIDN'T HAVE MONEY TO GET MORE.: NEVER TRUE

## 2023-01-27 ASSESSMENT — ENCOUNTER SYMPTOMS
DIARRHEA: 0
CHEST TIGHTNESS: 0
COLOR CHANGE: 0
ABDOMINAL PAIN: 0
NAUSEA: 0
SHORTNESS OF BREATH: 0
VOMITING: 0

## 2023-01-27 ASSESSMENT — PATIENT HEALTH QUESTIONNAIRE - PHQ9
SUM OF ALL RESPONSES TO PHQ QUESTIONS 1-9: 0
SUM OF ALL RESPONSES TO PHQ QUESTIONS 1-9: 0
2. FEELING DOWN, DEPRESSED OR HOPELESS: 0
DEPRESSION UNABLE TO ASSESS: PT REFUSES
SUM OF ALL RESPONSES TO PHQ QUESTIONS 1-9: 0
SUM OF ALL RESPONSES TO PHQ9 QUESTIONS 1 & 2: 0
SUM OF ALL RESPONSES TO PHQ QUESTIONS 1-9: 0
1. LITTLE INTEREST OR PLEASURE IN DOING THINGS: 0

## 2023-01-27 ASSESSMENT — SOCIAL DETERMINANTS OF HEALTH (SDOH): HOW HARD IS IT FOR YOU TO PAY FOR THE VERY BASICS LIKE FOOD, HOUSING, MEDICAL CARE, AND HEATING?: NOT HARD AT ALL

## 2023-01-27 NOTE — PROGRESS NOTES
Diabetic visit information    BP Readings from Last 3 Encounters:   12/29/22 138/77   10/26/22 129/80   07/06/22 122/80       Hemoglobin A1C (%)   Date Value   12/29/2022 6.6   08/09/2022 8.4   03/29/2022 10.0     Microalb/Crt. Ratio (mcg/mg creat)   Date Value   12/28/2021 70 (H)     LDL Cholesterol (mg/dL)   Date Value   12/28/2021 90               Have you changed or started any medications since your last visit including any over-the-counter medicines, vitamins, or herbal medicines? no   Have you stopped taking any of your medications? Is so, why? -  no  Are you having any side effects from any of your medications? - no    Have you seen any other physician or provider since your last visit?  no   Have you had any other diagnostic tests since your last visit?  no   Have you been seen in the emergency room and/or had an admission in a hospital since we last saw you?  no     Have you had your annual diabetic retinal (eye) exam? No   (ensure copy of exam is in the chart)    Have you had your routine dental cleaning in the past 6 months? no    Do you have an active [employer] account? If not, what are your barriers? No: Pending    Patient Care Team:  Lei Adams MD as PCP - General (Family Medicine)    Medical history Review  Past Medical, Family, and Social History reviewed and does contribute to the patient presenting condition.     Health Maintenance   Topic Date Due    Diabetic retinal exam  Never done    Colorectal Cancer Screen  06/11/2017    Annual Wellness Visit (AWV)  Never done    COVID-19 Vaccine (5 - Booster for Pfizer series) 06/24/2022    Diabetic foot exam  08/19/2022    Diabetic Alb to Cr ratio (uACR) test  12/28/2022    Lipids  12/28/2022    Depression Screen  03/29/2023    GFR test (Diabetes, CKD 3-4, OR last GFR 15-59)  07/25/2023    A1C test (Diabetic or Prediabetic)  12/29/2023    DTaP/Tdap/Td vaccine (2 - Td or Tdap) 11/24/2025    Flu vaccine  Completed    Shingles vaccine  Completed Pneumococcal 0-64 years Vaccine  Completed    Hepatitis C screen  Completed    HIV screen  Completed    Hepatitis A vaccine  Aged Out    Hib vaccine  Aged Out    Meningococcal (ACWY) vaccine  Aged Out

## 2023-01-27 NOTE — PATIENT INSTRUCTIONS
Thank you for letting us take care of you today. We hope all your questions were addressed. If a question was overlooked or something else comes to mind after you return home, please contact a member of your Care Team listed below. Your Care Team at [place] [address] is Team #1  Kristina Peabody, MD (Faculty)  Patricia Zavala MD(Resident)  Jen Lin MD (Resident)  Frank Francisoc DO (Resident)  Afsaneh Rey, LUCA Gallo., LUCA Alvarez, CATARINO Petty., Rob Tenorio., [facility] office)  Ivan Clark, [address] (Clinical Practice Manager)  Clifton Brenner [facility] (Clinical Pharmacist)     Office phone number: [phone]    If you need to get in right away due to illness, please be advised we have \"Same Day\" appointments available Monday-Friday. Please call us at [phone] option #3 to schedule your \"Same Day\" appointment.

## 2023-01-27 NOTE — PROGRESS NOTES
[facility]    Family Medicine Residency Program - Outpatient Note      Subjective:    Parvin Mccain is a 63 y.o. male with  has a past medical history of Bowel obstruction ([address] Cocaine abuse ([address] Diabetes mellitus ([address] Diabetic polyneuropathy associated with type 2 diabetes mellitus ([address] Diverticulitis, Diverticulosis, GERD (gastroesophageal reflux disease), Hypertension, MIGUEL (obstructive sleep apnea), Osteoarthritis, Renal lithiasis, Rheumatoid arteritis ([address] and Type II or unspecified type diabetes mellitus without mention of complication, not stated as uncontrolled. Presented to the office today for:  Chief Complaint   Patient presents with    Diabetes     Follow up DM     Leg Pain     On both leg in the calf area        HPI    Patient is a 63year old male presenting to the clinic with complaints of calf pain x 1 year. Previously described as more of a cramping sensation, electrolytes have been normal, did try stretches but recently calves have become tight, painful and he claims erythematous. Denies any hx of stroke or MI but does have hx of DM and HTN. Had clot in left upper leg as a kid, no fhx of clots. Says pain is worse when initiating walking but at rest he is ok. Also has concerns of blood glucose dropping to 40s in the morning, drinks juice and is ok. Discussed decreasing evening lantus to 35 units. Review of Systems   Constitutional:  Negative for chills, fatigue and fever. Respiratory:  Negative for chest tightness and shortness of breath. Gastrointestinal:  Negative for abdominal pain, diarrhea, nausea and vomiting. Musculoskeletal:         Calf pain   Skin:  Negative for color change.        The patient has a   Family History   Problem Relation Age of Onset    Diabetes Father     Diabetes Other     Heart Attack Other     Hypertension Other     Diabetes Brother        Objective:    BP (!) 144/79 (Site: Left Upper Arm, Position: Sitting, Cuff Size: Medium Adult)   Pulse 83   Temp 97.3 °F (36.3 °C) (Oral)   Ht 5' 9.02\" (1.753 m)   Wt 180 lb (81.6 kg)   BMI 26.57 kg/m²    BP Readings from Last 3 Encounters:   01/27/23 (!) 144/79   12/29/22 138/77   10/26/22 129/80       Physical Exam  Constitutional:       Appearance: Normal appearance. Cardiovascular:      Rate and Rhythm: Normal rate and regular rhythm. Pulses: Normal pulses. Heart sounds: Normal heart sounds. No murmur heard. No gallop. Pulmonary:      Effort: Pulmonary effort is normal.      Breath sounds: Normal breath sounds. No wheezing, rhonchi or rales. Musculoskeletal:      Right lower leg: No edema. Left lower leg: No edema. Comments: Firm calves bilaterally, no swelling, minimal erythema  Patient unable to due Hallie's due to pain/tightness  Pulses intact   Neurological:      Mental Status: He is alert. Lab Results   Component Value Date    WBC 7.8 06/04/2020    HGB 12.8 (L) 06/04/2020    HCT 36.0 (L) 06/04/2020     06/04/2020    CHOL 187 12/28/2021    TRIG 177 (H) 12/28/2021    HDL 62 12/28/2021    ALT 11 05/20/2020    AST 12 05/20/2020     07/25/2022    K 4.8 07/25/2022     07/25/2022    CREATININE 0.67 (L) 07/25/2022    BUN 15 07/25/2022    CO2 22 07/25/2022    TSH 0.84 03/15/2020    INR 1.3 11/11/2016    LABA1C 6.3 01/27/2023    LABMICR 70 (H) 12/28/2021     Lab Results   Component Value Date    CALCIUM 9.1 07/25/2022    PHOS 2.3 (L) 10/29/2016     Lab Results   Component Value Date    LDLCHOLESTEROL 90 12/28/2021       Assessment and Plan:    1. Gastroesophageal reflux disease, unspecified whether esophagitis present  Med refill  - omeprazole (PRILOSEC) 20 MG delayed release capsule; TAKE ONE CAPSULE BY MOUTH DAILY  Dispense: 30 capsule; Refill: 5    2.  Type 2 diabetes mellitus without complication, with long-term current use of insulin ([facility])  A1C improving to 6.3%, pt is having some hypoglycemic episodes in the morning with BG around 40s. Will decrease evening Lantus from  40 to 35 units and follow up in 1 month. At that time, may consider decreasing lantus further if hypoglycemic episodes continue.   - POCT glycosylated hemoglobin (Hb A1C)    3. Intermittent claudication (HCC)  Treatment and claudication in calves bilaterally accompanied by calf tightness and pain. Will obtain ABIs bilaterally to assess for PAD. Should ABIs come back abnormal, will refer patient to physical therapy/walking program.  - VL SHAHEED BILATERAL LIMITED 1-2 LEVELS; Future          Requested Prescriptions     Signed Prescriptions Disp Refills    omeprazole (PRILOSEC) 20 MG delayed release capsule 30 capsule 5     Sig: TAKE ONE CAPSULE BY MOUTH DAILY       Medications Discontinued During This Encounter   Medication Reason    omeprazole (PRILOSEC) 20 MG delayed release capsule REORDER       Marcelo received counseling on the following healthy behaviors: nutrition, exercise and medication adherence    Discussed use,benefit, and side effects of prescribed medications. Barriers to medication compliance addressed. All patient questions answered. Pt voiced understanding. Return in about 4 weeks (around 2/24/2023), or if symptoms worsen or fail to improve, for SHAHEED results. Disclaimer: Some orall of this note was transcribed using voice-recognition software. This may cause typographical errors occasionally. Although all effort is made to fix these errors, please do not hesitate to contact our office if there Marina Bellamy concern with the understanding of this note.

## 2023-01-27 NOTE — PROGRESS NOTES
Attending Physician Statement  I  have discussed the care of Ismael Clayton including pertinent history and exam findings with the resident. I agree with the assessment, plan and orders as documented by the resident. Addressed need for SHAHEED for claudication   DM addressed and lantus dose decreased due to hypoglycemia during the night     BP (!) 144/79 (Site: Left Upper Arm, Position: Sitting, Cuff Size: Medium Adult)   Pulse 83   Temp 97.3 °F (36.3 °C) (Oral)   Ht 5' 9.02\" (1.753 m)   Wt 180 lb (81.6 kg)   BMI 26.57 kg/m²    BP Readings from Last 3 Encounters:   01/27/23 (!) 144/79   12/29/22 138/77   10/26/22 129/80     Wt Readings from Last 3 Encounters:   01/27/23 180 lb (81.6 kg)   12/29/22 173 lb (78.5 kg)   10/26/22 165 lb 12.8 oz (75.2 kg)          Diagnosis Orders   1. Type 2 diabetes mellitus without complication, with long-term current use of insulin ([facility])  POCT glycosylated hemoglobin (Hb A1C)      2. Gastroesophageal reflux disease, unspecified whether esophagitis present  omeprazole (PRILOSEC) 20 MG delayed release capsule      3.  Intermittent claudication ([address]  VL SHAHEED BILATERAL LIMITED 1-2 LEVELS          Gale Perez MD 1/30/2023 11:52 AM

## 2023-02-17 ENCOUNTER — HOSPITAL ENCOUNTER (OUTPATIENT)
Dept: VASCULAR LAB | Age: 63
Discharge: HOME | End: 2023-02-17
Payer: MEDICARE

## 2023-02-17 DIAGNOSIS — I73.9 INTERMITTENT CLAUDICATION (HCC): ICD-10-CM

## 2023-02-17 PROCEDURE — 93922 UPR/L XTREMITY ART 2 LEVELS: CPT

## 2023-02-24 ENCOUNTER — OFFICE VISIT (OUTPATIENT)
Dept: FAMILY MEDICINE CLINIC | Age: 63
End: 2023-02-24

## 2023-02-24 VITALS — DIASTOLIC BLOOD PRESSURE: 95 MMHG | HEART RATE: 83 BPM | SYSTOLIC BLOOD PRESSURE: 169 MMHG

## 2023-02-24 DIAGNOSIS — Z79.4 TYPE 2 DIABETES MELLITUS WITHOUT COMPLICATION, WITH LONG-TERM CURRENT USE OF INSULIN (HCC): ICD-10-CM

## 2023-02-24 DIAGNOSIS — E11.9 TYPE 2 DIABETES MELLITUS WITHOUT COMPLICATION, WITH LONG-TERM CURRENT USE OF INSULIN (HCC): ICD-10-CM

## 2023-02-24 DIAGNOSIS — I73.9 INTERMITTENT CLAUDICATION (HCC): ICD-10-CM

## 2023-02-24 DIAGNOSIS — I10 ESSENTIAL HYPERTENSION: Primary | ICD-10-CM

## 2023-02-24 DIAGNOSIS — H60.502 ACUTE OTITIS EXTERNA OF LEFT EAR, UNSPECIFIED TYPE: ICD-10-CM

## 2023-02-24 RX ORDER — AMLODIPINE BESYLATE 5 MG/1
5 TABLET ORAL DAILY
Qty: 30 TABLET | Refills: 3 | Status: SHIPPED | OUTPATIENT
Start: 2023-02-24 | End: 2023-02-24 | Stop reason: ALTCHOICE

## 2023-02-24 RX ORDER — CIPROFLOXACIN/HYDROCORTISONE 0.2 %-1 %
3 SUSPENSION, DROPS(FINAL DOSAGE FORM)(ML) OTIC (EAR) 2 TIMES DAILY
Qty: 10 ML | Refills: 0 | Status: SHIPPED | OUTPATIENT
Start: 2023-02-24 | End: 2023-03-03

## 2023-02-24 RX ORDER — IBUPROFEN 600 MG/1
1 TABLET ORAL PRN
Qty: 1 KIT | Refills: 1 | Status: SHIPPED | OUTPATIENT
Start: 2023-02-24

## 2023-02-24 RX ORDER — LISINOPRIL 20 MG/1
TABLET ORAL
Qty: 60 TABLET | Refills: 5 | Status: SHIPPED | OUTPATIENT
Start: 2023-02-24

## 2023-02-24 SDOH — ECONOMIC STABILITY: FOOD INSECURITY: WITHIN THE PAST 12 MONTHS, YOU WORRIED THAT YOUR FOOD WOULD RUN OUT BEFORE YOU GOT MONEY TO BUY MORE.: NEVER TRUE

## 2023-02-24 SDOH — ECONOMIC STABILITY: HOUSING INSECURITY
IN THE LAST 12 MONTHS, WAS THERE A TIME WHEN YOU DID NOT HAVE A STEADY PLACE TO SLEEP OR SLEPT IN A SHELTER (INCLUDING NOW)?: NO

## 2023-02-24 SDOH — ECONOMIC STABILITY: INCOME INSECURITY: HOW HARD IS IT FOR YOU TO PAY FOR THE VERY BASICS LIKE FOOD, HOUSING, MEDICAL CARE, AND HEATING?: NOT HARD AT ALL

## 2023-02-24 SDOH — ECONOMIC STABILITY: FOOD INSECURITY: WITHIN THE PAST 12 MONTHS, THE FOOD YOU BOUGHT JUST DIDN'T LAST AND YOU DIDN'T HAVE MONEY TO GET MORE.: NEVER TRUE

## 2023-02-24 ASSESSMENT — ENCOUNTER SYMPTOMS
NAUSEA: 0
ABDOMINAL PAIN: 0
VOMITING: 0
DIARRHEA: 0
CHEST TIGHTNESS: 0
COLOR CHANGE: 0

## 2023-02-24 NOTE — PROGRESS NOTES
Attending Physician Statement  I have discussed the care of Stella Fierro, including pertinent history and exam findings,  with the resident. I have reviewed the key elements of all parts of the encounter with the resident. I agree with the assessment, plan and orders as documented by the resident.   (Marie Gunderson)    Ez Escoto MD

## 2023-02-24 NOTE — PROGRESS NOTES
[address]    Family Medicine Residency Program - Outpatient Note      Subjective:    Amie Haywood is a 63 y.o. male with  has a past medical history of Bowel obstruction ([address] Cocaine abuse ([address] Diabetes mellitus ([address] Diabetic polyneuropathy associated with type 2 diabetes mellitus ([address] Diverticulitis, Diverticulosis, GERD (gastroesophageal reflux disease), Hypertension, MIGUEL (obstructive sleep apnea), Osteoarthritis, Renal lithiasis, Rheumatoid arteritis ([address] and Type II or unspecified type diabetes mellitus without mention of complication, not stated as uncontrolled. Presented to the office today for:  Chief Complaint   Patient presents with    Otalgia     Left side slight pain and feels clogged     Results     SHAHEED        HPI    Patient is a 63year old male presenting to the clinic for SHAHEED results. Results were reviewed and discussed with patient in depth. Patient does have overlapping signs and symptoms of PAD and PVD, including claudication, skin changes (per pt), hair loss is present but pt says this is long standing, pt feels legs are cold (on exam they are not), get achy/burning muscles that sometimes swell. SHAHEED results are not conclusive due to calcifications. Patient does mention if he keeps walking pain will improve, informed this is the best first step. Patient also mentions he has hypoglycemia in the morning, lantus was already reduced at last visit from 40 to 30 U. Hypoglycemia only in the morning but BG through the day can increase as high as 400. A1C in acceptable range. Informed patient to take lantus in morning instead, after checking BG. Will refill glucagon. Lastly pt has complaints of left ear pain x 1 week, pain with laying on that side. No discharge or hearing changes. Patient does have hx of perforated TM on left ear. Review of Systems   Constitutional:  Negative for appetite change and fatigue. HENT:  Positive for ear pain.     Respiratory: Negative for chest tightness. Cardiovascular:  Negative for chest pain. Gastrointestinal:  Negative for abdominal pain, diarrhea, nausea and vomiting. Genitourinary:  Negative for difficulty urinating. Musculoskeletal:         Cramping in calves bilaterally   Skin:  Negative for color change. Neurological:  Negative for dizziness and headaches. The patient has a   Family History   Problem Relation Age of Onset    Diabetes Father     Diabetes Other     Heart Attack Other     Hypertension Other     Diabetes Brother        Objective:    BP (!) 169/95   Pulse 83    BP Readings from Last 3 Encounters:   02/24/23 (!) 169/95   01/27/23 (!) 144/79   12/29/22 138/77       Physical Exam  Constitutional:       Appearance: Normal appearance. HENT:      Right Ear: Tympanic membrane normal.      Left Ear: Tympanic membrane normal.      Ears:      Comments: Erythematous left ear canal  Cardiovascular:      Rate and Rhythm: Normal rate and regular rhythm. Pulses: Normal pulses. Heart sounds: Normal heart sounds. No murmur heard. No gallop. Pulmonary:      Effort: Pulmonary effort is normal.      Breath sounds: Normal breath sounds. No wheezing, rhonchi or rales. Musculoskeletal:      Comments: Lower legs bilaterally, skin is dry and flaky, some hair loss FCI down calf. No erythema or tenderness    Neurological:      Mental Status: He is alert.        Lab Results   Component Value Date    WBC 7.8 06/04/2020    HGB 12.8 (L) 06/04/2020    HCT 36.0 (L) 06/04/2020     06/04/2020    CHOL 187 12/28/2021    TRIG 177 (H) 12/28/2021    HDL 62 12/28/2021    ALT 11 05/20/2020    AST 12 05/20/2020     07/25/2022    K 4.8 07/25/2022     07/25/2022    CREATININE 0.67 (L) 07/25/2022    BUN 15 07/25/2022    CO2 22 07/25/2022    TSH 0.84 03/15/2020    INR 1.3 11/11/2016    LABA1C 6.3 01/27/2023    LABMICR 70 (H) 12/28/2021     Lab Results   Component Value Date    CALCIUM 9.1 07/25/2022 PHOS 2.3 (L) 10/29/2016     Lab Results   Component Value Date    LDLCHOLESTEROL 90 12/28/2021       Assessment and Plan:    1. Type 2 diabetes mellitus without complication, with long-term current use of insulin (HCC)  Take lantus in morning after checking BG to avoid hypoglycemic episodes. Will provide glucagon.   - Glucagon, rDNA, (GLUCAGON EMERGENCY) 1 MG KIT; Inject 1 mg as directed as needed (for hypoglycemia)  Dispense: 1 kit; Refill: 1  - lisinopril (PRINIVIL;ZESTRIL) 20 MG tablet; TAKE TWO TABLETS BY MOUTH DAILY  Dispense: 60 tablet; Refill: 5    2. Acute otitis externa of left ear, unspecified type  Otitis externa, will use antibiotic due to hx of perforated Tm (contraindication to use acids)   - ciprofloxacin-hydrocortisone (CIPRO HC) 0.2-1 % otic suspension; Place 3 drops in ear(s) 2 times daily for 7 days  Dispense: 10 mL; Refill: 0    3. Intermittent claudication (HCC)  Continue walking through the pain, will provide Voltaren   - diclofenac sodium (VOLTAREN) 1 % GEL; Apply 2 g topically 4 times daily  Dispense: 350 g; Refill: 0    4. Essential hypertension  Patient has not been taking his BP meds, will restart meds as BP is continuously high  - lisinopril (PRINIVIL;ZESTRIL) 20 MG tablet; TAKE TWO TABLETS BY MOUTH DAILY  Dispense: 60 tablet;  Refill: 5        Requested Prescriptions     Signed Prescriptions Disp Refills    Glucagon, rDNA, (GLUCAGON EMERGENCY) 1 MG KIT 1 kit 1     Sig: Inject 1 mg as directed as needed (for hypoglycemia)    ciprofloxacin-hydrocortisone (CIPRO HC) 0.2-1 % otic suspension 10 mL 0     Sig: Place 3 drops in ear(s) 2 times daily for 7 days    diclofenac sodium (VOLTAREN) 1 %  g 0     Sig: Apply 2 g topically 4 times daily    lisinopril (PRINIVIL;ZESTRIL) 20 MG tablet 60 tablet 5     Sig: TAKE TWO TABLETS BY MOUTH DAILY       Medications Discontinued During This Encounter   Medication Reason    Glucagon, rDNA, (GLUCAGON EMERGENCY) 1 MG KIT REORDER    amLODIPine (NORVASC) 5 MG tablet Therapy completed    lisinopril (PRINIVIL;ZESTRIL) 20 MG tablet REORDER       Marcelo received counseling on the following healthy behaviors: nutrition, exercise and medication adherence    Discussed use,benefit, and side effects of prescribed medications. Barriers to medication compliance addressed. All patient questions answered. Pt voiced understanding. Return in about 3 months (around 5/24/2023), or if symptoms worsen or fail to improve. Disclaimer: Some orall of this note was transcribed using voice-recognition software. This may cause typographical errors occasionally. Although all effort is made to fix these errors, please do not hesitate to contact our office if there Kee Mantle concern with the understanding of this note.

## 2023-02-24 NOTE — LETTER
[facility]  [place] [address] [address]  Phone: [phone]  Fax: [phone]    Renee Fields MD        February 24, 2023     Patient: Emma Thrasher   YOB: 1960   Date of Visit: 2/24/2023       To Whom It May Concern:     Paulo Soctt was absent from work due to a doctor's appointment on 2/24/2023. If you have any questions or concerns, please don't hesitate to call.     Sincerely,        Renee Fields MD

## 2023-03-21 DIAGNOSIS — E11.42 DIABETIC POLYNEUROPATHY ASSOCIATED WITH TYPE 2 DIABETES MELLITUS (HCC): ICD-10-CM

## 2023-03-21 RX ORDER — GABAPENTIN 800 MG/1
TABLET ORAL
Qty: 90 TABLET | Refills: 0 | Status: SHIPPED | OUTPATIENT
Start: 2023-03-21 | End: 2023-04-16

## 2023-03-21 NOTE — TELEPHONE ENCOUNTER
enzymes     Scrotal abscess     Abdominal abscess     Hyperplastic colon polyp     Lower abdominal pain     Diabetic foot (HCC)     Infected hernioplasty mesh (HCC)     Cocaine abuse ([address]     Chest pain     BPH with obstruction/lower urinary tract symptoms     Chronic tension-type headache, intractable     Diabetic polyneuropathy associated with type 2 diabetes mellitus (HCC)     Gastroesophageal reflux disease     Acute otitis externa of left ear     Intermittent claudication ([address]

## 2023-04-06 ENCOUNTER — TELEPHONE (OUTPATIENT)
Dept: FAMILY MEDICINE CLINIC | Age: 63
End: 2023-04-06

## 2023-04-06 DIAGNOSIS — R09.89 CHEST CONGESTION: Primary | ICD-10-CM

## 2023-04-19 DIAGNOSIS — E11.42 DIABETIC POLYNEUROPATHY ASSOCIATED WITH TYPE 2 DIABETES MELLITUS (HCC): ICD-10-CM

## 2023-04-24 DIAGNOSIS — E11.42 DIABETIC POLYNEUROPATHY ASSOCIATED WITH TYPE 2 DIABETES MELLITUS (HCC): ICD-10-CM

## 2023-04-24 NOTE — TELEPHONE ENCOUNTER
E-scribe request for med refills. Please review and e-scribe if applicable. Last Visit Date:  2/24/23  Next Visit Date:  5/18/2023    Hemoglobin A1C (%)   Date Value   01/27/2023 6.3   12/29/2022 6.6   08/09/2022 8.4             ( goal A1C is < 7)   Microalb/Crt.  Ratio (mcg/mg creat)   Date Value   12/28/2021 70 (H)     LDL Cholesterol (mg/dL)   Date Value   12/28/2021 90       (goal LDL is <100)   AST (U/L)   Date Value   05/20/2020 12     ALT (U/L)   Date Value   05/20/2020 11     BUN (mg/dL)   Date Value   07/25/2022 15     BP Readings from Last 3 Encounters:   02/24/23 (!) 169/95   01/27/23 (!) 144/79   12/29/22 138/77          (goal 120/80)        Patient Active Problem List:     DM2 (diabetes mellitus, type 2) ([address]     Diverticulosis     DM (diabetes mellitus) ([address]     Essential hypertension     Elevated liver enzymes     Scrotal abscess     Abdominal abscess     Hyperplastic colon polyp     Lower abdominal pain     Diabetic foot ([address]     Infected hernioplasty mesh (HCC)     Cocaine abuse ([address]     Chest pain     BPH with obstruction/lower urinary tract symptoms     Chronic tension-type headache, intractable     Diabetic polyneuropathy associated with type 2 diabetes mellitus (HCC)     Gastroesophageal reflux disease     Acute otitis externa of left ear     Intermittent claudication ([address]      ----Laurie Wood

## 2023-04-25 RX ORDER — GUAIFENESIN 600 MG/1
600 TABLET, EXTENDED RELEASE ORAL 2 TIMES DAILY
Qty: 30 TABLET | Refills: 0 | Status: SHIPPED | OUTPATIENT
Start: 2023-04-25 | End: 2023-05-10

## 2023-04-25 NOTE — TELEPHONE ENCOUNTER
Pt contacted office and wanted to check on status Gabapentin refill. Pt is scheduled for appt on 5-. Unable to get sooner appt with PCP, please advise, thank you.

## 2023-04-26 RX ORDER — GABAPENTIN 800 MG/1
TABLET ORAL
Qty: 90 TABLET | Refills: 0 | Status: SHIPPED | OUTPATIENT
Start: 2023-04-26 | End: 2023-05-20

## 2023-04-26 RX ORDER — GABAPENTIN 800 MG/1
TABLET ORAL
Qty: 90 TABLET | Refills: 0 | OUTPATIENT
Start: 2023-04-26 | End: 2023-05-15

## 2023-05-12 ENCOUNTER — TELEPHONE (OUTPATIENT)
Dept: FAMILY MEDICINE CLINIC | Age: 63
End: 2023-05-12

## 2023-05-18 ENCOUNTER — OFFICE VISIT (OUTPATIENT)
Dept: FAMILY MEDICINE CLINIC | Age: 63
End: 2023-05-18

## 2023-05-18 VITALS
SYSTOLIC BLOOD PRESSURE: 117 MMHG | WEIGHT: 177 LBS | HEART RATE: 80 BPM | TEMPERATURE: 97.3 F | DIASTOLIC BLOOD PRESSURE: 73 MMHG | BODY MASS INDEX: 26.13 KG/M2

## 2023-05-18 DIAGNOSIS — K21.9 GASTROESOPHAGEAL REFLUX DISEASE, UNSPECIFIED WHETHER ESOPHAGITIS PRESENT: ICD-10-CM

## 2023-05-18 DIAGNOSIS — E11.42 CONTROLLED TYPE 2 DIABETES MELLITUS WITH DIABETIC POLYNEUROPATHY, WITH LONG-TERM CURRENT USE OF INSULIN (HCC): Primary | ICD-10-CM

## 2023-05-18 DIAGNOSIS — Z79.4 CONTROLLED TYPE 2 DIABETES MELLITUS WITH DIABETIC POLYNEUROPATHY, WITH LONG-TERM CURRENT USE OF INSULIN (HCC): Primary | ICD-10-CM

## 2023-05-18 DIAGNOSIS — I10 ESSENTIAL HYPERTENSION: ICD-10-CM

## 2023-05-18 DIAGNOSIS — Z12.11 ENCOUNTER FOR SCREENING COLONOSCOPY: ICD-10-CM

## 2023-05-18 RX ORDER — IBUPROFEN 800 MG/1
TABLET ORAL
Qty: 30 TABLET | Refills: 0 | Status: CANCELLED | OUTPATIENT
Start: 2023-05-18

## 2023-05-18 RX ORDER — LISINOPRIL 20 MG/1
TABLET ORAL
Qty: 60 TABLET | Refills: 5 | Status: SHIPPED | OUTPATIENT
Start: 2023-05-18

## 2023-05-18 RX ORDER — INSULIN GLARGINE 100 [IU]/ML
INJECTION, SOLUTION SUBCUTANEOUS
Qty: 3 ML | Refills: 5 | Status: SHIPPED | OUTPATIENT
Start: 2023-05-18

## 2023-05-18 RX ORDER — OMEPRAZOLE 20 MG/1
CAPSULE, DELAYED RELEASE ORAL
Qty: 30 CAPSULE | Refills: 5 | Status: SHIPPED | OUTPATIENT
Start: 2023-05-18

## 2023-05-18 RX ORDER — GABAPENTIN 800 MG/1
TABLET ORAL
Qty: 90 TABLET | Refills: 0 | Status: SHIPPED | OUTPATIENT
Start: 2023-05-18 | End: 2023-06-11

## 2023-05-18 ASSESSMENT — ENCOUNTER SYMPTOMS
CONSTIPATION: 0
SHORTNESS OF BREATH: 0
CHEST TIGHTNESS: 0
NAUSEA: 0
ABDOMINAL DISTENTION: 0
VOMITING: 0
ABDOMINAL PAIN: 0
WHEEZING: 0
DIARRHEA: 0

## 2023-05-18 NOTE — PROGRESS NOTES
Diabetic visit information    BP Readings from Last 3 Encounters:   02/24/23 (!) 169/95   01/27/23 (!) 144/79   12/29/22 138/77       Hemoglobin A1C (%)   Date Value   01/27/2023 6.3   12/29/2022 6.6   08/09/2022 8.4     Microalb/Crt. Ratio (mcg/mg creat)   Date Value   12/28/2021 70 (H)     LDL Cholesterol (mg/dL)   Date Value   12/28/2021 90               Have you changed or started any medications since your last visit including any over-the-counter medicines, vitamins, or herbal medicines? no   Have you stopped taking any of your medications? Is so, why? -  no  Are you having any side effects from any of your medications? - no    Have you seen any other physician or provider since your last visit?  no   Have you had any other diagnostic tests since your last visit?  no   Have you been seen in the emergency room and/or had an admission in a hospital since we last saw you?  no     Have you had your annual diabetic retinal (eye) exam? Yes   (ensure copy of exam is in the chart)    Have you had your routine dental cleaning in the past 6 months? no    Do you have an active MyChart account? If not, what are your barriers? No:     Patient Care Team:  Adonis Donald MD as PCP - General (Family Medicine)    Medical history Review  Past Medical, Family, and Social History reviewed and does not contribute to the patient presenting condition.     Health Maintenance   Topic Date Due    Diabetic retinal exam  Never done    Colorectal Cancer Screen  06/11/2017    Annual Wellness Visit (AWV)  Never done    COVID-19 Vaccine (5 - Booster for Pfizer series) 06/24/2022    Diabetic foot exam  08/19/2022    Diabetic Alb to Cr ratio (uACR) test  12/28/2022    Lipids  12/28/2022    GFR test (Diabetes, CKD 3-4, OR last GFR 15-59)  07/25/2023    A1C test (Diabetic or Prediabetic)  01/27/2024    Depression Screen  01/27/2024    DTaP/Tdap/Td vaccine (3 - Td or Tdap) 04/05/2033    Flu vaccine  Completed    Shingles vaccine  Completed

## 2023-05-18 NOTE — PATIENT INSTRUCTIONS
Thank you for letting us take care of you today. We hope all your questions were addressed. If a question was overlooked or something else comes to mind after you return home, please contact a member of your Care Team listed below. Your Care Team at [place] [address] is Team #1  Alejandra Goyal MD (Faculty)  Yrn Roldan (Resident)  Ari Ko MD (Resident)  Nicole Garcia., LPLIVAN Hayden, LIVAN  [facility] office)  Radha Cage, [facility] (Clinical Practice Manager)  Vickie Clinton [facility] (Clinical Pharmacist)     Office phone number: [phone]    If you need to get in right away due to illness, please be advised we have \"Same Day\" appointments available Monday-Friday. Please call us at [phone] option #[phone] to schedule your \"Same Day\" appointment.

## 2023-05-18 NOTE — PROGRESS NOTES
Attending Physician Statement  I have discussed the care of Renetta Tay, 63 y.o. male,including pertinent history and exam findings,  with the resident Dr. Deonte Lazcano MD.  History:  Chief Complaint   Patient presents with    Diabetes     Patient here to check dm     I have reviewed the key elements of the encounter with the resident. Examination was done by resident as documented in residents note. BP Readings from Last 3 Encounters:   05/18/23 117/73   02/24/23 (!) 169/95   01/27/23 (!) 144/79     /73 (Site: Left Upper Arm, Position: Sitting, Cuff Size: Medium Adult)   Pulse 80   Temp 97.3 °F (36.3 °C) (Oral)   Wt 177 lb (80.3 kg)   BMI 26.13 kg/m²   Lab Results   Component Value Date    WBC 7.8 06/04/2020    HGB 12.8 (L) 06/04/2020    HCT 36.0 (L) 06/04/2020     06/04/2020    CHOL 187 12/28/2021    TRIG 177 (H) 12/28/2021    HDL 62 12/28/2021    ALT 11 05/20/2020    AST 12 05/20/2020     07/25/2022    K 4.8 07/25/2022     07/25/2022    CREATININE 0.67 (L) 07/25/2022    BUN 15 07/25/2022    CO2 22 07/25/2022    TSH 0.84 03/15/2020    INR 1.3 11/11/2016    LABA1C 7.6 05/19/2023    LABMICR 70 (H) 12/28/2021     Lab Results   Component Value Date    CALCIUM 9.1 07/25/2022    PHOS 2.3 (L) 10/29/2016     Lab Results   Component Value Date    LDLCHOLESTEROL 90 12/28/2021     I agree with the assessment, plan and diagnosis of    Diagnosis Orders   1. Controlled type 2 diabetes mellitus with diabetic polyneuropathy, with long-term current use of insulin (HCC)  POCT glycosylated hemoglobin (Hb A1C)    insulin glargine (LANTUS SOLOSTAR) 100 UNIT/ML injection pen    gabapentin (NEURONTIN) 800 MG tablet    metFORMIN (GLUCOPHAGE) 1000 MG tablet     DIABETES FOOT EXAM      2. Gastroesophageal reflux disease, unspecified whether esophagitis present  omeprazole (PRILOSEC) 20 MG delayed release capsule      3. Essential hypertension  lisinopril (PRINIVIL;ZESTRIL) 20 MG tablet      4.

## 2023-05-18 NOTE — PROGRESS NOTES
[facility]    Family Medicine Residency Program - Outpatient Note      Subjective:    Manny Ramirez is a 63 y.o. male with  has a past medical history of Bowel obstruction ([address] Cocaine abuse ([address] Diabetes mellitus ([address] Diabetic polyneuropathy associated with type 2 diabetes mellitus ([address] Diverticulitis, Diverticulosis, GERD (gastroesophageal reflux disease), Hypertension, MIGUEL (obstructive sleep apnea), Osteoarthritis, Renal lithiasis, Rheumatoid arteritis ([address] and Type II or unspecified type diabetes mellitus without mention of complication, not stated as uncontrolled. Presented to the office today for:  Chief Complaint   Patient presents with    Diabetes     Patient here to check dm       HPI    Patient is a 63year old male presenting to the clinic for medication refills, patient is doing well and denies any complaints today. He is compliant with all his medications. BP is well controlled, A1c at last visit was 6.3%. Denies any CP, dyspnea, palpitations, episodes of hypoglycemia. Patient is up to date on his retinal eye exams but needs to bring in a copy. He is also due for colon cancer screening, bowel movements are normal, no melena or hematochezia, no weight loss - will get pt set up with Dr. Wolf Duque of colonoscopy. Review of Systems   Constitutional:  Negative for chills, fatigue and fever. Respiratory:  Negative for chest tightness, shortness of breath and wheezing. Cardiovascular:  Negative for chest pain, palpitations and leg swelling. Gastrointestinal:  Negative for abdominal distention, abdominal pain, constipation, diarrhea, nausea and vomiting. Neurological:  Negative for dizziness and headaches.      The patient has a   Family History   Problem Relation Age of Onset    Diabetes Father     Diabetes Other     Heart Attack Other     Hypertension Other     Diabetes Brother        Objective:    /73 (Site: Left Upper Arm, Position: Sitting, Cuff Size:

## 2023-05-19 LAB — HBA1C MFR BLD: 7.6 %

## 2023-05-24 ENCOUNTER — OFFICE VISIT (OUTPATIENT)
Dept: SURGERY | Age: 63
End: 2023-05-24
Payer: MEDICARE

## 2023-05-24 VITALS
HEIGHT: 69 IN | HEART RATE: 80 BPM | SYSTOLIC BLOOD PRESSURE: 114 MMHG | DIASTOLIC BLOOD PRESSURE: 64 MMHG | WEIGHT: 175.8 LBS | BODY MASS INDEX: 26.04 KG/M2

## 2023-05-24 DIAGNOSIS — Z12.11 COLON CANCER SCREENING: Primary | ICD-10-CM

## 2023-05-24 PROCEDURE — S0285 CNSLT BEFORE SCREEN COLONOSC: HCPCS

## 2023-06-19 ENCOUNTER — TELEPHONE (OUTPATIENT)
Dept: FAMILY MEDICINE CLINIC | Age: 63
End: 2023-06-19

## 2023-06-19 DIAGNOSIS — E11.42 CONTROLLED TYPE 2 DIABETES MELLITUS WITH DIABETIC POLYNEUROPATHY, WITH LONG-TERM CURRENT USE OF INSULIN (HCC): ICD-10-CM
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
## 2023-06-19 DIAGNOSIS — Z79.4 CONTROLLED TYPE 2 DIABETES MELLITUS WITH DIABETIC POLYNEUROPATHY, WITH LONG-TERM CURRENT USE OF INSULIN (HCC): ICD-10-CM

## 2023-06-19 RX ORDER — IBUPROFEN 200 MG
200 TABLET ORAL EVERY 6 HOURS PRN
Qty: 30 TABLET | Refills: 0 | Status: SHIPPED | OUTPATIENT
Start: 2023-06-19

## 2023-06-19 RX ORDER — ACETAMINOPHEN 500 MG
500 TABLET ORAL 4 TIMES DAILY PRN
Qty: 90 TABLET | Refills: 0 | Status: SHIPPED | OUTPATIENT
Start: 2023-06-19

## 2023-06-19 NOTE — TELEPHONE ENCOUNTER
I received the health link regarding patient experiencing a fall and having chest pain. He mentions having a mechanical fall on 06/11/2023. Atrial fall was patient was walking outside in the rain when he took a misstep on a rock and fell. He denies hitting his head or losing consciousness. He denies being on any blood thinners currently. He landed on his L knee and L side chest.  He started experiencing L sided CP, described as achy, reproducible on elevating his arms and deep inspiration. Pain has improved since the fall. He has not taken any medications. He also denies any SOB, dizziness, palpitations, diaphoresis. At this time, I will send Tylenol and Motrin 200 to his pharmacy. I have also discussed with him that it can take time for this pain to improve. Considering that his pain has improved since the fall and there are no other signs or symptoms concerning for cardiac etiology of his pain, we will treat this conservatively. ER precautions provided. Teach back method applied and answered all of his questions. Patient verbalized understanding and is in agreement with plan.     Electronically signed by Micah Montelongo MD on 6/19/2023 at 4:36 PM

## 2023-06-20 DIAGNOSIS — Z79.4 CONTROLLED TYPE 2 DIABETES MELLITUS WITH DIABETIC POLYNEUROPATHY, WITH LONG-TERM CURRENT USE OF INSULIN (HCC): ICD-10-CM

## 2023-06-20 DIAGNOSIS — E11.42 CONTROLLED TYPE 2 DIABETES MELLITUS WITH DIABETIC POLYNEUROPATHY, WITH LONG-TERM CURRENT USE OF INSULIN (HCC): ICD-10-CM

## 2023-06-20 RX ORDER — GABAPENTIN 800 MG/1
TABLET ORAL
Qty: 90 TABLET | Refills: 0 | Status: SHIPPED | OUTPATIENT
Start: 2023-06-20 | End: 2023-07-20

## 2023-06-20 NOTE — TELEPHONE ENCOUNTER
E-scribe request for GABAPENTIN 800 MG. Please review and e-scribe if applicable. Last Visit Date:  5/18/2023  Next Visit Date:  Visit date not found    Hemoglobin A1C (%)   Date Value   05/19/2023 7.6   01/27/2023 6.3   12/29/2022 6.6             ( goal A1C is < 7)   Microalb/Crt.  Ratio (mcg/mg creat)   Date Value   12/28/2021 70 (H)     LDL Cholesterol (mg/dL)   Date Value   12/28/2021 90       (goal LDL is <100)   AST (U/L)   Date Value   05/20/2020 12     ALT (U/L)   Date Value   05/20/2020 11     BUN (mg/dL)   Date Value   07/25/2022 15     BP Readings from Last 3 Encounters:   05/24/23 114/64   05/18/23 117/73   02/24/23 (!) 169/95          (goal 120/80)        Patient Active Problem List:     DM2 (diabetes mellitus, type 2) ([address]     Diverticulosis     DM (diabetes mellitus) ([address]     Essential hypertension     Elevated liver enzymes     Scrotal abscess     Abdominal abscess     Hyperplastic colon polyp     Lower abdominal pain     Diabetic foot (HCC)     Infected hernioplasty mesh (HCC)     Cocaine abuse (HCC)     Chest pain     BPH with obstruction/lower urinary tract symptoms     Chronic tension-type headache, intractable     Diabetic polyneuropathy associated with type 2 diabetes mellitus (HCC)     Gastroesophageal reflux disease     Acute otitis externa of left ear     Intermittent claudication ([address]      ----JF

## 2023-06-20 NOTE — TELEPHONE ENCOUNTER
----- Message from Coco Conway sent at 6/20/2023  3:00 PM EDT -----  Subject: Refill Request    QUESTIONS  Name of Medication? gabapentin (NEURONTIN) 800 MG tablet  Patient-reported dosage and instructions? 800mg 3 times a day  How many days do you have left? 0  Preferred Pharmacy? [facility] [phone]  Pharmacy phone number (if available)? [phone]  ---------------------------------------------------------------------------  --------------  CALL BACK INFO  What is the best way for the office to contact you? OK to leave message on   voicemail  Preferred Call Back Phone Number? [phone]  ---------------------------------------------------------------------------  --------------  SCRIPT ANSWERS  Relationship to Patient?  Self

## 2023-06-20 NOTE — TELEPHONE ENCOUNTER
E-scribe request for med refills. Please review and e-scribe if applicable. Last Visit Date:  5/18/23  Next Visit Date:  Visit date not found    Hemoglobin A1C (%)   Date Value   05/19/2023 7.6   01/27/2023 6.3   12/29/2022 6.6             ( goal A1C is < 7)   Microalb/Crt.  Ratio (mcg/mg creat)   Date Value   12/28/2021 70 (H)     LDL Cholesterol (mg/dL)   Date Value   12/28/2021 90       (goal LDL is <100)   AST (U/L)   Date Value   05/20/2020 12     ALT (U/L)   Date Value   05/20/2020 11     BUN (mg/dL)   Date Value   07/25/2022 15     BP Readings from Last 3 Encounters:   05/24/23 114/64   05/18/23 117/73   02/24/23 (!) 169/95          (goal 120/80)        Patient Active Problem List:     DM2 (diabetes mellitus, type 2) ([address]     Diverticulosis     DM (diabetes mellitus) ([address]     Essential hypertension     Elevated liver enzymes     Scrotal abscess     Abdominal abscess     Hyperplastic colon polyp     Lower abdominal pain     Diabetic foot ([address]     Infected hernioplasty mesh (HCC)     Cocaine abuse ([address]     Chest pain     BPH with obstruction/lower urinary tract symptoms     Chronic tension-type headache, intractable     Diabetic polyneuropathy associated with type 2 diabetes mellitus (HCC)     Gastroesophageal reflux disease     Acute otitis externa of left ear     Intermittent claudication ([address]      ----Angelique Schaefer

## 2023-06-21 RX ORDER — GABAPENTIN 800 MG/1
800 TABLET ORAL 3 TIMES DAILY
Qty: 90 TABLET | Refills: 0 | OUTPATIENT
Start: 2023-06-21 | End: 2023-07-21

## 2023-06-27 DIAGNOSIS — I10 ESSENTIAL HYPERTENSION: ICD-10-CM

## 2023-06-28 RX ORDER — LISINOPRIL 20 MG/1
TABLET ORAL
Qty: 180 TABLET | Refills: 1 | Status: SHIPPED | OUTPATIENT
Start: 2023-06-28

## 2023-07-01 DIAGNOSIS — I10 ESSENTIAL HYPERTENSION: ICD-10-CM

## 2023-07-03 NOTE — TELEPHONE ENCOUNTER
E-scribe request for NORVASC 5 MG TAB. Please review and e-scribe if applicable. Last Visit Date:  5/18/2023  Next Visit Date:  Visit date not found    Hemoglobin A1C (%)   Date Value   05/19/2023 7.6   01/27/2023 6.3   12/29/2022 6.6             ( goal A1C is < 7)   Microalb/Crt.  Ratio (mcg/mg creat)   Date Value   12/28/2021 70 (H)     LDL Cholesterol (mg/dL)   Date Value   12/28/2021 90       (goal LDL is <100)   AST (U/L)   Date Value   05/20/2020 12     ALT (U/L)   Date Value   05/20/2020 11     BUN (mg/dL)   Date Value   07/25/2022 15     BP Readings from Last 3 Encounters:   05/24/23 114/64   05/18/23 117/73   02/24/23 (!) 169/95          (goal 120/80)        Patient Active Problem List:     DM2 (diabetes mellitus, type 2) ([address])     Diverticulosis     DM (diabetes mellitus) ([address])     Essential hypertension     Elevated liver enzymes     Scrotal abscess     Abdominal abscess     Hyperplastic colon polyp     Lower abdominal pain     Diabetic foot (HCC)     Infected hernioplasty mesh (HCC)     Cocaine abuse (HCC)     Chest pain     BPH with obstruction/lower urinary tract symptoms     Chronic tension-type headache, intractable     Diabetic polyneuropathy associated with type 2 diabetes mellitus (HCC)     Gastroesophageal reflux disease     Acute otitis externa of left ear     Intermittent claudication ([address])      ----JF

## 2023-07-06 ENCOUNTER — ANESTHESIA EVENT (OUTPATIENT)
Dept: OPERATING ROOM | Age: 63
End: 2023-07-06
Payer: MEDICARE

## 2023-07-06 ENCOUNTER — ANESTHESIA (OUTPATIENT)
Dept: OPERATING ROOM | Age: 63
End: 2023-07-06
Payer: MEDICARE

## 2023-07-06 ENCOUNTER — HOSPITAL ENCOUNTER (OUTPATIENT)
Age: 63
Setting detail: OUTPATIENT SURGERY
Discharge: HOME | End: 2023-07-06
Attending: SURGERY | Admitting: SURGERY
Payer: MEDICARE

## 2023-07-06 VITALS
OXYGEN SATURATION: 100 % | RESPIRATION RATE: 16 BRPM | DIASTOLIC BLOOD PRESSURE: 86 MMHG | TEMPERATURE: 97.3 F | BODY MASS INDEX: 24.2 KG/M2 | HEART RATE: 85 BPM | WEIGHT: 169 LBS | SYSTOLIC BLOOD PRESSURE: 116 MMHG | HEIGHT: 70 IN

## 2023-07-06 DIAGNOSIS — Z98.890 HISTORY OF COLOSTOMY REVERSAL: ICD-10-CM

## 2023-07-06 PROBLEM — Z80.0 FAMILY HISTORY OF MALIGNANT NEOPLASM OF COLON IN RELATIVE DIAGNOSED WHEN OLDER THAN 50 YEARS OF AGE: Status: ACTIVE | Noted: 2023-07-06

## 2023-07-06 LAB — GLUCOSE BLD-MCNC: 180 MG/DL (ref 75–110)

## 2023-07-06 PROCEDURE — 6360000002 HC RX W HCPCS: Performed by: SPECIALIST

## 2023-07-06 PROCEDURE — 7100000010 HC PHASE II RECOVERY - FIRST 15 MIN: Performed by: SURGERY

## 2023-07-06 PROCEDURE — 3700000000 HC ANESTHESIA ATTENDED CARE: Performed by: SURGERY

## 2023-07-06 PROCEDURE — 3609010300 HC COLONOSCOPY W/BIOPSY SINGLE/MULTIPLE: Performed by: SURGERY

## 2023-07-06 PROCEDURE — 93005 ELECTROCARDIOGRAM TRACING: CPT

## 2023-07-06 PROCEDURE — 2580000003 HC RX 258: Performed by: ANESTHESIOLOGY

## 2023-07-06 PROCEDURE — 7100000011 HC PHASE II RECOVERY - ADDTL 15 MIN: Performed by: SURGERY

## 2023-07-06 PROCEDURE — 88305 TISSUE EXAM BY PATHOLOGIST: CPT

## 2023-07-06 PROCEDURE — 2709999900 HC NON-CHARGEABLE SUPPLY: Performed by: SURGERY

## 2023-07-06 PROCEDURE — 3700000001 HC ADD 15 MINUTES (ANESTHESIA): Performed by: SURGERY

## 2023-07-06 PROCEDURE — 82947 ASSAY GLUCOSE BLOOD QUANT: CPT

## 2023-07-06 PROCEDURE — 2500000003 HC RX 250 WO HCPCS: Performed by: SPECIALIST

## 2023-07-06 RX ORDER — SODIUM CHLORIDE 0.9 % (FLUSH) 0.9 %
5-40 SYRINGE (ML) INJECTION PRN
Status: DISCONTINUED | OUTPATIENT
Start: 2023-07-06 | End: 2023-07-06 | Stop reason: HOSPADM

## 2023-07-06 RX ORDER — SODIUM CHLORIDE 9 MG/ML
INJECTION, SOLUTION INTRAVENOUS CONTINUOUS
Status: DISCONTINUED | OUTPATIENT
Start: 2023-07-06 | End: 2023-07-06 | Stop reason: HOSPADM

## 2023-07-06 RX ORDER — LIDOCAINE HYDROCHLORIDE 20 MG/ML
INJECTION, SOLUTION EPIDURAL; INFILTRATION; INTRACAUDAL; PERINEURAL PRN
Status: DISCONTINUED | OUTPATIENT
Start: 2023-07-06 | End: 2023-07-06 | Stop reason: SDUPTHER

## 2023-07-06 RX ORDER — PHENYLEPHRINE HCL IN 0.9% NACL 1 MG/10 ML
SYRINGE (ML) INTRAVENOUS PRN
Status: DISCONTINUED | OUTPATIENT
Start: 2023-07-06 | End: 2023-07-06 | Stop reason: SDUPTHER

## 2023-07-06 RX ORDER — SODIUM CHLORIDE 9 MG/ML
INJECTION, SOLUTION INTRAVENOUS PRN
Status: DISCONTINUED | OUTPATIENT
Start: 2023-07-06 | End: 2023-07-06

## 2023-07-06 RX ORDER — PROPOFOL 10 MG/ML
INJECTION, EMULSION INTRAVENOUS PRN
Status: DISCONTINUED | OUTPATIENT
Start: 2023-07-06 | End: 2023-07-06 | Stop reason: SDUPTHER

## 2023-07-06 RX ORDER — SODIUM CHLORIDE 0.9 % (FLUSH) 0.9 %
5-40 SYRINGE (ML) INJECTION EVERY 12 HOURS SCHEDULED
Status: DISCONTINUED | OUTPATIENT
Start: 2023-07-06 | End: 2023-07-06 | Stop reason: HOSPADM

## 2023-07-06 RX ORDER — LIDOCAINE HYDROCHLORIDE 10 MG/ML
1 INJECTION, SOLUTION EPIDURAL; INFILTRATION; INTRACAUDAL; PERINEURAL
Status: DISCONTINUED | OUTPATIENT
Start: 2023-07-06 | End: 2023-07-06 | Stop reason: HOSPADM

## 2023-07-06 RX ORDER — SODIUM CHLORIDE, SODIUM LACTATE, POTASSIUM CHLORIDE, CALCIUM CHLORIDE 600; 310; 30; 20 MG/100ML; MG/100ML; MG/100ML; MG/100ML
INJECTION, SOLUTION INTRAVENOUS CONTINUOUS
Status: DISCONTINUED | OUTPATIENT
Start: 2023-07-06 | End: 2023-07-06 | Stop reason: HOSPADM

## 2023-07-06 RX ADMIN — PROPOFOL 50 MG: 10 INJECTION, EMULSION INTRAVENOUS at 15:03

## 2023-07-06 RX ADMIN — PROPOFOL 50 MG: 10 INJECTION, EMULSION INTRAVENOUS at 15:16

## 2023-07-06 RX ADMIN — PROPOFOL 50 MG: 10 INJECTION, EMULSION INTRAVENOUS at 15:28

## 2023-07-06 RX ADMIN — Medication 100 MCG: at 15:29

## 2023-07-06 RX ADMIN — PROPOFOL 50 MG: 10 INJECTION, EMULSION INTRAVENOUS at 15:14

## 2023-07-06 RX ADMIN — PROPOFOL 50 MG: 10 INJECTION, EMULSION INTRAVENOUS at 15:07

## 2023-07-06 RX ADMIN — Medication 100 MCG: at 15:35

## 2023-07-06 RX ADMIN — Medication 100 MCG: at 15:23

## 2023-07-06 RX ADMIN — LIDOCAINE HYDROCHLORIDE 100 MG: 20 INJECTION, SOLUTION EPIDURAL; INFILTRATION; INTRACAUDAL; PERINEURAL at 14:57

## 2023-07-06 RX ADMIN — SODIUM CHLORIDE, POTASSIUM CHLORIDE, SODIUM LACTATE AND CALCIUM CHLORIDE: 600; 310; 30; 20 INJECTION, SOLUTION INTRAVENOUS at 15:51

## 2023-07-06 RX ADMIN — PROPOFOL 50 MG: 10 INJECTION, EMULSION INTRAVENOUS at 15:33

## 2023-07-06 RX ADMIN — PROPOFOL 50 MG: 10 INJECTION, EMULSION INTRAVENOUS at 15:11

## 2023-07-06 RX ADMIN — PROPOFOL 50 MG: 10 INJECTION, EMULSION INTRAVENOUS at 15:24

## 2023-07-06 RX ADMIN — Medication 100 MCG: at 15:45

## 2023-07-06 RX ADMIN — PROPOFOL 50 MG: 10 INJECTION, EMULSION INTRAVENOUS at 15:20

## 2023-07-06 RX ADMIN — PROPOFOL 50 MG: 10 INJECTION, EMULSION INTRAVENOUS at 15:44

## 2023-07-06 RX ADMIN — PROPOFOL 50 MG: 10 INJECTION, EMULSION INTRAVENOUS at 15:09

## 2023-07-06 RX ADMIN — PROPOFOL 50 MG: 10 INJECTION, EMULSION INTRAVENOUS at 15:39

## 2023-07-06 RX ADMIN — SODIUM CHLORIDE, POTASSIUM CHLORIDE, SODIUM LACTATE AND CALCIUM CHLORIDE: 600; 310; 30; 20 INJECTION, SOLUTION INTRAVENOUS at 13:04

## 2023-07-06 RX ADMIN — PROPOFOL 100 MG: 10 INJECTION, EMULSION INTRAVENOUS at 14:57

## 2023-07-06 ASSESSMENT — PAIN SCALES - GENERAL
PAINLEVEL_OUTOF10: 0

## 2023-07-06 ASSESSMENT — PAIN - FUNCTIONAL ASSESSMENT: PAIN_FUNCTIONAL_ASSESSMENT: 0-10

## 2023-07-06 ASSESSMENT — PAIN DESCRIPTION - DESCRIPTORS: DESCRIPTORS: TIGHTNESS

## 2023-07-06 NOTE — ANESTHESIA PRE PROCEDURE
01/27/2012 07:37 AM       CMP:   Lab Results   Component Value Date/Time     07/25/2022 02:45 PM    K 4.8 07/25/2022 02:45 PM     07/25/2022 02:45 PM    CO2 22 07/25/2022 02:45 PM    BUN 15 07/25/2022 02:45 PM    CREATININE 0.67 07/25/2022 02:45 PM    GFRAA >60 07/25/2022 02:45 PM    LABGLOM >60 07/25/2022 02:45 PM    GLUCOSE 265 07/25/2022 02:45 PM    GLUCOSE 115 01/10/2012 06:20 AM    PROT 5.8 05/20/2020 03:10 PM    CALCIUM 9.1 07/25/2022 02:45 PM    BILITOT 0.62 05/20/2020 03:10 PM    ALKPHOS 80 05/20/2020 03:10 PM    AST 12 05/20/2020 03:10 PM    ALT 11 05/20/2020 03:10 PM       POC Tests:   Recent Labs     07/06/23  1255   POCGLU 180*       Coags:   Lab Results   Component Value Date/Time    PROTIME 13.6 11/11/2016 05:54 PM    PROTIME 14.2 01/27/2012 07:37 AM    INR 1.3 11/11/2016 05:54 PM    APTT 25.3 11/11/2016 05:54 PM       HCG (If Applicable): No results found for: PREGTESTUR, PREGSERUM, HCG, HCGQUANT     ABGs: No results found for: PHART, PO2ART, GRC7KMD, QHS7GDB, BEART, R0LQTTJM     Type & Screen (If Applicable):  No results found for: LABABO, LABRH    Drug/Infectious Status (If Applicable):  Lab Results   Component Value Date/Time    HEPCAB NONREACTIVE 07/17/2013 08:37 AM       COVID-19 Screening (If Applicable):   Lab Results   Component Value Date/Time    COVID19 Not Detected 06/26/2020 03:10 PM           Anesthesia Evaluation  Patient summary reviewed and Nursing notes reviewed no history of anesthetic complications:   Airway: Mallampati: II          Dental:          Pulmonary:   (+) sleep apnea:      (-) COPD                           Cardiovascular:  Exercise tolerance: good (>4 METS),   (+) hypertension:,     (-) past MI and CABG/stent       Beta Blocker:  Not on Beta Blocker         Neuro/Psych:      (-) seizures and CVA           GI/Hepatic/Renal:   (+) GERD:,      (-) liver disease and no renal disease      ROS comment: H/o colostomy reversal.   Endo/Other:    (+) DiabetesType II

## 2023-07-06 NOTE — OP NOTE
scope was removed. The patient tolerated the procedure well. Recommendations:  Repeat colonoscopy in 6mo-1y due to poor prep, or sooner if changes in bowel habits, unexplained weight loss, rectal bleeding, or unexplained anemia. Patient to call office in one week to discuss biopsy findings.          Electronically signed by Vincent Sandoval DO on 7/6/2023 at 3:54 PM

## 2023-07-06 NOTE — ANESTHESIA POSTPROCEDURE EVALUATION
Department of Anesthesiology  Postprocedure Note    Patient: Alize Parrish  MRN: [id number]  YOB: 1960  Date of evaluation: 7/6/2023      Procedure Summary     Date: 07/06/23 Room / Location: [place] [address] / [facility] - INPATIENT    Anesthesia Start: 7846 Anesthesia Stop: 1600    Procedure: COLONOSCOPY WITH BIOPSY Diagnosis:       History of colostomy reversal      (History of colostomy reversal [Z98.890])    Surgeons: Zane Turner IV, DO Responsible Provider: Chana Irizarry MD    Anesthesia Type: MAC ASA Status: 3          Anesthesia Type: No value filed. Manuel Phase I: Manuel Score: 10    Manuel Phase II: Manuel Score: 10      Anesthesia Post Evaluation    Patient location during evaluation: PACU  Patient participation: complete - patient participated  Level of consciousness: awake  Airway patency: patent  Nausea & Vomiting: no nausea and no vomiting  Complications: no  Cardiovascular status: hemodynamically stable  Respiratory status: acceptable  Hydration status: stable  There was medical reason for not using a multimodal analgesia pain management approach.

## 2023-07-06 NOTE — H&P
General Surgery:  H&P        PATIENT NAME: Sade Chan OF BIRTH: 1960    ADMISSION DATE: 2023 12:05 PM     Admitting Provider: Shanae Ritter    TODAY'S DATE: 2023    Chief Complaint:  Family hx colon CA    HISTORY OF PRESENT ILLNESS:  The patient is a 63 y.o. male here for screening colonoscopy. Patient has hx colon CA in his uncle  age 61. Patient denies prior colonoscopy, bloody stools, changes in bowel habits, weight loss, or known anemia. Patient denies tobacco, ETOH, or illicits, denies AC/AP use, no prior MI or pulm issues.        Past Medical History:        Diagnosis Date    Bowel obstruction ([facility])     Cocaine abuse ([facility]) 3/11/2020    Diabetes mellitus ([facility])     Diabetic polyneuropathy associated with type 2 diabetes mellitus ([facility]) 3/29/2022    Diverticulitis     Diverticulosis     GERD (gastroesophageal reflux disease)     Hypertension     MIGUEL (obstructive sleep apnea)     Osteoarthritis     Renal lithiasis     Rheumatoid arteritis ([facility])     Type II or unspecified type diabetes mellitus without mention of complication, not stated as uncontrolled        Past Surgical History:        Procedure Laterality Date    APPENDECTOMY      CHOLECYSTECTOMY      COLON SURGERY      COLONOSCOPY      CYSTOSCOPY  2020    CYSTOSCOPY N/A 2020    CYSTOSCOPY performed by Gianfranco Dozier MD at [facility]         Medications Prior to Admission:   Medications Prior to Admission: lisinopril (PRINIVIL;ZESTRIL) 20 MG tablet, TAKE TWO TABLETS BY MOUTH DAILY  gabapentin (NEURONTIN) 800 MG tablet, TAKE ONE TABLET BY MOUTH THREE TIMES A DAY  ibuprofen (ADVIL) 200 MG tablet, Take 1 tablet by mouth every 6 hours as needed for Pain  acetaminophen (TYLENOL) 500 MG tablet, Take 1 tablet by mouth 4 times daily as needed for Pain  insulin glargine (LANTUS SOLOSTAR) 100 UNIT/ML injection pen,

## 2023-07-07 LAB
EKG ATRIAL RATE: 93 BPM
EKG P AXIS: 27 DEGREES
EKG P-R INTERVAL: 138 MS
EKG Q-T INTERVAL: 376 MS
EKG QRS DURATION: 82 MS
EKG QTC CALCULATION (BAZETT): 467 MS
EKG R AXIS: 20 DEGREES
EKG T AXIS: 71 DEGREES
EKG VENTRICULAR RATE: 93 BPM

## 2023-07-10 LAB — SURGICAL PATHOLOGY REPORT: NORMAL

## 2023-07-13 RX ORDER — AMLODIPINE BESYLATE 5 MG/1
TABLET ORAL
Qty: 30 TABLET | Refills: 3 | OUTPATIENT
Start: 2023-07-13

## 2023-07-19 DIAGNOSIS — Z79.4 CONTROLLED TYPE 2 DIABETES MELLITUS WITH DIABETIC POLYNEUROPATHY, WITH LONG-TERM CURRENT USE OF INSULIN (HCC): ICD-10-CM

## 2023-07-19 DIAGNOSIS — E11.42 CONTROLLED TYPE 2 DIABETES MELLITUS WITH DIABETIC POLYNEUROPATHY, WITH LONG-TERM CURRENT USE OF INSULIN (HCC): ICD-10-CM

## 2023-07-19 NOTE — TELEPHONE ENCOUNTER
E-scribe request for GABAPENTIN 800 MG. Please review and e-scribe if applicable. Last Visit Date:  5/18/2023  Next Visit Date:  Visit date not found    Hemoglobin A1C (%)   Date Value   05/19/2023 7.6   01/27/2023 6.3   12/29/2022 6.6             ( goal A1C is < 7)   Microalb/Crt.  Ratio (mcg/mg creat)   Date Value   12/28/2021 70 (H)     LDL Cholesterol (mg/dL)   Date Value   12/28/2021 90       (goal LDL is <100)   AST (U/L)   Date Value   05/20/2020 12     ALT (U/L)   Date Value   05/20/2020 11     BUN (mg/dL)   Date Value   07/25/2022 15     BP Readings from Last 3 Encounters:   07/06/23 116/86   05/24/23 114/64   05/18/23 117/73          (goal 120/80)        Patient Active Problem List:     DM2 (diabetes mellitus, type 2) (HCC)     Diverticulosis     DM (diabetes mellitus) ([address])     Essential hypertension     Elevated liver enzymes     Scrotal abscess     Abdominal abscess     Hyperplastic colon polyp     Lower abdominal pain     Diabetic foot (HCC)     Infected hernioplasty mesh (HCC)     Cocaine abuse (HCC)     Chest pain     BPH with obstruction/lower urinary tract symptoms     Chronic tension-type headache, intractable     Diabetic polyneuropathy associated with type 2 diabetes mellitus (HCC)     Gastroesophageal reflux disease     Acute otitis externa of left ear     Intermittent claudication (HCC)     Family history of malignant neoplasm of colon in relative diagnosed when older than 48years of age      ----JF

## 2023-07-20 RX ORDER — GABAPENTIN 800 MG/1
TABLET ORAL
Qty: 90 TABLET | Refills: 0 | Status: SHIPPED | OUTPATIENT
Start: 2023-07-20 | End: 2023-08-20

## 2023-08-06 ENCOUNTER — HOSPITAL ENCOUNTER (EMERGENCY)
Age: 63
Discharge: HOME | End: 2023-08-06
Attending: EMERGENCY MEDICINE
Payer: MEDICARE

## 2023-08-06 VITALS
DIASTOLIC BLOOD PRESSURE: 78 MMHG | SYSTOLIC BLOOD PRESSURE: 117 MMHG | OXYGEN SATURATION: 99 % | WEIGHT: 167.55 LBS | BODY MASS INDEX: 24.39 KG/M2 | RESPIRATION RATE: 18 BRPM | TEMPERATURE: 97.7 F | HEART RATE: 90 BPM

## 2023-08-06 DIAGNOSIS — E11.65 HYPERGLYCEMIA DUE TO DIABETES MELLITUS (HCC): Primary | ICD-10-CM

## 2023-08-06 DIAGNOSIS — Z79.4 TYPE 2 DIABETES MELLITUS WITHOUT COMPLICATION, WITH LONG-TERM CURRENT USE OF INSULIN (HCC): Primary | Chronic | ICD-10-CM

## 2023-08-06 DIAGNOSIS — E11.9 TYPE 2 DIABETES MELLITUS WITHOUT COMPLICATION, WITH LONG-TERM CURRENT USE OF INSULIN (HCC): Primary | Chronic | ICD-10-CM

## 2023-08-06 LAB
ALBUMIN SERPL-MCNC: 4.6 G/DL (ref 3.5–5.2)
ALBUMIN/GLOB SERPL: 1.8 {RATIO} (ref 1–2.5)
ALP SERPL-CCNC: 76 U/L (ref 40–129)
ALT SERPL-CCNC: 8 U/L (ref 5–41)
ANION GAP SERPL CALCULATED.3IONS-SCNC: 11 MMOL/L (ref 9–17)
AST SERPL-CCNC: 14 U/L
BASOPHILS # BLD: 0.03 K/UL (ref 0–0.2)
BASOPHILS NFR BLD: 0 % (ref 0–2)
BILIRUB SERPL-MCNC: 1.2 MG/DL (ref 0.3–1.2)
BUN SERPL-MCNC: 36 MG/DL (ref 8–23)
CALCIUM SERPL-MCNC: 9.1 MG/DL (ref 8.6–10.4)
CHLORIDE SERPL-SCNC: 98 MMOL/L (ref 98–107)
CO2 SERPL-SCNC: 25 MMOL/L (ref 20–31)
CREAT SERPL-MCNC: 1.3 MG/DL (ref 0.7–1.2)
EOSINOPHIL # BLD: 0.14 K/UL (ref 0–0.44)
EOSINOPHILS RELATIVE PERCENT: 2 % (ref 1–4)
ERYTHROCYTE [DISTWIDTH] IN BLOOD BY AUTOMATED COUNT: 14.6 % (ref 11.8–14.4)
GFR SERPL CREATININE-BSD FRML MDRD: >60 ML/MIN/1.73M2
GLUCOSE SERPL-MCNC: 308 MG/DL (ref 70–99)
HCT VFR BLD AUTO: 30.5 % (ref 40.7–50.3)
HGB BLD-MCNC: 11.4 G/DL (ref 13–17)
IMM GRANULOCYTES # BLD AUTO: 0.08 K/UL (ref 0–0.3)
IMM GRANULOCYTES NFR BLD: 1 %
LYMPHOCYTES NFR BLD: 1.1 K/UL (ref 1.1–3.7)
LYMPHOCYTES RELATIVE PERCENT: 12 % (ref 24–43)
MCH RBC QN AUTO: 33.9 PG (ref 25.2–33.5)
MCHC RBC AUTO-ENTMCNC: 37.4 G/DL (ref 28.4–34.8)
MCV RBC AUTO: 90.8 FL (ref 82.6–102.9)
METER GLUCOSE: 259 MG/DL (ref 75–110)
METER GLUCOSE: 290 MG/DL (ref 75–110)
MONOCYTES NFR BLD: 0.66 K/UL (ref 0.1–1.2)
MONOCYTES NFR BLD: 7 % (ref 3–12)
NEUTROPHILS NFR BLD: 78 % (ref 36–65)
NEUTS SEG NFR BLD: 6.9 K/UL (ref 1.5–8.1)
NRBC BLD-RTO: 0 PER 100 WBC
PLATELET # BLD AUTO: 313 K/UL (ref 138–453)
PMV BLD AUTO: 9.4 FL (ref 8.1–13.5)
POTASSIUM SERPL-SCNC: 4.8 MMOL/L (ref 3.7–5.3)
PROT SERPL-MCNC: 7.1 G/DL (ref 6.4–8.3)
RBC # BLD AUTO: 3.36 M/UL (ref 4.21–5.77)
RBC # BLD: ABNORMAL 10*6/UL
SODIUM SERPL-SCNC: 134 MMOL/L (ref 135–144)
WBC OTHER # BLD: 8.9 K/UL (ref 3.5–11.3)

## 2023-08-06 PROCEDURE — 96372 THER/PROPH/DIAG INJ SC/IM: CPT

## 2023-08-06 PROCEDURE — 85025 COMPLETE CBC W/AUTO DIFF WBC: CPT

## 2023-08-06 PROCEDURE — 6370000000 HC RX 637 (ALT 250 FOR IP): Performed by: STUDENT IN AN ORGANIZED HEALTH CARE EDUCATION/TRAINING PROGRAM

## 2023-08-06 PROCEDURE — 99284 EMERGENCY DEPT VISIT MOD MDM: CPT

## 2023-08-06 PROCEDURE — 80053 COMPREHEN METABOLIC PANEL: CPT

## 2023-08-06 PROCEDURE — 2580000003 HC RX 258: Performed by: STUDENT IN AN ORGANIZED HEALTH CARE EDUCATION/TRAINING PROGRAM

## 2023-08-06 PROCEDURE — 82947 ASSAY GLUCOSE BLOOD QUANT: CPT

## 2023-08-06 RX ORDER — INSULIN LISPRO 100 [IU]/ML
6 INJECTION, SOLUTION INTRAVENOUS; SUBCUTANEOUS ONCE
Status: COMPLETED | OUTPATIENT
Start: 2023-08-06 | End: 2023-08-06

## 2023-08-06 RX ORDER — 0.9 % SODIUM CHLORIDE 0.9 %
1000 INTRAVENOUS SOLUTION INTRAVENOUS ONCE
Status: COMPLETED | OUTPATIENT
Start: 2023-08-06 | End: 2023-08-06

## 2023-08-06 RX ADMIN — SODIUM CHLORIDE 1000 ML: 9 INJECTION, SOLUTION INTRAVENOUS at 19:07

## 2023-08-06 RX ADMIN — INSULIN LISPRO 6 UNITS: 100 INJECTION, SOLUTION INTRAVENOUS; SUBCUTANEOUS at 20:06

## 2023-08-06 ASSESSMENT — ENCOUNTER SYMPTOMS
PHOTOPHOBIA: 0
DIARRHEA: 1
NAUSEA: 0
ABDOMINAL PAIN: 0
EYE REDNESS: 0
BACK PAIN: 0
WHEEZING: 0
SORE THROAT: 0
SHORTNESS OF BREATH: 0
STRIDOR: 0
VOMITING: 0
COUGH: 0

## 2023-08-06 NOTE — ED NOTES
Pt presents to the ER via triage. Pt states he woke up this morning and his BS was in the 800's. Pt states he took his normal diabetic medications. Pt BS in triage 290. Pt has no other complaints otherwise. Pt concerned that his BS was too high. Pt states no vision changes, troubles with urination. Pt otherwise A&O x4, in NAD, VSS.       John Avalos RN  08/06/23 [id number]

## 2023-08-06 NOTE — DISCHARGE INSTRUCTIONS
You were seen in the emergency department for hyperglycemia. Blood glucose level over 300. Not in diabetic ketoacidosis. Discussed the case with family medicine, your primary care physicians, who saw you in the emergency department. Please follow-up with their clinic tomorrow to discuss your diabetic regimen. Please take Lantus and Humalog as they prescribed.   Please return to the emergency department if you begin developing headaches, visual changes, passing out, chest pain, shortness of breath or any other concerning symptoms

## 2023-08-07 NOTE — PLAN OF CARE
63 yr old with PMH of T2DM , HTN presented to ED with elevated blood glucose levels . Patient stated at home his blood glucose was 800 and on average is 500. Was given 10 units of Lantus and 1 L bolus of IVF. POC corrected to 259. No anion gap noted on labs, no other signs of DKA . Family Medicine consulted for insulin guidance. He also stated that he eats [employer] for breakfast. Currently patient is on 40 units of Lantus and 13 units of Humalog tid prior to meals. States that he uses Humalog as needed when his blood sugars run high and takes Lantus in the morning. Counseled patient on using his short acting insulin as prescribed and improving diet as well taking Lantus at night  . Also prescribed patient CGM and to bring it to next appointment  to the  office. Patient voiced understanding and agreed to plan. Discussed with ER resident , patient okay to be discharged .        Imelda Jacobo DO  Family Medicine Resident PGY2

## 2023-08-10 ENCOUNTER — OFFICE VISIT (OUTPATIENT)
Dept: FAMILY MEDICINE CLINIC | Age: 63
End: 2023-08-10
Payer: MEDICARE

## 2023-08-10 VITALS
HEIGHT: 69 IN | SYSTOLIC BLOOD PRESSURE: 142 MMHG | WEIGHT: 165 LBS | DIASTOLIC BLOOD PRESSURE: 88 MMHG | BODY MASS INDEX: 24.44 KG/M2 | HEART RATE: 91 BPM

## 2023-08-10 DIAGNOSIS — Z79.4 TYPE 2 DIABETES MELLITUS WITHOUT COMPLICATION, WITH LONG-TERM CURRENT USE OF INSULIN (HCC): Primary | Chronic | ICD-10-CM

## 2023-08-10 DIAGNOSIS — E11.9 TYPE 2 DIABETES MELLITUS WITHOUT COMPLICATION, WITH LONG-TERM CURRENT USE OF INSULIN (HCC): Primary | Chronic | ICD-10-CM

## 2023-08-10 PROBLEM — R35.0 BENIGN PROSTATIC HYPERPLASIA WITH URINARY FREQUENCY: Status: ACTIVE | Noted: 2022-11-21

## 2023-08-10 PROBLEM — N40.1 BENIGN PROSTATIC HYPERPLASIA WITH URINARY FREQUENCY: Status: ACTIVE | Noted: 2022-11-21

## 2023-08-10 PROBLEM — E11.65 HYPERGLYCEMIA DUE TO DIABETES MELLITUS (HCC): Status: ACTIVE | Noted: 2022-11-21

## 2023-08-10 PROBLEM — E11.01 HYPEROSMOLAR COMA DUE TO TYPE 2 DIABETES MELLITUS (HCC): Status: ACTIVE | Noted: 2022-11-21

## 2023-08-10 LAB — HBA1C MFR BLD: 7.8 %

## 2023-08-10 PROCEDURE — 99213 OFFICE O/P EST LOW 20 MIN: CPT

## 2023-08-10 PROCEDURE — 3074F SYST BP LT 130 MM HG: CPT

## 2023-08-10 PROCEDURE — 3051F HG A1C>EQUAL 7.0%<8.0%: CPT

## 2023-08-10 PROCEDURE — 83037 HB GLYCOSYLATED A1C HOME DEV: CPT

## 2023-08-10 PROCEDURE — 3078F DIAST BP <80 MM HG: CPT

## 2023-08-10 RX ORDER — AMLODIPINE BESYLATE 5 MG/1
TABLET ORAL
COMMUNITY
Start: 2023-05-26 | End: 2023-08-15

## 2023-08-10 RX ORDER — MAGNESIUM OXIDE 400 MG/1
400 TABLET ORAL DAILY
COMMUNITY
End: 2023-08-10 | Stop reason: SDUPTHER

## 2023-08-10 NOTE — PROGRESS NOTES
[facility]    Family Medicine Residency Program - Outpatient Note      Subjective:    Kahlil Kaplan is a 63 y.o. male with  has a past medical history of Bowel obstruction ([address] [facility]), Cocaine abuse ([address] [facility]), Diabetes mellitus ([address] [facility]), Diabetic polyneuropathy associated with type 2 diabetes mellitus ([address] [facility]), Diverticulitis, Diverticulosis, GERD (gastroesophageal reflux disease), Hypertension, MIGUEL (obstructive sleep apnea), Osteoarthritis, Renal lithiasis, Rheumatoid arteritis ([address] [facility]), and Type II or unspecified type diabetes mellitus without mention of complication, not stated as uncontrolled. Presented to the office today for:  Chief Complaint   Patient presents with    Follow-Up from Hospital     Pt was seen in [facility] ED 8/6/23 for Hyperglycemia       HPI    Patient is a 63year old male presenting to the clinic for ED follow up, he was seen on 8/6 due to hyperglycemia, pt had reported a BG of 800 at home and then around 250 in the ED, BG came down and he was discharged from ED. Patient had previously has issues with hypoglycemia due to not checking BG regularly and was giving himself too much insulin. Says now he backed off of some of the insulin and became hyperglycemic. He was sent home with freestyle bernabe for better glucose control. Says since discharge glucose has been 150-180. A1C 2 months ago 7.6. Currently on Lantus 40 nightly, Humalog 13 U TID and Metformin 1000 mg BID. Discussed with pt about getting him off of Humalog once BG remains stable. Review of Systems   Constitutional:  Negative for appetite change, chills, fatigue and fever. Respiratory:  Negative for chest tightness and shortness of breath. Cardiovascular:  Negative for chest pain, palpitations and leg swelling. Gastrointestinal:  Negative for abdominal pain, diarrhea, nausea and vomiting. Genitourinary:  Negative for difficulty urinating. Neurological:  Negative for dizziness and headaches.      The patient has a

## 2023-08-10 NOTE — PROGRESS NOTES
Visit Information    Have you changed or started any medications since your last visit including any over-the-counter medicines, vitamins, or herbal medicines? no   Have you stopped taking any of your medications? Is so, why? -  no  Are you having any side effects from any of your medications? - no    Have you seen any other physician or provider since your last visit? yes - Gen Surg 5/24/23   Have you had any other diagnostic tests since your last visit? yes - Labs 8/6/23 and Colonoscopy 7/6/23   Have you been seen in the emergency room and/or had an admission in a hospital since we last saw you?  yes - Pt was seen in [facility] ED 8/6/23 for Hyperglycemia   Have you had your routine dental cleaning in the past 6 months?  no     Do you have an active MyChart account? If no, what is the barrier?   Yes    Patient Care Team:  Juan Carmona MD as PCP - General (Family Medicine)    Medical History Review  Past Medical, Family, and Social History reviewed and does contribute to the patient presenting condition    Health Maintenance   Topic Date Due    Diabetic retinal exam  Never done    Annual Wellness Visit (AWV)  Never done    COVID-19 Vaccine (5 - Booster for [place] series) 06/24/2022    Diabetic Alb to Cr ratio (uACR) test  12/28/2022    Lipids  12/28/2022    Flu vaccine (1) 08/01/2023    Depression Screen  01/27/2024    Diabetic foot exam  05/18/2024    A1C test (Diabetic or Prediabetic)  05/19/2024    GFR test (Diabetes, CKD 3-4, OR last GFR 15-59)  08/06/2024    DTaP/Tdap/Td vaccine (3 - Td or Tdap) 04/05/2033    Colorectal Cancer Screen  07/06/2033    Shingles vaccine  Completed    Pneumococcal 0-64 years Vaccine  Completed    Hepatitis C screen  Completed    HIV screen  Completed    Hepatitis A vaccine  Aged Out    Hib vaccine  Aged Out    Meningococcal (ACWY) vaccine  Aged Out

## 2023-08-11 ASSESSMENT — ENCOUNTER SYMPTOMS
DIARRHEA: 0
NAUSEA: 0
SHORTNESS OF BREATH: 0
VOMITING: 0
CHEST TIGHTNESS: 0
ABDOMINAL PAIN: 0

## 2023-08-14 DIAGNOSIS — K21.9 GASTROESOPHAGEAL REFLUX DISEASE, UNSPECIFIED WHETHER ESOPHAGITIS PRESENT: ICD-10-CM

## 2023-08-14 DIAGNOSIS — I10 ESSENTIAL HYPERTENSION: ICD-10-CM

## 2023-08-14 DIAGNOSIS — Z79.4 CONTROLLED TYPE 2 DIABETES MELLITUS WITH DIABETIC POLYNEUROPATHY, WITH LONG-TERM CURRENT USE OF INSULIN (HCC): ICD-10-CM

## 2023-08-14 DIAGNOSIS — E11.42 CONTROLLED TYPE 2 DIABETES MELLITUS WITH DIABETIC POLYNEUROPATHY, WITH LONG-TERM CURRENT USE OF INSULIN (HCC): ICD-10-CM

## 2023-08-14 NOTE — TELEPHONE ENCOUNTER
Faxed Refill Request of multiple medications received from Pharmacy. Medication Pended. Pt last seen on 8/10/23, Next appt is 8/14/23.     Health Maintenance   Topic Date Due    Diabetic retinal exam  Never done    Annual Wellness Visit (AWV)  Never done    COVID-19 Vaccine (5 - Booster for Pfizer series) 06/24/2022    Diabetic Alb to Cr ratio (uACR) test  12/28/2022    Lipids  12/28/2022    Flu vaccine (1) 08/01/2023    Depression Screen  01/27/2024    Diabetic foot exam  05/18/2024    GFR test (Diabetes, CKD 3-4, OR last GFR 15-59)  08/06/2024    A1C test (Diabetic or Prediabetic)  08/10/2024    DTaP/Tdap/Td vaccine (3 - Td or Tdap) 04/05/2033    Colorectal Cancer Screen  07/06/2033    Shingles vaccine  Completed    Pneumococcal 0-64 years Vaccine  Completed    Hepatitis C screen  Completed    HIV screen  Completed    Hepatitis A vaccine  Aged Out    Hib vaccine  Aged Out    Meningococcal (ACWY) vaccine  Aged Out       Hemoglobin A1C (%)   Date Value   08/10/2023 7.8   05/19/2023 7.6   01/27/2023 6.3             ( goal A1C is < 7)   No components found for: LABMICR  LDL Cholesterol (mg/dL)   Date Value   12/28/2021 90       (goal LDL is <100)   AST (U/L)   Date Value   08/06/2023 14     ALT (U/L)   Date Value   08/06/2023 8     BUN (mg/dL)   Date Value   08/06/2023 36 (H)     BP Readings from Last 3 Encounters:   08/10/23 (!) 142/88   08/06/23 117/78   07/06/23 116/86          (goal 120/80)    All Future Testing planned in [facility]  Lab Frequency Next Occurrence   Microalbumin, Ur Once 01/29/2023   Lipid Panel Once 12/29/2022       Next Visit Date:  Future Appointments   Date Time Provider [address]   8/15/2023  2:30 PM Reynaldo Rg, [address] [facility]   9/14/2023  3:00 PM Vahid Patton MD [facility]            Patient Active Problem List:     DM2 (diabetes mellitus, type 2) ([facility])     Diverticulosis     DM (diabetes mellitus) ([address] )     Essential hypertension     Elevated liver enzymes

## 2023-08-15 ENCOUNTER — PHARMACY VISIT (OUTPATIENT)
Dept: FAMILY MEDICINE CLINIC | Age: 63
End: 2023-08-15

## 2023-08-15 ENCOUNTER — OFFICE VISIT (OUTPATIENT)
Dept: FAMILY MEDICINE CLINIC | Age: 63
End: 2023-08-15
Payer: MEDICARE

## 2023-08-15 VITALS
DIASTOLIC BLOOD PRESSURE: 86 MMHG | SYSTOLIC BLOOD PRESSURE: 155 MMHG | BODY MASS INDEX: 25.12 KG/M2 | WEIGHT: 169.6 LBS | HEIGHT: 69 IN | HEART RATE: 81 BPM

## 2023-08-15 DIAGNOSIS — Z79.899 MEDICATION MANAGEMENT: Primary | ICD-10-CM

## 2023-08-15 DIAGNOSIS — T30.0 BURN: Primary | ICD-10-CM

## 2023-08-15 PROCEDURE — APPNB45 APP NON BILLABLE 31-45 MINUTES: Performed by: PHARMACIST

## 2023-08-15 PROCEDURE — 99999 PR OFFICE/OUTPT VISIT,PROCEDURE ONLY: CPT | Performed by: PHARMACIST

## 2023-08-15 PROCEDURE — 3078F DIAST BP <80 MM HG: CPT

## 2023-08-15 PROCEDURE — 99213 OFFICE O/P EST LOW 20 MIN: CPT

## 2023-08-15 PROCEDURE — 3074F SYST BP LT 130 MM HG: CPT

## 2023-08-15 RX ORDER — BACITRACIN ZINC AND POLYMYXIN B SULFATE 500; 1000 [USP'U]/G; [USP'U]/G
OINTMENT TOPICAL
Qty: 1 EACH | Refills: 1 | Status: SHIPPED | OUTPATIENT
Start: 2023-08-15 | End: 2023-08-22

## 2023-08-15 RX ORDER — OMEPRAZOLE 20 MG/1
CAPSULE, DELAYED RELEASE ORAL
Qty: 30 CAPSULE | Refills: 5 | Status: SHIPPED | OUTPATIENT
Start: 2023-08-15

## 2023-08-15 RX ORDER — AMLODIPINE BESYLATE 5 MG/1
TABLET ORAL
Qty: 30 TABLET | Refills: 5 | Status: SHIPPED | OUTPATIENT
Start: 2023-08-15

## 2023-08-15 RX ORDER — LISINOPRIL 20 MG/1
40 TABLET ORAL DAILY
Qty: 180 TABLET | Refills: 1 | Status: SHIPPED | OUTPATIENT
Start: 2023-08-15

## 2023-08-15 RX ORDER — DOXYCYCLINE HYCLATE 100 MG
100 TABLET ORAL 2 TIMES DAILY
Qty: 10 TABLET | Refills: 0 | Status: SHIPPED | OUTPATIENT
Start: 2023-08-15 | End: 2023-08-20

## 2023-08-15 RX ORDER — GABAPENTIN 800 MG/1
TABLET ORAL
Qty: 90 TABLET | Refills: 0 | Status: SHIPPED | OUTPATIENT
Start: 2023-08-15 | End: 2023-09-15

## 2023-08-15 RX ORDER — INSULIN GLARGINE 100 [IU]/ML
INJECTION, SOLUTION SUBCUTANEOUS
Qty: 12 ML | Refills: 5 | Status: SHIPPED | OUTPATIENT
Start: 2023-08-15

## 2023-08-15 ASSESSMENT — ENCOUNTER SYMPTOMS
NAUSEA: 0
SORE THROAT: 0
DIARRHEA: 0
ABDOMINAL PAIN: 0
RHINORRHEA: 0
VOMITING: 1
SHORTNESS OF BREATH: 0
CONSTIPATION: 0

## 2023-08-15 NOTE — PATIENT INSTRUCTIONS
Thank you for letting us take care of you today. We hope all your questions were addressed. If a question was overlooked or something else comes to mind after you return home, please contact a member of your Care Team listed below. Your Care Team at [facility] is Team #4  Angeli Albert (Faculty)  Jonatan Del Valle (Resident)  Violetta Gonzales (Resident)  Paola Gann (Resident)  Norma Townsend (Resident)  Lele Perez (Resident)  Dang Hoffmann, [address] [place] [address], [facility], [facility]  Shanda Gordons, [facility], [facility]  Yumiko Pérez, [facility]  Darleen Cardoso, [facility]  Giorgi Latif, [facility], [facility]  Mark Kelsey, [facility], [facility]  Chela Birmingham) [place], [place] (Clinical Practice Manager)  Tristan Montero, [facility] (Clinical Pharmacist)       Office phone number: [phone]    If you need to get in right away due to illness, please be advised we have \"Same Day\" appointments available Monday-Friday. Please call us at [phone] option #3 to schedule your \"Same Day\" appointment.

## 2023-08-15 NOTE — PROGRESS NOTES
Medication Management Service ([facility]  [phone]     Darvin Leavitt is a 63 y.o. male that presents for an initial diabetes mellitus office visit with Medication Management Service per referral from Dr. Benigno Galdamez for Patient Education/Consultation with A1c goal < 7 %. Patient PMH includes HTN, GERD, T2DM, diabetic foot infection, HHS, BPH, Drug abuse. Complications from DM Include Foot infections. Met with patient today for [place] 2 sensor application and Radha CGM education. Patient requests refills on gabapentin, lisinopril, Lantus, and heartburn medications. Patient presented with burned thumb. Assessment/Plan     Diabetes Management: Uncontrolled diabetes as evidenced by A1C of 7.8% (08/10/2023) (Goal < 6.5%). Currently on Lantus, humalog, metformin. No blood glucose readings available. Did not assess hypoglycemia, patient has glucagon on file. Today's visit focused on radha 2 sensor placement and education. Following Radha 2 CGM Education provided to patient today:     [x] Education on how to apply sensors   [x] Education on using receivers or smartphone farida   [x] Education on alarms    [x] Education on scanning frequency (if necessary)   [x] Educated on how to review BG charts    [x] Educated on how to change sensors and frequency    [x] Provided \"Get Started\" manual   [x] Educated on Ambulatory Glucose Profile (AGP) Report   [x] Patient expressed understanding and ability to apply sensors at home   [x] Sensor applied to appropriate placement on patient   [x] Sensor started on smart phone or  before patient left appointment    Same-day appointment scheduled to be seen addressing burn at 2:20 pm  According to chart, requested medications have already been ordered today (gabapentin, lisinopril, amlodipine, Lantus, and omeprazole). Follow-Up: Follow up with PCP on 09/14/2023    Patient verbalized understanding of care plan.  Patient advised to call Medication

## 2023-08-22 ENCOUNTER — OFFICE VISIT (OUTPATIENT)
Dept: FAMILY MEDICINE CLINIC | Age: 63
End: 2023-08-22
Payer: MEDICARE

## 2023-08-22 ENCOUNTER — TELEPHONE (OUTPATIENT)
Dept: FAMILY MEDICINE CLINIC | Age: 63
End: 2023-08-22

## 2023-08-22 VITALS
DIASTOLIC BLOOD PRESSURE: 76 MMHG | BODY MASS INDEX: 25.92 KG/M2 | WEIGHT: 175 LBS | HEIGHT: 69 IN | HEART RATE: 67 BPM | SYSTOLIC BLOOD PRESSURE: 139 MMHG

## 2023-08-22 DIAGNOSIS — Z79.4 TYPE 2 DIABETES MELLITUS WITHOUT COMPLICATION, WITH LONG-TERM CURRENT USE OF INSULIN (HCC): ICD-10-CM

## 2023-08-22 DIAGNOSIS — Z79.4 TYPE 2 DIABETES MELLITUS WITHOUT COMPLICATION, WITH LONG-TERM CURRENT USE OF INSULIN (HCC): Primary | ICD-10-CM

## 2023-08-22 DIAGNOSIS — Z79.4 CONTROLLED TYPE 2 DIABETES MELLITUS WITH DIABETIC POLYNEUROPATHY, WITH LONG-TERM CURRENT USE OF INSULIN (HCC): Primary | ICD-10-CM

## 2023-08-22 DIAGNOSIS — E11.9 TYPE 2 DIABETES MELLITUS WITHOUT COMPLICATION, WITH LONG-TERM CURRENT USE OF INSULIN (HCC): ICD-10-CM

## 2023-08-22 DIAGNOSIS — Z51.89 VISIT FOR WOUND CHECK: ICD-10-CM

## 2023-08-22 DIAGNOSIS — E11.42 CONTROLLED TYPE 2 DIABETES MELLITUS WITH DIABETIC POLYNEUROPATHY, WITH LONG-TERM CURRENT USE OF INSULIN (HCC): Primary | ICD-10-CM

## 2023-08-22 DIAGNOSIS — E11.9 TYPE 2 DIABETES MELLITUS WITHOUT COMPLICATION, WITH LONG-TERM CURRENT USE OF INSULIN (HCC): Primary | ICD-10-CM

## 2023-08-22 PROCEDURE — 99213 OFFICE O/P EST LOW 20 MIN: CPT

## 2023-08-22 PROCEDURE — 3051F HG A1C>EQUAL 7.0%<8.0%: CPT

## 2023-08-22 PROCEDURE — 3075F SYST BP GE 130 - 139MM HG: CPT

## 2023-08-22 PROCEDURE — 3078F DIAST BP <80 MM HG: CPT

## 2023-08-22 RX ORDER — CALCIUM CITRATE/VITAMIN D3 200MG-6.25
TABLET ORAL
Qty: 100 STRIP | Refills: 3 | Status: SHIPPED | OUTPATIENT
Start: 2023-08-22

## 2023-08-22 ASSESSMENT — ENCOUNTER SYMPTOMS
VOMITING: 0
SORE THROAT: 0
CONSTIPATION: 0
ABDOMINAL PAIN: 0
RHINORRHEA: 0
SHORTNESS OF BREATH: 0
COLOR CHANGE: 0
NAUSEA: 0
DIARRHEA: 0

## 2023-08-22 NOTE — PROGRESS NOTES
Attending Physician Statement  I have discussed the care of Vicki Field, including pertinent history and exam findings,  with the resident. I have reviewed the key elements of all parts of the encounter with the resident. I agree with the assessment, plan and orders as documented by the resident.   (Andrew Boyce)    Mikel Roberts MD
Visit Information    Have you changed or started any medications since your last visit including any over-the-counter medicines, vitamins, or herbal medicines? no   Have you stopped taking any of your medications? Is so, why? -  no  Are you having any side effects from any of your medications? - no    Have you seen any other physician or provider since your last visit?  no   Have you had any other diagnostic tests since your last visit?  no   Have you been seen in the emergency room and/or had an admission in a hospital since we last saw you?  no   Have you had your routine dental cleaning in the past 6 months?  no     Do you have an active MyChart account? If no, what is the barrier?   No:     Patient Care Team:  Alireza Boucher MD as PCP - General (Family Medicine)    Medical History Review  Past Medical, Family, and Social History reviewed and does not contribute to the patient presenting condition    Health Maintenance   Topic Date Due    Diabetic retinal exam  Never done    Annual Wellness Visit (AWV)  Never done    COVID-19 Vaccine (5 - Booster for [place] series) 2022    Diabetic Alb to Cr ratio (uACR) test  2022    Lipids  2022    Flu vaccine (1) 2023    Depression Screen  2024    Diabetic foot exam  2024    GFR test (Diabetes, CKD 3-4, OR last GFR 15-59)  2024    A1C test (Diabetic or Prediabetic)  08/10/2024    DTaP/Tdap/Td vaccine (3 - Td or Tdap) 2033    Colorectal Cancer Screen  2033    Shingles vaccine  Completed    Pneumococcal 0-64 years Vaccine  Completed    Hepatitis C screen  Completed    HIV screen  Completed    Hepatitis A vaccine  Aged Out    Hib vaccine  Aged Out    Meningococcal (ACWY) vaccine  Aged Out
one and guided patient in placement. Last A1c on 8/10/23 was 7.8. Currently on Lantus 40u nightly, Humalog 13u TID and Metformin 1000 mg BID. [facility] Primary Care Pharmacist    2. Visit for wound check  - right thumb burn healing well. Completed course of oral doxy 100 mg. Nail pealing off in natural progression. No concern for infection. Patient instructed to not pull at nail, and keep area clean. Requested Prescriptions      No prescriptions requested or ordered in this encounter       There are no discontinued medications. Macrelo received counseling on the following healthy behaviors: nutrition, exercise and medication adherence    Discussed use,benefit, and side effects of prescribed medications. Barriers to medication compliance addressed. All patient questions answered. Pt voiced understanding. No follow-ups on file. Disclaimer: Some orall of this note was transcribed using voice-recognition software. This may cause typographical errors occasionally. Although all effort is made to fix these errors, please do not hesitate to contact our office if there Surekha Rehman concern with the understanding of this note.

## 2023-08-22 NOTE — PATIENT INSTRUCTIONS
Thank you for letting us take care of you today. We hope all your questions were addressed. If a question was overlooked or something else comes to mind after you return home, please contact a member of your Care Team listed below. Your Care Team at [address] [place] [address] is Team #  Brian Bhagat, (Faculty)  Kylie Coppola (Resident)  Rory Kamara (Resident)  Francis Terry (Resident)   Denisse Zaldivar (Resident)  Fabiano Caban (Resident)  Jam De Leon., [facility]  Giorgi Latif., [facility], [facility]  Theron Blake, [facility]  Darleen Cardoso, [facility]  Mark Kelsey, [facility]  Chela Birmingham) [place], [place] (Clinical Practice Manager)  Tristan Montero [facility] (Clinical Pharmacist)     Office phone number: [phone]    If you need to get in right away due to illness, please be advised we have \"Same Day\" appointments available Monday-Friday. Please call us at [phone] option #3 to schedule your \"Same Day\" appointment.

## 2023-08-22 NOTE — TELEPHONE ENCOUNTER
Last visit: 08/22/2023  Last Med refill: 01/2023  Does patient have enough medication for 72 hours: No: Needs test strips to cover sensor outage    Next Visit Date:  Future Appointments   Date Time Provider [address]   9/5/2023 11:00 AM Leonides Martin, [facility]   9/14/2023  3:00 PM Osei Bradford MD [address] [place] [address],[place] [address] Maintenance   Topic Date Due    Diabetic retinal exam  Never done    Annual Wellness Visit (AWV)  Never done    COVID-19 Vaccine (5 - Booster for Pfizer series) 06/24/2022    Diabetic Alb to Cr ratio (uACR) test  12/28/2022    Lipids  12/28/2022    Flu vaccine (1) 08/01/2023    Depression Screen  01/27/2024    Diabetic foot exam  05/18/2024    GFR test (Diabetes, CKD 3-4, OR last GFR 15-59)  08/06/2024    A1C test (Diabetic or Prediabetic)  08/10/2024    DTaP/Tdap/Td vaccine (3 - Td or Tdap) 04/05/2033    Colorectal Cancer Screen  07/06/2033    Shingles vaccine  Completed    Pneumococcal 0-64 years Vaccine  Completed    Hepatitis C screen  Completed    HIV screen  Completed    Hepatitis A vaccine  Aged Out    Hib vaccine  Aged Out    Meningococcal (ACWY) vaccine  Aged Out       Hemoglobin A1C (%)   Date Value   08/10/2023 7.8   05/19/2023 7.6   01/27/2023 6.3             ( goal A1C is < 7)   No components found for: LABMICR  LDL Cholesterol (mg/dL)   Date Value   12/28/2021 90   06/08/2020 83       (goal LDL is <100)   AST (U/L)   Date Value   08/06/2023 14     ALT (U/L)   Date Value   08/06/2023 8     BUN (mg/dL)   Date Value   08/06/2023 36 (H)     BP Readings from Last 3 Encounters:   08/22/23 139/76   08/15/23 (!) 155/86   08/10/23 (!) 142/88          (goal 120/80)    All Future Testing planned in [facility]  Lab Frequency Next Occurrence   Microalbumin, Ur Once 01/29/2023   Lipid Panel Once 12/29/2022               Patient Active Problem List:     Controlled type 2 diabetes mellitus with diabetic polyneuropathy, with long-term current use of insulin ([address])

## 2023-08-22 NOTE — TELEPHONE ENCOUNTER
Medication Management Service ([facility])  [facility]  [phone]     CLINICAL PHARMACY NOTE:      Patient was seen during physician appointment today to assess ability to place sensor on independently. Patient reported that his sensor fell off the day before when he knocked his arm into something. A sample was placed on his arm, but the reader would not recognize new sensor as old sensor had 8 days remaining. Writer and patient called [employer] support. Their suggestion was to have patient download farida to scan new sensor until old one ended on his reader. Patient phone not compatible with the farida. Andrey Samson will send a new sensor to the patient in 7-10 business days but patient will have to do fingersticks until then to monitor BG. Patient expressed understanding and needs test strips in order to monitor BG at home. Patient reports that he feels he can place next sensor independently. Instructed patient to wait until the reader says the previous sensor ended (8 days - Next Wednesday) before applying new sensor [facility] will send him. He expressed understanding. We will follow up with patient via phone call in September 5th 2023 at 11 am.     Referral for DM CPA given at the end of the appointment today by provider, need to have patient sign CPA as soon as possible    Viktoriya Payne, [facility]. LILY, PGY2 Ambulatory Care Resident  08/22/23    For Pharmacy Admin Tracking Only    Program: Medical Group  CPA in place:  Yes  Recommendation Provided To: Patient/Caregiver: 1 via In person  Intervention Detail: Patient Access Assistance/Sample Provided  Intervention Accepted By: Patient/Caregiver: 1  Time Spent (min): 60

## 2023-08-25 ENCOUNTER — OFFICE VISIT (OUTPATIENT)
Dept: FAMILY MEDICINE CLINIC | Age: 63
End: 2023-08-25
Payer: MEDICARE

## 2023-08-25 VITALS
SYSTOLIC BLOOD PRESSURE: 111 MMHG | BODY MASS INDEX: 25.83 KG/M2 | TEMPERATURE: 98.2 F | HEART RATE: 93 BPM | WEIGHT: 175 LBS | DIASTOLIC BLOOD PRESSURE: 73 MMHG

## 2023-08-25 DIAGNOSIS — E11.9 TYPE 2 DIABETES MELLITUS WITHOUT COMPLICATION, WITH LONG-TERM CURRENT USE OF INSULIN (HCC): ICD-10-CM

## 2023-08-25 DIAGNOSIS — I10 HYPERTENSION, UNSPECIFIED TYPE: Primary | ICD-10-CM

## 2023-08-25 DIAGNOSIS — Z79.4 TYPE 2 DIABETES MELLITUS WITHOUT COMPLICATION, WITH LONG-TERM CURRENT USE OF INSULIN (HCC): ICD-10-CM

## 2023-08-25 PROCEDURE — 99211 OFF/OP EST MAY X REQ PHY/QHP: CPT

## 2023-08-25 ASSESSMENT — ENCOUNTER SYMPTOMS
COUGH: 0
SHORTNESS OF BREATH: 0

## 2023-08-25 NOTE — PROGRESS NOTES
Attending Physician Statement  I have discussed the care of Mills Powell, including pertinent history and exam findings,  with the resident. I have reviewed the key elements of all parts of the encounter with the resident. I agree with the assessment, plan and orders as documented by the resident.   (Korina Zuniga)    Billie Narvaez MD

## 2023-08-25 NOTE — PROGRESS NOTES
[facility]    Family Medicine Residency Program - Outpatient Note      Subjective:    Shyla Fragoso is a 63 y.o. male with  has a past medical history of Bowel obstruction ([address]), Cocaine abuse ([address]), Diabetes mellitus ([address]), Diabetic polyneuropathy associated with type 2 diabetes mellitus ([address]), Diverticulitis, Diverticulosis, GERD (gastroesophageal reflux disease), Hypertension, MIGUEL (obstructive sleep apnea), Osteoarthritis, Renal lithiasis, Rheumatoid arteritis ([address]), and Type II or unspecified type diabetes mellitus without mention of complication, not stated as uncontrolled. Presented to the office today for:  Chief Complaint   Patient presents with    Headache     Patient have a headache 7/10    Dizziness     Patient is having dizzy spells       HPI    Patient is a 53-year-old male presenting to the office with complaint of headache and feeling dizzy at work. Patient states that he was running up and down when he felt dizzy. Patient is hypertensive and he took his morning meds without having breakfast.  States that it got worse and then he was laid off to work and then he went home had breakfast and he felt fine. Patient at the moment denies headache, chest pain, shortness of breath. Patient does not have a blood pressure cuff at home and we will order him 1 and patient will keep a log of his readings  Patient is here for headache staets that he was at work when he felt dizzy     DM   On Lantus 40 unit-s- nightly and Humalog sliding scale last A1c was 7.6  Averages around 180-250  No hypo/hyperglycemic episodes    Review of Systems   Constitutional:  Negative for activity change. Respiratory:  Negative for cough and shortness of breath. Genitourinary: Negative. Musculoskeletal: Negative. Neurological: Negative. Psychiatric/Behavioral: Negative.                  The patient has a   Family History   Problem Relation Age of Onset    Diabetes Father     Diabetes Other

## 2023-08-25 NOTE — PROGRESS NOTES
Visit Information    Have you changed or started any medications since your last visit including any over-the-counter medicines, vitamins, or herbal medicines? no   Have you stopped taking any of your medications? Is so, why? -  no  Are you having any side effects from any of your medications? - no    Have you seen any other physician or provider since your last visit?  no   Have you had any other diagnostic tests since your last visit?  no   Have you been seen in the emergency room and/or had an admission in a hospital since we last saw you?  no   Have you had your routine dental cleaning in the past 6 months?  no     Do you have an active MyChart account? If no, what is the barrier?   No:     Patient Care Team:  Rubén Danielle MD as PCP - General (Family Medicine)    Medical History Review  Past Medical, Family, and Social History reviewed and does not contribute to the patient presenting condition    Health Maintenance   Topic Date Due    Diabetic retinal exam  Never done    Annual Wellness Visit (AWV)  Never done    COVID-19 Vaccine (5 - Booster for [place] series) 06/24/2022    Diabetic Alb to Cr ratio (uACR) test  12/28/2022    Lipids  12/28/2022    Flu vaccine (1) 08/01/2023    Depression Screen  01/27/2024    Diabetic foot exam  05/18/2024    GFR test (Diabetes, CKD 3-4, OR last GFR 15-59)  08/06/2024    A1C test (Diabetic or Prediabetic)  08/10/2024    DTaP/Tdap/Td vaccine (3 - Td or Tdap) 04/05/2033    Colorectal Cancer Screen  07/06/2033    Shingles vaccine  Completed    Pneumococcal 0-64 years Vaccine  Completed    Hepatitis C screen  Completed    HIV screen  Completed    Hepatitis A vaccine  Aged Out    Hib vaccine  Aged Out    Meningococcal (ACWY) vaccine  Aged Out

## 2023-09-05 ENCOUNTER — OFFICE VISIT (OUTPATIENT)
Dept: FAMILY MEDICINE CLINIC | Age: 63
End: 2023-09-05
Payer: MEDICARE

## 2023-09-05 ENCOUNTER — PHARMACY VISIT (OUTPATIENT)
Dept: FAMILY MEDICINE CLINIC | Age: 63
End: 2023-09-05

## 2023-09-05 VITALS
DIASTOLIC BLOOD PRESSURE: 74 MMHG | BODY MASS INDEX: 25 KG/M2 | SYSTOLIC BLOOD PRESSURE: 116 MMHG | HEIGHT: 69 IN | HEART RATE: 84 BPM | WEIGHT: 168.8 LBS

## 2023-09-05 DIAGNOSIS — R03.1 LOW BLOOD PRESSURE READING: ICD-10-CM

## 2023-09-05 DIAGNOSIS — Z79.4 CONTROLLED TYPE 2 DIABETES MELLITUS WITH DIABETIC POLYNEUROPATHY, WITH LONG-TERM CURRENT USE OF INSULIN (HCC): ICD-10-CM

## 2023-09-05 DIAGNOSIS — Z13.220 SCREENING FOR HYPERLIPIDEMIA: ICD-10-CM

## 2023-09-05 DIAGNOSIS — E11.42 CONTROLLED TYPE 2 DIABETES MELLITUS WITH DIABETIC POLYNEUROPATHY, WITH LONG-TERM CURRENT USE OF INSULIN (HCC): ICD-10-CM

## 2023-09-05 DIAGNOSIS — M79.605 LEFT LEG PAIN: Primary | ICD-10-CM

## 2023-09-05 DIAGNOSIS — Z79.899 MEDICATION MANAGEMENT: Primary | ICD-10-CM

## 2023-09-05 PROCEDURE — 99213 OFFICE O/P EST LOW 20 MIN: CPT

## 2023-09-05 PROCEDURE — 99211 OFF/OP EST MAY X REQ PHY/QHP: CPT | Performed by: STUDENT IN AN ORGANIZED HEALTH CARE EDUCATION/TRAINING PROGRAM

## 2023-09-05 PROCEDURE — 3078F DIAST BP <80 MM HG: CPT

## 2023-09-05 PROCEDURE — 3074F SYST BP LT 130 MM HG: CPT

## 2023-09-05 PROCEDURE — 3051F HG A1C>EQUAL 7.0%<8.0%: CPT

## 2023-09-05 ASSESSMENT — PATIENT HEALTH QUESTIONNAIRE - PHQ9
SUM OF ALL RESPONSES TO PHQ QUESTIONS 1-9: 0
SUM OF ALL RESPONSES TO PHQ QUESTIONS 1-9: 0
SUM OF ALL RESPONSES TO PHQ9 QUESTIONS 1 & 2: 0
1. LITTLE INTEREST OR PLEASURE IN DOING THINGS: 0
SUM OF ALL RESPONSES TO PHQ QUESTIONS 1-9: 0
SUM OF ALL RESPONSES TO PHQ QUESTIONS 1-9: 0
2. FEELING DOWN, DEPRESSED OR HOPELESS: 0

## 2023-09-05 NOTE — PATIENT INSTRUCTIONS
Thank you for letting us take care of you today. We hope all your questions were addressed. If a question was overlooked or something else comes to mind after you return home, please contact a member of your Care Team listed below. Your Care Team at [facility] is Team #1  Emily Fisher, Faculty Advisor  Westley Mccullough, Resident Physician  Evon Odonnell, Resident Physician  Sunitha Cooper, Resident Physician  [facility], [facility], [facility], 111  [facility], [facility], [facility]  [facility]  Rayray Abad, [address] [facility] [place],   Leonides Martin, [facility] (Clinical Pharmacist)     Office phone number: [phone]    If you need to get in right away due to illness, please be advised we have \"Same Day\" appointments available Monday-Friday. Please call us at [phone] option #3 to schedule your \"Same Day\" appointment.

## 2023-09-05 NOTE — PROGRESS NOTES
Subjective:    Lilian Mena is a 63 y.o. male with  has a past medical history of Bowel obstruction ([address]), Cocaine abuse ([address]), Diabetes mellitus ([address]), Diabetic polyneuropathy associated with type 2 diabetes mellitus ([address]), Diverticulitis, Diverticulosis, GERD (gastroesophageal reflux disease), Hypertension, MIGUEL (obstructive sleep apnea), Osteoarthritis, Renal lithiasis, Rheumatoid arteritis ([address]), and Type II or unspecified type diabetes mellitus without mention of complication, not stated as uncontrolled. Presented to the office today for:  Chief Complaint   Patient presents with    Leg Pain       HPI    Cramping pain on the left mid leg radiating down to the toes after waking up on Sunday morning. Says he missed his insulin dose on Saturday night. Wasn't able to walk because of the pain  Says it was a 10/10 pain. Today the pain is no longer present and he is able to walk fine, just feels a little bit sore. Saturday he also had abdominal pain and diarrhea 3 times. Back in November, he was admitted to [facility] hospital for similar complaints except it was on both legs bilaterally. That time he was found to be in [facility] with glucose of 700+    Also will have CGM placed by pharmacy today after it fell off 3 weeks ago.      Needs a sick leave for Sunday September 3rd    Review of Systems    REVIEW OF SYSTEMS    Constitutional:  Denies fever, chills, or weakness   Eyes:  Denies vision changes  HENT:  Denies sore throat, neck pain, or headache   Respiratory:  Denies cough or shortness of breath   Cardiovascular:  Denies chest pain or palpitations  GI:  Abdominal pain + diarrhea  Musculoskeletal:  Denies back pain or joint pain  : increased urinary frequency (BPH)    The patient has a   Family History   Problem Relation Age of Onset    Diabetes Father     Diabetes Other     Heart Attack Other     Hypertension Other     Diabetes Brother        Objective:    /74 (Site: Right Upper Arm, Position:

## 2023-09-05 NOTE — PROGRESS NOTES
of Care     Goal A1c:  Less than 7 %   Fasting Blood Sugars:  80-130mg/dL  Postprandial glucose:  Less than 180mg/dL     A1C MONITORING  Lab Results   Component Value Date    LABA1C 7.8 08/10/2023    LABA1C 7.6 05/19/2023    LABA1C 6.3 01/27/2023       RENAL MONITORING  Lab Results   Component Value Date/Time    MICROALBUR 63 12/28/2021 01:17 PM    LABCREA 90.3 12/28/2021 01:17 PM     CrCl cannot be calculated (Unknown ideal weight.). CHOLESTEROL MANAGEMENT  Lab Results   Component Value Date    CHOL 187 12/28/2021    TRIG 177 (H) 12/28/2021    HDL 62 12/28/2021    LDLCHOLESTEROL 90 12/28/2021     ALT   Date Value Ref Range Status   08/06/2023 8 5 - 41 U/L Final     AST   Date Value Ref Range Status   08/06/2023 14 <40 U/L Final       BLOOD PRESSURE MANAGEMENT  BP Readings from Last 3 Encounters:   08/25/23 111/73   08/22/23 139/76   08/15/23 (!) 155/86       RISK MANAGEMENT  ASCVD RISK: The 10-year ASCVD risk score (Alysha MARTIN, et al., 2019) is: 11.9%    Values used to calculate the score:      Age: 63 years      Sex: Male      Is Non- : No      Diabetic: Yes      Tobacco smoker: No      Systolic Blood Pressure: 478 mmHg      Is BP treated: Yes      HDL Cholesterol: 62 mg/dL      Total Cholesterol: 187 mg/dL   On Statin: No  On ACE-I/ARB for HTN or Microalbuminuria: Yes  On GLP-1RA:No  On SGLT2i: No  Smoker: Former smoker  Immunizations Needed: Hep B  Date of last eye exam: Did not assess  Date of last foot exam: Did not assess    Assessment/Plan     Diabetes Management: Uncontrolled diabetes as evidenced by A1C of 7.8% (08/10/2023) (Goal < 7). Currently on Lantus, Humalog, Metformin. BG reported 190-270s. denies hypoglycemia. Purpose of today to place CGM and schedule follow up. Pharmacologic therapy: Continue Lantus 40 units daily, Humalog 13 units before meals, metformin max therapy  Only take humalog prior to meals.  Education provided  Glucose testing:   Sensor placed and reader

## 2023-09-05 NOTE — PROGRESS NOTES
Visit Information    Have you changed or started any medications since your last visit including any over-the-counter medicines, vitamins, or herbal medicines? no   Have you stopped taking any of your medications? Is so, why? -  no  Are you having any side effects from any of your medications? - no    Have you seen any other physician or provider since your last visit?  no   Have you had any other diagnostic tests since your last visit?  no   Have you been seen in the emergency room and/or had an admission in a hospital since we last saw you?  no   Have you had your routine dental cleaning in the past 6 months?  no     Do you have an active MyChart account? If no, what is the barrier?   No: pending    Patient Care Team:  Linh Cerna MD as PCP - General (Family Medicine)    Medical History Review  Past Medical, Family, and Social History reviewed and does not contribute to the patient presenting condition    Health Maintenance   Topic Date Due    Diabetic retinal exam  Never done    Annual Wellness Visit (AWV)  Never done    COVID-19 Vaccine (5 - Booster for [place] series) 06/24/2022    Diabetic Alb to Cr ratio (uACR) test  12/28/2022    Lipids  12/28/2022    Flu vaccine (1) 08/01/2023    Depression Screen  01/27/2024    Diabetic foot exam  05/18/2024    GFR test (Diabetes, CKD 3-4, OR last GFR 15-59)  08/06/2024    A1C test (Diabetic or Prediabetic)  08/10/2024    DTaP/Tdap/Td vaccine (3 - Td or Tdap) 04/05/2033    Colorectal Cancer Screen  07/06/2033    Shingles vaccine  Completed    Pneumococcal 0-64 years Vaccine  Completed    Hepatitis C screen  Completed    HIV screen  Completed    Hepatitis A vaccine  Aged Out    Hib vaccine  Aged Out    Meningococcal (ACWY) vaccine  Aged Out

## 2023-09-13 DIAGNOSIS — Z79.4 CONTROLLED TYPE 2 DIABETES MELLITUS WITH DIABETIC POLYNEUROPATHY, WITH LONG-TERM CURRENT USE OF INSULIN (HCC): ICD-10-CM

## 2023-09-13 DIAGNOSIS — E11.42 CONTROLLED TYPE 2 DIABETES MELLITUS WITH DIABETIC POLYNEUROPATHY, WITH LONG-TERM CURRENT USE OF INSULIN (HCC): ICD-10-CM

## 2023-09-13 NOTE — TELEPHONE ENCOUNTER
E-scribe request for GABAPENTIN. Please review and e-scribe if applicable.      Last Visit Date:  9/5/2023  Next Visit Date:  9/19/2023    Hemoglobin A1C (%)   Date Value   08/10/2023 7.8   05/19/2023 7.6   01/27/2023 6.3             ( goal A1C is < 7)   No components found for: \"LABMICR\"  LDL Cholesterol (mg/dL)   Date Value   12/28/2021 90       (goal LDL is <100)   AST (U/L)   Date Value   08/06/2023 14     ALT (U/L)   Date Value   08/06/2023 8     BUN (mg/dL)   Date Value   08/06/2023 36 (H)     BP Readings from Last 3 Encounters:   09/05/23 116/74   08/25/23 111/73   08/22/23 139/76          (goal 120/80)        Patient Active Problem List:     Controlled type 2 diabetes mellitus with diabetic polyneuropathy, with long-term current use of insulin (HCC)     Diverticulosis     DM (diabetes mellitus) (HCC)     Hypertension     Elevated liver enzymes     Scrotal abscess     Abdominal abscess     Hyperplastic colon polyp     Lower abdominal pain     Diabetic foot (HCC)     Infected hernioplasty mesh (HCC)     Cocaine abuse (HCC)     Chest pain     BPH with obstruction/lower urinary tract symptoms     Chronic tension-type headache, intractable     Diabetic polyneuropathy associated with type 2 diabetes mellitus (HCC)     Gastroesophageal reflux disease     Acute otitis externa of left ear     Intermittent claudication (HCC)     Family history of malignant neoplasm of colon in relative diagnosed when older than 48years of age     Benign prostatic hyperplasia with urinary frequency     Hyperglycemia due to diabetes mellitus ([address])     Hyperosmolar coma due to type 2 diabetes mellitus ([address])     Visit for wound check     Low blood pressure reading     Left leg pain     Screening for hyperlipidemia      ----JF

## 2023-09-14 RX ORDER — GABAPENTIN 800 MG/1
TABLET ORAL
Qty: 90 TABLET | Refills: 0 | Status: SHIPPED | OUTPATIENT
Start: 2023-09-14 | End: 2023-10-14

## 2023-09-17 DIAGNOSIS — E11.9 TYPE 2 DIABETES MELLITUS WITHOUT COMPLICATION, WITH LONG-TERM CURRENT USE OF INSULIN (HCC): ICD-10-CM

## 2023-09-17 DIAGNOSIS — Z79.4 TYPE 2 DIABETES MELLITUS WITHOUT COMPLICATION, WITH LONG-TERM CURRENT USE OF INSULIN (HCC): ICD-10-CM

## 2023-09-18 NOTE — TELEPHONE ENCOUNTER
Please address the medication refill and close the encounter. If I can be of assistance, please route to the applicable pool. Thank you.       Last visit: 9-5-23  Last Med refill: 8-9-22  Does patient have enough medication for 72 hours: No:     Next Visit Date:  Future Appointments   Date Time Provider [address]   9/19/2023  2:00 PM Alexa Geller, [address] [place] [address]   10/5/2023  1:15 PM Scooter Hickman MD [facility]   12/7/2023 11:00 AM Scooter Hickman MD [address] [place] [address],42-10 Maintenance   Topic Date Due    Diabetic retinal exam  Never done    Pneumococcal 0-64 years Vaccine (2 - PCV) 06/27/2014    Annual Wellness Visit (AWV)  Never done    COVID-19 Vaccine (5 - Pfizer series) 06/24/2022    Hepatitis B vaccine (1 of 3 - Risk 3-dose series) Never done    Diabetic Alb to Cr ratio (uACR) test  12/28/2022    Lipids  12/28/2022    Flu vaccine (1) 08/01/2023    Diabetic foot exam  05/18/2024    GFR test (Diabetes, CKD 3-4, OR last GFR 15-59)  08/06/2024    A1C test (Diabetic or Prediabetic)  08/10/2024    Depression Screen  09/05/2024    DTaP/Tdap/Td vaccine (3 - Td or Tdap) 04/05/2033    Colorectal Cancer Screen  07/06/2033    Shingles vaccine  Completed    Hepatitis C screen  Completed    HIV screen  Completed    Hepatitis A vaccine  Aged Out    Hib vaccine  Aged Out    Meningococcal (ACWY) vaccine  Aged Out       Hemoglobin A1C (%)   Date Value   08/10/2023 7.8   05/19/2023 7.6   01/27/2023 6.3             ( goal A1C is < 7)   No components found for: \"LABMICR\"  LDL Cholesterol (mg/dL)   Date Value   12/28/2021 90   06/08/2020 83       (goal LDL is <100)   AST (U/L)   Date Value   08/06/2023 14     ALT (U/L)   Date Value   08/06/2023 8     BUN (mg/dL)   Date Value   08/06/2023 36 (H)     BP Readings from Last 3 Encounters:   09/05/23 116/74   08/25/23 111/73   08/22/23 139/76          (goal 120/80)    All Future Testing planned in [facility]  Lab Frequency Next Occurrence   Microalbumin,

## 2023-09-19 ENCOUNTER — PHARMACY VISIT (OUTPATIENT)
Dept: FAMILY MEDICINE CLINIC | Age: 63
End: 2023-09-19

## 2023-09-19 VITALS — BODY MASS INDEX: 27.13 KG/M2 | WEIGHT: 183.8 LBS

## 2023-09-19 DIAGNOSIS — Z79.899 MEDICATION MANAGEMENT: Primary | ICD-10-CM

## 2023-09-19 PROCEDURE — APPNB60 APP NON BILLABLE TIME 46-60 MINS

## 2023-09-19 PROCEDURE — 99999 PR OFFICE/OUTPT VISIT,PROCEDURE ONLY: CPT | Performed by: PHARMACIST

## 2023-09-19 RX ORDER — INSULIN LISPRO 100 [IU]/ML
13 INJECTION, SOLUTION INTRAVENOUS; SUBCUTANEOUS
Qty: 15 ML | Refills: 0 | Status: SHIPPED | OUTPATIENT
Start: 2023-09-19

## 2023-09-19 NOTE — PROGRESS NOTES
Medication Management Service  [address] Del Valle is a 63 y.o. male that presents for a follow-up diabetes mellitus office visit with Medication Management Service per referral from Dr. Christal Donald MD for Diabetes Management under Collaborative Practice Agreement. Pt has an A1c goal of < 7%. His PMH is significant for HTN, diverticulitis, GERD, T2DM, & BPH. Pt currently experiences neuropathy as a complication of his Z2ZA. Subjective/Objective   New Problems/Changes since last visit:   Pt presents today after his first [employer] Radha 2 sensor has been active for 2 weeks. Libreview report downloaded and plan to review with patient. Patient brought new sensor to be applied during office visit. Current sensor ended during this appointment. Patient also reports that he is now laid off from his job. Finds himself laying around more often. LIFESTYLE  Diet:  B: eggs,   L: mixed veggies, hamburger, fried food  D: steak, liver, mexican rice  Drinks: Prefers water, occasionally a regular pop  Sweets/snacks: Snacks on chips at work or a candy bar    Physical Activity:   Laid off-- laying around house  When given the choice of altering diet or physical activity, pt chose physical activity. Pt agreeable to walking, and agreed to start walking around the block 10 minutes in the morning and 10 minutes in the evening. Explained to pt that we're trying to make sustainable lifestyle modifications, not crash-diet-like changes. WEIGHT  Weight: 83.4 kg (183 lbs 12.8 oz)  BMI: 27.13 kg/m^2    DIABETES MANAGEMENT  Current DM Medications:   Insulin glargine 40 units SubQ once nightly  Metformin 1,000 mg PO twice daily  Insulin lispro 13 units SubQ three times daily before meals    Previous Diabetes Medications:   Humulin 70/30, last filled 08/20/2020 #9    Medication Adherence/Cost/Adverse Events:   Patient reports that he takes his lispro before meals instead of after meals.  He

## 2023-09-22 RX ORDER — INSULIN LISPRO 100 [IU]/ML
INJECTION, SOLUTION INTRAVENOUS; SUBCUTANEOUS
Qty: 9 ML | Refills: 3 | Status: SHIPPED | OUTPATIENT
Start: 2023-09-22

## 2023-09-26 ENCOUNTER — SCHEDULED TELEPHONE ENCOUNTER (OUTPATIENT)
Dept: FAMILY MEDICINE CLINIC | Age: 63
End: 2023-09-26

## 2023-09-26 DIAGNOSIS — E11.42 CONTROLLED TYPE 2 DIABETES MELLITUS WITH DIABETIC POLYNEUROPATHY, WITH LONG-TERM CURRENT USE OF INSULIN (HCC): ICD-10-CM

## 2023-09-26 DIAGNOSIS — Z79.899 MEDICATION MANAGEMENT: Primary | ICD-10-CM

## 2023-09-26 DIAGNOSIS — Z79.4 CONTROLLED TYPE 2 DIABETES MELLITUS WITH DIABETIC POLYNEUROPATHY, WITH LONG-TERM CURRENT USE OF INSULIN (HCC): ICD-10-CM

## 2023-09-26 PROCEDURE — 99999 PR OFFICE/OUTPT VISIT,PROCEDURE ONLY: CPT | Performed by: PHARMACIST

## 2023-09-26 PROCEDURE — APPNB180 APP NON BILLABLE TIME > 60 MINS

## 2023-09-26 NOTE — PROGRESS NOTES
Medication Management Service  [address]  [place] is a 63 y.o. male that presents for a follow-up diabetes mellitus office visit with Medication Management Service per referral from Dr. Shelley Ames  for Diabetes Management under Collaborative Practice Agreement with A1c goal < 7 %. Patient PMH includes Intermittent claudication, DM, HTN, elevated liver enzymes, diabetic foot, cocaine abuse, chest pain, BPH, Chronic tension-type headache, GERD, Hyperosmolar coma due to DM2 . Complications from DM Include Neuropathy and delayed wound healing. Subjective/Objective     New Problems/Changes since last visit: Follow up regarding low glucose and high glucose readings at last appointment. Originally a phone call but patient showed in the office today. Patient reports eating a large breakfast with a slice of cake, took his humalog prior to eating his meal, and presented to the clinic with a glucose of 69mg/dL. With low glucose reading at clinic patient was given three Dum-Dum lollipops to chew. Within 15 minutes his glucose reported back at 74mg/dL. Patient instructed to eat a meal as soon as appointment was over. LIFESTYLE  Diet:  B: eggs,   L: mixed veggies and hamburger  D: steak, liver, mexican rice  Drinks: Prefers water, occasionally a regular pop  Sweets/snacks: Corn nuts, wang candy, pringles.  Snacking has increased greatly     Physical Activity: Was walking around more, wants to keep going     WEIGHT  Weight: 83.4 kg  BMI: 27.13 kg/m2    DIABETES MANAGEMENT    Current DM Medications: Lantus 40 units nightly, Humalog 13 units three times daily before meals, metformin 1000 mg twice daily    Medication Adherence/Cost/Adverse Events: No missed doses    Blood Glucose Monitoring System: Radha 2    Previous Home Glucose Readings:         Current Home Glucose Readings:         Hypoglycemia:   Classification of Hypoglycemia Events  []   None Reported  [x]   Level 1   (Glucose

## 2023-09-27 RX ORDER — INSULIN LISPRO 100 [IU]/ML
10 INJECTION, SOLUTION INTRAVENOUS; SUBCUTANEOUS
Qty: 15 ML | Refills: 0
Start: 2023-09-27

## 2023-09-27 RX ORDER — INSULIN GLARGINE 100 [IU]/ML
INJECTION, SOLUTION SUBCUTANEOUS
Qty: 12 ML | Refills: 5
Start: 2023-09-27

## 2023-10-02 ENCOUNTER — TELEPHONE (OUTPATIENT)
Dept: PHARMACY | Facility: CLINIC | Age: 63
End: 2023-10-02

## 2023-10-05 ENCOUNTER — OFFICE VISIT (OUTPATIENT)
Dept: FAMILY MEDICINE CLINIC | Age: 63
End: 2023-10-05
Payer: MEDICARE

## 2023-10-05 VITALS
DIASTOLIC BLOOD PRESSURE: 73 MMHG | BODY MASS INDEX: 26.19 KG/M2 | HEART RATE: 73 BPM | SYSTOLIC BLOOD PRESSURE: 113 MMHG | HEIGHT: 69 IN | WEIGHT: 176.8 LBS

## 2023-10-05 DIAGNOSIS — K21.9 GASTROESOPHAGEAL REFLUX DISEASE, UNSPECIFIED WHETHER ESOPHAGITIS PRESENT: ICD-10-CM

## 2023-10-05 DIAGNOSIS — Z79.4 CONTROLLED TYPE 2 DIABETES MELLITUS WITH DIABETIC POLYNEUROPATHY, WITH LONG-TERM CURRENT USE OF INSULIN (HCC): Primary | ICD-10-CM

## 2023-10-05 DIAGNOSIS — E11.42 CONTROLLED TYPE 2 DIABETES MELLITUS WITH DIABETIC POLYNEUROPATHY, WITH LONG-TERM CURRENT USE OF INSULIN (HCC): Primary | ICD-10-CM

## 2023-10-05 PROBLEM — Z13.220 SCREENING FOR HYPERLIPIDEMIA: Status: RESOLVED | Noted: 2023-09-05 | Resolved: 2023-10-05

## 2023-10-05 PROCEDURE — 3051F HG A1C>EQUAL 7.0%<8.0%: CPT

## 2023-10-05 PROCEDURE — 3074F SYST BP LT 130 MM HG: CPT

## 2023-10-05 PROCEDURE — 3078F DIAST BP <80 MM HG: CPT

## 2023-10-05 PROCEDURE — 99213 OFFICE O/P EST LOW 20 MIN: CPT

## 2023-10-05 RX ORDER — OMEPRAZOLE 20 MG/1
CAPSULE, DELAYED RELEASE ORAL
Qty: 30 CAPSULE | Refills: 5 | Status: SHIPPED | OUTPATIENT
Start: 2023-10-05

## 2023-10-05 RX ORDER — INSULIN LISPRO 100 [IU]/ML
13 INJECTION, SOLUTION INTRAVENOUS; SUBCUTANEOUS
Qty: 15 ML | Refills: 0
Start: 2023-10-05

## 2023-10-05 RX ORDER — ATORVASTATIN CALCIUM 40 MG/1
40 TABLET, FILM COATED ORAL DAILY
Qty: 30 TABLET | Refills: 3 | Status: SHIPPED | OUTPATIENT
Start: 2023-10-05

## 2023-10-05 RX ORDER — INSULIN GLARGINE 100 [IU]/ML
15 INJECTION, SOLUTION SUBCUTANEOUS 2 TIMES DAILY
Qty: 15 ML | Refills: 1 | Status: SHIPPED | OUTPATIENT
Start: 2023-10-05

## 2023-10-05 RX ORDER — GABAPENTIN 800 MG/1
800 TABLET ORAL 3 TIMES DAILY
Qty: 90 TABLET | Refills: 0 | Status: SHIPPED | OUTPATIENT
Start: 2023-10-05 | End: 2023-11-04

## 2023-10-05 ASSESSMENT — ENCOUNTER SYMPTOMS
SORE THROAT: 0
NAUSEA: 0
SHORTNESS OF BREATH: 0
VOMITING: 0
ABDOMINAL PAIN: 0
COUGH: 0

## 2023-10-05 NOTE — PATIENT INSTRUCTIONS
Thank you for letting us take care of you today. We hope all your questions were addressed. If a question was overlooked or something else comes to mind after you return home, please contact a member of your Care Team listed below. Your Care Team at [address] [place] [address] is Team #  Lilly Mendez, (Faculty)  Pako Lozano (Resident)  Sisi Johnson (Resident)  Maxim Miles (Resident)   Maryann Vasquez (Resident)  Zoila Nix (Resident)  Salazar Chatman., [facility]  Catia Neumann., [facility], [facility]  Holli Pedlar, [facility]  Molly Saliva, [facility]  Quique Bales, [facility]  Chela Marcelino) [place], [place] (Clinical Practice Manager)  Jesika CastellanosVA [facility] (Clinical Pharmacist)     Office phone number: [phone]    If you need to get in right away due to illness, please be advised we have \"Same Day\" appointments available Monday-Friday. Please call us at [phone] option #3 to schedule your \"Same Day\" appointment.

## 2023-10-05 NOTE — PROGRESS NOTES
Attending Physician Statement  I have discussed the care of Vicki Field, including pertinent history and exam findings,  with the resident. I have reviewed the key elements of all parts of the encounter with the resident. I agree with the assessment, plan and orders as documented by the resident.   (Andrew Boyce)    Mikel Roberts MD
Diabetic visit information    BP Readings from Last 3 Encounters:   09/05/23 116/74   08/25/23 111/73   08/22/23 139/76       Hemoglobin A1C (%)   Date Value   08/10/2023 7.8   05/19/2023 7.6   01/27/2023 6.3     LDL Cholesterol (mg/dL)   Date Value   12/28/2021 90               Have you changed or started any medications since your last visit including any over-the-counter medicines, vitamins, or herbal medicines? no   Have you stopped taking any of your medications? Is so, why? -  no  Are you having any side effects from any of your medications? - no    Have you seen any other physician or provider since your last visit?  no   Have you had any other diagnostic tests since your last visit?  no   Have you been seen in the emergency room and/or had an admission in a hospital since we last saw you?  no     Have you had your annual diabetic retinal (eye) exam? No   (ensure copy of exam is in the chart)    Have you had your routine dental cleaning in the past 6 months? no    Do you have an active [employer] account? If not, what are your barriers? No: Pending    Patient Care Team:  Ronald Givens MD as PCP - General (Family Medicine)    Medical history Review  Past Medical, Family, and Social History reviewed and does contribute to the patient presenting condition.     Health Maintenance   Topic Date Due    Diabetic retinal exam  Never done    Annual Wellness Visit (AWV)  Never done    Hepatitis B vaccine (1 of 3 - Risk 3-dose series) Never done    Diabetic Alb to Cr ratio (uACR) test  12/28/2022    Lipids  12/28/2022    COVID-19 Vaccine (5 - Pfizer series) 11/25/2023    Diabetic foot exam  05/18/2024    GFR test (Diabetes, CKD 3-4, OR last GFR 15-59)  08/06/2024    A1C test (Diabetic or Prediabetic)  08/10/2024    Depression Screen  09/05/2024    DTaP/Tdap/Td vaccine (3 - Td or Tdap) 04/05/2033    Colorectal Cancer Screen  07/06/2033    Flu vaccine  Completed    Shingles vaccine  Completed    Pneumococcal 0-64 years
note.    --Brittany Acosta MD

## 2023-10-06 NOTE — TELEPHONE ENCOUNTER
Provider response noted - atorvastatin 40 mg started - sent to [facility] on 10/5/23.       Shwetha Allan, PharmD, [facility]  Department, toll free: [phone], option 1      For Pharmacy Admin Tracking Only  Program: [place] in place:  No  Recommendation Provided To: Provider: 1 via Note to Provider  Intervention Detail: New Rx: 1, reason: Needs Additional Therapy  Intervention Accepted By: Provider: 1  Gap Closed?: Yes   Time Spent (min): 30
identified include:  - Patient identified as having SUPD gap  Suggest initiation of moderate intensity statin therapy (e.g., atorvastatin 10-20 mg; rosuvastatin 5-10 mg); increase the dose as the patient tolerates.  OR  Patient remains a candidate for statin therapy, but has tried/failed statin(s) in the past. Suggest diagnosis/ICD-10 code at provider visit to exclude patient from SUPD measure - if applicable to the patient:  statin myopathy (ICD-10 G72.0, F19.2P1S)      Future Appointments   Date Time Provider [address] [place] [address]   10/5/2023  1:15 PM Maribel Weiss MD [address] [place] [address]   10/10/2023 11:00 AM Gene Lopez, [address]   12/7/2023 11:00 AM Ronald Givens MD [address], PharmD, [facility]  Department, toll free: [phone], option 1

## 2023-10-27 ENCOUNTER — TELEPHONE (OUTPATIENT)
Dept: FAMILY MEDICINE CLINIC | Age: 63
End: 2023-10-27

## 2023-10-27 NOTE — TELEPHONE ENCOUNTER
Medication Management Service ([facility])  [facility]  [phone]     CLINICAL PHARMACY NOTE:    Spoke with Machelle Kent. Patient reports not being able to fill [place] 2 sensors at his pharmacy. Patient shared that his sensor will  in 2 days. Patient insurance provider is [employer] ([employer]). This particular plan covers [place] as a pharmacy benefit. Phone call to Clarence Becker. Pharmacy is needing office to complete a PA. PA request has been sent to the office. Followed up with MA. Plan will be to set aside office sample for patient to  so that glucose monitoring can continue while awaiting PA decision. Lot# [id number]  Exp. 2024 . MA will follow up with office workflow to verify that PA has been completed. Radha 2 sensor sample left with MA. MA also left voice message for patient to return phone call to office to discuss above plan. Jesika Castellanos, Pharm. D., [address] Medication Management Service  [phone]  10/27/2023  2:26 PM        ====================================================  For Pharmacy Admin Tracking Only    Program: Medical Group  CPA in place:  Yes  Recommendation Provided To:  Other: 2  Intervention Detail: Benefit Assistance and Patient Access Assistance/Sample Provided  Intervention Accepted By: Other: 2  Time Spent (min): 30

## 2023-10-27 NOTE — TELEPHONE ENCOUNTER
Left message for patient informing him of the PA that was submitted for his sensor. It was explained that the PA can take a while to clear, but that office has a sensor here for him from Derek Kiran the pharmacist. He can pick it up anytime before 4:45pm today. If he can't pick it up today, he can on Monday.

## 2023-10-27 NOTE — TELEPHONE ENCOUNTER
PA request for [employer]     PA processed and submitted to pt insurance, waiting for response in regards to coverage

## 2023-11-09 ENCOUNTER — OFFICE VISIT (OUTPATIENT)
Dept: FAMILY MEDICINE CLINIC | Age: 63
End: 2023-11-09

## 2023-11-09 VITALS
SYSTOLIC BLOOD PRESSURE: 155 MMHG | BODY MASS INDEX: 28.29 KG/M2 | WEIGHT: 191 LBS | DIASTOLIC BLOOD PRESSURE: 89 MMHG | HEART RATE: 82 BPM | HEIGHT: 69 IN

## 2023-11-09 DIAGNOSIS — E11.42 CONTROLLED TYPE 2 DIABETES MELLITUS WITH DIABETIC POLYNEUROPATHY, WITH LONG-TERM CURRENT USE OF INSULIN (HCC): ICD-10-CM

## 2023-11-09 DIAGNOSIS — M25.551 RIGHT HIP PAIN: Primary | ICD-10-CM

## 2023-11-09 DIAGNOSIS — Z79.4 CONTROLLED TYPE 2 DIABETES MELLITUS WITH DIABETIC POLYNEUROPATHY, WITH LONG-TERM CURRENT USE OF INSULIN (HCC): ICD-10-CM

## 2023-11-09 DIAGNOSIS — M70.61 TROCHANTERIC BURSITIS OF RIGHT HIP: ICD-10-CM

## 2023-11-09 RX ORDER — GABAPENTIN 800 MG/1
800 TABLET ORAL 3 TIMES DAILY
Qty: 90 TABLET | Refills: 0 | Status: SHIPPED | OUTPATIENT
Start: 2023-11-09 | End: 2023-12-09

## 2023-11-09 RX ORDER — IBUPROFEN 200 MG
400 TABLET ORAL EVERY 6 HOURS PRN
Qty: 60 TABLET | Refills: 0 | Status: SHIPPED | OUTPATIENT
Start: 2023-11-09 | End: 2023-12-09

## 2023-11-09 ASSESSMENT — ENCOUNTER SYMPTOMS
SHORTNESS OF BREATH: 0
WHEEZING: 0
CHEST TIGHTNESS: 0
ABDOMINAL PAIN: 0
COUGH: 0

## 2023-11-15 ENCOUNTER — HOSPITAL ENCOUNTER (OUTPATIENT)
Age: 63
Discharge: HOME | End: 2023-11-17
Payer: MEDICARE

## 2023-11-15 ENCOUNTER — HOSPITAL ENCOUNTER (OUTPATIENT)
Dept: GENERAL RADIOLOGY | Age: 63
Discharge: HOME | End: 2023-11-17
Payer: MEDICARE

## 2023-11-15 DIAGNOSIS — M25.551 RIGHT HIP PAIN: ICD-10-CM

## 2023-11-15 PROCEDURE — 73502 X-RAY EXAM HIP UNI 2-3 VIEWS: CPT

## 2023-11-16 ENCOUNTER — OFFICE VISIT (OUTPATIENT)
Dept: FAMILY MEDICINE CLINIC | Age: 63
End: 2023-11-16
Payer: MEDICARE

## 2023-11-16 VITALS
WEIGHT: 183 LBS | DIASTOLIC BLOOD PRESSURE: 83 MMHG | HEIGHT: 70 IN | SYSTOLIC BLOOD PRESSURE: 137 MMHG | BODY MASS INDEX: 26.2 KG/M2 | HEART RATE: 78 BPM

## 2023-11-16 DIAGNOSIS — E11.42 CONTROLLED TYPE 2 DIABETES MELLITUS WITH DIABETIC POLYNEUROPATHY, WITH LONG-TERM CURRENT USE OF INSULIN (HCC): ICD-10-CM

## 2023-11-16 DIAGNOSIS — Z79.4 CONTROLLED TYPE 2 DIABETES MELLITUS WITH DIABETIC POLYNEUROPATHY, WITH LONG-TERM CURRENT USE OF INSULIN (HCC): ICD-10-CM

## 2023-11-16 DIAGNOSIS — M70.61 GREATER TROCHANTERIC BURSITIS OF RIGHT HIP: Primary | ICD-10-CM

## 2023-11-16 LAB — HBA1C MFR BLD: 5.9 %

## 2023-11-16 PROCEDURE — G8419 CALC BMI OUT NRM PARAM NOF/U: HCPCS

## 2023-11-16 PROCEDURE — 3017F COLORECTAL CA SCREEN DOC REV: CPT

## 2023-11-16 PROCEDURE — G8484 FLU IMMUNIZE NO ADMIN: HCPCS

## 2023-11-16 PROCEDURE — 1036F TOBACCO NON-USER: CPT

## 2023-11-16 PROCEDURE — 3075F SYST BP GE 130 - 139MM HG: CPT

## 2023-11-16 PROCEDURE — 3044F HG A1C LEVEL LT 7.0%: CPT

## 2023-11-16 PROCEDURE — 2022F DILAT RTA XM EVC RTNOPTHY: CPT

## 2023-11-16 PROCEDURE — 99213 OFFICE O/P EST LOW 20 MIN: CPT

## 2023-11-16 PROCEDURE — G8427 DOCREV CUR MEDS BY ELIG CLIN: HCPCS

## 2023-11-16 PROCEDURE — 83036 HEMOGLOBIN GLYCOSYLATED A1C: CPT

## 2023-11-16 PROCEDURE — 3079F DIAST BP 80-89 MM HG: CPT

## 2023-11-16 RX ORDER — ECHINACEA PURPUREA EXTRACT 125 MG
2 TABLET ORAL PRN
Qty: 1 EACH | Refills: 1 | Status: SHIPPED | OUTPATIENT
Start: 2023-11-16 | End: 2023-11-30

## 2023-11-16 RX ORDER — TRIAMCINOLONE ACETONIDE 40 MG/ML
40 INJECTION, SUSPENSION INTRA-ARTICULAR; INTRAMUSCULAR ONCE
Status: COMPLETED | OUTPATIENT
Start: 2023-11-16 | End: 2023-11-16

## 2023-11-16 RX ORDER — BUPIVACAINE HYDROCHLORIDE 5 MG/ML
2 INJECTION, SOLUTION PERINEURAL ONCE
Status: COMPLETED | OUTPATIENT
Start: 2023-11-16 | End: 2023-11-16

## 2023-11-16 RX ADMIN — BUPIVACAINE HYDROCHLORIDE 2 ML: 5 INJECTION, SOLUTION PERINEURAL at 09:15

## 2023-11-16 RX ADMIN — TRIAMCINOLONE ACETONIDE 40 MG: 40 INJECTION, SUSPENSION INTRA-ARTICULAR; INTRAMUSCULAR at 09:15

## 2023-11-16 ASSESSMENT — ENCOUNTER SYMPTOMS
SHORTNESS OF BREATH: 0
CONSTIPATION: 0
NAUSEA: 0
CHEST TIGHTNESS: 0
DIARRHEA: 0
VOMITING: 0

## 2023-11-16 NOTE — PROGRESS NOTES
[facility]    Family Medicine Residency Program - Outpatient Note      Subjective:    Ayleen Sahu is a 66 y.o. male with  has a past medical history of Bowel obstruction ([address]), Cocaine abuse ([address]), Diabetes mellitus ([address]), Diabetic polyneuropathy associated with type 2 diabetes mellitus ([address]), Diverticulitis, Diverticulosis, GERD (gastroesophageal reflux disease), Hypertension, MIGUEL (obstructive sleep apnea), Osteoarthritis, Renal lithiasis, Rheumatoid arteritis ([address]), and Type II or unspecified type diabetes mellitus without mention of complication, not stated as uncontrolled. Presented to the office today for:  Chief Complaint   Patient presents with    Injections     Bursa injection - pain in both legs - 4/10 pain       HPI    Patient presenting for right hip bursa injection, was seen at last visit for right hip pain and says he has had injections before and would prefer an injection as opposed to continuously taking Advil or Tylenol for this. He has had steroid injections in his knees before which were well tolerated. He was educated on the risks/benefits of the procedure, as well as the chance that it may not provide any relief or duration of relief is variable by patient. He was also requesting A1C check today which he was due for. DM has always been well controlled, at last check A1C was 7.8% and today it is 5.9%. Feels well and has been continuing to work on diet and exercise. Review of Systems   Constitutional:  Negative for chills and fatigue. Respiratory:  Negative for chest tightness and shortness of breath. Cardiovascular:  Negative for chest pain and palpitations. Gastrointestinal:  Negative for constipation, diarrhea, nausea and vomiting. Musculoskeletal:  Positive for arthralgias. Neurological:  Negative for dizziness and headaches.        The patient has a   Family History   Problem Relation Age of Onset    Diabetes Father     Diabetes Other     Heart

## 2023-11-16 NOTE — PROGRESS NOTES
Attending Physician Statement  I have discussed the care of Alexandraxiuezincluding pertinent history and exam findings,  with the resident. I have reviewed the key elements of all parts of the encounter with the resident. I agree with the assessment, plan and orders as documented by the resident.   (GE Modifier)    R trochanteric bursitis- Steroid  shot given  DM- well controlled

## 2023-11-16 NOTE — PROGRESS NOTES
Visit Information    Have you changed or started any medications since your last visit including any over-the-counter medicines, vitamins, or herbal medicines? no   Have you stopped taking any of your medications? Is so, why? -  no  Are you having any side effects from any of your medications? - no    Have you seen any other physician or provider since your last visit?  no   Have you had any other diagnostic tests since your last visit? yes - Xray   Have you been seen in the emergency room and/or had an admission in a hospital since we last saw you?  no   Have you had your routine dental cleaning in the past 6 months?  no     Do you have an active MyChart account? If no, what is the barrier?   No: Declined    Patient Care Team:  Noel Geiger MD as PCP - General (Family Medicine)    Medical History Review  Past Medical, Family, and Social History reviewed and does not contribute to the patient presenting condition    Health Maintenance   Topic Date Due    Diabetic retinal exam  Never done    Annual Wellness Visit (AWV)  Never done    Hepatitis B vaccine (1 of 3 - Risk 3-dose series) Never done    Diabetic Alb to Cr ratio (uACR) test  12/28/2022    Lipids  12/28/2022    COVID-19 Vaccine (6 - 2023-24 season) 11/25/2023    Diabetic foot exam  05/18/2024    GFR test (Diabetes, CKD 3-4, OR last GFR 15-59)  08/06/2024    A1C test (Diabetic or Prediabetic)  08/10/2024    Depression Screen  09/05/2024    DTaP/Tdap/Td vaccine (3 - Td or Tdap) 04/05/2033    Colorectal Cancer Screen  07/06/2033    Flu vaccine  Completed    Shingles vaccine  Completed    Pneumococcal 0-64 years Vaccine  Completed    Hepatitis C screen  Completed    HIV screen  Completed    Hepatitis A vaccine  Aged Out    Hib vaccine  Aged Out    Meningococcal (ACWY) vaccine  Aged Out

## 2023-11-20 ENCOUNTER — TELEPHONE (OUTPATIENT)
Dept: FAMILY MEDICINE CLINIC | Age: 63
End: 2023-11-20

## 2023-11-20 NOTE — TELEPHONE ENCOUNTER
Patient is calling to update you on the injection you gave him 11/16/23 and let you know the injection worked and he felt like a brand new man then its just now jose juan off.

## 2023-11-28 ENCOUNTER — OFFICE VISIT (OUTPATIENT)
Dept: FAMILY MEDICINE CLINIC | Age: 63
End: 2023-11-28
Payer: MEDICARE

## 2023-11-28 VITALS
WEIGHT: 189 LBS | BODY MASS INDEX: 27.99 KG/M2 | SYSTOLIC BLOOD PRESSURE: 129 MMHG | HEIGHT: 69 IN | DIASTOLIC BLOOD PRESSURE: 74 MMHG | OXYGEN SATURATION: 97 % | TEMPERATURE: 97.2 F | HEART RATE: 78 BPM

## 2023-11-28 DIAGNOSIS — J06.9 VIRAL URI: Primary | ICD-10-CM

## 2023-11-28 PROCEDURE — 3078F DIAST BP <80 MM HG: CPT

## 2023-11-28 PROCEDURE — 99213 OFFICE O/P EST LOW 20 MIN: CPT

## 2023-11-28 PROCEDURE — G8419 CALC BMI OUT NRM PARAM NOF/U: HCPCS

## 2023-11-28 PROCEDURE — 3074F SYST BP LT 130 MM HG: CPT

## 2023-11-28 PROCEDURE — G8427 DOCREV CUR MEDS BY ELIG CLIN: HCPCS

## 2023-11-28 PROCEDURE — 3017F COLORECTAL CA SCREEN DOC REV: CPT

## 2023-11-28 PROCEDURE — G8484 FLU IMMUNIZE NO ADMIN: HCPCS

## 2023-11-28 PROCEDURE — 1036F TOBACCO NON-USER: CPT

## 2023-11-28 RX ORDER — BENZONATATE 100 MG/1
100 CAPSULE ORAL 2 TIMES DAILY PRN
Qty: 20 CAPSULE | Refills: 0 | Status: SHIPPED | OUTPATIENT
Start: 2023-11-28 | End: 2023-12-05

## 2023-11-28 RX ORDER — OXYMETAZOLINE HYDROCHLORIDE 0.05 G/100ML
2 SPRAY NASAL 2 TIMES DAILY
Qty: 15 ML | Refills: 0 | Status: SHIPPED | OUTPATIENT
Start: 2023-11-28 | End: 2024-11-27

## 2023-11-28 ASSESSMENT — ENCOUNTER SYMPTOMS
VOMITING: 0
BACK PAIN: 1
SHORTNESS OF BREATH: 0
RHINORRHEA: 1
SINUS PRESSURE: 0
ABDOMINAL PAIN: 0
SINUS PAIN: 0
ABDOMINAL DISTENTION: 0
NAUSEA: 0
COUGH: 1
SORE THROAT: 0

## 2023-11-28 NOTE — PROGRESS NOTES
HYPERTENSION visit     BP Readings from Last 3 Encounters:   11/16/23 137/83   11/09/23 (!) 155/89   10/05/23 113/73       LDL Cholesterol (mg/dL)   Date Value   12/28/2021 90     HDL (mg/dL)   Date Value   12/28/2021 62     BUN (mg/dL)   Date Value   08/06/2023 36 (H)     Creatinine (mg/dL)   Date Value   08/06/2023 1.3 (H)     Glucose (mg/dL)   Date Value   08/06/2023 308 (H)   01/10/2012 115 (H)              Have you changed or started any medications since your last visit including any over-the-counter medicines, vitamins, or herbal medicines? no   Have you stopped taking any of your medications? Is so, why? -  no  Are you having any side effects from any of your medications? - no  How often do you miss doses of your medication? no      Have you seen any other physician or provider since your last visit?  no   Have you had any other diagnostic tests since your last visit?  no   Have you been seen in the emergency room and/or had an admission in a hospital since we last saw you?  no   Have you had your routine dental cleaning in the past 6 months?  no     Do you have an active MyChart account? If no, what is the barrier?   No: declined    Patient Care Team:  Graciela Diego MD as PCP - General (Family Medicine)    Medical History Review  Past Medical, Family, and Social History reviewed and does not contribute to the patient presenting condition    Health Maintenance   Topic Date Due    Diabetic retinal exam  Never done    Annual Wellness Visit (AWV)  Never done    Hepatitis B vaccine (1 of 3 - Risk 3-dose series) Never done    Diabetic Alb to Cr ratio (uACR) test  12/28/2022    Lipids  12/28/2022    COVID-19 Vaccine (6 - 2023-24 season) 11/25/2023    Diabetic foot exam  05/18/2024    GFR test (Diabetes, CKD 3-4, OR last GFR 15-59)  08/06/2024    Depression Screen  09/05/2024    A1C test (Diabetic or Prediabetic)  11/16/2024    DTaP/Tdap/Td vaccine (3 - Td or Tdap) 04/05/2033    Colorectal Cancer Screen

## 2023-11-28 NOTE — PROGRESS NOTES
[facility]  [facility]  [address] [address]     Date of Visit:  2023  Patient Name: Aida Rivera   Patient :  1960       Aida Rivera is a 66 y.o. male who presents today for an general visit to be evaluated for the following condition(s):  Chief Complaint   Patient presents with    Cough    Congestion       Patient presented today with concern of nonproductive cough for the past 3 days. He states that his cough produces clear phlegm. He states that he felt fatigued, has had lower back pain that he believes is a result of his coughing. He states that the pain is about 5 out of 10. He denies having any fevers, chills, sinus pressure, nausea, vomiting, chest pain, shortness of breath. He states that he is taking NyQuil which has helped his symptoms help with his sleeping. He denies any sick contacts. States he has gotten the flu shot. He would like a work note for  and . With attending Dr. Christiano Garcia, we discussed options for testing for flu, patient declined and was agreeable to symptomatic treatment. Cough  Associated symptoms include rhinorrhea. Pertinent negatives include no chest pain, chills, fever, sore throat or shortness of breath. Review of Systems:  Review of Systems   Constitutional:  Positive for fatigue. Negative for chills and fever. HENT:  Positive for rhinorrhea. Negative for sinus pressure, sinus pain and sore throat. Respiratory:  Positive for cough. Negative for shortness of breath. Cardiovascular:  Negative for chest pain. Gastrointestinal:  Negative for abdominal distention, abdominal pain, nausea and vomiting. Musculoskeletal:  Positive for back pain. Prior to Admission medications    Medication Sig Start Date End Date Taking?  Authorizing Provider   benzonatate (TESSALON) 100 MG capsule Take 1 capsule by mouth 2 times daily as needed for Cough 23 Yes Hal Reilly

## 2023-11-28 NOTE — PATIENT INSTRUCTIONS
Thank you for letting us take care of you today. We hope all your questions were addressed. If a question was overlooked or something else comes to mind after you return home, please contact a member of your Care Team listed below. Your Care Team at [facility] is Team #1  Guadlupe Canavan, Faculty Advisor  All Mccloud, Resident Physician  Robert Maya, Resident Physician  Suzi Nichols, Resident Physician  [facility], [address] [facility], [address] [facility], [address]  [facility], [address] [facility], [facility]  Natasha Dacosta, [address] [facility] [place],   Abdi Kamara, [facility] (Clinical Pharmacist)     Office phone number: [phone]    If you need to get in right away due to illness, please be advised we have \"Same Day\" appointments available Monday-Friday. Please call us at [phone] option #3 to schedule your \"Same Day\" appointment.

## 2023-12-06 DIAGNOSIS — E11.42 CONTROLLED TYPE 2 DIABETES MELLITUS WITH DIABETIC POLYNEUROPATHY, WITH LONG-TERM CURRENT USE OF INSULIN (HCC): ICD-10-CM

## 2023-12-06 DIAGNOSIS — Z79.4 CONTROLLED TYPE 2 DIABETES MELLITUS WITH DIABETIC POLYNEUROPATHY, WITH LONG-TERM CURRENT USE OF INSULIN (HCC): ICD-10-CM

## 2023-12-06 NOTE — TELEPHONE ENCOUNTER
Last visit: 11/28/2023  Last Med refill: 11/09/2023  Does patient have enough medication for 72 hours: Yes    Next Visit Date:  Future Appointments   Date Time Provider [address]   12/21/2023 11:00 AM MD Mouna Mckeon FP [facility]   2/19/2024  2:00 PM [facility]  [facility]   2/19/2024  2:00 PM Antoine Guajardo  [facility] Maintenance   Topic Date Due    Diabetic retinal exam  Never done    Annual Wellness Visit (AWV)  Never done    Hepatitis B vaccine (1 of 3 - Risk 3-dose series) Never done    Respiratory Syncytial Virus (RSV) age 61 yrs+ (1 - 1-dose 60+ series) Never done    Diabetic Alb to Cr ratio (uACR) test  12/28/2022    Lipids  12/28/2022    Diabetic foot exam  05/18/2024    GFR test (Diabetes, CKD 3-4, OR last GFR 15-59)  08/06/2024    Depression Screen  09/05/2024    A1C test (Diabetic or Prediabetic)  11/16/2024    DTaP/Tdap/Td vaccine (3 - Td or Tdap) 04/05/2033    Colorectal Cancer Screen  07/06/2033    Flu vaccine  Completed    Shingles vaccine  Completed    Pneumococcal 0-64 years Vaccine  Completed    COVID-19 Vaccine  Completed    Hepatitis C screen  Completed    HIV screen  Completed    Hepatitis A vaccine  Aged Out    Hib vaccine  Aged Out    Meningococcal (ACWY) vaccine  Aged Out       Hemoglobin A1C (%)   Date Value   11/16/2023 5.9   08/10/2023 7.8   05/19/2023 7.6             ( goal A1C is < 7)   No components found for: \"LABMICR\"  LDL Cholesterol (mg/dL)   Date Value   12/28/2021 90   06/08/2020 83       (goal LDL is <100)   AST (U/L)   Date Value   08/06/2023 14     ALT (U/L)   Date Value   08/06/2023 8     BUN (mg/dL)   Date Value   08/06/2023 36 (H)     BP Readings from Last 3 Encounters:   11/28/23 129/74   11/16/23 137/83   11/09/23 (!) 155/89          (goal 120/80)    All Future Testing planned in [facility]  Lab Frequency Next Occurrence   Microalbumin, Ur Once 01/29/2023   Lipid Panel Once 12/29/2022   Microalbumin, Ur Once

## 2023-12-08 RX ORDER — GABAPENTIN 800 MG/1
800 TABLET ORAL 3 TIMES DAILY
Qty: 90 TABLET | Refills: 0 | Status: SHIPPED | OUTPATIENT
Start: 2023-12-08 | End: 2024-01-07

## 2023-12-13 NOTE — TELEPHONE ENCOUNTER
Last visit: 11/28/23  Last Med refill: 8/9/22  Does patient have enough medication for 72 hours: No:     Next Visit Date:  Future Appointments   Date Time Provider [address]   12/21/2023 11:00 AM MD Mouna Lemos [facility]   2/19/2024  2:00 PM [facility]  [facility]   2/19/2024  2:00 PM Demetrius Curling,  [facility] Maintenance   Topic Date Due    Diabetic retinal exam  Never done    Annual Wellness Visit (AWV)  Never done    Hepatitis B vaccine (1 of 3 - Risk 3-dose series) Never done    Respiratory Syncytial Virus (RSV) age 61 yrs+ (1 - 1-dose 60+ series) Never done    Diabetic Alb to Cr ratio (uACR) test  12/28/2022    Lipids  12/28/2022    Diabetic foot exam  05/18/2024    GFR test (Diabetes, CKD 3-4, OR last GFR 15-59)  08/06/2024    Depression Screen  09/05/2024    A1C test (Diabetic or Prediabetic)  11/16/2024    DTaP/Tdap/Td vaccine (3 - Td or Tdap) 04/05/2033    Colorectal Cancer Screen  07/06/2033    Flu vaccine  Completed    Shingles vaccine  Completed    Pneumococcal 0-64 years Vaccine  Completed    COVID-19 Vaccine  Completed    Hepatitis C screen  Completed    HIV screen  Completed    Hepatitis A vaccine  Aged Out    Hib vaccine  Aged Out    Meningococcal (ACWY) vaccine  Aged Out       Hemoglobin A1C (%)   Date Value   11/16/2023 5.9   08/10/2023 7.8   05/19/2023 7.6             ( goal A1C is < 7)   No components found for: \"LABMICR\"  LDL Cholesterol (mg/dL)   Date Value   12/28/2021 90   06/08/2020 83       (goal LDL is <100)   AST (U/L)   Date Value   08/06/2023 14     ALT (U/L)   Date Value   08/06/2023 8     BUN (mg/dL)   Date Value   08/06/2023 36 (H)     BP Readings from Last 3 Encounters:   11/28/23 129/74   11/16/23 137/83   11/09/23 (!) 155/89          (goal 120/80)    All Future Testing planned in [facility]  Lab Frequency Next Occurrence   Microalbumin, Ur Once 01/29/2023   Lipid Panel Once 12/29/2022   Microalbumin, Ur Once 09/25/2023

## 2023-12-15 RX ORDER — PEN NEEDLE, DIABETIC 31 G X1/4"
NEEDLE, DISPOSABLE MISCELLANEOUS
Qty: 100 EACH | Refills: 1 | Status: SHIPPED | OUTPATIENT
Start: 2023-12-15

## 2024-01-08 DIAGNOSIS — Z79.4 CONTROLLED TYPE 2 DIABETES MELLITUS WITH DIABETIC POLYNEUROPATHY, WITH LONG-TERM CURRENT USE OF INSULIN (HCC): ICD-10-CM

## 2024-01-08 DIAGNOSIS — E11.42 CONTROLLED TYPE 2 DIABETES MELLITUS WITH DIABETIC POLYNEUROPATHY, WITH LONG-TERM CURRENT USE OF INSULIN (HCC): ICD-10-CM

## 2024-01-09 RX ORDER — GABAPENTIN 800 MG/1
800 TABLET ORAL 3 TIMES DAILY
Qty: 90 TABLET | Refills: 0 | Status: SHIPPED | OUTPATIENT
Start: 2024-01-09 | End: 2024-02-07 | Stop reason: SDUPTHER

## 2024-01-09 NOTE — TELEPHONE ENCOUNTER
Last visit: 11-28-23  Last Med refill: 12-8-23  Does patient have enough medication for 72 hours: No:     Next Visit Date:  Future Appointments   Date Time Provider Department Center   2/19/2024  2:00 PM [facility]  [facility]   2/19/2024  2:00 PM Jose Parrish MD [facility]       Health Maintenance   Topic Date Due    Diabetic retinal exam  Never done    Respiratory Syncytial Virus (RSV) Pregnant or age 60 yrs+ (1 - 1-dose 60+ series) Never done    Diabetic Alb to Cr ratio (uACR) test  12/28/2022    Lipids  12/28/2022    Annual Wellness Visit (Medicare Advantage)  Never done    Diabetic foot exam  05/18/2024    GFR test (Diabetes, CKD 3-4, OR last GFR 15-59)  08/06/2024    Depression Screen  09/05/2024    A1C test (Diabetic or Prediabetic)  11/16/2024    DTaP/Tdap/Td vaccine (3 - Td or Tdap) 04/05/2033    Colorectal Cancer Screen  07/06/2033    Flu vaccine  Completed    Shingles vaccine  Completed    Pneumococcal 0-64 years Vaccine  Completed    COVID-19 Vaccine  Completed    Hepatitis C screen  Completed    HIV screen  Completed    Hepatitis A vaccine  Aged Out    Hepatitis B vaccine  Aged Out    Hib vaccine  Aged Out    Polio vaccine  Aged Out    Meningococcal (ACWY) vaccine  Aged Out       Hemoglobin A1C (%)   Date Value   11/16/2023 5.9   08/10/2023 7.8   05/19/2023 7.6             ( goal A1C is < 7)   No components found for: \"LABMICR\"  LDL Cholesterol (mg/dL)   Date Value   12/28/2021 90   06/08/2020 83       (goal LDL is <100)   AST (U/L)   Date Value   08/06/2023 14     ALT (U/L)   Date Value   08/06/2023 8     BUN (mg/dL)   Date Value   08/06/2023 36 (H)     BP Readings from Last 3 Encounters:   11/28/23 129/74   11/16/23 137/83   11/09/23 (!) 155/89          (goal 120/80)    All Future Testing planned in [facility]  Lab Frequency Next Occurrence   Microalbumin, Ur Once 09/25/2023   Microalbumin, Ur Once 10/05/2023   Lipid Panel Once 09/05/2023               Patient Active Problem

## 2024-01-30 DIAGNOSIS — Z79.4 CONTROLLED TYPE 2 DIABETES MELLITUS WITH DIABETIC POLYNEUROPATHY, WITH LONG-TERM CURRENT USE OF INSULIN (HCC): ICD-10-CM

## 2024-01-30 DIAGNOSIS — E11.42 CONTROLLED TYPE 2 DIABETES MELLITUS WITH DIABETIC POLYNEUROPATHY, WITH LONG-TERM CURRENT USE OF INSULIN (HCC): ICD-10-CM

## 2024-02-02 ENCOUNTER — OFFICE VISIT (OUTPATIENT)
Dept: FAMILY MEDICINE CLINIC | Age: 64
End: 2024-02-02
Payer: MEDICARE

## 2024-02-02 VITALS
HEART RATE: 81 BPM | TEMPERATURE: 96.6 F | HEIGHT: 69 IN | SYSTOLIC BLOOD PRESSURE: 138 MMHG | DIASTOLIC BLOOD PRESSURE: 76 MMHG | BODY MASS INDEX: 28.2 KG/M2 | OXYGEN SATURATION: 99 % | WEIGHT: 190.4 LBS

## 2024-02-02 DIAGNOSIS — Z79.4 CONTROLLED TYPE 2 DIABETES MELLITUS WITH DIABETIC POLYNEUROPATHY, WITH LONG-TERM CURRENT USE OF INSULIN (HCC): ICD-10-CM

## 2024-02-02 DIAGNOSIS — K21.9 GASTROESOPHAGEAL REFLUX DISEASE, UNSPECIFIED WHETHER ESOPHAGITIS PRESENT: ICD-10-CM

## 2024-02-02 DIAGNOSIS — I10 ESSENTIAL HYPERTENSION: ICD-10-CM

## 2024-02-02 DIAGNOSIS — N40.1 BENIGN PROSTATIC HYPERPLASIA (BPH) WITH STRAINING ON URINATION: ICD-10-CM

## 2024-02-02 DIAGNOSIS — R09.82 POST-NASAL DRIP: Primary | ICD-10-CM

## 2024-02-02 DIAGNOSIS — Z59.9 FINANCIAL DIFFICULTIES: ICD-10-CM

## 2024-02-02 DIAGNOSIS — R39.16 BENIGN PROSTATIC HYPERPLASIA (BPH) WITH STRAINING ON URINATION: ICD-10-CM

## 2024-02-02 DIAGNOSIS — E11.42 CONTROLLED TYPE 2 DIABETES MELLITUS WITH DIABETIC POLYNEUROPATHY, WITH LONG-TERM CURRENT USE OF INSULIN (HCC): ICD-10-CM

## 2024-02-02 DIAGNOSIS — L85.3 XEROSIS OF SKIN: ICD-10-CM

## 2024-02-02 PROCEDURE — 99211 OFF/OP EST MAY X REQ PHY/QHP: CPT | Performed by: FAMILY MEDICINE

## 2024-02-02 RX ORDER — INSULIN LISPRO 100 [IU]/ML
13 INJECTION, SOLUTION INTRAVENOUS; SUBCUTANEOUS
Qty: 15 ML | Refills: 0 | Status: SHIPPED | OUTPATIENT
Start: 2024-02-02

## 2024-02-02 RX ORDER — GUAIFENESIN 600 MG/1
600 TABLET, EXTENDED RELEASE ORAL 2 TIMES DAILY
Qty: 30 TABLET | Refills: 0 | Status: SHIPPED | OUTPATIENT
Start: 2024-02-02 | End: 2024-02-17

## 2024-02-02 RX ORDER — OMEPRAZOLE 20 MG/1
CAPSULE, DELAYED RELEASE ORAL
Qty: 30 CAPSULE | Refills: 5 | Status: SHIPPED | OUTPATIENT
Start: 2024-02-02

## 2024-02-02 RX ORDER — DOXAZOSIN MESYLATE 4 MG/1
4 TABLET ORAL DAILY
Qty: 30 TABLET | Refills: 2 | Status: SHIPPED | OUTPATIENT
Start: 2024-02-02

## 2024-02-02 RX ORDER — BLOOD PRESSURE TEST KIT
KIT MISCELLANEOUS
Qty: 120 EACH | Refills: 3 | Status: SHIPPED | OUTPATIENT
Start: 2024-02-02

## 2024-02-02 RX ORDER — DOCUSATE SODIUM 100 MG/1
100 CAPSULE, LIQUID FILLED ORAL 2 TIMES DAILY PRN
Qty: 60 CAPSULE | Refills: 2 | Status: CANCELLED | OUTPATIENT
Start: 2024-02-02

## 2024-02-02 RX ORDER — AMLODIPINE BESYLATE 5 MG/1
5 TABLET ORAL DAILY
Qty: 30 TABLET | Refills: 5 | Status: SHIPPED | OUTPATIENT
Start: 2024-02-02

## 2024-02-02 RX ORDER — INSULIN GLARGINE 100 [IU]/ML
15 INJECTION, SOLUTION SUBCUTANEOUS 2 TIMES DAILY
Qty: 15 ML | Refills: 1 | Status: SHIPPED | OUTPATIENT
Start: 2024-02-02

## 2024-02-02 RX ORDER — OXYMETAZOLINE HYDROCHLORIDE 0.05 G/100ML
2 SPRAY NASAL 2 TIMES DAILY
Qty: 15 ML | Refills: 0 | Status: CANCELLED | OUTPATIENT
Start: 2024-02-02 | End: 2025-02-01

## 2024-02-02 RX ORDER — TRAZODONE HYDROCHLORIDE 50 MG/1
TABLET ORAL
Qty: 30 TABLET | Refills: 3 | Status: CANCELLED | OUTPATIENT
Start: 2024-02-02

## 2024-02-02 RX ORDER — FLUTICASONE PROPIONATE 50 MCG
1 SPRAY, SUSPENSION (ML) NASAL DAILY
Qty: 1 EACH | Refills: 2 | Status: CANCELLED | OUTPATIENT
Start: 2024-02-02

## 2024-02-02 RX ORDER — LANCETS 30 GAUGE
EACH MISCELLANEOUS
Qty: 120 EACH | Refills: 3 | Status: SHIPPED | OUTPATIENT
Start: 2024-02-02

## 2024-02-02 RX ORDER — TADALAFIL 10 MG/1
TABLET ORAL
Qty: 30 TABLET | Refills: 0 | Status: CANCELLED | OUTPATIENT
Start: 2024-02-02

## 2024-02-02 RX ORDER — IBUPROFEN 600 MG/1
1 TABLET ORAL PRN
Qty: 1 KIT | Refills: 1 | Status: CANCELLED | OUTPATIENT
Start: 2024-02-02

## 2024-02-02 RX ORDER — CALCIUM CARBONATE 300MG(750)
400 TABLET,CHEWABLE ORAL DAILY
Qty: 30 TABLET | Refills: 3 | Status: CANCELLED | OUTPATIENT
Start: 2024-02-02

## 2024-02-02 RX ORDER — ALFUZOSIN HYDROCHLORIDE 10 MG/1
TABLET, EXTENDED RELEASE ORAL
Qty: 30 TABLET | Refills: 1 | Status: SHIPPED | OUTPATIENT
Start: 2024-02-02

## 2024-02-02 RX ORDER — GABAPENTIN 800 MG/1
800 TABLET ORAL 3 TIMES DAILY
Qty: 90 TABLET | Refills: 0 | Status: CANCELLED | OUTPATIENT
Start: 2024-02-02 | End: 2024-03-03

## 2024-02-02 RX ORDER — LISINOPRIL 20 MG/1
40 TABLET ORAL DAILY
Qty: 180 TABLET | Refills: 1 | Status: SHIPPED | OUTPATIENT
Start: 2024-02-02

## 2024-02-02 RX ORDER — ECHINACEA PURPUREA EXTRACT 125 MG
2 TABLET ORAL 2 TIMES DAILY PRN
Qty: 1 EACH | Refills: 3 | Status: SHIPPED | OUTPATIENT
Start: 2024-02-02 | End: 2024-02-22

## 2024-02-02 RX ORDER — ACETAMINOPHEN 500 MG
1000 TABLET ORAL EVERY 6 HOURS PRN
Qty: 60 TABLET | Refills: 0 | Status: CANCELLED | OUTPATIENT
Start: 2024-02-02

## 2024-02-02 RX ORDER — CALCIUM CITRATE/VITAMIN D3 200MG-6.25
TABLET ORAL
Qty: 100 STRIP | Refills: 3 | Status: SHIPPED | OUTPATIENT
Start: 2024-02-02

## 2024-02-02 RX ORDER — ATORVASTATIN CALCIUM 40 MG/1
40 TABLET, FILM COATED ORAL DAILY
Qty: 30 TABLET | Refills: 3 | Status: SHIPPED | OUTPATIENT
Start: 2024-02-02

## 2024-02-02 SDOH — ECONOMIC STABILITY - INCOME SECURITY: PROBLEM RELATED TO HOUSING AND ECONOMIC CIRCUMSTANCES, UNSPECIFIED: Z59.9

## 2024-02-02 ASSESSMENT — PATIENT HEALTH QUESTIONNAIRE - PHQ9
SUM OF ALL RESPONSES TO PHQ QUESTIONS 1-9: 1
SUM OF ALL RESPONSES TO PHQ QUESTIONS 1-9: 1
SUM OF ALL RESPONSES TO PHQ9 QUESTIONS 1 & 2: 1
1. LITTLE INTEREST OR PLEASURE IN DOING THINGS: 1
2. FEELING DOWN, DEPRESSED OR HOPELESS: 0
SUM OF ALL RESPONSES TO PHQ QUESTIONS 1-9: 1

## 2024-02-02 ASSESSMENT — ENCOUNTER SYMPTOMS
NAUSEA: 0
TROUBLE SWALLOWING: 0
VOMITING: 0
SINUS PRESSURE: 0
SORE THROAT: 0
CONSTIPATION: 0
WHEEZING: 0
COUGH: 1
SHORTNESS OF BREATH: 0
ABDOMINAL DISTENTION: 0
DIARRHEA: 0
ABDOMINAL PAIN: 0
SINUS PAIN: 0

## 2024-02-02 NOTE — PROGRESS NOTES
Writer didn't check out patient, nor give any orders or AVS. Medical Assistant Ty checked out patient.

## 2024-02-02 NOTE — PROGRESS NOTES
Attending Physician Statement  I have discussed the care of Marcelo Littlejohn, 63 y.o. male,including pertinent history and exam findings,  with the resident Ame Stubbs MD.  History:  Chief Complaint   Patient presents with    Cough    Fall     I have reviewed the key elements of the encounter with the resident. Examination was done by resident as documented in residents note.  BP Readings from Last 3 Encounters:   02/02/24 138/76   11/28/23 129/74   11/16/23 137/83     /76 (Site: Right Upper Arm, Position: Sitting, Cuff Size: Medium Adult)   Pulse 81   Temp (!) 96.6 °F (35.9 °C) (Oral)   Ht 1.753 m (5' 9.02\")   Wt 86.4 kg (190 lb 6.4 oz)   SpO2 99%   BMI 28.10 kg/m²   Lab Results   Component Value Date    WBC 8.9 08/06/2023    HGB 11.4 (L) 08/06/2023    HCT 30.5 (L) 08/06/2023     08/06/2023    CHOL 187 12/28/2021    TRIG 177 (H) 12/28/2021    HDL 62 12/28/2021    ALT 8 08/06/2023    AST 14 08/06/2023     (L) 08/06/2023    K 4.8 08/06/2023    CL 98 08/06/2023    CREATININE 1.3 (H) 08/06/2023    BUN 36 (H) 08/06/2023    CO2 25 08/06/2023    TSH 0.84 03/15/2020    INR 1.3 11/11/2016    LABA1C 5.9 11/16/2023     Lab Results   Component Value Date    CALCIUM 9.1 08/06/2023    PHOS 2.3 (L) 10/29/2016     Lab Results   Component Value Date    LDLCHOLESTEROL 90 12/28/2021     I agree with the assessment, plan and diagnosis of    Diagnosis Orders   1. Post-nasal drip  guaiFENesin (MUCINEX) 600 MG extended release tablet    sodium chloride (ALTAMIST SPRAY) 0.65 % nasal spray      2. Benign prostatic hyperplasia (BPH) with straining on urination  alfuzosin (UROXATRAL) 10 MG extended release tablet    doxazosin (CARDURA) 4 MG tablet      3. Controlled type 2 diabetes mellitus with diabetic polyneuropathy, with long-term current use of insulin (HCC)  Microalbumin, Ur    Lipid Panel    Alcohol Swabs PADS    Lancets MISC    metFORMIN (GLUCOPHAGE) 1000 MG tablet    TRUE METRIX BLOOD GLUCOSE TEST

## 2024-02-02 NOTE — PATIENT INSTRUCTIONS
Thank you for letting us take care of you today. We hope all your questions were addressed. If a question was overlooked or something else comes to mind after you return home, please contact a member of your Care Team listed below.      Your Care Team at [facility] is Team #1  Conchis Dunbar, Faculty Advisor  Vazquez Valerio, Resident Physician  Jose Milian, Resident Physician  Ame Moise, Resident Physician  Flor Gilman, [facility]  Marlin Morel, [facility]  Christopher Eason, [facility]  Abril Barrera, [facility]  Debi Campuzano, [facility], [facility]  Rachelle Huertas, [facility]  Yamile Villasenor, [facility], [facility]  Patti Lopez, [facility]  Chela (LJ) Jayda,   Kaleigh Ryder formerly [facility] (Clinical Pharmacist)     Office phone number: [phone]    If you need to get in right away due to illness, please be advised we have \"Same Day\" appointments available Monday-Friday. Please call us at [phone] option #3 to schedule your \"Same Day\" appointment.

## 2024-02-02 NOTE — PROGRESS NOTES
Visit Information    Have you changed or started any medications since your last visit including any over-the-counter medicines, vitamins, or herbal medicines? no   Have you stopped taking any of your medications? Is so, why? -  no  Are you having any side effects from any of your medications? - no    Have you seen any other physician or provider since your last visit?  no   Have you had any other diagnostic tests since your last visit?  no   Have you been seen in the emergency room and/or had an admission in a hospital since we last saw you?  no   Have you had your routine dental cleaning in the past 6 months?  no     Do you have an active MyChart account? If no, what is the barrier?  No:     Patient Care Team:  Ame Moise MD as PCP - General (Family Medicine)    Medical History Review  Past Medical, Family, and Social History reviewed and does not contribute to the patient presenting condition    Health Maintenance   Topic Date Due    Diabetic retinal exam  Never done    Respiratory Syncytial Virus (RSV) Pregnant or age 60 yrs+ (1 - 1-dose 60+ series) Never done    Diabetic Alb to Cr ratio (uACR) test  12/28/2022    Lipids  12/28/2022    Annual Wellness Visit (Medicare Advantage)  Never done    Diabetic foot exam  05/18/2024    GFR test (Diabetes, CKD 3-4, OR last GFR 15-59)  08/06/2024    Depression Screen  09/05/2024    A1C test (Diabetic or Prediabetic)  11/16/2024    DTaP/Tdap/Td vaccine (3 - Td or Tdap) 04/05/2033    Colorectal Cancer Screen  07/06/2033    Flu vaccine  Completed    Shingles vaccine  Completed    Pneumococcal 0-64 years Vaccine  Completed    COVID-19 Vaccine  Completed    Hepatitis C screen  Completed    HIV screen  Completed    Hepatitis A vaccine  Aged Out    Hepatitis B vaccine  Aged Out    Hib vaccine  Aged Out    Polio vaccine  Aged Out    Meningococcal (ACWY) vaccine  Aged Out

## 2024-02-02 NOTE — PROGRESS NOTES
[facility] Program - Outpatient Note      Subjective:    Marcelo Littlejohn is a 63 y.o. male with  has a past medical history of Bowel obstruction (HCC), Cocaine abuse (HCC), Diabetes mellitus (HCC), Diabetic polyneuropathy associated with type 2 diabetes mellitus (HCC), Diverticulitis, Diverticulosis, GERD (gastroesophageal reflux disease), Hypertension, MIGUEL (obstructive sleep apnea), Osteoarthritis, Renal lithiasis, Rheumatoid arteritis (HCC), and Type II or unspecified type diabetes mellitus without mention of complication, not stated as uncontrolled.    Presented to the office today for:  Chief Complaint   Patient presents with    Cough    Fall       HPI    Patient is a 63-year-old male presenting to the clinic for an acute care visit, says that he has complaints of cough x 1 month, typically occurring once or twice a week lasting only a few seconds, occurs in the morning upon awakening as well as at night, describes it as a large amount of mucus in the back of his throat causing him to cough.  Denies any fevers, chills, no cough otherwise.  Does mention he has some fullness in his sinuses and feels like they are constantly draining.    Also asking about foot pantry, unfortunately, there is nothing left in pantry. Did discuss [facility] referral. Patient says affording everything has been difficult lately. He is agreeable to referral.       Review of Systems   Constitutional:  Negative for chills, fatigue and fever.   HENT:  Positive for postnasal drip. Negative for sinus pressure, sinus pain, sneezing, sore throat and trouble swallowing.    Respiratory:  Positive for cough. Negative for shortness of breath and wheezing.    Cardiovascular:  Negative for chest pain, palpitations and leg swelling.   Gastrointestinal:  Negative for abdominal distention, abdominal pain, constipation, diarrhea, nausea and vomiting.   Neurological:  Negative for dizziness and headaches.

## 2024-02-06 ENCOUNTER — CARE COORDINATION (OUTPATIENT)
Dept: CARE COORDINATION | Age: 64
End: 2024-02-06

## 2024-02-06 DIAGNOSIS — Z79.4 CONTROLLED TYPE 2 DIABETES MELLITUS WITH DIABETIC POLYNEUROPATHY, WITH LONG-TERM CURRENT USE OF INSULIN (HCC): ICD-10-CM

## 2024-02-06 DIAGNOSIS — E11.42 CONTROLLED TYPE 2 DIABETES MELLITUS WITH DIABETIC POLYNEUROPATHY, WITH LONG-TERM CURRENT USE OF INSULIN (HCC): ICD-10-CM

## 2024-02-06 NOTE — CARE COORDINATION
Patient was referred to  by Caitlyn Beasley/ELOISE, who stated that this patient has SDOH.  HC attempted to speak with the patient, he identified as being the patient, and stated that he was sleeping  at the time.  HC suggested that the patient call back when he's awake.    Plan of Care  HC will assist patient when he calls back

## 2024-02-07 DIAGNOSIS — E11.42 CONTROLLED TYPE 2 DIABETES MELLITUS WITH DIABETIC POLYNEUROPATHY, WITH LONG-TERM CURRENT USE OF INSULIN (HCC): ICD-10-CM

## 2024-02-07 DIAGNOSIS — Z79.4 CONTROLLED TYPE 2 DIABETES MELLITUS WITH DIABETIC POLYNEUROPATHY, WITH LONG-TERM CURRENT USE OF INSULIN (HCC): ICD-10-CM

## 2024-02-07 NOTE — TELEPHONE ENCOUNTER
E-scribe request for gabapentin. Please review and e-scribe if applicable.     Last Visit Date:  02/02/24    Next Visit Date:  2/7/2024    Hemoglobin A1C (%)   Date Value   11/16/2023 5.9   08/10/2023 7.8   05/19/2023 7.6             ( goal A1C is < 7)   No components found for: \"LABMICR\"  LDL Cholesterol (mg/dL)   Date Value   12/28/2021 90       (goal LDL is <100)   AST (U/L)   Date Value   08/06/2023 14     ALT (U/L)   Date Value   08/06/2023 8     BUN (mg/dL)   Date Value   08/06/2023 36 (H)     BP Readings from Last 3 Encounters:   02/02/24 138/76   11/28/23 129/74   11/16/23 137/83          (goal 120/80)        Patient Active Problem List:     Controlled type 2 diabetes mellitus with diabetic polyneuropathy, with long-term current use of insulin (HCC)     Diverticulosis     DM (diabetes mellitus) (HCC)     Essential hypertension     Elevated liver enzymes     Scrotal abscess     Abdominal abscess     Hyperplastic colon polyp     Lower abdominal pain     Diabetic foot (HCC)     Infected hernioplasty mesh (HCC)     Cocaine abuse (HCC)     Chest pain     BPH with obstruction/lower urinary tract symptoms     Chronic tension-type headache, intractable     Diabetic polyneuropathy associated with type 2 diabetes mellitus (HCC)     Gastroesophageal reflux disease     Acute otitis externa of left ear     Intermittent claudication (HCC)     Family history of malignant neoplasm of colon in relative diagnosed when older than 50 years of age     Benign prostatic hyperplasia (BPH) with straining on urination     Hyperglycemia due to diabetes mellitus (HCC)     Hyperosmolar coma due to type 2 diabetes mellitus (HCC)     Visit for wound check     Low blood pressure reading     Left leg pain     Right hip pain     Post-nasal drip     Xerosis of skin     Financial difficulties

## 2024-02-07 NOTE — TELEPHONE ENCOUNTER
----- Message from Vi Villasenor sent at 2/7/2024  8:38 AM EST -----  Subject: Refill Request    QUESTIONS  Name of Medication? gabapentin (NEURONTIN) 800 MG tablet  Patient-reported dosage and instructions? Take 1 tablet by mouth 3 times   daily for 30 days.  How many days do you have left? 0  Preferred Pharmacy? [facility] [id number]  Pharmacy phone number (if available)? [phone]  ---------------------------------------------------------------------------  --------------,  Name of Medication? Continuous Blood Gluc Sensor (FREESTYLE LIBAN 2   SENSOR) MISC  Patient-reported dosage and instructions? USE TO TEST BLOOD GLUCOSE THREE   TIMES A DAY. CHANGE EVERY 14 DAYS  How many days do you have left? 0  Preferred Pharmacy? [facility] [id number]  Pharmacy phone number (if available)? [phone]  ---------------------------------------------------------------------------  --------------  CALL BACK INFO  What is the best way for the office to contact you? OK to leave message on   voicemail  Preferred Call Back Phone Number? [phone]  ---------------------------------------------------------------------------  --------------  SCRIPT ANSWERS  Relationship to Patient? Self

## 2024-02-08 RX ORDER — GABAPENTIN 800 MG/1
800 TABLET ORAL 3 TIMES DAILY
Qty: 90 TABLET | Refills: 0 | Status: SHIPPED | OUTPATIENT
Start: 2024-02-08 | End: 2024-03-09

## 2024-02-08 RX ORDER — GABAPENTIN 800 MG/1
800 TABLET ORAL 3 TIMES DAILY
Qty: 90 TABLET | Refills: 0 | OUTPATIENT
Start: 2024-02-08

## 2024-02-14 ENCOUNTER — CARE COORDINATION (OUTPATIENT)
Dept: CARE COORDINATION | Age: 64
End: 2024-02-14

## 2024-02-14 NOTE — CARE COORDINATION
HC phoned patient and attempted to contact him regarding his SDOH needs.  Patient answered and stated that he remains in need of food resources and housing.  HC suggested that the patient contact [place] [address] for housing assistance, due to him stating that he's currently staying in a hotel.  HC if patient is homeless, and he stated that he is.  He also stated that he would like to get food resources,  Patient stated that he was in dispose and was unable to take information.  Patient stated that he will call back.    Plan of Care  HC will await a return call form patient

## 2024-02-14 NOTE — CARE COORDINATION
Patient called back and left a message for the HC to call him back.  HC returned the patient's call and shared with him the phone number for [place].  HC recommended that the patient inquire about help for the homeless and also for food resources.    Plan of Care  HC will follow up with patient for results

## 2024-02-15 ENCOUNTER — CARE COORDINATION (OUTPATIENT)
Dept: CARE COORDINATION | Age: 64
End: 2024-02-15

## 2024-02-15 NOTE — CARE COORDINATION
SDOH referral copied and pasted below.    Question Answer   Please address the following Financial Resource Strain    Food Insecurity   My clinical question is: financial resource strain   Additional details about assistance needed: no   Patient has consented to SDOH referral and is aware they will be contacted by a Community Health Navigator Yes     Danitza Peña , is currently working with Patient.    Phoned Patient to introduce self and explain ACM.  Writer plans to enroll Patient in ACM if he is in agreement.  Message left on Patient's voice mail requesting a return call.  Writer's contact information was provided.    Per Danitza Peña's note, Patient is homeless.  He was staying in a hotel at the time of her call.  Writer plans to check with Cathie Latif RN, [employer], to inquire if Patient meets requirements for the [employer] Program due to his homelessness.

## 2024-02-20 ENCOUNTER — OFFICE VISIT (OUTPATIENT)
Dept: FAMILY MEDICINE CLINIC | Age: 64
End: 2024-02-20
Payer: MEDICARE

## 2024-02-20 VITALS
OXYGEN SATURATION: 99 % | WEIGHT: 191.6 LBS | SYSTOLIC BLOOD PRESSURE: 123 MMHG | TEMPERATURE: 97.4 F | BODY MASS INDEX: 28.38 KG/M2 | HEIGHT: 69 IN | HEART RATE: 80 BPM | DIASTOLIC BLOOD PRESSURE: 70 MMHG

## 2024-02-20 DIAGNOSIS — Z79.4 CONTROLLED TYPE 2 DIABETES MELLITUS WITH DIABETIC POLYNEUROPATHY, WITH LONG-TERM CURRENT USE OF INSULIN (HCC): ICD-10-CM

## 2024-02-20 DIAGNOSIS — E11.42 CONTROLLED TYPE 2 DIABETES MELLITUS WITH DIABETIC POLYNEUROPATHY, WITH LONG-TERM CURRENT USE OF INSULIN (HCC): ICD-10-CM

## 2024-02-20 DIAGNOSIS — Z13.220 SCREENING FOR HYPERLIPIDEMIA: ICD-10-CM

## 2024-02-20 DIAGNOSIS — M62.838 MUSCLE SPASM: Primary | ICD-10-CM

## 2024-02-20 LAB — HBA1C MFR BLD: 6.7 %

## 2024-02-20 PROCEDURE — 3074F SYST BP LT 130 MM HG: CPT

## 2024-02-20 PROCEDURE — 83036 HEMOGLOBIN GLYCOSYLATED A1C: CPT

## 2024-02-20 PROCEDURE — 3044F HG A1C LEVEL LT 7.0%: CPT

## 2024-02-20 PROCEDURE — 99214 OFFICE O/P EST MOD 30 MIN: CPT

## 2024-02-20 PROCEDURE — 3078F DIAST BP <80 MM HG: CPT

## 2024-02-20 RX ORDER — CYCLOBENZAPRINE HCL 5 MG
5 TABLET ORAL 2 TIMES DAILY PRN
Qty: 30 TABLET | Refills: 0 | Status: SHIPPED | OUTPATIENT
Start: 2024-02-20 | End: 2024-03-01

## 2024-02-20 ASSESSMENT — PATIENT HEALTH QUESTIONNAIRE - PHQ9
SUM OF ALL RESPONSES TO PHQ9 QUESTIONS 1 & 2: 0
2. FEELING DOWN, DEPRESSED OR HOPELESS: 0
SUM OF ALL RESPONSES TO PHQ QUESTIONS 1-9: 0
1. LITTLE INTEREST OR PLEASURE IN DOING THINGS: 0

## 2024-02-20 ASSESSMENT — ENCOUNTER SYMPTOMS
SHORTNESS OF BREATH: 0
COUGH: 0

## 2024-02-20 NOTE — PATIENT INSTRUCTIONS
Kitchen, Food pantry and Homeless kit, open to the homeless community  Phone Number: [phone]      [facility] ([place]):  What they offer: Food pantry for [place] residents  Phone Number: [phone]    [facility]:  What they offer: Food pantry for [facility] Residents  Phone Number: [phone]    [facility]:  What they offer: Food pantry for [facility] residents  Phone Number: [phone]    [place] [employer] ([employer]):  What they offer: Government programs including Medicaid, SNAP(food stamps), TANF (cash assistance), and childcare assistance.  Phone Number: [phone]        [facility] Office on Aging of [facility]):  What they offer: Medical cab rides for seniors and referral to community resources  Phone Number: [phone]     [facility] Agency on Aging, [facility] 5:    [facility], [place], [place], [place], [place], [place], [place],                   [facility]:  What they offer: Referral to transportation and other resources for seniors.  Phone Number: [phone]     [facility] Opportunity Program (OP) [place], [place]:  What they offer: Food and other assistance.   Phone Number: [phone]

## 2024-02-20 NOTE — PROGRESS NOTES
Writer didn't check out patient, nor give any orders or AVS. Medical Assistant Yamile checked out patient.

## 2024-02-20 NOTE — PROGRESS NOTES
Visit Information    Have you changed or started any medications since your last visit including any over-the-counter medicines, vitamins, or herbal medicines? no   Have you stopped taking any of your medications? Is so, why? -  no  Are you having any side effects from any of your medications? - no    Have you seen any other physician or provider since your last visit?  no   Have you had any other diagnostic tests since your last visit?  no   Have you been seen in the emergency room and/or had an admission in a hospital since we last saw you?  no   Have you had your routine dental cleaning in the past 6 months?  no     Do you have an active MyChart account? If no, what is the barrier?  Yes    Patient Care Team:  Ame Moise MD as PCP - General (Family Medicine)  Danitza Peña as Health   Caitlyn Beasley RN as Ambulatory Care Manager    Medical History Review  Past Medical, Family, and Social History reviewed and does not contribute to the patient presenting condition    Health Maintenance   Topic Date Due    Diabetic retinal exam  Never done    Respiratory Syncytial Virus (RSV) Pregnant or age 60 yrs+ (1 - 1-dose 60+ series) Never done    Diabetic Alb to Cr ratio (uACR) test  12/28/2022    Lipids  12/28/2022    Annual Wellness Visit (Medicare Advantage)  Never done    Diabetic foot exam  05/18/2024    GFR test (Diabetes, CKD 3-4, OR last GFR 15-59)  08/06/2024    A1C test (Diabetic or Prediabetic)  11/16/2024    Depression Screen  02/02/2025    DTaP/Tdap/Td vaccine (3 - Td or Tdap) 04/05/2033    Colorectal Cancer Screen  07/06/2033    Flu vaccine  Completed    Shingles vaccine  Completed    Pneumococcal 0-64 years Vaccine  Completed    COVID-19 Vaccine  Completed    Hepatitis C screen  Completed    HIV screen  Completed    Hepatitis A vaccine  Aged Out    Hepatitis B vaccine  Aged Out    Hib vaccine  Aged Out    Polio vaccine  Aged Out    Meningococcal (ACWY) vaccine  Aged Out

## 2024-02-20 NOTE — PROGRESS NOTES
Attending Physician Statement  I  have discussed the care of Marcelo Littlejohn including pertinent history and exam findings with the resident. I agree with the assessment, plan and orders as documented by the resident.      /70 (Site: Left Upper Arm, Position: Sitting, Cuff Size: Medium Adult)   Pulse 80   Temp 97.4 °F (36.3 °C) (Oral)   Ht 1.753 m (5' 9.02\")   Wt 86.9 kg (191 lb 9.6 oz)   SpO2 99%   BMI 28.28 kg/m²    BP Readings from Last 3 Encounters:   02/20/24 123/70   02/02/24 138/76   11/28/23 129/74     Wt Readings from Last 3 Encounters:   02/20/24 86.9 kg (191 lb 9.6 oz)   02/02/24 86.4 kg (190 lb 6.4 oz)   11/28/23 85.7 kg (189 lb)          Diagnosis Orders   1. Muscle spasm  cyclobenzaprine (FLEXERIL) 5 MG tablet      2. Screening for hyperlipidemia  Lipid Panel      3. Controlled type 2 diabetes mellitus with diabetic polyneuropathy, with long-term current use of insulin ([facility])  Microalbumin, Ur    POCT glycosylated hemoglobin (Hb A1C)        R thigh pain- lifting at work; no fall. Soreness, tender. MM relaxer, modified activity. Able to ambulate. No signs of instability.      Jeff Hilton,  2/20/2024 4:52 PM

## 2024-02-20 NOTE — PROGRESS NOTES
[facility] Residency Program - Outpatient Note      Subjective:    Marcelo Littlejohn is a 63 y.o. male with  has a past medical history of Bowel obstruction (HCC), Cocaine abuse (HCC), Diabetes mellitus (HCC), Diabetic polyneuropathy associated with type 2 diabetes mellitus (HCC), Diverticulitis, Diverticulosis, GERD (gastroesophageal reflux disease), Hypertension, MIGUEL (obstructive sleep apnea), Osteoarthritis, Renal lithiasis, Rheumatoid arteritis (HCC), and Type II or unspecified type diabetes mellitus without mention of complication, not stated as uncontrolled.    Presented to the office today for:  Chief Complaint   Patient presents with    Leg Pain    Health Maintenance     No dm eye exam, declined medication refills        HPI    Patient is a 63-year-old male presenting to the office.  Neck pain that started yesterday when he was at work, patient states that he does a lot of lifting and he was pushing the weight when he felt intense pain back of his leg.  He was unable to walk after that.  Patient tried NSAIDs with little relief  Patient reports of soreness around the area rates the pain 6/10, increases in intensity with any work or activity.  -Patient also has history of mild right-sided osteoarthritis      Review of Systems   Constitutional:  Negative for activity change and appetite change.   Respiratory:  Negative for cough and shortness of breath.    Cardiovascular:  Negative for chest pain.   Genitourinary: Negative.    Musculoskeletal:  Positive for myalgias.   Neurological: Negative.    Psychiatric/Behavioral: Negative.                   The patient has a   Family History   Problem Relation Age of Onset    Diabetes Father     Diabetes Other     Heart Attack Other     Hypertension Other     Diabetes Brother        Objective:    /70 (Site: Left Upper Arm, Position: Sitting, Cuff Size: Medium Adult)   Pulse 80   Temp 97.4 °F (36.3 °C) (Oral)   Ht 1.753 m (5'

## 2024-02-21 ENCOUNTER — CARE COORDINATION (OUTPATIENT)
Dept: CARE COORDINATION | Age: 64
End: 2024-02-21

## 2024-02-21 NOTE — CARE COORDINATION
HC phoned the patient to follow up on his success with getting food resources.  Patient stated that he contacted [facility] as the HC had suggested and was given some leads on food pantries.  Patient stated that he did get food.    Plan of Care  HC will follow up with patient regarding food resources .

## 2024-02-28 ENCOUNTER — CARE COORDINATION (OUTPATIENT)
Dept: CARE COORDINATION | Age: 64
End: 2024-02-28

## 2024-02-28 NOTE — CARE COORDINATION
SDOH referral copied and pasted below.     Question Answer   Please address the following Financial Resource Strain     Food Insecurity   My clinical question is: financial resource strain   Additional details about assistance needed: no   Patient has consented to SDOH referral and is aware they will be contacted by a Community Health Navigator Yes      Danitza Peña , is currently working with Patient.     Patient was seen by Dr. Barr on 2/21/2024.            Writer phoned Patient to introduce self and explain ACM.  Writer plans to enroll Patient in ACM if he is in agreement.  Message left on Patient's identifiable voice mail requesting return call.  Writer's contact information provided.    Plan to verify Patient's address prior to sending letter to Patient.  Danitza reported Patient was staying in a hotel and homeless.  Plan to verify with Patient and possibly refer to Cathie Latif RN, [facility].

## 2024-03-04 DIAGNOSIS — E11.42 CONTROLLED TYPE 2 DIABETES MELLITUS WITH DIABETIC POLYNEUROPATHY, WITH LONG-TERM CURRENT USE OF INSULIN (HCC): ICD-10-CM

## 2024-03-04 DIAGNOSIS — Z79.4 CONTROLLED TYPE 2 DIABETES MELLITUS WITH DIABETIC POLYNEUROPATHY, WITH LONG-TERM CURRENT USE OF INSULIN (HCC): ICD-10-CM

## 2024-03-04 NOTE — TELEPHONE ENCOUNTER
Last visit: 02/20/2024  Last Med refill: 02/08/2024  Does patient have enough medication for 72 hours: No:     Next Visit Date:  Future Appointments   Date Time Provider Department Center   3/7/2024  2:00 PM Ame Moise MD [id number]       Health Maintenance   Topic Date Due    Diabetic retinal exam  Never done    Respiratory Syncytial Virus (RSV) Pregnant or age 60 yrs+ (1 - 1-dose 60+ series) Never done    Diabetic Alb to Cr ratio (uACR) test  12/28/2022    Lipids  12/28/2022    Annual Wellness Visit (Medicare Advantage)  Never done    Diabetic foot exam  05/18/2024    GFR test (Diabetes, CKD 3-4, OR last GFR 15-59)  08/06/2024    A1C test (Diabetic or Prediabetic)  02/20/2025    Depression Screen  02/20/2025    DTaP/Tdap/Td vaccine (3 - Td or Tdap) 04/05/2033    Colorectal Cancer Screen  07/06/2033    Flu vaccine  Completed    Shingles vaccine  Completed    Pneumococcal 0-64 years Vaccine  Completed    COVID-19 Vaccine  Completed    Hepatitis C screen  Completed    HIV screen  Completed    Hepatitis A vaccine  Aged Out    Hepatitis B vaccine  Aged Out    Hib vaccine  Aged Out    Polio vaccine  Aged Out    Meningococcal (ACWY) vaccine  Aged Out       Hemoglobin A1C (%)   Date Value   02/20/2024 6.7   11/16/2023 5.9   08/10/2023 7.8             ( goal A1C is < 7)   No components found for: \"LABMICR\"  LDL Cholesterol (mg/dL)   Date Value   12/28/2021 90   06/08/2020 83       (goal LDL is <100)   AST (U/L)   Date Value   08/06/2023 14     ALT (U/L)   Date Value   08/06/2023 8     BUN (mg/dL)   Date Value   08/06/2023 36 (H)     BP Readings from Last 3 Encounters:   02/20/24 123/70   02/02/24 138/76   11/28/23 129/74          (goal 120/80)    All Future Testing planned in [facility]  Lab Frequency Next Occurrence   Microalbumin, Ur Once 09/25/2023   Microalbumin, Ur Once 10/05/2023   Lipid Panel Once 09/05/2023   Microalbumin, Ur Once 03/02/2024   Lipid Panel Once 02/02/2024   Microalbumin, Ur Once

## 2024-03-06 RX ORDER — GABAPENTIN 800 MG/1
800 TABLET ORAL 3 TIMES DAILY
Qty: 90 TABLET | Refills: 0 | Status: SHIPPED | OUTPATIENT
Start: 2024-03-06 | End: 2024-04-05

## 2024-03-07 ENCOUNTER — ANESTHESIA (OUTPATIENT)
Dept: OPERATING ROOM | Age: 64
End: 2024-03-07
Payer: MEDICARE

## 2024-03-07 ENCOUNTER — ANESTHESIA EVENT (OUTPATIENT)
Dept: OPERATING ROOM | Age: 64
End: 2024-03-07
Payer: MEDICARE

## 2024-03-07 ENCOUNTER — HOSPITAL ENCOUNTER (OUTPATIENT)
Age: 64
Setting detail: OUTPATIENT SURGERY
Discharge: HOME | End: 2024-03-07
Attending: SURGERY | Admitting: SURGERY
Payer: MEDICARE

## 2024-03-07 VITALS
WEIGHT: 183 LBS | HEART RATE: 74 BPM | HEIGHT: 69 IN | SYSTOLIC BLOOD PRESSURE: 161 MMHG | RESPIRATION RATE: 11 BRPM | TEMPERATURE: 97.3 F | BODY MASS INDEX: 27.11 KG/M2 | OXYGEN SATURATION: 97 % | DIASTOLIC BLOOD PRESSURE: 89 MMHG

## 2024-03-07 DIAGNOSIS — Z12.11 SCREEN FOR COLON CANCER: ICD-10-CM

## 2024-03-07 LAB
GLUCOSE BLD-MCNC: 150 MG/DL (ref 75–110)
GLUCOSE BLD-MCNC: 160 MG/DL (ref 75–110)

## 2024-03-07 PROCEDURE — 3609027000 HC COLONOSCOPY: Performed by: SURGERY

## 2024-03-07 PROCEDURE — 7100000011 HC PHASE II RECOVERY - ADDTL 15 MIN: Performed by: SURGERY

## 2024-03-07 PROCEDURE — 2709999900 HC NON-CHARGEABLE SUPPLY: Performed by: SURGERY

## 2024-03-07 PROCEDURE — 88305 TISSUE EXAM BY PATHOLOGIST: CPT

## 2024-03-07 PROCEDURE — 6360000002 HC RX W HCPCS: Performed by: NURSE ANESTHETIST, CERTIFIED REGISTERED

## 2024-03-07 PROCEDURE — 3700000000 HC ANESTHESIA ATTENDED CARE: Performed by: SURGERY

## 2024-03-07 PROCEDURE — 2500000003 HC RX 250 WO HCPCS: Performed by: NURSE ANESTHETIST, CERTIFIED REGISTERED

## 2024-03-07 PROCEDURE — 3700000001 HC ADD 15 MINUTES (ANESTHESIA): Performed by: SURGERY

## 2024-03-07 PROCEDURE — 82947 ASSAY GLUCOSE BLOOD QUANT: CPT

## 2024-03-07 PROCEDURE — 7100000010 HC PHASE II RECOVERY - FIRST 15 MIN: Performed by: SURGERY

## 2024-03-07 PROCEDURE — 2580000003 HC RX 258: Performed by: NURSE ANESTHETIST, CERTIFIED REGISTERED

## 2024-03-07 RX ORDER — PROPOFOL 10 MG/ML
INJECTION, EMULSION INTRAVENOUS PRN
Status: DISCONTINUED | OUTPATIENT
Start: 2024-03-07 | End: 2024-03-07 | Stop reason: SDUPTHER

## 2024-03-07 RX ORDER — LIDOCAINE HYDROCHLORIDE 10 MG/ML
1 INJECTION, SOLUTION EPIDURAL; INFILTRATION; INTRACAUDAL; PERINEURAL
Status: DISCONTINUED | OUTPATIENT
Start: 2024-03-08 | End: 2024-03-07 | Stop reason: HOSPADM

## 2024-03-07 RX ORDER — SODIUM CHLORIDE, SODIUM LACTATE, POTASSIUM CHLORIDE, CALCIUM CHLORIDE 600; 310; 30; 20 MG/100ML; MG/100ML; MG/100ML; MG/100ML
INJECTION, SOLUTION INTRAVENOUS CONTINUOUS PRN
Status: DISCONTINUED | OUTPATIENT
Start: 2024-03-07 | End: 2024-03-07 | Stop reason: SDUPTHER

## 2024-03-07 RX ORDER — LIDOCAINE HYDROCHLORIDE 20 MG/ML
INJECTION, SOLUTION EPIDURAL; INFILTRATION; INTRACAUDAL; PERINEURAL PRN
Status: DISCONTINUED | OUTPATIENT
Start: 2024-03-07 | End: 2024-03-07 | Stop reason: SDUPTHER

## 2024-03-07 RX ORDER — SODIUM CHLORIDE 9 MG/ML
INJECTION, SOLUTION INTRAVENOUS CONTINUOUS
Status: DISCONTINUED | OUTPATIENT
Start: 2024-03-07 | End: 2024-03-07 | Stop reason: HOSPADM

## 2024-03-07 RX ADMIN — SODIUM CHLORIDE, POTASSIUM CHLORIDE, SODIUM LACTATE AND CALCIUM CHLORIDE: 600; 310; 30; 20 INJECTION, SOLUTION INTRAVENOUS at 12:58

## 2024-03-07 RX ADMIN — PROPOFOL 80 MG: 10 INJECTION, EMULSION INTRAVENOUS at 13:00

## 2024-03-07 RX ADMIN — LIDOCAINE HYDROCHLORIDE 50 MG: 20 INJECTION, SOLUTION EPIDURAL; INFILTRATION; INTRACAUDAL; PERINEURAL at 13:00

## 2024-03-07 RX ADMIN — PROPOFOL 30 MG: 10 INJECTION, EMULSION INTRAVENOUS at 13:23

## 2024-03-07 RX ADMIN — PROPOFOL 30 MG: 10 INJECTION, EMULSION INTRAVENOUS at 13:20

## 2024-03-07 RX ADMIN — PROPOFOL 30 MG: 10 INJECTION, EMULSION INTRAVENOUS at 13:08

## 2024-03-07 RX ADMIN — PROPOFOL 30 MG: 10 INJECTION, EMULSION INTRAVENOUS at 13:25

## 2024-03-07 RX ADMIN — PROPOFOL 30 MG: 10 INJECTION, EMULSION INTRAVENOUS at 13:17

## 2024-03-07 RX ADMIN — PROPOFOL 30 MG: 10 INJECTION, EMULSION INTRAVENOUS at 13:14

## 2024-03-07 RX ADMIN — PROPOFOL 30 MG: 10 INJECTION, EMULSION INTRAVENOUS at 13:04

## 2024-03-07 RX ADMIN — PROPOFOL 30 MG: 10 INJECTION, EMULSION INTRAVENOUS at 13:11

## 2024-03-07 RX ADMIN — PROPOFOL 30 MG: 10 INJECTION, EMULSION INTRAVENOUS at 13:06

## 2024-03-07 RX ADMIN — PROPOFOL 30 MG: 10 INJECTION, EMULSION INTRAVENOUS at 13:01

## 2024-03-07 ASSESSMENT — LIFESTYLE VARIABLES: SMOKING_STATUS: 1

## 2024-03-07 ASSESSMENT — PAIN - FUNCTIONAL ASSESSMENT: PAIN_FUNCTIONAL_ASSESSMENT: 0-10

## 2024-03-07 NOTE — ANESTHESIA POSTPROCEDURE EVALUATION
Department of Anesthesiology  Postprocedure Note    Patient: Marcelo Littlejohn  MRN: [id number]  YOB: 1960  Date of evaluation: 3/7/2024    Procedure Summary       Date: 03/07/24 Room / Location: [address]    Anesthesia Start: 1258 Anesthesia Stop: 1340    Procedure: COLONOSCOPY WITH BX (Rectum) Diagnosis:       Screen for colon cancer      (Screen for colon cancer [Z12.11])    Surgeons: Singh Turner IV, DO Responsible Provider: Jacques Sawant MD    Anesthesia Type: MAC ASA Status: 3            Anesthesia Type: No value filed.    Manuel Phase I: Manuel Score: 10    Manuel Phase II: Manuel Score: 10    Anesthesia Post Evaluation    Patient location during evaluation: PACU  Patient participation: complete - patient participated  Level of consciousness: awake and alert  Airway patency: patent  Nausea & Vomiting: no nausea and no vomiting  Cardiovascular status: hemodynamically stable  Respiratory status: acceptable  Hydration status: euvolemic  Pain management: adequate    No notable events documented.

## 2024-03-07 NOTE — H&P
History and Physical GI Update    Pt Name: Marcelo Littlejohn  MRN: [id number]  YOB: 1960  Date of evaluation: 3/7/2024      [x] I have reviewed the FAMILY MEDICINE PROGRESS NOTE BY.DR.AHMED RUTH OF 2/20/2024  found in [facility]  which meets the criteria for an Interval History and Physical note and is attached below.    [x] I have examined  Marcelo Littlejohn and there are no changes to the patient or plans for a COLONOSCOPY .by Dr DUONG  for COLORECTAL CANCER SCREENING.. Bowel Prep completed: Yes with ADEQUATE results. Last colonoscopy JUNE 2023.  Biopsies were not taken.  The patient denies ANY BLEEDING, DARK OR TARRY STOOLS.     Patient today denies  fever, chills, night sweats, pain or unexplained weight loss. HE DOES NOT TAKE BLOOD THINNERS,HE HAS DIABETES,INSULIN DEPENDENT.BLOOD SUGAR TODAY .  PATIENT STATES\"I HAVE SLEEP APNEA\" DOES NOT USE MACHINE.  Vital signs: /71   Pulse 79   Temp 97.3 °F (36.3 °C)   Resp 12   Ht 1.753 m (5' 9\")   Wt 83 kg (183 lb)   SpO2 100%   BMI 27.02 kg/m²     Allergies:  Patient has no known allergies.    Medications:   No current facility-administered medications on file prior to encounter.     Current Outpatient Medications on File Prior to Encounter   Medication Sig Dispense Refill    Insulin Pen Needle ([employer] PEN NEEDLES) 31G X 6 MM MISC USE ONE DAILY 100 each 1    oxymetazoline (12 HOUR NASAL SPRAY) 0.05 % nasal spray 2 sprays by Nasal route 2 times daily 15 mL 0    ibuprofen (ADVIL) 200 MG tablet Take 2 tablets by mouth every 6 hours as needed for Pain 60 tablet 0    silver sulfADIAZINE (SILVADENE) 1 % cream Apply topically daily. 25 g 1    Continuous Blood Gluc  (FREESTYLE LIBAN 2 READER) SRINIVAS 1 each by Does not apply route 3 times daily 1 each 5    Glucagon, rDNA, (GLUCAGON EMERGENCY) 1 MG KIT Inject 1 mg as directed as needed (for hypoglycemia) 1 kit 1    tadalafil (CIALIS) 10 MG tablet TAKE 1/2 TABLET BY MOUTH AS NEEDED FOR ERECTILE  11/11/2016    LABA1C 6.7 02/20/2024     Lab Results   Component Value Date    CALCIUM 9.1 08/06/2023    PHOS 2.3 (L) 10/29/2016     Lab Results   Component Value Date    LDLCHOLESTEROL 90 12/28/2021       Assessment and Plan:    1. Muscle spasm  Patient likely pulled his hamstring muscle, will treat conservatively with rest, ice activity modification  Patient can also apply heat or massage therapy  -Able to ambulate but has pain when walking  - cyclobenzaprine (FLEXERIL) 5 MG tablet; Take 1 tablet by mouth 2 times daily as needed for Muscle spasms  Dispense: 30 tablet; Refill: 0    2. Screening for hyperlipidemia    - Lipid Panel; Future    3. Controlled type 2 diabetes mellitus with diabetic polyneuropathy, with long-term current use of insulin ([facility])  A1c is 6.7  - Microalbumin, Ur; Future  - POCT glycosylated hemoglobin (Hb A1C)          Requested Prescriptions     Signed Prescriptions Disp Refills    cyclobenzaprine (FLEXERIL) 5 MG tablet 30 tablet 0     Sig: Take 1 tablet by mouth 2 times daily as needed for Muscle spasms       There are no discontinued medications.    Marcelo received counseling on the following healthy behaviors: nutrition, exercise and medication adherence    Discussed use,benefit, and side effects of prescribed medications.  Barriers to medication compliance addressed.      All patient questions answered.  Pt voiced understanding.     Return if symptoms worsen or fail to improve.        Disclaimer: Some orall of this note was transcribed using voice-recognition software.This may cause typographical errors occasionally. Although all effort is made to fix these errors, please do not hesitate to contact our office if there isany concern with the understanding of this note.

## 2024-03-07 NOTE — ANESTHESIA PRE PROCEDURE
\"PREGSERUM\", \"HCG\", \"HCGQUANT\"     ABGs: No results found for: \"PHART\", \"PO2ART\", \"SBX1RIY\", \"HBC3PQZ\", \"BEART\", \"O9CFPIKV\"     Type & Screen (If Applicable):  No results found for: \"LABABO\", \"LABRH\"    Drug/Infectious Status (If Applicable):  Lab Results   Component Value Date/Time    HEPCAB NONREACTIVE 07/17/2013 08:37 AM       COVID-19 Screening (If Applicable):   Lab Results   Component Value Date/Time    COVID19 Not Detected 06/26/2020 03:10 PM           Anesthesia Evaluation  Patient summary reviewed and Nursing notes reviewed   no history of anesthetic complications:   Airway: Mallampati: III  TM distance: >3 FB   Neck ROM: limited  Mouth opening: > = 3 FB   Dental:          Pulmonary: breath sounds clear to auscultation  (+)     sleep apnea:       current smoker    (-) COPD          Patient did not smoke on day of surgery.                 Cardiovascular:  Exercise tolerance: good (>4 METS)  (+) hypertension:, hyperlipidemia    (-) past MI and CABG/stent    ECG reviewed  Rhythm: regular  Rate: normal    Stress test reviewed       Beta Blocker:  Not on Beta Blocker      ROS comment: Stress Test 2020:  IMPRESSION:  1. No discrete perfusion abnormality to suggest myocardial  ischemia/infarction.  2. No wall motion abnormality.  Calculated ejection fraction of 61%.  3. Risk stratification: Low  Notes concerning risk stratification:    EKG 7/2023:  Sinus rhythm with Premature atrial complexes  in singles and couplets  Otherwise normal ECG       Neuro/Psych:   (+) neuromuscular disease (Diabetic polyneuropathy):, headaches: tension headaches   (-) seizures and CVA           GI/Hepatic/Renal:   (+) GERD:     (-) liver disease      ROS comment: H/o colostomy reversal.   Endo/Other:    (+) DiabetesType II DM, using insulin, blood dyscrasia: anemia, arthritis: rheumatoid..                  ROS comment: BPH Abdominal:             Vascular: negative vascular ROS.         Other Findings:         Anesthesia Plan      MAC

## 2024-03-07 NOTE — DISCHARGE INSTRUCTIONS
Discharge Instructions:    Diet:   You may resume a regular diet.    Activity:   Light activity for remainder of the day.    Reasons to Return:   Some soreness and redness is common after surgery, especially for the first 24-48 hours. If, however, you experience for increasing redness, worsening pain, fever above 101.5 degrees Farenheit, bleeding that does not stop soon after discovery, or any other concerns about your incision or post op course, please either call the office or call/return to the Emergency Department for further evaluation.    Follow up with [facility] in 1 week          Colonoscopy: What to Expect at Home  Your Recovery  Your doctor will talk to you about when you will need your next colonoscopy. The results of your test and your risk for colorectal cancer will help your doctor decide how often you need to be checked.  After the test, you may be bloated or have gas pains. You may need to pass gas. If a biopsy was done or a polyp was removed, you may have streaks of blood in your stool (feces) for a few days.    How can you care for yourself at home?  Activity  Rest as much as you need to after you go home.  You should be able to go back to your usual activities the day after the test.  Diet  Follow your doctor’s directions for eating.  Drink plenty of fluids (unless your doctor has told you not to) to replace the fluids that were lost during the colon prep.  Medicines  If polyps were removed or a biopsy was done during the test, your doctor may tell you not to take aspirin or other anti-inflammatory medicines, such as ibuprofen (Advil, Motrin) and naproxen (Aleve), for a few days.  Follow-up care is a key part of your treatment and safety. Be sure to make and go to all appointments, and call your doctor if you are having problems.  When should you call for help?  Call 911 anytime you think you may need emergency care. For example, call if:  You passed out (lost consciousness).  You pass

## 2024-03-07 NOTE — OP NOTE
Operative Note      Patient: Marcelo Littlejohn  YOB: 1960  MRN: [id number]    Date of Procedure: 3/7/2024    Pre-Op Diagnosis Codes:     * Screen for colon cancer [Z12.11]    Post-Op Diagnosis: Same       Procedure(s):  COLORECTAL CANCER SCREENING, NOT HIGH RISK    Surgeon(s):  Singh Turner IV, DO    Assistant:   Resident: Wolf Salazar DO    Anesthesia: Monitor Anesthesia Care    Estimated Blood Loss (mL): Minimal    Complications: None    Specimens:   ID Type Source Tests Collected by Time Destination   A : CECAL POLYP Tissue Cecum SURGICAL PATHOLOGY Singh Turner IV,  3/7/2024 1321        Implants:  * No implants in log *      Drains: * No LDAs found *    Findings: POLYP IN CECUM, very poor prep        Detailed Description of Procedure:     HISTORY: The patient is a 63 y.o. year old male with history of above preop diagnosis.  I recommended colonoscopy with possible biopsy or polypectomy and I explained the risk, benefits, expected outcome, and alternatives to the procedure.  Risks included but are not limited to bleeding, infection, respiratory distress, hypotension, and perforation of the colon.  The patient understands and is in agreement.        PROCEDURE: The patient was given IV conscious sedation per anesthesia. The patient was given 4 L of O2 /minute by nasal cannula.  The patient's SPO2 remained above 90% throughout the procedure. The colonoscope was inserted per rectum and advanced under direct vision to the cecum without difficulty. Very poor prep    Findings:  Cecum/Ascending colon: abnormal: polyp    Transverse colon: normal    Descending/Sigmoid colon: normal    Rectum/Anus: examined in normal and retroflexed positions and was abnormal: grade II internal hemorrhoids    Cold forceps was used to remove cecal polyp. The colon was decompressed and the scope was removed.  The patient tolerated the procedure well.     IMPRESSION/PLAN:   Repeat colonoscopy in 1 year, will

## 2024-03-08 ENCOUNTER — CARE COORDINATION (OUTPATIENT)
Dept: CARE COORDINATION | Age: 64
End: 2024-03-08

## 2024-03-08 NOTE — CARE COORDINATION
SDOH referral copied and pasted below.     Question Answer   Please address the following Financial Resource Strain     Food Insecurity   My clinical question is: financial resource strain   Additional details about assistance needed: no   Patient has consented to SDOH referral and is aware they will be contacted by a Community Health Navigator Yes      Danitza Peña , is currently working with Patient.     Patient was seen by Dr. Barr on 2024.       Patient has an appointment today with Dr. Moise at 3:45 pm.    Ambulatory Care Coordination Note  3/8/2024    Patient Current Location:  [place]    ACM contacted the patient by telephone. Verified name and  with patient as identifiers. Provided introduction to self, and explanation of the ACM role.     ACM: Caitlyn Beasley RN    Challenges to be reviewed by the provider   Additional needs identified to be addressed with provider: No  none               Method of communication with provider: staff message.    Writer phoned Patient to introduce self and explain ACM.  Patient is in agreement for ACM.  He states he is currently on his lunch break.  He request Writer return call to him next week on Tuesday or Thursday.  He states those are his days off.    Patient plans to cancel and reschedule his appointment with Dr. Moise today.  He states he will call the office.    Patient states he had a colonoscopy yesterday.  He states he is feeling pretty good today other than a little bit of cramping.      Patient states he has all of his medications.    A photo of Writer's business card was sent to Patient via text message.    Offered patient enrollment in the Remote Patient Monitoring (RPM) program for in-home monitoring: Yes, but did not enroll at this time.        Future Appointments   Date Time Provider Department Center   3/8/2024  3:45 PM Ame Moise MD [facility]

## 2024-03-11 LAB — SURGICAL PATHOLOGY REPORT: NORMAL

## 2024-03-14 ENCOUNTER — CARE COORDINATION (OUTPATIENT)
Dept: CARE COORDINATION | Age: 64
End: 2024-03-14

## 2024-03-14 ENCOUNTER — OFFICE VISIT (OUTPATIENT)
Dept: FAMILY MEDICINE CLINIC | Age: 64
End: 2024-03-14
Payer: MEDICARE

## 2024-03-14 VITALS
SYSTOLIC BLOOD PRESSURE: 144 MMHG | DIASTOLIC BLOOD PRESSURE: 86 MMHG | HEART RATE: 80 BPM | BODY MASS INDEX: 28.94 KG/M2 | WEIGHT: 196 LBS

## 2024-03-14 DIAGNOSIS — Z79.4 CONTROLLED TYPE 2 DIABETES MELLITUS WITH DIABETIC POLYNEUROPATHY, WITH LONG-TERM CURRENT USE OF INSULIN (HCC): ICD-10-CM

## 2024-03-14 DIAGNOSIS — E11.42 CONTROLLED TYPE 2 DIABETES MELLITUS WITH DIABETIC POLYNEUROPATHY, WITH LONG-TERM CURRENT USE OF INSULIN (HCC): ICD-10-CM

## 2024-03-14 PROCEDURE — 3017F COLORECTAL CA SCREEN DOC REV: CPT

## 2024-03-14 PROCEDURE — 3079F DIAST BP 80-89 MM HG: CPT

## 2024-03-14 PROCEDURE — G8427 DOCREV CUR MEDS BY ELIG CLIN: HCPCS

## 2024-03-14 PROCEDURE — G8482 FLU IMMUNIZE ORDER/ADMIN: HCPCS

## 2024-03-14 PROCEDURE — 3077F SYST BP >= 140 MM HG: CPT

## 2024-03-14 PROCEDURE — 2022F DILAT RTA XM EVC RTNOPTHY: CPT

## 2024-03-14 PROCEDURE — G8419 CALC BMI OUT NRM PARAM NOF/U: HCPCS

## 2024-03-14 PROCEDURE — 3044F HG A1C LEVEL LT 7.0%: CPT

## 2024-03-14 PROCEDURE — 1036F TOBACCO NON-USER: CPT

## 2024-03-14 PROCEDURE — 99213 OFFICE O/P EST LOW 20 MIN: CPT

## 2024-03-14 RX ORDER — INSULIN LISPRO 100 [IU]/ML
13 INJECTION, SOLUTION INTRAVENOUS; SUBCUTANEOUS
Qty: 15 ML | Refills: 0 | Status: SHIPPED | OUTPATIENT
Start: 2024-03-14

## 2024-03-14 SDOH — ECONOMIC STABILITY: FOOD INSECURITY: WITHIN THE PAST 12 MONTHS, YOU WORRIED THAT YOUR FOOD WOULD RUN OUT BEFORE YOU GOT MONEY TO BUY MORE.: NEVER TRUE

## 2024-03-14 SDOH — ECONOMIC STABILITY: INCOME INSECURITY: HOW HARD IS IT FOR YOU TO PAY FOR THE VERY BASICS LIKE FOOD, HOUSING, MEDICAL CARE, AND HEATING?: NOT HARD AT ALL

## 2024-03-14 SDOH — ECONOMIC STABILITY: FOOD INSECURITY: WITHIN THE PAST 12 MONTHS, THE FOOD YOU BOUGHT JUST DIDN'T LAST AND YOU DIDN'T HAVE MONEY TO GET MORE.: NEVER TRUE

## 2024-03-14 ASSESSMENT — ENCOUNTER SYMPTOMS
DIARRHEA: 0
ABDOMINAL PAIN: 0
CHEST TIGHTNESS: 0
NAUSEA: 0
VOMITING: 0

## 2024-03-14 NOTE — CARE COORDINATION
Yes, but did not enroll at this time.    Phoned Patient to complete ACM enrollment questions and review PCP recommendations from visit this morning.  Patient states he is not at home.  He states he is currently at the store.  Patient states he is off from work on Tuesdays and Thursdays.  He request return call next week.    Lab Results       None                 Goals Addressed    None         No future appointments.

## 2024-03-14 NOTE — PROGRESS NOTES
[facility] - Outpatient Note      Subjective:    Marcelo Littlejohn is a 63 y.o. male with  has a past medical history of Bowel obstruction (HCC), Cocaine abuse (HCC), Diabetes mellitus (HCC), Diabetic polyneuropathy associated with type 2 diabetes mellitus (HCC), Diverticulitis, Diverticulosis, GERD (gastroesophageal reflux disease), History of blood transfusion, Hx of blood clots, Hyperlipidemia, Hypertension, MIGUEL (obstructive sleep apnea), Osteoarthritis, Renal lithiasis, Rheumatoid arteritis (HCC), and Type II or unspecified type diabetes mellitus without mention of complication, not stated as uncontrolled.    Presented to the office today for:  Chief Complaint   Patient presents with    Diabetes     Too soon for A1C, insurance allows every 90 days  - does state BS have been running high        HPI    Patient is a 63-year-old male presented to clinic for diabetes follow-up, unfortunately patient is in a hurry today and unable to discuss all matters.  Does mention that he is compliant with his Lantus 15 units twice daily, he supposed be also taking Humalog 13 units with meals, has noticed that his morning blood sugar is high after eating up around 300-350, mentions that he has been increasing his Humalog up to about 19 units in the morning to bring it down to less than 200.  Says that he has not changed his diet however he eats a lot of hashbrowns and high carb foods in the morning because he is so hungry.  Did have extensive conversation with patient regarding dietary changes, states that he does not want to change his diet at this time and that he would prefer to just inject himself with more insulin.  Last A1c was done less than a month ago at 6.7%.    Blood pressure slightly elevated today, it is noted that patient's not taking Norvasc, will need to have patient come back for further discussion as he is in a hurry today and needs to get to another

## 2024-03-14 NOTE — PROGRESS NOTES
Attending Physician Statement  I have discussed the care of RubDenitauezincluding pertinent history and exam findings,  with the resident. I have reviewed the key elements of all parts of the encounter with the resident.  I agree with the assessment, plan and orders as documented by the resident.  (GE Modifier)    DM - well controlled  HTN- controlled

## 2024-03-19 ENCOUNTER — CARE COORDINATION (OUTPATIENT)
Dept: CARE COORDINATION | Age: 64
End: 2024-03-19

## 2024-03-19 NOTE — CARE COORDINATION
SDOH referral copied and pasted below.     Question Answer   Please address the following Financial Resource Strain     Food Insecurity   My clinical question is: financial resource strain   Additional details about assistance needed: no   Patient has consented to SDOH referral and is aware they will be contacted by a Community Health Navigator Yes      Danitza Peña , is currently working with Patient.     Patient was seen by Dr. Barr on 2/21/2024.       Patient had an appointment Dr. Moise this morning.  Her plan of care is copied and pasted below for review with Patient:     1. Controlled type 2 diabetes mellitus with diabetic polyneuropathy, with long-term current use of insulin ([facility])  Increased morning Humalog dose from 13 units up to 19 units, patient has been injecting this amount into himself every morning to bring down his blood sugar.  Does eat poorly in the morning with high carb foods, did have extensive conversation with patient and counseled him on appropriate diet however he would prefer to inject himself with more insulin versus changing his diet at this time.  He is compliant with all other medications and A1c is well-controlled.  - insulin lispro, 1 Unit Dial, (HUMALOG KWIKPEN) 100 UNIT/ML SOPN; Inject 13 Units into the skin 3 times daily (before meals) Inject 19U in the morning and 13U at lunch and dinner  Dispense: 15 mL; Refill: 0               Phoned Patient for AC enrollment.  Patient states he's taking a knap.  He request a return call a little later.

## 2024-03-25 ENCOUNTER — OFFICE VISIT (OUTPATIENT)
Dept: FAMILY MEDICINE CLINIC | Age: 64
End: 2024-03-25
Payer: MEDICARE

## 2024-03-25 VITALS
BODY MASS INDEX: 28.14 KG/M2 | TEMPERATURE: 96.9 F | WEIGHT: 190 LBS | SYSTOLIC BLOOD PRESSURE: 128 MMHG | HEIGHT: 69 IN | HEART RATE: 77 BPM | DIASTOLIC BLOOD PRESSURE: 76 MMHG

## 2024-03-25 DIAGNOSIS — Z79.4 CONTROLLED TYPE 2 DIABETES MELLITUS WITH DIABETIC POLYNEUROPATHY, WITH LONG-TERM CURRENT USE OF INSULIN (HCC): ICD-10-CM

## 2024-03-25 DIAGNOSIS — E11.42 CONTROLLED TYPE 2 DIABETES MELLITUS WITH DIABETIC POLYNEUROPATHY, WITH LONG-TERM CURRENT USE OF INSULIN (HCC): ICD-10-CM

## 2024-03-25 DIAGNOSIS — R07.9 CHEST PAIN, UNSPECIFIED TYPE: Primary | ICD-10-CM

## 2024-03-25 DIAGNOSIS — J06.9 VIRAL URI: ICD-10-CM

## 2024-03-25 PROCEDURE — G8427 DOCREV CUR MEDS BY ELIG CLIN: HCPCS

## 2024-03-25 PROCEDURE — 99211 OFF/OP EST MAY X REQ PHY/QHP: CPT | Performed by: FAMILY MEDICINE

## 2024-03-25 PROCEDURE — 3017F COLORECTAL CA SCREEN DOC REV: CPT

## 2024-03-25 PROCEDURE — G8482 FLU IMMUNIZE ORDER/ADMIN: HCPCS

## 2024-03-25 PROCEDURE — 3078F DIAST BP <80 MM HG: CPT

## 2024-03-25 PROCEDURE — 1036F TOBACCO NON-USER: CPT

## 2024-03-25 PROCEDURE — 3044F HG A1C LEVEL LT 7.0%: CPT

## 2024-03-25 PROCEDURE — G8419 CALC BMI OUT NRM PARAM NOF/U: HCPCS

## 2024-03-25 PROCEDURE — 3074F SYST BP LT 130 MM HG: CPT

## 2024-03-25 PROCEDURE — 99213 OFFICE O/P EST LOW 20 MIN: CPT

## 2024-03-25 PROCEDURE — 2022F DILAT RTA XM EVC RTNOPTHY: CPT

## 2024-03-25 RX ORDER — NAPROXEN 500 MG/1
500 TABLET ORAL 2 TIMES DAILY PRN
Qty: 10 TABLET | Refills: 0 | Status: SHIPPED | OUTPATIENT
Start: 2024-03-25 | End: 2024-03-30

## 2024-03-25 RX ORDER — INSULIN GLARGINE 100 [IU]/ML
15 INJECTION, SOLUTION SUBCUTANEOUS 2 TIMES DAILY
Qty: 15 ML | Refills: 1 | Status: SHIPPED | OUTPATIENT
Start: 2024-03-25

## 2024-03-25 RX ORDER — GUAIFENESIN 600 MG/1
600 TABLET, EXTENDED RELEASE ORAL 2 TIMES DAILY
Qty: 20 TABLET | Refills: 0 | Status: SHIPPED | OUTPATIENT
Start: 2024-03-25 | End: 2024-04-04

## 2024-03-25 RX ORDER — LORATADINE 10 MG/1
10 TABLET ORAL DAILY
Qty: 10 TABLET | Refills: 0 | Status: SHIPPED | OUTPATIENT
Start: 2024-03-25

## 2024-03-25 ASSESSMENT — ENCOUNTER SYMPTOMS
NAUSEA: 0
COUGH: 1
COLOR CHANGE: 0
VOMITING: 0
SHORTNESS OF BREATH: 0
RHINORRHEA: 1
ABDOMINAL PAIN: 0
WHEEZING: 0
BACK PAIN: 0

## 2024-03-25 ASSESSMENT — PATIENT HEALTH QUESTIONNAIRE - PHQ9
SUM OF ALL RESPONSES TO PHQ QUESTIONS 1-9: 0
1. LITTLE INTEREST OR PLEASURE IN DOING THINGS: NOT AT ALL
SUM OF ALL RESPONSES TO PHQ QUESTIONS 1-9: 0
SUM OF ALL RESPONSES TO PHQ9 QUESTIONS 1 & 2: 0
2. FEELING DOWN, DEPRESSED OR HOPELESS: NOT AT ALL

## 2024-03-25 NOTE — PATIENT INSTRUCTIONS
Thank you for letting us take care of you today. We hope all your questions were addressed. If a question was overlooked or something else comes to mind after you return home, please contact a member of your Care Team listed below.      Your Care Team at [facility] is Team #1  Conchis Dunbar, Faculty Advisor  Vazquez Valerio, Resident Physician  Jose Milian, Resident Physician  Ame Moise, Resident Physician  Flor Gilman, [facility]  Mariln Morel, [facility]  Christopher Eason, [facility]  Abril Barrera, [facility]  Debi Campuzano, [facility]  Rachelle Huertas, [facility]  Yamile Villasenor, [facility]  Patti Lopez, [facility]  Chela (LJ) Jayda,   Kaleigh Ryder [facility] (Clinical Pharmacist)     Office phone number: [phone]    If you need to get in right away due to illness, please be advised we have \"Same Day\" appointments available Monday-Friday. Please call us at [phone] option #3 to schedule your \"Same Day\" appointment.

## 2024-03-25 NOTE — PROGRESS NOTES
Attending Physician Statement  I  have discussed the care of Marcelo Littlejohn including pertinent history and exam findings with the resident. I agree with the assessment, plan and orders as documented by the resident.      /76 (Site: Right Upper Arm, Position: Sitting, Cuff Size: Medium Adult)   Pulse 77   Temp 96.9 °F (36.1 °C) (Oral)   Ht 1.753 m (5' 9.02\")   Wt 86.2 kg (190 lb)   BMI 28.05 kg/m²    BP Readings from Last 3 Encounters:   03/25/24 128/76   03/14/24 (!) 144/86   03/07/24 (!) 161/89     Wt Readings from Last 3 Encounters:   03/25/24 86.2 kg (190 lb)   03/14/24 88.9 kg (196 lb)   03/07/24 83 kg (183 lb)          Diagnosis Orders   1. Chest pain, unspecified type  naproxen (NAPROSYN) 500 MG tablet    XR CHEST STANDARD (2 VW)      2. Controlled type 2 diabetes mellitus with diabetic polyneuropathy, with long-term current use of insulin (HCC)  insulin glargine (LANTUS SOLOSTAR) 100 UNIT/ML injection pen      3. Viral URI  loratadine (CLARITIN) 10 MG tablet    guaiFENesin (MUCINEX) 600 MG extended release tablet              Donald Martinez DO 3/25/2024 3:09 PM      
Visit Information    Have you changed or started any medications since your last visit including any over-the-counter medicines, vitamins, or herbal medicines? no   Have you stopped taking any of your medications? Is so, why? -  no  Are you having any side effects from any of your medications? - no    Have you seen any other physician or provider since your last visit?  no   Have you had any other diagnostic tests since your last visit?  no   Have you been seen in the emergency room and/or had an admission in a hospital since we last saw you?  no   Have you had your routine dental cleaning in the past 6 months?  no     Do you have an active MyChart account? If no, what is the barrier?  Yes    Patient Care Team:  Ame Moies MD as PCP - General (Family Medicine)  Danitza Peña as Health   Caitlyn Beasley RN as Ambulatory Care Manager    Medical History Review  Past Medical, Family, and Social History reviewed and does not contribute to the patient presenting condition    Health Maintenance   Topic Date Due    Diabetic retinal exam  Never done    Diabetic Alb to Cr ratio (uACR) test  12/28/2022    Lipids  12/28/2022    Annual Wellness Visit (Medicare Advantage)  Never done    Diabetic foot exam  05/18/2024    GFR test (Diabetes, CKD 3-4, OR last GFR 15-59)  08/06/2024    A1C test (Diabetic or Prediabetic)  02/20/2025    Depression Screen  02/20/2025    DTaP/Tdap/Td vaccine (3 - Td or Tdap) 04/05/2033    Colorectal Cancer Screen  03/07/2034    Flu vaccine  Completed    Shingles vaccine  Completed    Pneumococcal 0-64 years Vaccine  Completed    COVID-19 Vaccine  Completed    Respiratory Syncytial Virus (RSV) Pregnant or age 60 yrs+  Completed    Hepatitis C screen  Completed    HIV screen  Completed    Hepatitis A vaccine  Aged Out    Hepatitis B vaccine  Aged Out    Hib vaccine  Aged Out    Polio vaccine  Aged Out    Meningococcal (ACWY) vaccine  Aged Out             
tablet 0     Sig: Take 1 tablet by mouth 2 times daily as needed for Pain    loratadine (CLARITIN) 10 MG tablet 10 tablet 0     Sig: Take 1 tablet by mouth daily    guaiFENesin (MUCINEX) 600 MG extended release tablet 20 tablet 0     Sig: Take 1 tablet by mouth 2 times daily for 10 days       Medications Discontinued During This Encounter   Medication Reason    insulin glargine (LANTUS SOLOSTAR) 100 UNIT/ML injection pen REORDER       Marcelo received counseling on the following healthy behaviors: nutrition, exercise and medication adherence.    Discussed use, benefit, and side effects of prescribed medications.  Barriers to medication compliance addressed.        All patient questions answered.  Pt voiced understanding.     Return in about 4 weeks (around 4/22/2024).        Disclaimer: Some orall of this note was transcribed using voice-recognition software.This may cause typographical errors occasionally. Although all effort is made to fix these errors, please do not hesitate to contact our office if there isany concern with the understanding of this note.

## 2024-03-29 DIAGNOSIS — N40.1 BENIGN PROSTATIC HYPERPLASIA (BPH) WITH STRAINING ON URINATION: ICD-10-CM

## 2024-03-29 DIAGNOSIS — R39.16 BENIGN PROSTATIC HYPERPLASIA (BPH) WITH STRAINING ON URINATION: ICD-10-CM

## 2024-03-29 NOTE — TELEPHONE ENCOUNTER
Patient [facility] pharmacy is requesting for a 90 days supply, pharmacy is requesting refill on patient Alfuzosin HCL ER 10 mg tablet. Thank you

## 2024-04-01 RX ORDER — DOXAZOSIN MESYLATE 4 MG/1
4 TABLET ORAL DAILY
Qty: 30 TABLET | Refills: 2 | Status: SHIPPED | OUTPATIENT
Start: 2024-04-01

## 2024-04-02 ENCOUNTER — CARE COORDINATION (OUTPATIENT)
Dept: CARE COORDINATION | Age: 64
End: 2024-04-02

## 2024-04-02 NOTE — CARE COORDINATION
Ambulatory Care Coordination Note  2024    Patient Current Location:  Home: [address]  [place] [address]    ACM contacted the patient by telephone. Verified name and  with patient as identifiers. Provided introduction to self, and explanation of the ACM role.     ACM: Caitlyn Beasley RN    Challenges to be reviewed by the provider   Additional needs identified to be addressed with provider: No  none               Method of communication with provider: staff message.    Writer phoned Patient to complete ACM enrollment.  Patient was sleeping.  Writer request he return call to Writer when he awakens.  He states he will do so.    Writer missed return call from Patient at 11:52 am.  Writer returned call to Patient at 1:46 pm.  Message left on voice mail requesting return call.  Contact information provided.    Offered patient enrollment in the Remote Patient Monitoring (RPM) program for in-home monitoring: Yes, but did not enroll at this time.        Future Appointments   Date Time Provider Department Center   2024  1:30 PM [facility]   2024  1:15 PM [facility]

## 2024-04-08 DIAGNOSIS — E11.42 CONTROLLED TYPE 2 DIABETES MELLITUS WITH DIABETIC POLYNEUROPATHY, WITH LONG-TERM CURRENT USE OF INSULIN (HCC): ICD-10-CM

## 2024-04-08 DIAGNOSIS — R39.16 BENIGN PROSTATIC HYPERPLASIA (BPH) WITH STRAINING ON URINATION: ICD-10-CM

## 2024-04-08 DIAGNOSIS — Z79.4 CONTROLLED TYPE 2 DIABETES MELLITUS WITH DIABETIC POLYNEUROPATHY, WITH LONG-TERM CURRENT USE OF INSULIN (HCC): ICD-10-CM

## 2024-04-08 DIAGNOSIS — N40.1 BENIGN PROSTATIC HYPERPLASIA (BPH) WITH STRAINING ON URINATION: ICD-10-CM

## 2024-04-08 RX ORDER — ALFUZOSIN HYDROCHLORIDE 10 MG/1
TABLET, EXTENDED RELEASE ORAL
Qty: 30 TABLET | Refills: 1 | OUTPATIENT
Start: 2024-04-08

## 2024-04-08 RX ORDER — GABAPENTIN 800 MG/1
800 TABLET ORAL 3 TIMES DAILY
Qty: 90 TABLET | Refills: 0 | Status: SHIPPED | OUTPATIENT
Start: 2024-04-08 | End: 2024-05-08

## 2024-04-08 NOTE — TELEPHONE ENCOUNTER
Frequency Next Occurrence   Microalbumin, Ur Once 09/25/2023   Microalbumin, Ur Once 10/05/2023   Lipid Panel Once 09/05/2023   Microalbumin, Ur Once 03/02/2024   Lipid Panel Once 02/02/2024   Microalbumin, Ur Once 03/20/2024   Lipid Panel Once 02/20/2024   XR CHEST STANDARD (2 VW) Once 03/25/2024               Patient Active Problem List:     Controlled type 2 diabetes mellitus with diabetic polyneuropathy, with long-term current use of insulin (HCC)     Diverticulosis     DM (diabetes mellitus) (HCC)     Essential hypertension     Elevated liver enzymes     Scrotal abscess     Abdominal abscess     Hyperplastic colon polyp     Lower abdominal pain     Diabetic foot (HCC)     Infected hernioplasty mesh (HCC)     Cocaine abuse (HCC)     Chest pain     BPH with obstruction/lower urinary tract symptoms     Chronic tension-type headache, intractable     Diabetic polyneuropathy associated with type 2 diabetes mellitus (HCC)     Gastroesophageal reflux disease     Acute otitis externa of left ear     Intermittent claudication (HCC)     Family history of malignant neoplasm of colon in relative diagnosed when older than 50 years of age     Benign prostatic hyperplasia (BPH) with straining on urination     Hyperglycemia due to diabetes mellitus (HCC)     Hyperosmolar coma due to type 2 diabetes mellitus (HCC)     Visit for wound check     Low blood pressure reading     Left leg pain     Right hip pain     Post-nasal drip     Xerosis of skin     Financial difficulties

## 2024-04-09 ENCOUNTER — CARE COORDINATION (OUTPATIENT)
Dept: CARE COORDINATION | Age: 64
End: 2024-04-09

## 2024-04-09 NOTE — CARE COORDINATION
Ambulatory Care Coordination Note  2024    Patient Current Location:  [place]     ACM contacted the patient by telephone. Verified name and  with patient as identifiers. Provided introduction to self, and explanation of the ACM role.     Challenges to be reviewed by the provider   Additional needs identified to be addressed with provider: No  none               Method of communication with provider: staff message.    ACM: Caitlyn Beasley RN    Writer has made many attempts to complete ACM enrollment questions.  Writer phoned Patient today to complete ACM enrollment questions.  He informed Writer that he is driving in his car but can answer questions.  We reviewed some of Patient's medications with him.  Patient then informed Writer:  \" there is a police car behind me\", and the call was ended.  Writer request return call when Patient is able to talk.    Prior to ending the call:  Patient informed Writer that he is aware of upcoming appointment with PCP on 24.      Patient states his balance has been \"funny lately\".  He denies dizziness.  He states his legs feel like they are going to give out.      Patient states he has tingling to his heels and toes and.  He states it feels like:  \"pins and needles\".  He states:  It's because of his diabetes.    Patient reports his blood sugar was 126 today.  He states his blood sugars have been running high.  He states he stopped taking his Metformin due to diarrhea.  He states he noticed his blood sugars began to run high so he restarted the Metformin.      Offered patient enrollment in the Remote Patient Monitoring (RPM) program for in-home monitoring: Yes, but did not enroll at this time.    Lab Results       None                 Goals Addressed    None         Future Appointments   Date Time Provider Department Center   2024  1:30 PM [facility]   2024  1:15 PM [facility]

## 2024-04-11 ENCOUNTER — CARE COORDINATION (OUTPATIENT)
Dept: CARE COORDINATION | Age: 64
End: 2024-04-11

## 2024-04-11 NOTE — CARE COORDINATION
Ambulatory Care Coordination Note  2024    Patient Current Location:  Home: [address]  [place] [place] [address]     ACM contacted the patient by telephone. Verified name and  with patient as identifiers. Provided introduction to self, and explanation of the ACM role.     Challenges to be reviewed by the provider   Additional needs identified to be addressed with provider: No  none               Method of communication with provider: staff message.    ACM: Caitlyn Beasley, RN    Writer phoned Patient today to complete ACM enrollment questions.  Patient states his balance has been off for the past month.  He complains of dizziness as well for the past month.  He denies nausea/vomiting.  He states he has poor vision and is in need of an Ophthalmology appointment.      Patient denies chest discomfort and shortness of breath.    Patient states his blood sugar is usually around 126 - 200 when he has these symptoms.  Patient states his blood sugar is 260 at this time.  Patient states he is wearing a sensor.    Patient states he does have constipation sometimes.  He states he is:  \"too lazy to  OTC medication\" for treatment of constipation.    Patient is aware he has an upcoming appointment with Dr. Moise on 2024.    Writer plans to follow up with Patient next week.  Writer will inform PCP of his complaints of being off balance and of dizziness.          Offered patient enrollment in the Remote Patient Monitoring (RPM) program for in-home monitoring: Patient declined.    Lab Results       None                 Goals Addressed    None         Future Appointments   Date Time Provider Department Center   2024  1:30 PM A[facility] Moise MD [facility]   2024  1:15 PM Ame Moise MD [facility]

## 2024-04-12 NOTE — CARE COORDINATION
I agree with the Care Coordinator's Plan of Care    Electronically signed by Ame Moise MD on 4/12/2024 at 3:11 PM

## 2024-04-22 ENCOUNTER — OFFICE VISIT (OUTPATIENT)
Dept: FAMILY MEDICINE CLINIC | Age: 64
End: 2024-04-22
Payer: MEDICARE

## 2024-04-22 VITALS
SYSTOLIC BLOOD PRESSURE: 122 MMHG | HEART RATE: 73 BPM | WEIGHT: 192.8 LBS | HEIGHT: 69 IN | BODY MASS INDEX: 28.56 KG/M2 | DIASTOLIC BLOOD PRESSURE: 77 MMHG

## 2024-04-22 DIAGNOSIS — M54.12 CERVICAL RADICULOPATHY: Primary | ICD-10-CM

## 2024-04-22 DIAGNOSIS — E11.42 CONTROLLED TYPE 2 DIABETES MELLITUS WITH DIABETIC POLYNEUROPATHY, WITH LONG-TERM CURRENT USE OF INSULIN (HCC): ICD-10-CM

## 2024-04-22 DIAGNOSIS — Z79.4 CONTROLLED TYPE 2 DIABETES MELLITUS WITH DIABETIC POLYNEUROPATHY, WITH LONG-TERM CURRENT USE OF INSULIN (HCC): ICD-10-CM

## 2024-04-22 PROCEDURE — 3044F HG A1C LEVEL LT 7.0%: CPT

## 2024-04-22 PROCEDURE — 3078F DIAST BP <80 MM HG: CPT

## 2024-04-22 PROCEDURE — 3074F SYST BP LT 130 MM HG: CPT

## 2024-04-22 PROCEDURE — 99213 OFFICE O/P EST LOW 20 MIN: CPT

## 2024-04-22 RX ORDER — LIDOCAINE 50 MG/G
1 PATCH TOPICAL DAILY
Qty: 30 PATCH | Refills: 0 | Status: SHIPPED | OUTPATIENT
Start: 2024-04-22 | End: 2024-05-22

## 2024-04-22 ASSESSMENT — ENCOUNTER SYMPTOMS
CONSTIPATION: 0
SHORTNESS OF BREATH: 0
DIARRHEA: 0
ABDOMINAL PAIN: 0
NAUSEA: 0
VOMITING: 0

## 2024-04-22 NOTE — PROGRESS NOTES
Subjective:    Marcelo Littlejohn is a 63 y.o. male with  has a past medical history of Bowel obstruction (HCC), Cocaine abuse (HCC), Diabetes mellitus (HCC), Diabetic polyneuropathy associated with type 2 diabetes mellitus (HCC), Diverticulitis, Diverticulosis, GERD (gastroesophageal reflux disease), History of blood transfusion, Hx of blood clots, Hyperlipidemia, Hypertension, MIGUEL (obstructive sleep apnea), Osteoarthritis, Renal lithiasis, Rheumatoid arteritis (HCC), and Type II or unspecified type diabetes mellitus without mention of complication, not stated as uncontrolled.    Presented to the office today for:  Chief Complaint   Patient presents with    Pain     Neck & Right Shoulder pain - right hand numbness - 6/10 pain - lasting about a couple weeks - patient fell in January on ice and landed on right side -     Letter     Needs work note for yesterday       Pain  Associated symptoms include neck pain. Pertinent negatives include no abdominal pain, chest pain, chills, fever, headaches, nausea or vomiting.       Patient is a 63-year-old male presenting to the clinic today with neck pain with radiculopathy.  Patient states this has been going on for the past 2 weeks.  Patient denies any trauma to the area.  Patient states the pain starts in his neck and shoots down to his fingertips, sometimes he has numbness and tingling in his fingertips.  Denies any weakness in his hands.        Review of Systems   Constitutional:  Negative for chills and fever.   Respiratory:  Negative for shortness of breath.    Cardiovascular:  Negative for chest pain.   Gastrointestinal:  Negative for abdominal pain, constipation, diarrhea, nausea and vomiting.   Musculoskeletal:  Positive for neck pain and neck stiffness.   Neurological:  Negative for dizziness, light-headedness and headaches.                 The patient has a   Family History   Problem Relation Age of Onset    Diabetes Father     Diabetes Other     Heart Attack Other

## 2024-04-22 NOTE — PROGRESS NOTES
Attending Physician Statement  I  have discussed the care of Marcelo Littlejohn including pertinent history and exam findings with the resident. I agree with the assessment, plan and orders as documented by the resident.      /77 (Site: Right Upper Arm, Position: Sitting, Cuff Size: Medium Adult) Comment: machine  Pulse 73   Ht 1.753 m (5' 9.02\")   Wt 87.5 kg (192 lb 12.8 oz)   BMI 28.46 kg/m²    BP Readings from Last 3 Encounters:   04/22/24 122/77   03/25/24 128/76   03/14/24 (!) 144/86     Wt Readings from Last 3 Encounters:   04/22/24 87.5 kg (192 lb 12.8 oz)   03/25/24 86.2 kg (190 lb)   03/14/24 88.9 kg (196 lb)          Diagnosis Orders   1. Cervical radiculopathy  XR CERVICAL SPINE (2-3 VIEWS)    lidocaine (LIDODERM) 5 %      2. Controlled type 2 diabetes mellitus with diabetic polyneuropathy, with long-term current use of insulin ([facility])  SHARMAINE - Clifton Rascon MD, Ophthalmology, [place]          Cervical radiculopathy sx- w/o immediate signs of impingement. Xray ordered further recommendations pending imaging. Home stretches and OTC treatment prn at this time.     Jeff Hilton DO 4/24/2024 1:48 PM

## 2024-04-22 NOTE — PATIENT INSTRUCTIONS
Thank you for letting us take care of you today. We hope all your questions were addressed. If a question was overlooked or something else comes to mind after you return home, please contact a member of your Care Team listed below.      Your Care Team at [facility] is Team #  Donald Martinez, (Faculty)  Citlaly Garay (Resident)  Citlaly Asher (Resident)   Gita Euceda (Resident)  Enedina Mandel (Resident)  Flor [place]., [facility]  Debi Campuzano., [facility]  Marlin Morel, [facility]  Patti Lopez, [facility]  Christopher Eason, [facility]  Abril Barrera, [facility]  Rachelle Huertas, [facility]  Yamile Villasenor, ANDREW York (LJ) LUCA Montelongo (Clinical Practice Manager)  Kaleigh Ryder [facility] (Clinical Pharmacist)     Office phone number: [phone]    If you need to get in right away due to illness, please be advised we have \"Same Day\" appointments available Monday-Friday. Please call us at [phone] option #3 to schedule your \"Same Day\" appointment.

## 2024-04-22 NOTE — PROGRESS NOTES
Visit Information    Have you changed or started any medications since your last visit including any over-the-counter medicines, vitamins, or herbal medicines? no   Have you stopped taking any of your medications? Is so, why? -  no  Are you having any side effects from any of your medications? - no    Have you seen any other physician or provider since your last visit?  no   Have you had any other diagnostic tests since your last visit?  no   Have you been seen in the emergency room and/or had an admission in a hospital since we last saw you?  no   Have you had your routine dental cleaning in the past 6 months?  no     Do you have an active MyChart account? If no, what is the barrier?  Yes    Patient Care Team:  Ame Moise MD as PCP - General (Family Medicine)  Danitza Peña as Health   Caitlyn Beasley RN as Ambulatory Care Manager    Medical History Review  Past Medical, Family, and Social History reviewed and does not contribute to the patient presenting condition    Health Maintenance   Topic Date Due    Diabetic retinal exam  Never done    Diabetic Alb to Cr ratio (uACR) test  12/28/2022    Lipids  12/28/2022    Annual Wellness Visit (Medicare Advantage)  Never done    Diabetic foot exam  05/18/2024    GFR test (Diabetes, CKD 3-4, OR last GFR 15-59)  08/06/2024    A1C test (Diabetic or Prediabetic)  02/20/2025    Depression Screen  03/25/2025    DTaP/Tdap/Td vaccine (3 - Td or Tdap) 04/05/2033    Colorectal Cancer Screen  03/07/2034    Flu vaccine  Completed    Shingles vaccine  Completed    Pneumococcal 0-64 years Vaccine  Completed    COVID-19 Vaccine  Completed    Respiratory Syncytial Virus (RSV) Pregnant or age 60 yrs+  Completed    Hepatitis C screen  Completed    HIV screen  Completed    Hepatitis A vaccine  Aged Out    Hepatitis B vaccine  Aged Out    Hib vaccine  Aged Out    Polio vaccine  Aged Out    Meningococcal (ACWY) vaccine  Aged Out

## 2024-04-23 ENCOUNTER — HOSPITAL ENCOUNTER (OUTPATIENT)
Age: 64
Discharge: HOME | End: 2024-04-23
Payer: MEDICARE

## 2024-04-23 ENCOUNTER — HOSPITAL ENCOUNTER (OUTPATIENT)
Dept: GENERAL RADIOLOGY | Age: 64
Discharge: HOME | End: 2024-04-25
Payer: MEDICARE

## 2024-04-23 ENCOUNTER — HOSPITAL ENCOUNTER (OUTPATIENT)
Age: 64
Discharge: HOME | End: 2024-04-25
Payer: MEDICARE

## 2024-04-23 DIAGNOSIS — Z13.220 SCREENING FOR HYPERLIPIDEMIA: ICD-10-CM

## 2024-04-23 DIAGNOSIS — E11.42 CONTROLLED TYPE 2 DIABETES MELLITUS WITH DIABETIC POLYNEUROPATHY, WITH LONG-TERM CURRENT USE OF INSULIN (HCC): ICD-10-CM

## 2024-04-23 DIAGNOSIS — Z79.4 CONTROLLED TYPE 2 DIABETES MELLITUS WITH DIABETIC POLYNEUROPATHY, WITH LONG-TERM CURRENT USE OF INSULIN (HCC): ICD-10-CM

## 2024-04-23 DIAGNOSIS — M54.12 CERVICAL RADICULOPATHY: ICD-10-CM

## 2024-04-23 LAB
CHOLEST SERPL-MCNC: 88 MG/DL (ref 0–199)
CHOLESTEROL/HDL RATIO: 2
CREAT UR-MCNC: 50.2 MG/DL (ref 39–259)
HDLC SERPL-MCNC: 45 MG/DL
LDLC SERPL CALC-MCNC: 27 MG/DL (ref 0–100)
MICROALBUMIN UR-MCNC: 238 MG/L (ref 0–20)
MICROALBUMIN/CREAT UR-RTO: 474 MCG/MG CREAT (ref 0–17)
TRIGL SERPL-MCNC: 77 MG/DL
VLDLC SERPL CALC-MCNC: 15 MG/DL

## 2024-04-23 PROCEDURE — 72040 X-RAY EXAM NECK SPINE 2-3 VW: CPT

## 2024-04-23 PROCEDURE — 80061 LIPID PANEL: CPT

## 2024-04-23 PROCEDURE — 82043 UR ALBUMIN QUANTITATIVE: CPT

## 2024-04-23 PROCEDURE — 82570 ASSAY OF URINE CREATININE: CPT

## 2024-04-23 PROCEDURE — 36415 COLL VENOUS BLD VENIPUNCTURE: CPT

## 2024-04-25 ENCOUNTER — CARE COORDINATION (OUTPATIENT)
Dept: CARE COORDINATION | Age: 64
End: 2024-04-25

## 2024-04-25 NOTE — CARE COORDINATION
Ambulatory Care Coordination Note  4/25/2024      ACM attempted to contact the patient by telephone. Message left on voice mail requesting a return call.  Writer's contact information provided.    Method of communication with provider: staff message.    ACM: Caitlyn Beasley RN    CC Plan:      Patient kept appointment with Dr. Euceda on 4/22/24.  He complained of neck discomfort and right shoulder pain with right hand numbness. A Cervical spine x-ray was ordered.     IMPRESSION:  1. No acute osseous abnormality.  2. Moderate degenerative disc disease at C5-6.  3. Mild multilevel facet osteoarthritis.  4. Minimal grade 1 retrolisthesis of C5 on C6.    2.  Review medications with Patient.    3.  Review upcoming appointments with Patient.    Future Appointments   Date Time Provider Department Center   4/25/2024  2:15 PM Ame Moise MD [facility]   6/13/2024  1:15 PM Ame Moise MD [facility]       Offered patient enrollment in the Remote Patient Monitoring (RPM) program for in-home monitoring: Yes, but did not enroll at this time: Patient declined .    Lab Results       None            Care Coordination Interventions    Referral from Primary Care Provider: No  Suggested Interventions and Community Resources          Goals Addressed    None         Future Appointments   Date Time Provider Department Center   4/25/2024  2:15 PM Ame Moise MD [facility]   6/13/2024  1:15 PM Ame Moise MD [facility]

## 2024-05-07 ENCOUNTER — CARE COORDINATION (OUTPATIENT)
Dept: CARE COORDINATION | Age: 64
End: 2024-05-07

## 2024-05-07 NOTE — CARE COORDINATION
Ambulatory Care Coordination Note  2024    Patient Current Location:  Home: [address]  [place] [address]     ACM contacted the patient by telephone. Verified name and  with patient as identifiers. Provided introduction to self, and explanation of the ACM role.     Challenges to be reviewed by the provider   Additional needs identified to be addressed with provider: No  none               Method of communication with provider: staff message.    ACM: Caitlyn Beasley RN    CC Plan:       Patient kept appointment with Dr. Euceda on 24.  He complained of neck discomfort and right shoulder pain with right hand numbness. A Cervical spine x-ray was ordered.      IMPRESSION:  1. No acute osseous abnormality.  2. Moderate degenerative disc disease at C5-6.  3. Mild multilevel facet osteoarthritis.  4. Minimal grade 1 retrolisthesis of C5 on C6.     2.  Review medications with Patient.     3.  Review upcoming appointments with Patient.     Future Appointments   Date Time Provider Department Center   2024  2:30 PM Ame Moise MD [facility]   2024  1:15 PM Ame Moise MD [facility]     Offered patient enrollment in the Remote Patient Monitoring (RPM) program for in-home monitoring: Patient declined    Writer phoned Patient for ACM update and to discuss cc plan of care.  Patient states he is sleeping.  He  is aware of his upcoming appointment with Dr. Moise on 24.  Writer will follow up with Patient next week.  Lab Results       None            Care Coordination Interventions    Referral from Primary Care Provider: No  Suggested Interventions and Community Resources          Goals Addressed    None         Future Appointments   Date Time Provider Department Center   2024  2:30 PM Ame Moise MD [facility]   2024  1:15 PM Ame Moise MD [facility]

## 2024-05-09 ENCOUNTER — OFFICE VISIT (OUTPATIENT)
Dept: FAMILY MEDICINE CLINIC | Age: 64
End: 2024-05-09

## 2024-05-09 VITALS
HEIGHT: 69 IN | SYSTOLIC BLOOD PRESSURE: 153 MMHG | HEART RATE: 85 BPM | WEIGHT: 194.4 LBS | DIASTOLIC BLOOD PRESSURE: 79 MMHG | BODY MASS INDEX: 28.79 KG/M2

## 2024-05-09 DIAGNOSIS — Z79.4 CONTROLLED TYPE 2 DIABETES MELLITUS WITH DIABETIC POLYNEUROPATHY, WITH LONG-TERM CURRENT USE OF INSULIN (HCC): ICD-10-CM

## 2024-05-09 DIAGNOSIS — E11.42 CONTROLLED TYPE 2 DIABETES MELLITUS WITH DIABETIC POLYNEUROPATHY, WITH LONG-TERM CURRENT USE OF INSULIN (HCC): ICD-10-CM

## 2024-05-09 RX ORDER — GABAPENTIN 800 MG/1
800 TABLET ORAL 3 TIMES DAILY
Qty: 90 TABLET | Refills: 0 | Status: SHIPPED | OUTPATIENT
Start: 2024-05-09 | End: 2024-06-08

## 2024-05-09 ASSESSMENT — ENCOUNTER SYMPTOMS
CHEST TIGHTNESS: 0
NAUSEA: 0
VOMITING: 0
DIARRHEA: 0
SHORTNESS OF BREATH: 0

## 2024-05-09 NOTE — PROGRESS NOTES
Attending Physician Statement  I  have discussed the care of Marcelo Littlejohn including pertinent history and exam findings with the resident. I agree with the assessment, plan and orders as documented by the resident.      BP (!) 153/79 (Site: Left Upper Arm, Position: Sitting, Cuff Size: Medium Adult) Comment: m  Pulse 85   Ht 1.753 m (5' 9.02\")   Wt 88.2 kg (194 lb 6.4 oz)   BMI 28.69 kg/m²    BP Readings from Last 3 Encounters:   05/09/24 (!) 153/79   04/22/24 122/77   03/25/24 128/76     Wt Readings from Last 3 Encounters:   05/09/24 88.2 kg (194 lb 6.4 oz)   04/22/24 87.5 kg (192 lb 12.8 oz)   03/25/24 86.2 kg (190 lb)     Addressed DM and neuropathy   Discussed dietary habits      Diagnosis Orders   1. Controlled type 2 diabetes mellitus with diabetic polyneuropathy, with long-term current use of insulin (HCC)  gabapentin (NEURONTIN) 800 MG tablet              Fede Solis MD 5/9/2024 4:02 PM      
Diabetic visit information    BP Readings from Last 3 Encounters:   04/22/24 122/77   03/25/24 128/76   03/14/24 (!) 144/86       Hemoglobin A1C (%)   Date Value   02/20/2024 6.7   11/16/2023 5.9   08/10/2023 7.8               Have you changed or started any medications since your last visit including any over-the-counter medicines, vitamins, or herbal medicines? no   Have you stopped taking any of your medications? Is so, why? -  no  Are you having any side effects from any of your medications? - no    Have you seen any other physician or provider since your last visit?  no   Have you had any other diagnostic tests since your last visit?  yes - Labs, XR   Have you been seen in the emergency room and/or had an admission in a hospital since we last saw you?  no     Have you had your annual diabetic retinal (eye) exam? No   (ensure copy of exam is in the chart)    Have you had your routine dental cleaning in the past 6 months? no    Do you have an active [employer] account?  If not, what are your barriers?  Yes    Patient Care Team:  Ame Moise MD as PCP - General (Family Medicine)  Danitza Peña as Health   Caitlyn Beasley, RN as Ambulatory Care Manager    Medical history Review  Past Medical, Family, and Social History reviewed and does contribute to the patient presenting condition.    Health Maintenance   Topic Date Due    Annual Wellness Visit (Medicare)  Never done    Diabetic foot exam  05/18/2024    Diabetic retinal exam  05/05/2025 (Originally 10/6/1978)    GFR test (Diabetes, CKD 3-4, OR last GFR 15-59)  08/06/2024    A1C test (Diabetic or Prediabetic)  02/20/2025    Diabetic Alb to Cr ratio (uACR) test  04/23/2025    Lipids  04/23/2025    Depression Screen  04/25/2025    DTaP/Tdap/Td vaccine (3 - Td or Tdap) 04/05/2033    Colorectal Cancer Screen  03/07/2034    Flu vaccine  Completed    Shingles vaccine  Completed    Pneumococcal 0-64 years Vaccine  Completed    COVID-19 Vaccine  Completed    
pain, palpitations and leg swelling.   Gastrointestinal:  Negative for diarrhea, nausea and vomiting.   Genitourinary:  Negative for difficulty urinating.   Skin:         Darkening of right 4th toenail   Neurological:  Negative for dizziness and headaches.   Psychiatric/Behavioral:  Negative for agitation.      The patient has a   Family History   Problem Relation Age of Onset    Diabetes Father     Diabetes Other     Heart Attack Other     Hypertension Other     Diabetes Brother        Objective:    BP (!) 153/79 (Site: Left Upper Arm, Position: Sitting, Cuff Size: Medium Adult) Comment: m  Pulse 85   Ht 1.753 m (5' 9.02\")   Wt 88.2 kg (194 lb 6.4 oz)   BMI 28.69 kg/m²    BP Readings from Last 3 Encounters:   05/09/24 (!) 153/79   04/22/24 122/77   03/25/24 128/76       Physical Exam  Constitutional:       Appearance: Normal appearance.   Cardiovascular:      Rate and Rhythm: Normal rate and regular rhythm.      Pulses: Normal pulses.      Heart sounds: Normal heart sounds. No murmur heard.     No gallop.   Pulmonary:      Effort: Pulmonary effort is normal.      Breath sounds: Normal breath sounds. No wheezing, rhonchi or rales.   Musculoskeletal:      Right lower leg: No edema.      Left lower leg: No edema.   Feet:      Comments: Darkening of right 4th toenail  Skin:     General: Skin is warm.   Neurological:      General: No focal deficit present.      Mental Status: He is alert.   Psychiatric:         Mood and Affect: Mood normal.         Lab Results   Component Value Date    WBC 8.9 08/06/2023    HGB 11.4 (L) 08/06/2023    HCT 30.5 (L) 08/06/2023     08/06/2023    CHOL 88 04/23/2024    TRIG 77 04/23/2024    HDL 45 04/23/2024    ALT 8 08/06/2023    AST 14 08/06/2023     (L) 08/06/2023    K 4.8 08/06/2023    CL 98 08/06/2023    CREATININE 1.3 (H) 08/06/2023    BUN 36 (H) 08/06/2023    CO2 25 08/06/2023    TSH 0.84 03/15/2020    INR 1.3 11/11/2016    LABA1C 6.7 02/20/2024     Lab Results

## 2024-05-09 NOTE — PATIENT INSTRUCTIONS
Thank you for letting us take care of you today. We hope all your questions were addressed. If a question was overlooked or something else comes to mind after you return home, please contact a member of your Care Team listed below.      Your Care Team at [facility] is Team #1  Conchis Dunbar, Faculty Advisor  Vazquez Valerio, Resident Physician  Jose Milian, Resident Physician  Ame Moise, Resident Physician  Flor Gilman, [facility]  Marlin Morel, [facility]  Christopher Eason, [facility]  Abril Barrera, [facility]  Debi Campuzano, [facility]  Rachelle Huertas, [facility]  Yamile Villasenor, [facility]  Patti Lopez, [facility]  Chela (LJ) Jayda,   Kaleigh Ryder [facility] (Clinical Pharmacist)     Office phone number: [phone]    If you need to get in right away due to illness, please be advised we have \"Same Day\" appointments available Monday-Friday. Please call us at [phone] option #3 to schedule your \"Same Day\" appointment.

## 2024-05-16 ENCOUNTER — CARE COORDINATION (OUTPATIENT)
Dept: CARE COORDINATION | Age: 64
End: 2024-05-16

## 2024-05-16 NOTE — CARE COORDINATION
Ambulatory Care Coordination Note  2024    Patient Current Location:  Home: [address]  [place] [address]     ACM contacted the patient by telephone. Verified name and  with patient as identifiers. Provided introduction to self, and explanation of the ACM role.     Challenges to be reviewed by the provider   Additional needs identified to be addressed with provider: No  none               Method of communication with provider: staff message.    ACM: Caitlyn Beasley RN    CC Plan:       Cervical spine x-ray IMPRESSION:    1. No acute osseous abnormality.  2. Moderate degenerative disc disease at C5-6.  3. Mild multilevel facet osteoarthritis.  4. Minimal grade 1 retrolisthesis of C5 on C6.     2.  Review medications with Patient.     3.  Review upcoming appointments with Patient.    Future Appointments   Date Time Provider Department Center   2024  1:15 PM Ame Moise MD [facility]   2024  1:15 PM Ame Moise MD [facility]      4.  Patient kept appointment with Dr. Moise on 2024.    Offered patient enrollment in the Remote Patient Monitoring (RPM) program for in-home monitoring: Patient declined.    Writer phoned Patient for ACM update and to discuss cc plan of care.  Patient states Writer woke him up from a nap.  He request return call next week.  He denies needs/concerns.  He states he has all of his medications.   Lab Results       None            Care Coordination Interventions    Referral from Primary Care Provider: No  Suggested Interventions and Community Resources          Goals Addressed    None         Future Appointments   Date Time Provider Department Center   2024  1:15 PM Ame Moise MD [facility]   2024  1:15 PM Ame Moise MD [facility]

## 2024-05-20 ENCOUNTER — OFFICE VISIT (OUTPATIENT)
Dept: FAMILY MEDICINE CLINIC | Age: 64
End: 2024-05-20
Payer: MEDICARE

## 2024-05-20 VITALS
HEART RATE: 61 BPM | DIASTOLIC BLOOD PRESSURE: 76 MMHG | SYSTOLIC BLOOD PRESSURE: 131 MMHG | BODY MASS INDEX: 28.49 KG/M2 | WEIGHT: 193 LBS

## 2024-05-20 DIAGNOSIS — R25.2 FOOT CRAMPS: Primary | ICD-10-CM

## 2024-05-20 PROCEDURE — 99213 OFFICE O/P EST LOW 20 MIN: CPT

## 2024-05-20 PROCEDURE — G8419 CALC BMI OUT NRM PARAM NOF/U: HCPCS

## 2024-05-20 PROCEDURE — G8427 DOCREV CUR MEDS BY ELIG CLIN: HCPCS

## 2024-05-20 PROCEDURE — 3017F COLORECTAL CA SCREEN DOC REV: CPT

## 2024-05-20 PROCEDURE — 1036F TOBACCO NON-USER: CPT

## 2024-05-20 PROCEDURE — 3078F DIAST BP <80 MM HG: CPT

## 2024-05-20 PROCEDURE — 3075F SYST BP GE 130 - 139MM HG: CPT

## 2024-05-20 ASSESSMENT — ENCOUNTER SYMPTOMS
ABDOMINAL PAIN: 0
CONSTIPATION: 0
DIARRHEA: 0
WHEEZING: 0
NAUSEA: 0
COUGH: 0
SHORTNESS OF BREATH: 0
VOMITING: 0
SORE THROAT: 0
EYE PAIN: 0

## 2024-05-20 NOTE — PROGRESS NOTES
Marcelo Littlejohn (:  1960) is a 63 y.o. male,Established patient, here for evaluation of the following chief complaint(s):  Foot Pain (Left foot pain, cramping very bad X couple of days - BS have been running high)    Assessment & Plan     1. Foot cramps    Monitor for cramps for now.  Discussed with patient that ideologies most of the time unknown.  Patient refusing blood work at this time.    Patient has a follow-up with his PCP in 10 days.  Diabetes will be addressed at that time.    Return in about 10 days (around 2024) for DM Type II.       Subjective     HPI    Mr. Marcelo Albarran, 63-year-old male, presents to the [facility] medicine office today as a same-day visit.    Primary concern with cramps.  Started about a week ago and have only occurred 2-3 times.  Come on at rest.  Cramp is not associated with walking or exercise.  No history of trauma.  Recent blood work has been largely unremarkable except for slightly elevated creatinine.  Patient is refusing blood work at today's visit.    Patient did have bilateral ABIs completed recently and showed calcific arteries.    Patient does have a history of diabetes mellitus.  Patient does have a follow-up with his PCP on 2024.    Patient does not have any other acute concerns at this time    Review of Systems   Constitutional:  Negative for activity change, appetite change and fever.   HENT:  Negative for congestion and sore throat.    Eyes:  Negative for pain and visual disturbance.   Respiratory:  Negative for cough, shortness of breath and wheezing.    Cardiovascular:  Negative for chest pain and leg swelling.   Gastrointestinal:  Negative for abdominal pain, constipation, diarrhea, nausea and vomiting.   Genitourinary:  Negative for difficulty urinating and dysuria.   Musculoskeletal:         Left foot cramps   Neurological:  Negative for syncope, weakness and headaches.        Objective   Physical Exam  Vitals and nursing note

## 2024-05-20 NOTE — PROGRESS NOTES
Attending Physician Statement  I have discussed the care of RubshannaRodriguezincluding pertinent history and exam findings,  with the resident. I have reviewed the key elements of all parts of the encounter with the resident.  I agree with the assessment, plan and orders as documented by the resident.  (GE Modifier)    Foot cramps- Monitor for now. FU with PCp.

## 2024-05-24 ENCOUNTER — TELEPHONE (OUTPATIENT)
Dept: FAMILY MEDICINE CLINIC | Age: 64
End: 2024-05-24

## 2024-05-24 NOTE — TELEPHONE ENCOUNTER
Medication Management Service (MMS)  [facility]  [phone]     CLINICAL PHARMACY NOTE:  Telephone    Pharmacy team received call that patient could not get his Freestyle Radha 2 sensors from his pharmacy due to insurance being cancelled. Upon chart review, appears patient may have [facility] Medicare. Patient was receiving sensors from [facility] when Medicare would require him to go through DME. Attempt to call patient to clarify if insurance lapsed or if supplies need to be sent to a DME company. If patient needs to be changed to a DME company, opportunity to change to a Freestyle 3 system. Left voicemail requesting a return call.    Lenka Rodriguez, Pharm.D., PGY2 Ambulatory Care Resident  05/24/24  =======================================================  For Pharmacy Admin Tracking Only    Program: Medical Group  Time Spent (min): 20

## 2024-05-29 DIAGNOSIS — R39.16 BENIGN PROSTATIC HYPERPLASIA (BPH) WITH STRAINING ON URINATION: ICD-10-CM

## 2024-05-29 DIAGNOSIS — N40.1 BENIGN PROSTATIC HYPERPLASIA (BPH) WITH STRAINING ON URINATION: ICD-10-CM

## 2024-05-29 NOTE — TELEPHONE ENCOUNTER
Last visit:   Last Med refill: 4-1-24  Does patient have enough medication for 72 hours: No:     Next Visit Date:  Future Appointments   Date Time Provider Department Center   5/30/2024  1:15 PM Ame Moise MD [facility]   6/13/2024  1:15 PM Ame Moise MD [facility]       Health Maintenance   Topic Date Due    Annual Wellness Visit (Medicare)  Never done    Diabetic foot exam  05/18/2024    Diabetic retinal exam  05/05/2025 (Originally 10/6/1978)    GFR test (Diabetes, CKD 3-4, OR last GFR 15-59)  08/06/2024    A1C test (Diabetic or Prediabetic)  02/20/2025    Diabetic Alb to Cr ratio (uACR) test  04/23/2025    Lipids  04/23/2025    Depression Screen  04/25/2025    DTaP/Tdap/Td vaccine (3 - Td or Tdap) 04/05/2033    Colorectal Cancer Screen  03/07/2034    Flu vaccine  Completed    Shingles vaccine  Completed    Pneumococcal 0-64 years Vaccine  Completed    COVID-19 Vaccine  Completed    Respiratory Syncytial Virus (RSV) Pregnant or age 60 yrs+  Completed    Hepatitis C screen  Completed    HIV screen  Completed    Hepatitis A vaccine  Aged Out    Hepatitis B vaccine  Aged Out    Hib vaccine  Aged Out    Polio vaccine  Aged Out    Meningococcal (ACWY) vaccine  Aged Out       Hemoglobin A1C (%)   Date Value   02/20/2024 6.7   11/16/2023 5.9   08/10/2023 7.8             ( goal A1C is < 7)   No components found for: \"LABMICR\"  No components found for: \"LDLCHOLESTEROL\", \"LDLCALC\"    (goal LDL is <100)   AST (U/L)   Date Value   08/06/2023 14     ALT (U/L)   Date Value   08/06/2023 8     BUN (mg/dL)   Date Value   08/06/2023 36 (H)     BP Readings from Last 3 Encounters:   05/20/24 131/76   05/09/24 (!) 153/79   04/22/24 122/77          (goal 120/80)    All Future Testing planned in [facility]  Lab Frequency Next Occurrence   Microalbumin, Ur Once 09/25/2023   Microalbumin, Ur Once 10/05/2023   Lipid Panel Once 09/05/2023   Microalbumin, Ur Once 03/02/2024   Lipid Panel Once 02/02/2024   XR CHEST 
Statement Selected

## 2024-05-30 ENCOUNTER — OFFICE VISIT (OUTPATIENT)
Dept: FAMILY MEDICINE CLINIC | Age: 64
End: 2024-05-30

## 2024-05-30 VITALS — DIASTOLIC BLOOD PRESSURE: 84 MMHG | SYSTOLIC BLOOD PRESSURE: 134 MMHG | HEART RATE: 91 BPM

## 2024-05-30 DIAGNOSIS — R14.3 FLATUS: Primary | ICD-10-CM

## 2024-05-30 DIAGNOSIS — Z79.4 CONTROLLED TYPE 2 DIABETES MELLITUS WITH DIABETIC POLYNEUROPATHY, WITH LONG-TERM CURRENT USE OF INSULIN (HCC): ICD-10-CM

## 2024-05-30 DIAGNOSIS — E11.42 CONTROLLED TYPE 2 DIABETES MELLITUS WITH DIABETIC POLYNEUROPATHY, WITH LONG-TERM CURRENT USE OF INSULIN (HCC): ICD-10-CM

## 2024-05-30 RX ORDER — DOXAZOSIN MESYLATE 4 MG/1
4 TABLET ORAL DAILY
Qty: 30 TABLET | Refills: 2 | Status: SHIPPED | OUTPATIENT
Start: 2024-05-30

## 2024-05-30 RX ORDER — SIMETHICONE 125 MG
125 TABLET,CHEWABLE ORAL EVERY 6 HOURS PRN
Qty: 60 TABLET | Refills: 3 | Status: SHIPPED | OUTPATIENT
Start: 2024-05-30 | End: 2024-07-29

## 2024-05-30 RX ORDER — INSULIN LISPRO 100 [IU]/ML
13 INJECTION, SOLUTION INTRAVENOUS; SUBCUTANEOUS
Qty: 3 ML | Refills: 3 | Status: SHIPPED | OUTPATIENT
Start: 2024-05-30

## 2024-05-30 ASSESSMENT — ENCOUNTER SYMPTOMS
DIARRHEA: 0
ABDOMINAL PAIN: 0
NAUSEA: 0
COUGH: 0
VOMITING: 0

## 2024-05-30 NOTE — PROGRESS NOTES
Attending Physician Statement  I  have discussed the care of Marcelo Littlejohn including pertinent history and exam findings with the resident. I agree with the assessment, plan and orders as documented by the resident.      /84   Pulse 91    BP Readings from Last 3 Encounters:   05/30/24 134/84   05/20/24 131/76   05/09/24 (!) 153/79     Wt Readings from Last 3 Encounters:   05/20/24 87.5 kg (193 lb)   05/09/24 88.2 kg (194 lb 6.4 oz)   04/22/24 87.5 kg (192 lb 12.8 oz)          Diagnosis Orders   1. Flatus  simethicone (GAS-X EXTRA STRENGTH) 125 MG chewable tablet      2. Controlled type 2 diabetes mellitus with diabetic polyneuropathy, with long-term current use of insulin (HCC)  insulin lispro, 1 Unit Dial, (HUMALOG KWIKPEN) 100 UNIT/ML SOPN     DIABETES FOOT EXAM              Fede Solis MD 5/31/2024 11:16 AM

## 2024-05-30 NOTE — PROGRESS NOTES
[facility] Residency Program - Outpatient Note      Subjective:    Marcelo Littlejohn is a 63 y.o. male with  has a past medical history of Bowel obstruction (HCC), Cocaine abuse (HCC), Diabetes mellitus (HCC), Diabetic polyneuropathy associated with type 2 diabetes mellitus (HCC), Diverticulitis, Diverticulosis, GERD (gastroesophageal reflux disease), History of blood transfusion, Hx of blood clots, Hyperlipidemia, Hypertension, MIGUEL (obstructive sleep apnea), Osteoarthritis, Renal lithiasis, Rheumatoid arteritis (HCC), and Type II or unspecified type diabetes mellitus without mention of complication, not stated as uncontrolled.    Presented to the office today for:  Chief Complaint   Patient presents with    Nail Problem     Was turning purple, now normal       Leg Problem     RT leg had a red rash that randomly showed and went away       HPI    Patient is a 63-year-old male presenting to clinic for diabetes follow-up and foot exam.  Patient denies any complaints today overall doing well.  He is requesting medication refill of his Lantus.  Patient also mentions that he deals with a lot of flatus for the past several years and would like medication to help with this, does not attribute it to one specific food.      Review of Systems   Constitutional:  Negative for chills, fatigue and fever.   Respiratory:  Negative for cough.    Cardiovascular:  Negative for chest pain, palpitations and leg swelling.   Gastrointestinal:  Negative for abdominal pain, diarrhea, nausea and vomiting.   Genitourinary:  Negative for difficulty urinating.   Neurological:  Negative for dizziness and headaches.       The patient has a   Family History   Problem Relation Age of Onset    Diabetes Father     Diabetes Other     Heart Attack Other     Hypertension Other     Diabetes Brother        Objective:    /84   Pulse 91    BP Readings from Last 3 Encounters:   05/30/24 134/84   05/20/24 131/76

## 2024-06-05 DIAGNOSIS — E11.42 CONTROLLED TYPE 2 DIABETES MELLITUS WITH DIABETIC POLYNEUROPATHY, WITH LONG-TERM CURRENT USE OF INSULIN (HCC): ICD-10-CM

## 2024-06-05 DIAGNOSIS — Z79.4 CONTROLLED TYPE 2 DIABETES MELLITUS WITH DIABETIC POLYNEUROPATHY, WITH LONG-TERM CURRENT USE OF INSULIN (HCC): ICD-10-CM

## 2024-06-05 NOTE — TELEPHONE ENCOUNTER
Last visit: 5-30-24  Last Med refill: 5-9-24  Does patient have enough medication for 72 hours: No:     Next Visit Date:  Future Appointments   Date Time Provider Department Center   6/13/2024  1:15 PM A[facility] Moise MD [facility]       Health Maintenance   Topic Date Due    Annual Wellness Visit (Medicare Advantage)  Never done    Diabetic retinal exam  05/05/2025 (Originally 10/6/1978)    GFR test (Diabetes, CKD 3-4, OR last GFR 15-59)  08/06/2024    A1C test (Diabetic or Prediabetic)  02/20/2025    Diabetic Alb to Cr ratio (uACR) test  04/23/2025    Lipids  04/23/2025    Depression Screen  04/25/2025    Diabetic foot exam  05/30/2025    DTaP/Tdap/Td vaccine (3 - Td or Tdap) 04/05/2033    Colorectal Cancer Screen  03/07/2034    Flu vaccine  Completed    Shingles vaccine  Completed    Pneumococcal 0-64 years Vaccine  Completed    COVID-19 Vaccine  Completed    Respiratory Syncytial Virus (RSV) Pregnant or age 60 yrs+  Completed    Hepatitis C screen  Completed    HIV screen  Completed    Hepatitis A vaccine  Aged Out    Hepatitis B vaccine  Aged Out    Hib vaccine  Aged Out    Polio vaccine  Aged Out    Meningococcal (ACWY) vaccine  Aged Out       Hemoglobin A1C (%)   Date Value   02/20/2024 6.7   11/16/2023 5.9   08/10/2023 7.8             ( goal A1C is < 7)   No components found for: \"LABMICR\"  No components found for: \"LDLCHOLESTEROL\", \"LDLCALC\"    (goal LDL is <100)   AST (U/L)   Date Value   08/06/2023 14     ALT (U/L)   Date Value   08/06/2023 8     BUN (mg/dL)   Date Value   08/06/2023 36 (H)     BP Readings from Last 3 Encounters:   05/30/24 134/84   05/20/24 131/76   05/09/24 (!) 153/79          (goal 120/80)    All Future Testing planned in [facility]  Lab Frequency Next Occurrence   Microalbumin, Ur Once 09/25/2023   Microalbumin, Ur Once 10/05/2023   Lipid Panel Once 09/05/2023   Microalbumin, Ur Once 03/02/2024   Lipid Panel Once 02/02/2024   XR CHEST STANDARD (2 VW) Once 03/25/2024

## 2024-06-06 ENCOUNTER — CARE COORDINATION (OUTPATIENT)
Dept: CARE COORDINATION | Age: 64
End: 2024-06-06

## 2024-06-06 RX ORDER — GABAPENTIN 800 MG/1
800 TABLET ORAL 3 TIMES DAILY
Qty: 90 TABLET | Refills: 0 | Status: SHIPPED | OUTPATIENT
Start: 2024-06-06 | End: 2024-07-06

## 2024-06-06 NOTE — CARE COORDINATION
Ambulatory Care Coordination Note  2024    Patient Current Location:  Home: [address]  [facility] [address]     ACM contacted the patient by telephone. Verified name and  with patient as identifiers. Provided introduction to self, and explanation of the ACM role.     Challenges to be reviewed by the provider   Additional needs identified to be addressed with provider: No  none               Method of communication with provider: staff message.    ACM: Caitlyn Beasley RN    CC Plan:       1.  Review medications with Patient.     2.  Review upcoming appointments with Patient.    Future Appointments   Date Time Provider Department [place]   2024  1:15 PM Ame Moise MD [facility]      3.  Patient kept appointment with Dr. Mandel on 2024 for evaluation of left foot pain and hyperglycemia.    4.  Patient kept appointment with Dr. Moise on 24.  Her plan and assessment were reviewed.    Offered patient enrollment in the Remote Patient Monitoring (RPM) program for in-home monitoring:  Patient declined.    Writer phoned Patient for ACM update and to discuss cc plan of care.  He states he's sleeping.  Patient was informed Writer plans to graduate him from ACM if he doesn't need anything today.  Patient states he needs help finding a place to live.  He states he has been living in a hotel for 2 years.  Writer will route message to GENESIS Nguyễn.  She followed Patient in the past.    Writer request return call from Patient when he awakens.      Lab Results       None            Care Coordination Interventions    Referral from Primary Care Provider: No  Suggested Interventions and Community Resources  Diabetes Education: In Process  Zone Management Tools: In Process          Goals Addressed    None         Future Appointments   Date Time Provider Department [place]   2024  1:15 PM Ame Miose MD [facility]

## 2024-06-06 NOTE — CARE COORDINATION
HC received a message from Caitlyn Beasley/PRICILLA, who stated that the patient is in need of housing and has been staying in a hotel room for two years.  Caitlyn Beasley/PRICILLA, asked that the HC contact the patient.  HC phoned the patient and engaged in conversation with him.  Patient mentioned that his landlord asked  he and his family to move stating that she wanted to remodel the residence that he had lived in for eleven years.   Patient states that he later found out that he has an eviction on his credit report and has bad credit.  HC informed the patient that they don't assist with finding properties, but she could offer patient a list of properties that she just received, and he can make calls.  Patient stated \"nevermind\" and thanked the HC and got off the phone.    Plan of Care  HC will sign off of patient at this time.

## 2024-06-16 ENCOUNTER — HOSPITAL ENCOUNTER (EMERGENCY)
Age: 64
Discharge: HOME | End: 2024-06-16
Attending: EMERGENCY MEDICINE
Payer: MEDICARE

## 2024-06-16 ENCOUNTER — APPOINTMENT (OUTPATIENT)
Dept: GENERAL RADIOLOGY | Age: 64
End: 2024-06-16
Payer: MEDICARE

## 2024-06-16 VITALS
DIASTOLIC BLOOD PRESSURE: 64 MMHG | OXYGEN SATURATION: 98 % | RESPIRATION RATE: 16 BRPM | SYSTOLIC BLOOD PRESSURE: 98 MMHG | WEIGHT: 193.12 LBS | TEMPERATURE: 97.2 F | BODY MASS INDEX: 28.6 KG/M2 | HEART RATE: 84 BPM | HEIGHT: 69 IN

## 2024-06-16 DIAGNOSIS — E86.0 DEHYDRATION: Primary | ICD-10-CM

## 2024-06-16 LAB
ALBUMIN SERPL-MCNC: 4.1 G/DL (ref 3.5–5.2)
ALBUMIN/GLOB SERPL: 2 {RATIO} (ref 1–2.5)
ALP SERPL-CCNC: 86 U/L (ref 40–129)
ALT SERPL-CCNC: 8 U/L (ref 10–50)
ANION GAP SERPL CALCULATED.3IONS-SCNC: 12 MMOL/L (ref 9–16)
AST SERPL-CCNC: 20 U/L (ref 10–50)
BASOPHILS # BLD: <0.03 K/UL (ref 0–0.2)
BASOPHILS NFR BLD: 0 % (ref 0–2)
BILIRUB SERPL-MCNC: 0.9 MG/DL (ref 0–1.2)
BUN SERPL-MCNC: 35 MG/DL (ref 8–23)
CALCIUM SERPL-MCNC: 8.5 MG/DL (ref 8.6–10.4)
CHLORIDE SERPL-SCNC: 100 MMOL/L (ref 98–107)
CO2 SERPL-SCNC: 22 MMOL/L (ref 20–31)
CREAT SERPL-MCNC: 1.5 MG/DL (ref 0.7–1.2)
D DIMER PPP FEU-MCNC: <0.27 UG/ML FEU (ref 0–0.57)
EOSINOPHIL # BLD: 0.15 K/UL (ref 0–0.44)
EOSINOPHILS RELATIVE PERCENT: 2 % (ref 1–4)
ERYTHROCYTE [DISTWIDTH] IN BLOOD BY AUTOMATED COUNT: 14.5 % (ref 11.8–14.4)
GFR, ESTIMATED: 54 ML/MIN/1.73M2
GLUCOSE BLD-MCNC: 281 MG/DL (ref 75–110)
GLUCOSE SERPL-MCNC: 294 MG/DL (ref 74–99)
HCT VFR BLD AUTO: 26.9 % (ref 40.7–50.3)
HGB BLD-MCNC: 9.7 G/DL (ref 13–17)
IMM GRANULOCYTES # BLD AUTO: 0.04 K/UL (ref 0–0.3)
IMM GRANULOCYTES NFR BLD: 1 %
LIPASE SERPL-CCNC: 99 U/L (ref 13–60)
LYMPHOCYTES NFR BLD: 1.5 K/UL (ref 1.1–3.7)
LYMPHOCYTES RELATIVE PERCENT: 18 % (ref 24–43)
MCH RBC QN AUTO: 33 PG (ref 25.2–33.5)
MCHC RBC AUTO-ENTMCNC: 36.1 G/DL (ref 28.4–34.8)
MCV RBC AUTO: 91.5 FL (ref 82.6–102.9)
MONOCYTES NFR BLD: 0.66 K/UL (ref 0.1–1.2)
MONOCYTES NFR BLD: 8 % (ref 3–12)
NEUTROPHILS NFR BLD: 71 % (ref 36–65)
NEUTS SEG NFR BLD: 6.15 K/UL (ref 1.5–8.1)
NRBC BLD-RTO: 0 PER 100 WBC
PLATELET # BLD AUTO: 277 K/UL (ref 138–453)
PMV BLD AUTO: 10 FL (ref 8.1–13.5)
POTASSIUM SERPL-SCNC: 4.5 MMOL/L (ref 3.7–5.3)
PROT SERPL-MCNC: 6.1 G/DL (ref 6.6–8.7)
RBC # BLD AUTO: 2.94 M/UL (ref 4.21–5.77)
RBC # BLD: ABNORMAL 10*6/UL
SARS-COV-2 RDRP RESP QL NAA+PROBE: NOT DETECTED
SODIUM SERPL-SCNC: 134 MMOL/L (ref 136–145)
SPECIMEN DESCRIPTION: NORMAL
WBC OTHER # BLD: 8.5 K/UL (ref 3.5–11.3)

## 2024-06-16 PROCEDURE — 2580000003 HC RX 258

## 2024-06-16 PROCEDURE — 80053 COMPREHEN METABOLIC PANEL: CPT

## 2024-06-16 PROCEDURE — 87635 SARS-COV-2 COVID-19 AMP PRB: CPT

## 2024-06-16 PROCEDURE — 93005 ELECTROCARDIOGRAM TRACING: CPT

## 2024-06-16 PROCEDURE — 82947 ASSAY GLUCOSE BLOOD QUANT: CPT

## 2024-06-16 PROCEDURE — 71045 X-RAY EXAM CHEST 1 VIEW: CPT

## 2024-06-16 PROCEDURE — 85025 COMPLETE CBC W/AUTO DIFF WBC: CPT

## 2024-06-16 PROCEDURE — 83690 ASSAY OF LIPASE: CPT

## 2024-06-16 PROCEDURE — 85379 FIBRIN DEGRADATION QUANT: CPT

## 2024-06-16 PROCEDURE — 99285 EMERGENCY DEPT VISIT HI MDM: CPT

## 2024-06-16 PROCEDURE — 96360 HYDRATION IV INFUSION INIT: CPT

## 2024-06-16 RX ORDER — 0.9 % SODIUM CHLORIDE 0.9 %
1000 INTRAVENOUS SOLUTION INTRAVENOUS ONCE
Status: DISCONTINUED | OUTPATIENT
Start: 2024-06-16 | End: 2024-06-16 | Stop reason: HOSPADM

## 2024-06-16 RX ORDER — 0.9 % SODIUM CHLORIDE 0.9 %
1000 INTRAVENOUS SOLUTION INTRAVENOUS ONCE
Status: COMPLETED | OUTPATIENT
Start: 2024-06-16 | End: 2024-06-16

## 2024-06-16 RX ADMIN — SODIUM CHLORIDE 1000 ML: 9 INJECTION, SOLUTION INTRAVENOUS at 13:14

## 2024-06-16 ASSESSMENT — PAIN - FUNCTIONAL ASSESSMENT: PAIN_FUNCTIONAL_ASSESSMENT: 0-10

## 2024-06-16 ASSESSMENT — PAIN SCALES - GENERAL: PAINLEVEL_OUTOF10: 8

## 2024-06-16 ASSESSMENT — PAIN DESCRIPTION - ORIENTATION: ORIENTATION: LEFT;RIGHT

## 2024-06-16 ASSESSMENT — PAIN DESCRIPTION - DESCRIPTORS: DESCRIPTORS: CRAMPING;TINGLING

## 2024-06-16 ASSESSMENT — PAIN DESCRIPTION - LOCATION: LOCATION: FINGER (COMMENT WHICH ONE);LEG

## 2024-06-16 NOTE — ED NOTES
Pt presents to ED with c/o dizziness, tingling fingers and diarrhea.  Pt states he's diabetic and his sugar was low on Friday. Upon arrival pt's sugar 281.  Pt states he's been having tingling fingers since yesterday and has had 4 episodes of diarrhea today.  Pt also c/o shortness of breath. Upon arrival pt's oxygen saturation 98% on room air.  Patient alert and oriented x4, talking in complete sentences. Respirations even and unlabored, . Call light in reach, all needs met at this time.

## 2024-06-16 NOTE — ED PROVIDER NOTES
[facility]   Emergency Department  Faculty Attestation       I performed a history and physical examination of the patient and discussed management with the resident. I reviewed the resident’s note and agree with the documented findings including all diagnostic interpretations and plan of care. Any areas of disagreement are noted on the chart. I was personally present for the key portions of any procedures. I have documented in the chart those procedures where I was not present during the key portions. I have reviewed the emergency nurses triage note. I agree with the chief complaint, past medical history, past surgical history, allergies, medications, social and family history as documented unless otherwise noted below.  For Physician Assistant/ Nurse Practitioner cases/documentation I have personally evaluated this patient and have completed at least one if not all key elements of the E/M (history, physical exam, and MDM). Additional findings are as noted.    Patient Name: Marcelo Littlejohn  MRN: [id number]  : 1960  Primary Care Physician: Ame Moise MD    Date of evaluationa: 2024   Note Started: 1:05 PM EDT    Pertinent Comments     Chief Complaint:   Chief Complaint   Patient presents with    Diarrhea    Dizziness    Tingling        Initial vitals: (If not listed, please see nursing documentation)  ED Triage Vitals   BP Temp Temp Source Pulse Respirations SpO2 Height Weight - Scale   24 1245 24 1245 24 1245 24 1245 24 1245 24 1245 24 1301 24 1245   98/64 97.2 °F (36.2 °C) Oral 84 16 98 % 1.753 m (5' 9\") 87.6 kg (193 lb 2 oz)        HPI/PE/Impression:  This is a 63 y.o. male who presents to the Emergency Department w/ history of Type 2 diabetes and extensive abdominal surgeries in the past who presents with diarrhea and light headedness.  4 episodes today - no blood. Pre-syncopal.  Sugars were low on Friday and now reading 
Other     Diabetes Brother        Allergies:  Patient has no known allergies.    Home Medications:  Prior to Admission medications    Medication Sig Start Date End Date Taking? Authorizing Provider   gabapentin (NEURONTIN) 800 MG tablet Take 1 tablet by mouth 3 times daily for 30 days. 6/6/24 7/6/24  Ame Moise MD   doxazosin (CARDURA) 4 MG tablet Take 1 tablet by mouth daily 5/30/24   Ame Moise MD   insulin lispro, 1 Unit Dial, (HUMALOG KWIKPEN) 100 UNIT/ML SOPN Inject 13 Units into the skin 3 times daily (before meals) 5/30/24   Aem Moise MD   simethicone (GAS-X EXTRA STRENGTH) 125 MG chewable tablet Take 1 tablet by mouth every 6 hours as needed for Flatulence 5/30/24 7/29/24  Ame Moise MD   insulin glargine (LANTUS SOLOSTAR) 100 UNIT/ML injection pen Inject 15 Units into the skin 2 times daily  Patient taking differently: Inject 20 Units into the skin 2 times daily 3/25/24   Pb Méndez MD   naproxen (NAPROSYN) 500 MG tablet Take 1 tablet by mouth 2 times daily as needed for Pain 3/25/24 3/30/24  Pb Méndez MD   Continuous Blood Gluc Sensor (FREESTYLE LIBAN 2 SENSOR) [facility] USE TO TEST BLOOD GLUCOSE THREE TIMES A DAY. CHANGE EVERY 14 DAYS 2/8/24   Ame Moise MD   Alcohol Swabs PADS Use as needed while checking blood sugar 2/2/24   Ame Moise MD   Lancets MISC Check blood sugar 4 times daily (AC HS) 2/2/24   Ame Moise MD   metFORMIN (GLUCOPHAGE) 1000 MG tablet TAKE ONE TABLET BY MOUTH TWICE A DAY WITH A MEAL 2/2/24   Ame Moise MD   omeprazole (PRILOSEC) 20 MG delayed release capsule TAKE ONE CAPSULE BY MOUTH DAILY 2/2/24   Ame Moise MD   TRUE METRIX BLOOD GLUCOSE TEST strip Check blood sugar 3 times a day and if symptoms of hypoglycemia 2/2/24   Ame Moise MD   blood glucose test strips (ASCENSIA AUTODISC VI;ONE TOUCH ULTRA TEST VI) strip 1 each by In Vitro route daily As needed. 2/2/24   Ame Moise MD   atorvastatin

## 2024-06-16 NOTE — DISCHARGE INSTRUCTIONS
You were seen in the ER today for tingling in your feet, shortness of breath, diarrhea. This is likely due to a viral infection, be sure to stay hydrated. Your covid was negative.   Follow up with your primary care doctor within the next few days for reevaluation.   Monitor your sugars closely.   Return to the ER if new or worsening symptoms or any other concerns.

## 2024-06-17 ENCOUNTER — CARE COORDINATION (OUTPATIENT)
Dept: CARE COORDINATION | Age: 64
End: 2024-06-17

## 2024-06-17 LAB
EKG ATRIAL RATE: 81 BPM
EKG P AXIS: 13 DEGREES
EKG P-R INTERVAL: 148 MS
EKG Q-T INTERVAL: 386 MS
EKG QRS DURATION: 80 MS
EKG QTC CALCULATION (BAZETT): 448 MS
EKG T AXIS: 56 DEGREES
EKG VENTRICULAR RATE: 81 BPM

## 2024-06-17 NOTE — CARE COORDINATION
Ambulatory Care Coordination  ED Follow up Call    Reason for ED visit:  Diarrhea, Dizziness, Tingling     Status:     improved    FINAL IMPRESSION       1. Dehydration        Did you call your PCP prior to going to the ED?  No      Did you receive a discharge instructions from the Emergency Room? Yes  Review of Instructions:     Understands what to report/when to return?:  Yes   Understands discharge instructions?:  Yes   Following discharge instructions?:  Yes   If not why?     Are there any new complaints of pain? No  New Pain Meds? No    Constipation prophylaxis needed?  N/A    If you have a wound is the dressing clean, dry, and intact? N/A  Understands wound care regimen? N/A    Are there any other complaints/concerns that you wish to tell your provider?   None today, Patient was encouraged to call the office to schedule an ED follow up appointment.  Patient cancelled his appointment with Dr. Moise on 6/13/2024.      FU appts/Provider:    No future appointments.    New Medications?:   No      Medication Reconciliation by phone - Yes  Understands Medications?  Yes  Taking Medications? Yes  Can you swallow your pills?  Yes    Any further needs in the home i.e. Equipment?  No    Link to services in community?:  N/A   Which services:

## 2024-06-28 ENCOUNTER — CARE COORDINATION (OUTPATIENT)
Dept: CARE COORDINATION | Age: 64
End: 2024-06-28

## 2024-06-28 NOTE — CARE COORDINATION
Ambulatory Care Coordination Note     6/28/2024 1:51 PM     Patient outreach attempt by this ACM today to perform care management follow up . ACM was unable to reach the patient by telephone today; left voice message requesting a return phone call to this ACM.     ACM: Caitlyn Beasley RN     Care Summary Note:     CC Plan:       1.  Review medications with Patient.     2.  Patient was seen in ED on 6/16/24.  He did not have ED follow up appointment at PCP office.            PCP/Specialist follow up:       Follow Up:   Plan for next ACM outreach in approximately 2 weeks to complete:  - disease specific assessments  - SDOH assessments  - medication review  - advance care planning  - goal progression  - education   - outreach attempt to offer care management services.

## 2024-07-01 DIAGNOSIS — Z79.4 CONTROLLED TYPE 2 DIABETES MELLITUS WITH DIABETIC POLYNEUROPATHY, WITH LONG-TERM CURRENT USE OF INSULIN (HCC): ICD-10-CM

## 2024-07-01 DIAGNOSIS — E11.42 CONTROLLED TYPE 2 DIABETES MELLITUS WITH DIABETIC POLYNEUROPATHY, WITH LONG-TERM CURRENT USE OF INSULIN (HCC): ICD-10-CM

## 2024-07-01 NOTE — TELEPHONE ENCOUNTER
polyneuropathy, with long-term current use of insulin (HCC)     Diverticulosis     DM (diabetes mellitus) (HCC)     Essential hypertension     Elevated liver enzymes     Scrotal abscess     Abdominal abscess     Hyperplastic colon polyp     Lower abdominal pain     Diabetic foot (HCC)     Infected hernioplasty mesh (HCC)     Cocaine abuse (HCC)     Chest pain     BPH with obstruction/lower urinary tract symptoms     Chronic tension-type headache, intractable     Diabetic polyneuropathy associated with type 2 diabetes mellitus (HCC)     Gastroesophageal reflux disease     Acute otitis externa of left ear     Intermittent claudication (HCC)     Family history of malignant neoplasm of colon in relative diagnosed when older than 50 years of age     Benign prostatic hyperplasia (BPH) with straining on urination     Hyperglycemia due to diabetes mellitus (HCC)     Hyperosmolar coma due to type 2 diabetes mellitus (HCC)     Visit for wound check     Low blood pressure reading     Left leg pain     Right hip pain     Post-nasal drip     Xerosis of skin     Financial difficulties

## 2024-07-02 RX ORDER — GABAPENTIN 800 MG/1
800 TABLET ORAL 3 TIMES DAILY
Qty: 90 TABLET | Refills: 1 | Status: SHIPPED | OUTPATIENT
Start: 2024-07-02 | End: 2024-08-31

## 2024-07-15 ENCOUNTER — OFFICE VISIT (OUTPATIENT)
Dept: FAMILY MEDICINE CLINIC | Age: 64
End: 2024-07-15
Payer: MEDICARE

## 2024-07-15 VITALS
HEART RATE: 79 BPM | DIASTOLIC BLOOD PRESSURE: 87 MMHG | SYSTOLIC BLOOD PRESSURE: 139 MMHG | HEIGHT: 69 IN | WEIGHT: 211 LBS | BODY MASS INDEX: 31.25 KG/M2

## 2024-07-15 DIAGNOSIS — M79.89 LEG SWELLING: Primary | ICD-10-CM

## 2024-07-15 DIAGNOSIS — R06.02 SHORTNESS OF BREATH: ICD-10-CM

## 2024-07-15 PROCEDURE — 99213 OFFICE O/P EST LOW 20 MIN: CPT

## 2024-07-15 PROCEDURE — 3075F SYST BP GE 130 - 139MM HG: CPT

## 2024-07-15 PROCEDURE — 3079F DIAST BP 80-89 MM HG: CPT

## 2024-07-15 RX ORDER — BUMETANIDE 1 MG/1
1 TABLET ORAL DAILY
Qty: 14 TABLET | Refills: 0 | Status: SHIPPED | OUTPATIENT
Start: 2024-07-15 | End: 2024-07-29

## 2024-07-15 NOTE — PATIENT INSTRUCTIONS
Thank you for letting us take care of you today. We hope all your questions were addressed. If a question was overlooked or something else comes to mind after you return home, please contact a member of your Care Team listed below.      Your Care Team at [facility] is Team #1  Conchis Dunbar M.D. (Faculty)  Citlaly Asher M.D. (Resident)  Kirsten Taveras D.O. (Resident)  Vazquez Valerio M.D. (Resident)  Kena Kilgore M.D. (Resident)  Marlin Morel, [facility]  Christopher Eason, [facility]  Abril Barrera, [facility]  Debi Campuzano, [facility]  Rachelle Huertas, [facility]  Yamile Villasenor, [facility]  Patti Lopez, [facility]  Chela (LJ) Jayda,   Kaleigh Ryder [facility] (Clinical Pharmacist)     Office phone number: [phone]    If you need to get in right away due to illness, please be advised we have \"Same Day\" appointments available Monday-Friday. Please call us at [phone] option #3 to schedule your \"Same Day\" appointment.

## 2024-07-15 NOTE — PROGRESS NOTES
Visit Information    Have you changed or started any medications since your last visit including any over-the-counter medicines, vitamins, or herbal medicines? no   Have you stopped taking any of your medications? Is so, why? -  no  Are you having any side effects from any of your medications? - no    Have you seen any other physician or provider since your last visit?  no   Have you had any other diagnostic tests since your last visit?  yes - Labs, XR, EKG   Have you been seen in the emergency room and/or had an admission in a hospital since we last saw you?  yes - [facility]   Have you had your routine dental cleaning in the past 6 months?  no     Do you have an active MyChart account? If no, what is the barrier?  Yes    Patient Care Team:  Vazquez Valerio MD as PCP - General (Family Medicine)  Caitlyn Beasley RN as Ambulatory Care Manager    Medical History Review  Past Medical, Family, and Social History reviewed and does not contribute to the patient presenting condition    Health Maintenance   Topic Date Due    Annual Wellness Visit (Medicare Advantage)  Never done    Diabetic retinal exam  05/05/2025 (Originally 10/6/1978)    Flu vaccine (1) 08/01/2024    A1C test (Diabetic or Prediabetic)  02/20/2025    Diabetic Alb to Cr ratio (uACR) test  04/23/2025    Lipids  04/23/2025    Depression Screen  04/25/2025    Diabetic foot exam  05/30/2025    GFR test (Diabetes, CKD 3-4, OR last GFR 15-59)  06/16/2025    DTaP/Tdap/Td vaccine (3 - Td or Tdap) 04/05/2033    Colorectal Cancer Screen  03/07/2034    Shingles vaccine  Completed    Pneumococcal 0-64 years Vaccine  Completed    COVID-19 Vaccine  Completed    Respiratory Syncytial Virus (RSV) Pregnant or age 60 yrs+  Completed    Hepatitis C screen  Completed    HIV screen  Completed    Hepatitis A vaccine  Aged Out    Hepatitis B vaccine  Aged Out    Hib vaccine  Aged Out    Polio vaccine  Aged Out    Meningococcal (ACWY) vaccine  Aged Out

## 2024-07-15 NOTE — PROGRESS NOTES
Attending Physician Statement  I  have discussed the care of Marcelo Littlejohn including pertinent history and exam findings with the resident. I agree with the assessment, plan and orders as documented by the resident.      /87 (Site: Right Upper Arm, Position: Sitting, Cuff Size: Medium Adult)   Pulse 79   Ht 1.753 m (5' 9.02\")   Wt 95.7 kg (211 lb)   BMI 31.14 kg/m²    BP Readings from Last 3 Encounters:   07/15/24 139/87   06/16/24 98/64   05/30/24 134/84     Wt Readings from Last 3 Encounters:   07/15/24 95.7 kg (211 lb)   06/16/24 87.6 kg (193 lb 2 oz)   05/20/24 87.5 kg (193 lb)     Lower leg edema   Leg pain       Diagnosis Orders   1. Leg swelling  Echo (TTE) complete (PRN contrast/bubble/strain/3D)    Brain Natriuretic Peptide    D-Dimer, Quantitative    bumetanide (BUMEX) 1 MG tablet      2. Shortness of breath  Echo (TTE) complete (PRN contrast/bubble/strain/3D)    Basic Metabolic Panel    Brain Natriuretic Peptide              Fede Solis MD 7/16/2024 2:32 PM

## 2024-07-15 NOTE — PROGRESS NOTES
Subjective:    Marcelo Littlejohn is a 63 y.o. male with  has a past medical history of Bowel obstruction (HCC), Cocaine abuse (HCC), Diabetes mellitus (HCC), Diabetic polyneuropathy associated with type 2 diabetes mellitus (HCC), Diverticulitis, Diverticulosis, GERD (gastroesophageal reflux disease), History of blood transfusion, Hx of blood clots, Hyperlipidemia, Hypertension, MIGUEL (obstructive sleep apnea), Osteoarthritis, Renal lithiasis, Rheumatoid arteritis (HCC), and Type II or unspecified type diabetes mellitus without mention of complication, not stated as uncontrolled.    Presented to the office today for:  Chief Complaint   Patient presents with    Leg Swelling     Leg pain and swelling in both legs        HPI    63-year-old male with a past medical history of diabetes MIGUEL who presents for an acute care visit.  Patient states 10 days ago he was on a road trip where he drove 5 hours to [place] after which he started developing some leg pain and bilateral lower limb edema.  Of note, the patient has a history of DVT at the age of 12, which the patient describes at the time felt like cramping pain which is different to how his pain and swelling feel right now.  Since his return from [place] over the past week, his swelling has decreased by 50% spontaneously with no measures taken apart from raising his legs.  He also endorses some shortness of breath on exertion that been going on for last couple of weeks.  He denies any chest pain or palpitations.  He uses 2-3 pillows while sleeping and states that he sleeps flat and becomes short of breath.      Review of Systems    REVIEW OF SYSTEMS    Constitutional:  Denies fever, chills, or weakness   Eyes:  Denies vision changes  HENT:  Denies sore throat, neck pain, or headache   Respiratory:  PATEL   Cardiovascular:  Denies chest pain or palpitations  GI:  Denies abdominal pain, nausea, vomiting, or diarrhea   Musculoskeletal:  Denies back pain or joint

## 2024-07-16 ENCOUNTER — CARE COORDINATION (OUTPATIENT)
Dept: CARE COORDINATION | Age: 64
End: 2024-07-16

## 2024-07-16 NOTE — CARE COORDINATION
Ambulatory Care Coordination Note     2024 2:27 PM     Patient Current Location:  Home: [address]  [facility] [address]     ACM contacted the patient by telephone. Verified name and  with patient as identifiers.       ACM: Caitlyn Beasley RN     Challenges to be reviewed by the provider   Additional needs identified to be addressed with provider No  none               Method of communication with provider: staff message.    Care Summary Note:     CC Plan:       1.  Review medications with Patient.     2.  Patient was seen in ED on 24 for diarrhea, dizziness, and tingling.     FINAL IMPRESSION       1. Dehydration     3.  Patient kept ED follow up appointment with PCP on 7/15/2024.   He was noted to have swelling in both legs.  He complained of some shortness of breath on exertion for last couple of weeks.  Bumex medication was ordered due to increase in Creat level to 1.5.    bumetanide (BUMEX) 1 MG tablet; Take 1 tablet by mouth daily for 14 days Dispense: 14 tablet; Refill: 0     Patient is to get an ECHO (TTE) in the future.  Lab test ordered.  Patient to follow up with PCP in 2 weeks.    Future Appointments         Provider Specialty Dept Phone    2024 3:20 PM Vazquez Valerio MD Family Medicine [phone]          Offered patient enrollment in the Remote Patient Monitoring (RPM) program for in-home monitoring: Patient declined RPM.    Writer phoned Patient for ACM update and to discuss cc plan of care.  He immediately states:  \"I'm in the bathroom\".  Patient was informed Writer is calling for follow up.  He states he feels fine.  Writer question if Patient picked up and began taking Bumex medication.  He states it wasn't ready from the Pharmacy yet.  He plans to pick it up when it is ready.      Writer plans to follow up with Patient in 1 - 2 days.     Assessments Completed:   No changes since last call; assessment not done today.    Medications Reviewed:   Not completed during this

## 2024-07-25 ENCOUNTER — CARE COORDINATION (OUTPATIENT)
Dept: CARE COORDINATION | Age: 64
End: 2024-07-25

## 2024-07-25 NOTE — CARE COORDINATION
Ambulatory Care Coordination Note     2024 11:24 AM     Patient Current Location:  Home: [address]  [place] [id number]     ACM contacted the patient by telephone. Verified name and  with patient as identifiers.       ACM: Caitlyn Beasley RN     Challenges to be reviewed by the provider   Additional needs identified to be addressed with provider Yes  medications-Metformin causes diarrhea.  Patient stopped taking Metformin about 1 month ago               Method of communication with provider: staff message.    Care Summary Note:     CC Plan:       1.  Review medications with Patient.     2.  Patient was seen in ED on 24 for diarrhea, dizziness, and tingling.      FINAL IMPRESSION       1. Dehydration      3.  Patient kept ED follow up appointment with PCP on 7/15/2024.   He was noted to have swelling in both legs.  He complained of some shortness of breath on exertion for last couple of weeks.  Bumex medication was ordered due to increase in Creat level to 1.5.     bumetanide (BUMEX) 1 MG tablet; Take 1 tablet by mouth daily for 14 days Dispense: 14 tablet; Refill: 0      Patient is to get an ECHO (TTE) in the future.  Lab test ordered.  Patient to follow up with PCP in 2 weeks.     Future Appointments           Provider Specialty Dept Phone     2024 3:20 PM Vazquez Valerio MD Family Medicine [phone]            Offered patient enrollment in the Remote Patient Monitoring (RPM) program for in-home monitoring: Patient declined    Writer phoned Patient for ACM update and to discuss cc plan of care.  Patient states he has been taking Bumex medication as ordered.  He states his leg swelling has gone down.  He denies shortness of breath and chest discomfort.  He mentioned the increase in his weight from 175 lb to 211 lb.  He was informed the weight gain may have been due to fluid.  He was informed it would be good to get a repeat weight to see how much weight he has lost since he began Bumex.

## 2024-07-26 DIAGNOSIS — Z79.4 CONTROLLED TYPE 2 DIABETES MELLITUS WITH DIABETIC POLYNEUROPATHY, WITH LONG-TERM CURRENT USE OF INSULIN (HCC): ICD-10-CM

## 2024-07-26 DIAGNOSIS — E11.42 CONTROLLED TYPE 2 DIABETES MELLITUS WITH DIABETIC POLYNEUROPATHY, WITH LONG-TERM CURRENT USE OF INSULIN (HCC): ICD-10-CM

## 2024-07-26 RX ORDER — ATORVASTATIN CALCIUM 40 MG/1
40 TABLET, FILM COATED ORAL DAILY
Qty: 90 TABLET | Refills: 4 | Status: SHIPPED | OUTPATIENT
Start: 2024-07-26

## 2024-07-26 NOTE — TELEPHONE ENCOUNTER
Dr. Vazquez Valerio MD,     Patient overdue to fill atorvastatin and pharmacy needs refills. Order pended for your convenience.      Last visit: 07/15/2024, Next visit: 07/29/2024    See encounter note(s) below for complete details. Please let me know if you have any questions.      Thank you,  Ricky Byrnes PharmD, DEVORACP, BCGP  [facility] Pharmacist   [facility] Clinical Pharmacy  Department, toll free: [phone], option 1    =======================================================    [facility] CLINICAL PHARMACY REVIEW: ADHERENCE  Identified care gap per [employer]: fills at [facility]: Statin adherence    ASSESSMENT  STATIN ADHERENCE    Insurance Records claims through 07/26/24 (Prior Year PDC = not reported; YTD PDC = 65%; Potential Fail Date: 07/29/2024):   atorvastatin 40mg daily last filled on 02/25/2024 for 90 day supply. Next refill due: 05/25/2024    Last Rx sent on 02/02/2024 for 30ds with 3 refills - 30ds remaining    Lab Results   Component Value Date/Time    CHOL 88 04/23/2024 11:15 AM    TRIG 77 04/23/2024 11:15 AM    HDL 45 04/23/2024 11:15 AM    CHOLFAST 172 05/21/2019 07:25 AM    TRIGLYCFAST 74 05/21/2019 07:25 AM    LDL 27 04/23/2024 11:15 AM    VLDL 15 04/23/2024 11:15 AM    ALT 8 06/16/2024 01:25 PM    AST 20 06/16/2024 01:25 PM       PLAN  The following are interventions that have been identified:   Patient OVERDUE refilling atorvastatin and active on home medication list    Will send refill request to provider.     Future Appointments   Date Time Provider Department Center   7/29/2024  4:00 PM Sandy Edmond MD [facility]   7/31/2024  2:00 PM [facility] ECHO 1 [facility]     Ricky Byrnes PharmD, BCACP, BCGP  [facility] Pharmacist   [facility] Clinical Pharmacy  Department, toll free: [phone], option 1    =======================================================    For Pharmacy Admin Tracking Only  Program: [facility]  CPA in place:

## 2024-07-29 ENCOUNTER — OFFICE VISIT (OUTPATIENT)
Dept: FAMILY MEDICINE CLINIC | Age: 64
End: 2024-07-29
Payer: MEDICARE

## 2024-07-29 VITALS
SYSTOLIC BLOOD PRESSURE: 158 MMHG | BODY MASS INDEX: 28.88 KG/M2 | HEART RATE: 85 BPM | HEIGHT: 69 IN | WEIGHT: 195 LBS | DIASTOLIC BLOOD PRESSURE: 82 MMHG

## 2024-07-29 DIAGNOSIS — F14.10 COCAINE ABUSE (HCC): ICD-10-CM

## 2024-07-29 DIAGNOSIS — M79.89 LEG SWELLING: Primary | ICD-10-CM

## 2024-07-29 DIAGNOSIS — I73.9 INTERMITTENT CLAUDICATION (HCC): ICD-10-CM

## 2024-07-29 PROCEDURE — 3079F DIAST BP 80-89 MM HG: CPT

## 2024-07-29 PROCEDURE — 3077F SYST BP >= 140 MM HG: CPT

## 2024-07-29 PROCEDURE — 99213 OFFICE O/P EST LOW 20 MIN: CPT

## 2024-07-29 ASSESSMENT — ENCOUNTER SYMPTOMS
STRIDOR: 0
NAUSEA: 0
COUGH: 0
VOMITING: 0
CONSTIPATION: 0
ABDOMINAL PAIN: 0
CHEST TIGHTNESS: 0
SHORTNESS OF BREATH: 0
CHOKING: 0

## 2024-07-29 NOTE — PROGRESS NOTES
[facility] Program - Outpatient Note      Subjective:    Marcelo Littlejohn is a 63 y.o. male with  has a past medical history of Bowel obstruction (HCC), Cocaine abuse (HCC), Diabetes mellitus (HCC), Diabetic polyneuropathy associated with type 2 diabetes mellitus (HCC), Diverticulitis, Diverticulosis, GERD (gastroesophageal reflux disease), History of blood transfusion, Hx of blood clots, Hyperlipidemia, Hypertension, MIGUEL (obstructive sleep apnea), Osteoarthritis, Renal lithiasis, Rheumatoid arteritis (HCC), and Type II or unspecified type diabetes mellitus without mention of complication, not stated as uncontrolled.    Presented to the office today for:  Chief Complaint   Patient presents with    Leg Pain     Two week follow up for leg pain and swelling        HPI    Patient is a 63-year-old male with significant past medical history of type 2 diabetes, diabetic neuropathy, diverticulitis, hypertension and MIGUEL prior history of thromboembolic disease presenting for follow-up on foot swelling.  Patient states had bilateral leg swelling but admits his swelling improved while elevated denied any shortness of breath or decreased exercise tolerance.  Patient was started on Bumex and states that significantly helped his symptoms.  Patient denies any prior history of heart disease or family history of heart disease.  Patient states that starting on Bumex his symptoms have improved.  Admits he did not have his lab work done but will follow-up with that and admits already scheduled his echocardiogram before this Wednesday, 31 July.  But states his symptoms improved when elevating the legs denies any pain with movement and denies any shortness of breath or decreased exercise tolerance.      Review of Systems   Constitutional:  Negative for appetite change, chills, diaphoresis, fatigue and fever.   Respiratory:  Negative for cough, choking, chest tightness, shortness of breath

## 2024-07-29 NOTE — PROGRESS NOTES
Visit Information    Have you changed or started any medications since your last visit including any over-the-counter medicines, vitamins, or herbal medicines?  No    Have you stopped taking any of your medications? Is so, why? -  no  Are you having any side effects from any of your medications? - no    Have you seen any other physician or provider since your last visit?  no   Have you had any other diagnostic tests since your last visit?  no   Have you been seen in the emergency room and/or had an admission in a hospital since we last saw you?  no   Have you had your routine dental cleaning in the past 6 months?  no     Do you have an active MyChart account? If no, what is the barrier?  Yes    Patient Care Team:  Vazquez Valerio MD as PCP - General (Family Medicine)  Caitlyn Beasley RN as Ambulatory Care Manager    Medical History Review  Past Medical, Family, and Social History reviewed and does not contribute to the patient presenting condition    Health Maintenance   Topic Date Due    Annual Wellness Visit (Medicare Advantage)  Never done    Diabetic retinal exam  05/05/2025 (Originally 10/6/1978)    Flu vaccine (1) 08/01/2024    A1C test (Diabetic or Prediabetic)  02/20/2025    Diabetic Alb to Cr ratio (uACR) test  04/23/2025    Lipids  04/23/2025    Depression Screen  04/25/2025    Diabetic foot exam  05/30/2025    GFR test (Diabetes, CKD 3-4, OR last GFR 15-59)  06/16/2025    DTaP/Tdap/Td vaccine (3 - Td or Tdap) 04/05/2033    Colorectal Cancer Screen  03/07/2034    Shingles vaccine  Completed    Pneumococcal 0-64 years Vaccine  Completed    COVID-19 Vaccine  Completed    Respiratory Syncytial Virus (RSV) Pregnant or age 60 yrs+  Completed    Hepatitis C screen  Completed    HIV screen  Completed    Hepatitis A vaccine  Aged Out    Hepatitis B vaccine  Aged Out    Hib vaccine  Aged Out    Polio vaccine  Aged Out    Meningococcal (ACWY) vaccine  Aged Out

## 2024-07-29 NOTE — PATIENT INSTRUCTIONS
Thank you for letting us take care of you today. We hope all your questions were addressed. If a question was overlooked or something else comes to mind after you return home, please contact a member of your Care Team listed below.      Your Care Team at [facility] is Team #2  Fede Solis M.D. (Faculty)  Jacqueline Riggs M.D. (Resident)  Nayeli Taylor M.D. (Resident)  Sandy Edmond M.D. (Resident)  Mere Case M.D. (Resident)  Dixie Platt M.D. (Resident)  Christopher Eason, [facility]  Marlin Morel, ANDREW Barrera, [facility]  Debi Campuzano,  [facility]  Rachelle Huertas, [facility]  Yamile Villasenor, ANDREW Lopez, [facility]  Chela (LJ) Jayda ANDREW (Clinical Practice Manager)  Kaleigh Ryder [facility] (Clinical Pharmacist)     Office phone number: [phone]    If you need to get in right away due to illness, please be advised we have \"Same Day\" appointments available Monday-Friday. Please call us at [phone] option #3 to schedule your \"Same Day\" appointment.

## 2024-07-29 NOTE — PROGRESS NOTES
Attending Physician Statement  I  have discussed the care of Marcelo Littlejohn including pertinent history and exam findings with the resident. I agree with the assessment, plan and orders as documented by the resident.      BP (!) 158/82 (Site: Right Upper Arm, Position: Sitting, Cuff Size: Medium Adult)   Pulse 85   Ht 1.753 m (5' 9.02\")   Wt 88.5 kg (195 lb)   BMI 28.78 kg/m²    BP Readings from Last 3 Encounters:   07/29/24 (!) 158/82   07/15/24 139/87   06/16/24 98/64     Wt Readings from Last 3 Encounters:   07/29/24 88.5 kg (195 lb)   07/15/24 95.7 kg (211 lb)   06/16/24 87.6 kg (193 lb 2 oz)          Diagnosis Orders   1. Leg swelling  Vascular duplex lower extremity venous bilateral      2. Cocaine abuse (HCC)        3. Intermittent claudication (HCC)                Donald Martinez DO 7/30/2024 8:32 AM

## 2024-08-08 DIAGNOSIS — E11.9 TYPE 2 DIABETES MELLITUS WITHOUT COMPLICATION, WITH LONG-TERM CURRENT USE OF INSULIN (HCC): Chronic | ICD-10-CM

## 2024-08-08 DIAGNOSIS — Z79.4 TYPE 2 DIABETES MELLITUS WITHOUT COMPLICATION, WITH LONG-TERM CURRENT USE OF INSULIN (HCC): Chronic | ICD-10-CM

## 2024-08-08 NOTE — TELEPHONE ENCOUNTER
Patient contacting office because he misplaced his glucometer and insurance is requesting we send another script to the pharmacy, script pending please advise.

## 2024-08-13 ENCOUNTER — CARE COORDINATION (OUTPATIENT)
Dept: CARE COORDINATION | Age: 64
End: 2024-08-13

## 2024-08-13 ENCOUNTER — TELEPHONE (OUTPATIENT)
Dept: PHARMACY | Facility: CLINIC | Age: 64
End: 2024-08-13

## 2024-08-13 DIAGNOSIS — Z79.4 CONTROLLED TYPE 2 DIABETES MELLITUS WITH DIABETIC POLYNEUROPATHY, WITH LONG-TERM CURRENT USE OF INSULIN (HCC): ICD-10-CM

## 2024-08-13 DIAGNOSIS — E11.42 CONTROLLED TYPE 2 DIABETES MELLITUS WITH DIABETIC POLYNEUROPATHY, WITH LONG-TERM CURRENT USE OF INSULIN (HCC): ICD-10-CM

## 2024-08-13 RX ORDER — INSULIN LISPRO 100 [IU]/ML
13 INJECTION, SOLUTION INTRAVENOUS; SUBCUTANEOUS
Qty: 3 ML | Refills: 3 | Status: SHIPPED | OUTPATIENT
Start: 2024-08-13

## 2024-08-13 NOTE — TELEPHONE ENCOUNTER
----- Message from CODY AVENDAÑO RN sent at 8/13/2024  4:00 PM EDT -----  Regarding: Referral for Blood Glucose monitoring and medication monitoring  Good Afternoon,    Patient informed Writer that he has a Freestyle Monitoring System.  He reports his blood glucose as running high.  He states he doesn't know how to get the readings from his monitor to review them with Writer.  He reports his blood glucose level runs 260 - 300.  He states he is out of Humalog because his Rx states he takes 13 units BID.    Patient is in agreement for referral to Pharmacy staff for diabetes and medication management.    Your assistance with this Patient would be greatly appreciated.    Thank you,    Caitlyn

## 2024-08-13 NOTE — TELEPHONE ENCOUNTER
Please address the medication refill and close the encounter.  If I can be of assistance, please route to the applicable pool.      Thank you.  
17

## 2024-08-13 NOTE — CARE COORDINATION
Ambulatory Care Coordination Note     2024 2:04 PM     Patient Current Location:  Home: [address]  [place] [address]     ACM contacted the patient by telephone. Verified name and  with patient as identifiers.       ACM: Caitlyn Beasley RN     Challenges to be reviewed by the provider   Additional needs identified to be addressed with provider Yes and Patient's blood glucose readings have ranged 260 - 300 with Freestyle Monitoring System.      Patient needs a new prescription for Humalog.  Patient's Bumex medication was a 14 day supply.  He has been out of Bumex for a week.    Referral submitted to [facility]               Method of communication with provider: staff message.    Care Summary Note:     Patient informed Writer that he has a Freestyle Monitoring System.  He reports his blood glucose as running high.  He states he doesn't know how to get the readings from his monitor to review them with Writer.  He reports his blood glucose level runs 260 - 300.  He states he is out of Humalog because his Rx states he takes 13 units BID.  Patient states he called the office today to request a refill on Humalog medication.    Patient is in agreement for referral to Pharmacy staff for diabetes and medication management.  Referral submitted today.    Patient was a no show for his ECHO.  Importance of rescheduling ECHO as soon as possible discussed with Patient.  He states he has the phone number to Central Scheduling.  He states he will reschedule his ECHO test.      Patient denies chest discomfort and shortness of breath.  He states he no longer has swelling to his legs.  Patient is out of Bumex.    Patient states his vision is real hazy.  He states he has an upcoming eye doctor appointment but doesn't know the date/time.  He couldn't remember the name of the office.   He mentioned the name of the office has initials in it with a C and V.  ????(Possibly [facility],

## 2024-08-14 ENCOUNTER — CARE COORDINATION (OUTPATIENT)
Dept: CARE COORDINATION | Age: 64
End: 2024-08-14

## 2024-08-14 NOTE — TELEPHONE ENCOUNTER
CLINICAL PHARMACY NOTE - Population [facility] Pharmacy Referral    Patient can be scheduled with:  Team Schedule- General Referrals, etc.    Received a referral from: Care Coordinator to discuss patient’s medications. Called patient to schedule a time to speak with a pharmacist over the telephone.   Spoke to patient and advised them of the above message.  Patient verified understanding and scheduled their appointment: tomorrow at 1pm    Jaky Thomas CPhT.   [facility] Clinical   [facility]  Toll free: [phone] Option 1    For Pharmacy Admin Tracking Only    Program: [facility] [employer]  CPA in place:  No  Recommendation Provided To: Patient/Caregiver: 1 via Telephone  Intervention Detail: Scheduled Appointment  Intervention Accepted By: Patient/Caregiver: 1  Gap Closed?: Yes   Time Spent (min): 15

## 2024-08-14 NOTE — CARE COORDINATION
Ambulatory Care Coordination Note     2024 10:00 AM     Patient Current Location:  Home: [address]  [place] [address]     ACM contacted the patient by telephone. Verified name and  with patient as identifiers.       ACM: Caitlyn Beasley RN     Challenges to be reviewed by the provider   Additional needs identified to be addressed with provider No  none               Method of communication with provider: staff message.    Care Summary Note:     Writer phoned Patient to inform him PCP submitted prescription for Humalog as he requested.  Patient should report if he continues to have elevated blood glucose readings.  He will need an appointment with PCP or Pharmacy team.    Writer submitted referral to [facility] Pharm[facility] Team.  Patient has an appointment on 8/15/24.    Offered patient enrollment in the Remote Patient Monitoring (RPM) program for in-home monitoring:  Patient declined     Assessments Completed:   No changes since last call    Medications Reviewed:   Not completed during this call:      Advance Care Planning:   Not reviewed during this call     Care Planning:   Not completed during this call    PCP/Specialist follow up:   Future Appointments         Provider Specialty Dept Phone    8/15/2024 1:00 PM SCHEDULE, [facility]ICAL [facility] Pharmacy [phone]            Follow Up:   Plan for next AC outreach in approximately 1 week to complete:  - disease specific assessments  - medication review   - advance care planning   - goal progression  - education   - follow up appointment with Clinical Pharmacy on 8/15/2024 .   Patient  is agreeable to this plan.

## 2024-08-15 ENCOUNTER — TELEPHONE (OUTPATIENT)
Dept: PHARMACY | Facility: CLINIC | Age: 64
End: 2024-08-15

## 2024-08-15 NOTE — TELEPHONE ENCOUNTER
SURVEY:     You may be receiving a survey from [employer] regarding your visit today. Please complete the survey to enable us to provide the highest quality of care to you and your family. If you cannot score us a very good on any question, please call the office to discuss how we could have made your experience a very good one.      Thank you,    Brandyn Jimenez, FANTASMA Rascon Courser, DELBERT-JABARI Sofia, ANNMARIE Carroll, ANNMARIE Valdez, CMA  Gayle, NEAL Carter, PM Vazquez Valerio MD  - nurse referred patient to clinical pharmacy team for increasing sugars  - consider referral to meds management clinic?    Thank you,  Angelita Oviedo PharmD, [facility]  [facility] Clinical Pharmacist  Department: [phone]  -===========================================    CLINICAL PHARMACY NOTE - Medication Review    Marcelo Littlejohn is a 63 y.o. male referred to a clinical pharmacy specialist by care coordination given their history of diabetes.    Spoke to patient who was driving and was not able to complete med review at scheduled time. I did confirm he states he was able to get humalog refill and will pick it up.     Patient was willing to reschedule for Tuesday aug 20 th at 10 am.     Thank you,  Angelita Oviedo PharmD, [facility]  [facility] Clinical Pharmacist  Department: [phone]     For Pharmacy Admin Tracking Only    Program: [employer]  Time Spent (min): 10

## 2024-08-20 DIAGNOSIS — E11.42 CONTROLLED TYPE 2 DIABETES MELLITUS WITH DIABETIC POLYNEUROPATHY, WITH LONG-TERM CURRENT USE OF INSULIN (HCC): ICD-10-CM

## 2024-08-20 DIAGNOSIS — R39.16 BENIGN PROSTATIC HYPERPLASIA (BPH) WITH STRAINING ON URINATION: ICD-10-CM

## 2024-08-20 DIAGNOSIS — N40.1 BENIGN PROSTATIC HYPERPLASIA (BPH) WITH STRAINING ON URINATION: ICD-10-CM

## 2024-08-20 DIAGNOSIS — Z79.4 CONTROLLED TYPE 2 DIABETES MELLITUS WITH DIABETIC POLYNEUROPATHY, WITH LONG-TERM CURRENT USE OF INSULIN (HCC): ICD-10-CM

## 2024-08-20 NOTE — TELEPHONE ENCOUNTER
CHANGE EVERY 14 DAYS    Continuous Glucose  (FREESTYLE LIBAN 2 READER) SRINIVAS USE THREE TIMES A DAY    doxazosin (CARDURA) 4 mg, Oral, DAILY    fluticasone (FLONASE) 50 MCG/ACT nasal spray 1 spray, Each Nostril, DAILY    gabapentin (NEURONTIN) 800 mg, Oral, 3 TIMES DAILY    Glucagon Emergency 1 mg, Injection, PRN    insulin lispro (1 Unit Dial) (HUMALOG KWIKPEN) 13 Units, SubCUTAneous, 3 TIMES DAILY BEFORE MEALS    Insulin Pen Needle ([employer] PEN NEEDLES) 31G X 6 MM MISC USE ONE DAILY   is not changing pen needle often, pt educated on reasons why changing pen needle is important. Script pended for #400 pen needles since should be injecting 4 times daily    Insulin Syringe-Needle U-100 30G X 1/2\" 0.5 ML MISC 1 each, Does not apply, DAILY  -    Lancets MISC Check blood sugar 4 times daily (AC HS)    Lantus SoloStar 15 Units, SubCUTAneous, 2 TIMES DAILY  - has been taking 20 units BID, needs new script    lisinopril (PRINIVIL;ZESTRIL) 40 mg, Oral, DAILY    metFORMIN (GLUCOPHAGE) 1000 MG tablet TAKE ONE TABLET BY MOUTH TWICE A DAY WITH A MEAL  - did not tolerate, consider XR and lower dose?    omeprazole (PRILOSEC) 20 MG delayed release capsule TAKE ONE CAPSULE BY MOUTH DAILY    simethicone (GAS-X EXTRA STRENGTH) 125 mg, Oral, EVERY 6 HOURS PRN    tadalafil (CIALIS) 10 MG tablet TAKE 1/2 TABLET BY MOUTH AS NEEDED FOR ERECTILE DYSFUNCTION    traZODone (DESYREL) 50 MG tablet TAKE ONE TABLET BY MOUTH ONCE NIGHTLY    TRUE METRIX BLOOD GLUCOSE TEST strip Check blood sugar 3 times a day and if symptoms of hypoglycemia       Additional Medications (including OTC/Herbal Supplements):  He has been taking amlodipine 5 mg daily, despite telling the doctor he has not been. Patient was confused on meds but does need refill for this med    Allergies:   No Known Allergies    Pertinent Labs/Vitals:  BP Readings from Last 3 Encounters:   07/29/24 (!) 158/82   07/15/24 139/87   06/16/24 98/64     No results found for:

## 2024-08-21 ENCOUNTER — TELEPHONE (OUTPATIENT)
Dept: FAMILY MEDICINE CLINIC | Age: 64
End: 2024-08-21

## 2024-08-21 ENCOUNTER — TELEPHONE (OUTPATIENT)
Dept: PHARMACY | Facility: CLINIC | Age: 64
End: 2024-08-21

## 2024-08-21 NOTE — TELEPHONE ENCOUNTER
Vazquez Valerio MD  - patient stopped taking metformin due to diarrhea. Consider metformin  mg BID to decrease side effects? Please send script to [facility] pharmacy if you agree    - does not have back up meter in case of CGM sensor failure, Patient interested in meter and supplies    Thank you,  Angelita Oviedo, PharmD, [facility]  [facility] Clinical Pharmacist  Department: [phone]

## 2024-08-22 DIAGNOSIS — E11.42 CONTROLLED TYPE 2 DIABETES MELLITUS WITH DIABETIC POLYNEUROPATHY, WITH LONG-TERM CURRENT USE OF INSULIN (HCC): Primary | ICD-10-CM

## 2024-08-22 DIAGNOSIS — Z79.4 CONTROLLED TYPE 2 DIABETES MELLITUS WITH DIABETIC POLYNEUROPATHY, WITH LONG-TERM CURRENT USE OF INSULIN (HCC): Primary | ICD-10-CM

## 2024-08-22 RX ORDER — METFORMIN HYDROCHLORIDE 500 MG/1
500 TABLET, EXTENDED RELEASE ORAL 2 TIMES DAILY
Qty: 90 TABLET | Refills: 1 | Status: SHIPPED | OUTPATIENT
Start: 2024-08-22

## 2024-08-22 RX ORDER — DOXAZOSIN MESYLATE 4 MG/1
4 TABLET ORAL DAILY
Qty: 30 TABLET | Refills: 2 | Status: SHIPPED | OUTPATIENT
Start: 2024-08-22

## 2024-08-22 RX ORDER — PEN NEEDLE, DIABETIC 31 G X1/4"
NEEDLE, DISPOSABLE MISCELLANEOUS
Qty: 400 EACH | Refills: 3 | Status: SHIPPED | OUTPATIENT
Start: 2024-08-22

## 2024-08-22 RX ORDER — INSULIN GLARGINE 100 [IU]/ML
20 INJECTION, SOLUTION SUBCUTANEOUS 2 TIMES DAILY
Qty: 10 ADJUSTABLE DOSE PRE-FILLED PEN SYRINGE | Refills: 1 | Status: SHIPPED | OUTPATIENT
Start: 2024-08-22

## 2024-08-22 RX ORDER — AMLODIPINE BESYLATE 5 MG/1
5 TABLET ORAL DAILY
Qty: 90 TABLET | Refills: 0 | Status: SHIPPED | OUTPATIENT
Start: 2024-08-22

## 2024-08-22 NOTE — TELEPHONE ENCOUNTER
Attempted to reach patient to discuss change in therapy of metformin. AMADO Oviedo, PharmD, [facility]  [facility]  [facility] Clinical Pharmacist  Department: [phone]

## 2024-08-28 DIAGNOSIS — R14.3 FLATUS: ICD-10-CM

## 2024-08-28 RX ORDER — SIMETHICONE 125 MG
125 TABLET,CHEWABLE ORAL EVERY 6 HOURS PRN
Qty: 60 TABLET | Refills: 3 | Status: SHIPPED | OUTPATIENT
Start: 2024-08-28 | End: 2024-10-27

## 2024-08-28 NOTE — TELEPHONE ENCOUNTER
Please address the medication refill and close the encounter.  If I can be of assistance, please route to the applicable pool.      Thank you.      Last visit: 7-29-24  Last Med refill: 5-30-24  Does patient have enough medication for 72 hours:     Next Visit Date:  No future appointments.    Health Maintenance   Topic Date Due    Annual Wellness Visit (Medicare Advantage)  Never done    Flu vaccine (1) 08/01/2024    Diabetic retinal exam  05/05/2025 (Originally 10/6/1978)    A1C test (Diabetic or Prediabetic)  02/20/2025    Diabetic Alb to Cr ratio (uACR) test  04/23/2025    Lipids  04/23/2025    Depression Screen  04/25/2025    Diabetic foot exam  05/30/2025    GFR test (Diabetes, CKD 3-4, OR last GFR 15-59)  06/16/2025    DTaP/Tdap/Td vaccine (3 - Td or Tdap) 04/05/2033    Colorectal Cancer Screen  03/07/2034    Shingles vaccine  Completed    Pneumococcal 0-64 years Vaccine  Completed    COVID-19 Vaccine  Completed    Respiratory Syncytial Virus (RSV) Pregnant or age 60 yrs+  Completed    Hepatitis C screen  Completed    HIV screen  Completed    Hepatitis A vaccine  Aged Out    Hepatitis B vaccine  Aged Out    Hib vaccine  Aged Out    Polio vaccine  Aged Out    Meningococcal (ACWY) vaccine  Aged Out       Hemoglobin A1C (%)   Date Value   02/20/2024 6.7   11/16/2023 5.9   08/10/2023 7.8             ( goal A1C is < 7)   No components found for: \"LABMICR\"  No components found for: \"LDLCHOLESTEROL\", \"LDLCALC\"    (goal LDL is <100)   AST (U/L)   Date Value   06/16/2024 20     ALT (U/L)   Date Value   06/16/2024 8 (L)     BUN (mg/dL)   Date Value   06/16/2024 35 (H)     BP Readings from Last 3 Encounters:   07/29/24 (!) 158/82   07/15/24 139/87   06/16/24 98/64          (goal 120/80)    All Future Testing planned in [facility]  Lab Frequency Next Occurrence   Microalbumin, Ur Once 10/05/2023   Lipid Panel Once 09/05/2023   Microalbumin, Ur Once 03/02/2024   Lipid Panel Once 02/02/2024   XR CHEST STANDARD (2 VW) Once  03/25/2024   Echo (TTE) complete (PRN contrast/bubble/strain/3D) Once 07/15/2024   Basic Metabolic Panel Once 07/15/2024   Brain Natriuretic Peptide Once 07/15/2024   D-Dimer, Quantitative Once 07/15/2024   Vascular duplex lower extremity venous bilateral Once 07/29/2024               Patient Active Problem List:     Controlled type 2 diabetes mellitus with diabetic polyneuropathy, with long-term current use of insulin (HCC)     Diverticulosis     DM (diabetes mellitus) (HCC)     Essential hypertension     Elevated liver enzymes     Scrotal abscess     Abdominal abscess     Hyperplastic colon polyp     Lower abdominal pain     Diabetic foot (HCC)     Infected hernioplasty mesh (HCC)     Cocaine abuse (HCC)     Chest pain     BPH with obstruction/lower urinary tract symptoms     Chronic tension-type headache, intractable     Diabetic polyneuropathy associated with type 2 diabetes mellitus (HCC)     Gastroesophageal reflux disease     Acute otitis externa of left ear     Intermittent claudication (HCC)     Family history of malignant neoplasm of colon in relative diagnosed when older than 50 years of age     Benign prostatic hyperplasia (BPH) with straining on urination     Hyperglycemia due to diabetes mellitus (HCC)     Hyperosmolar coma due to type 2 diabetes mellitus (HCC)     Visit for wound check     Low blood pressure reading     Left leg pain     Right hip pain     Post-nasal drip     Xerosis of skin     Financial difficulties     Leg swelling     Shortness of breath

## 2024-08-31 ENCOUNTER — APPOINTMENT (OUTPATIENT)
Dept: VASCULAR LAB | Age: 64
End: 2024-08-31
Payer: MEDICARE

## 2024-08-31 ENCOUNTER — HOSPITAL ENCOUNTER (EMERGENCY)
Age: 64
Discharge: HOME | End: 2024-08-31
Attending: EMERGENCY MEDICINE
Payer: MEDICARE

## 2024-08-31 VITALS
SYSTOLIC BLOOD PRESSURE: 150 MMHG | TEMPERATURE: 97 F | DIASTOLIC BLOOD PRESSURE: 78 MMHG | OXYGEN SATURATION: 99 % | HEART RATE: 79 BPM | RESPIRATION RATE: 16 BRPM

## 2024-08-31 DIAGNOSIS — R73.9 HYPERGLYCEMIA: ICD-10-CM

## 2024-08-31 DIAGNOSIS — M79.604 RIGHT LEG PAIN: Primary | ICD-10-CM

## 2024-08-31 DIAGNOSIS — E87.5 HYPERKALEMIA: ICD-10-CM

## 2024-08-31 LAB
ALBUMIN SERPL-MCNC: 3.7 G/DL (ref 3.5–5.2)
ALBUMIN/GLOB SERPL: 2 {RATIO} (ref 1–2.5)
ALP SERPL-CCNC: 106 U/L (ref 40–129)
ALT SERPL-CCNC: 19 U/L (ref 10–50)
ANION GAP SERPL CALCULATED.3IONS-SCNC: 8 MMOL/L (ref 9–16)
AST SERPL-CCNC: 21 U/L (ref 10–50)
BASOPHILS # BLD: <0.03 K/UL (ref 0–0.2)
BASOPHILS NFR BLD: 0 % (ref 0–2)
BILIRUB SERPL-MCNC: 0.6 MG/DL (ref 0–1.2)
BUN SERPL-MCNC: 35 MG/DL (ref 8–23)
CALCIUM SERPL-MCNC: 8.4 MG/DL (ref 8.6–10.4)
CHLORIDE SERPL-SCNC: 105 MMOL/L (ref 98–107)
CO2 SERPL-SCNC: 23 MMOL/L (ref 20–31)
CREAT SERPL-MCNC: 1.3 MG/DL (ref 0.7–1.2)
EOSINOPHIL # BLD: 0.2 K/UL (ref 0–0.44)
EOSINOPHILS RELATIVE PERCENT: 3 % (ref 1–4)
ERYTHROCYTE [DISTWIDTH] IN BLOOD BY AUTOMATED COUNT: 15.9 % (ref 11.8–14.4)
GFR, ESTIMATED: 62 ML/MIN/1.73M2
GLUCOSE BLD-MCNC: 193 MG/DL (ref 75–110)
GLUCOSE SERPL-MCNC: 489 MG/DL (ref 74–99)
HCT VFR BLD AUTO: 27.7 % (ref 40.7–50.3)
HGB BLD-MCNC: 9.6 G/DL (ref 13–17)
IMM GRANULOCYTES # BLD AUTO: 0.05 K/UL (ref 0–0.3)
IMM GRANULOCYTES NFR BLD: 1 %
LYMPHOCYTES NFR BLD: 1.18 K/UL (ref 1.1–3.7)
LYMPHOCYTES RELATIVE PERCENT: 15 % (ref 24–43)
MCH RBC QN AUTO: 32.7 PG (ref 25.2–33.5)
MCHC RBC AUTO-ENTMCNC: 34.7 G/DL (ref 28.4–34.8)
MCV RBC AUTO: 94.2 FL (ref 82.6–102.9)
MONOCYTES NFR BLD: 0.75 K/UL (ref 0.1–1.2)
MONOCYTES NFR BLD: 9 % (ref 3–12)
NEUTROPHILS NFR BLD: 72 % (ref 36–65)
NEUTS SEG NFR BLD: 5.94 K/UL (ref 1.5–8.1)
NRBC BLD-RTO: 0 PER 100 WBC
PLATELET # BLD AUTO: 289 K/UL (ref 138–453)
PMV BLD AUTO: 9.8 FL (ref 8.1–13.5)
POTASSIUM SERPL-SCNC: 5 MMOL/L (ref 3.7–5.3)
POTASSIUM SERPL-SCNC: 5.7 MMOL/L (ref 3.7–5.3)
PROT SERPL-MCNC: 5.6 G/DL (ref 6.6–8.7)
RBC # BLD AUTO: 2.94 M/UL (ref 4.21–5.77)
RBC # BLD: ABNORMAL 10*6/UL
SODIUM SERPL-SCNC: 136 MMOL/L (ref 136–145)
WBC OTHER # BLD: 8.1 K/UL (ref 3.5–11.3)

## 2024-08-31 PROCEDURE — 84132 ASSAY OF SERUM POTASSIUM: CPT

## 2024-08-31 PROCEDURE — 6370000000 HC RX 637 (ALT 250 FOR IP)

## 2024-08-31 PROCEDURE — 6360000002 HC RX W HCPCS

## 2024-08-31 PROCEDURE — 93005 ELECTROCARDIOGRAM TRACING: CPT

## 2024-08-31 PROCEDURE — 99284 EMERGENCY DEPT VISIT MOD MDM: CPT | Performed by: EMERGENCY MEDICINE

## 2024-08-31 PROCEDURE — 80053 COMPREHEN METABOLIC PANEL: CPT

## 2024-08-31 PROCEDURE — 96374 THER/PROPH/DIAG INJ IV PUSH: CPT | Performed by: EMERGENCY MEDICINE

## 2024-08-31 PROCEDURE — 96361 HYDRATE IV INFUSION ADD-ON: CPT | Performed by: EMERGENCY MEDICINE

## 2024-08-31 PROCEDURE — 96375 TX/PRO/DX INJ NEW DRUG ADDON: CPT | Performed by: EMERGENCY MEDICINE

## 2024-08-31 PROCEDURE — 82947 ASSAY GLUCOSE BLOOD QUANT: CPT

## 2024-08-31 PROCEDURE — 85025 COMPLETE CBC W/AUTO DIFF WBC: CPT

## 2024-08-31 PROCEDURE — 93970 EXTREMITY STUDY: CPT

## 2024-08-31 PROCEDURE — 2580000003 HC RX 258

## 2024-08-31 RX ORDER — CALCIUM GLUCONATE 20 MG/ML
1000 INJECTION, SOLUTION INTRAVENOUS ONCE
Status: COMPLETED | OUTPATIENT
Start: 2024-08-31 | End: 2024-08-31

## 2024-08-31 RX ORDER — ACETAMINOPHEN 500 MG
1000 TABLET ORAL 3 TIMES DAILY
Qty: 42 TABLET | Refills: 0 | Status: SHIPPED | OUTPATIENT
Start: 2024-08-31 | End: 2024-09-07

## 2024-08-31 RX ORDER — METHOCARBAMOL 750 MG/1
750 TABLET, FILM COATED ORAL 3 TIMES DAILY
Qty: 21 TABLET | Refills: 0 | Status: SHIPPED | OUTPATIENT
Start: 2024-08-31 | End: 2024-09-07

## 2024-08-31 RX ORDER — DEXTROSE MONOHYDRATE 100 MG/ML
INJECTION, SOLUTION INTRAVENOUS CONTINUOUS PRN
Status: DISCONTINUED | OUTPATIENT
Start: 2024-08-31 | End: 2024-08-31 | Stop reason: HOSPADM

## 2024-08-31 RX ORDER — METHOCARBAMOL 500 MG/1
500 TABLET, FILM COATED ORAL ONCE
Status: COMPLETED | OUTPATIENT
Start: 2024-08-31 | End: 2024-08-31

## 2024-08-31 RX ORDER — ACETAMINOPHEN 325 MG/1
650 TABLET ORAL ONCE
Status: COMPLETED | OUTPATIENT
Start: 2024-08-31 | End: 2024-08-31

## 2024-08-31 RX ORDER — METHOCARBAMOL 500 MG/1
500 TABLET, FILM COATED ORAL 4 TIMES DAILY
Status: DISCONTINUED | OUTPATIENT
Start: 2024-08-31 | End: 2024-08-31

## 2024-08-31 RX ORDER — 0.9 % SODIUM CHLORIDE 0.9 %
1000 INTRAVENOUS SOLUTION INTRAVENOUS ONCE
Status: COMPLETED | OUTPATIENT
Start: 2024-08-31 | End: 2024-08-31

## 2024-08-31 RX ORDER — GLUCAGON 1 MG/ML
1 KIT INJECTION PRN
Status: DISCONTINUED | OUTPATIENT
Start: 2024-08-31 | End: 2024-08-31 | Stop reason: HOSPADM

## 2024-08-31 RX ADMIN — DEXTROSE MONOHYDRATE 250 ML: 100 INJECTION, SOLUTION INTRAVENOUS at 17:09

## 2024-08-31 RX ADMIN — ACETAMINOPHEN 650 MG: 325 TABLET ORAL at 15:58

## 2024-08-31 RX ADMIN — SODIUM ZIRCONIUM CYCLOSILICATE 10 G: 5 POWDER, FOR SUSPENSION ORAL at 18:43

## 2024-08-31 RX ADMIN — INSULIN HUMAN 10 UNITS: 100 INJECTION, SOLUTION PARENTERAL at 17:26

## 2024-08-31 RX ADMIN — CALCIUM GLUCONATE 1000 MG: 20 INJECTION, SOLUTION INTRAVENOUS at 17:30

## 2024-08-31 RX ADMIN — SODIUM CHLORIDE 1000 ML: 9 INJECTION, SOLUTION INTRAVENOUS at 17:29

## 2024-08-31 RX ADMIN — METHOCARBAMOL 500 MG: 500 TABLET ORAL at 15:58

## 2024-08-31 ASSESSMENT — PAIN - FUNCTIONAL ASSESSMENT: PAIN_FUNCTIONAL_ASSESSMENT: 0-10

## 2024-08-31 ASSESSMENT — PAIN DESCRIPTION - LOCATION: LOCATION: LEG

## 2024-08-31 ASSESSMENT — PAIN SCALES - GENERAL: PAINLEVEL_OUTOF10: 8

## 2024-08-31 ASSESSMENT — PAIN DESCRIPTION - DESCRIPTORS: DESCRIPTORS: CRAMPING

## 2024-08-31 ASSESSMENT — LIFESTYLE VARIABLES
HOW OFTEN DO YOU HAVE A DRINK CONTAINING ALCOHOL: NEVER
HOW MANY STANDARD DRINKS CONTAINING ALCOHOL DO YOU HAVE ON A TYPICAL DAY: PATIENT DOES NOT DRINK

## 2024-08-31 NOTE — DISCHARGE INSTRUCTIONS
You were seen for evaluation of leg pain.  Your ultrasound performed the emergency department did not show any clots in your leg.  Your lab work was concerning for high potassium which is a potentially lethal electrolyte derangement.  You opted to leave the emergency department AGAINST MEDICAL ADVICE prior to a repeat potassium level being drawn.  Your blood glucose was also high in the emergency department which was treated while you are here.  You will be discharged home with Tylenol and Robaxin which you can take at home for leg pain.  You need to follow-up outpatient with a primary care doctor in the next 24 hours for reevaluation of your symptoms.  Return the emergency department immediately for any new or worsening chest pain, shortness of breath, dizziness or loss of consciousness, fevers, chills, other new or concerning symptoms.  You are welcome to return the emergency department at anytime for repeat lab work, reevaluation

## 2024-08-31 NOTE — ED PROVIDER NOTES
[facility] ED  Emergency Department Encounter  Emergency Medicine Resident     Pt Name:Marcelo Littlejohn  MRN: [id number]  Birthdate 1960  Date of evaluation: 8/31/24  PCP:  Vazquez Valerio MD  Note Started: 3:40 PM EDT      CHIEF COMPLAINT       Chief Complaint   Patient presents with    Leg Pain       HISTORY OF PRESENT ILLNESS  (Location/Symptom, Timing/Onset, Context/Setting, Quality, Duration, Modifying Factors, Severity.)      Marcelo Littlejohn is a 63 y.o. male history of type 2 diabetes, diabetic neuropathy, diverticulitis, hypertension, MIGUEL, thromboembolic disease who presents with several days of right lower extremity burning pain.  No new injury to the area.  History of rheumatoid arthritis, neuropathy,  Taking gabapentin at home for pain with minimal relief.  No chest pain, shortness of breath, fevers, chills, nausea, vomiting    Vascular duplex ordered by primary care but does not appear that this has been done yet    PAST MEDICAL / SURGICAL / SOCIAL / FAMILY HISTORY      has a past medical history of Bowel obstruction (HCC), Cocaine abuse (HCC), Diabetes mellitus (HCC), Diabetic polyneuropathy associated with type 2 diabetes mellitus (HCC), Diverticulitis, Diverticulosis, GERD (gastroesophageal reflux disease), History of blood transfusion, Hx of blood clots, Hyperlipidemia, Hypertension, MIGUEL (obstructive sleep apnea), Osteoarthritis, Renal lithiasis, Rheumatoid arteritis (HCC), and Type II or unspecified type diabetes mellitus without mention of complication, not stated as uncontrolled.       has a past surgical history that includes Meckel's diverticulum excision; hernia repair; Cholecystectomy; Urethra dilation; Small intestine surgery; Colonoscopy; Colon surgery; Appendectomy; Cystocopy (06/30/2020); Cystoscopy (N/A, 06/30/2020); Colonoscopy (N/A, 07/06/2023); Colonoscopy (03/07/2024); and Colonoscopy (N/A, 3/7/2024).      Social History     Socioeconomic History     particularly in the right calf.  History of blood clots.  No associated chest pain, shortness of breath, fevers, chills.  He does note that his primary care doctor recently started him on a diuretic for lower extremity swelling.  Patient is hypertensive upon arrival, vital signs otherwise within normal.  On exam he is alert, awake, no acute distress.  He has tenderness throughout his bilateral lower extremities but especially in the right calf.  2+ DP pulses bilaterally, distal extremities are warm to the touch.  Heart is regular rate and rhythm, lungs are clear to auscultation bilaterally he is in no respiratory distress.  DDx: DVT, electrolyte derangement, liver dysfunction.  Plan for pain control, DVT scans, labs, reevaluation    Amount and/or Complexity of Data Reviewed  Labs: ordered. Decision-making details documented in ED Course.  ECG/medicine tests: ordered.    Risk  OTC drugs.  Prescription drug management.          EMERGENCY DEPARTMENT COURSE:      ED Course as of 08/31/24 2252   Sat Aug 31, 2024   1621 WBC: 8.1 [TD]   1621 Hemoglobin Quant(!): 9.6  Baseline for the patient [TD]   1627 Vascular duplex of the bilateral lower extremities without acute clot [TD]   1641 Glucose(!!): 489 [TD]   1641 Potassium(!): 5.7  Patient with significant hyperglycemia, hyperkalemia.  Anion gap is not elevated to suggest DKA. [TD]   1642 Renal function appears to be at his baseline. [TD]   1849 Patient's blood glucose rechecked it was 190 [TD]   1853 POC Glucose(!): 193  Patient now requesting to nursing that he needs to leave and he cannot stay in the emergency department any longer.  I did discuss with patient that his potassium was high which can potentially cause fatal arrhythmias, other issues within his body.  He expressed understanding and states he would still like to leave.  Potassium recheck was in process at the time of this discussion.  He states he cannot wait for this to result.  He states he does have a  primary care doctor he can follow-up with, he was told to follow-up with a doctor in the next 24 hours for reevaluation.  Encouraged to return the emergency department at any time.  Patient's blood glucose was elevated, he states that he stepped in a fast food restaurant and ate 3 hotdogs for come to the emergency department today.  Patient is going to sign out AGAINST MEDICAL ADVICE.  Risks were explained to him and he had decision-making capacity.  Did not appear to be intoxicated.    Of note, initial concern was potentially for DVT.  His DVT studies in the emergency department did not show any clots. [TD]      ED Course User Index  [TD] Vale Krueger DO       CONSULTS:  None    FINAL IMPRESSION      1. Right leg pain    2. Hyperkalemia    3. Hyperglycemia          DISPOSITION / PLAN     DISPOSITION [place] 08/31/2024 06:55:36 PM  Condition at Disposition: Data Unavailable      PATIENT REFERRED TO:  Vazquez Valerio MD  [address] [facility] [place] [address]  [phone]    Schedule an appointment as soon as possible for a visit         DISCHARGE MEDICATIONS:  Discharge Medication List as of 8/31/2024  6:59 PM        START taking these medications    Details   methocarbamol (ROBAXIN-750) 750 MG tablet Take 1 tablet by mouth 3 times daily for 7 days, Disp-21 tablet, R-0Normal      acetaminophen (TYLENOL) 500 MG tablet Take 2 tablets by mouth 3 times daily for 7 days, Disp-42 tablet, R-0Normal             Vale Krueger DO  Emergency Medicine Resident    (Please note that portions of this note were completed with a voice recognition program.  Efforts were made to edit the dictations but occasionally words are mis-transcribed.)

## 2024-08-31 NOTE — ED NOTES
Pt states he needs to get going. He is asking for his discharge papers and a work noted. Dr. Krueger notified.

## 2024-08-31 NOTE — ED NOTES
Pt to ed with c/o bilateral leg pain.   Pt states he has had leg pain for months, denies any type of injury. Pt states his pain is worsening today so he wanted to be evaluated. Pt reports pain is in both great toes, both calves and in the right hip. Pt rates pain 6/10.  Pt also states he did not go to work today, requesting work note.   Pt denies chest pain, sob.

## 2024-08-31 NOTE — ED NOTES
The following labs were labeled with appropriate pt sticker and tubed to lab:     [] Blue     [x] Lavender   [] on ice  [x] Green/yellow  [] Green/black [] on ice  [] Peralta  [] on ice  [x] Yellow  [] Red  [] Pink  [] Type/ Screen  [] ABG  [] VBG    [] COVID-19 swab    [] Rapid  [] PCR  [] Flu swab  [] Peds Viral Panel     [] Urine Sample  [] Fecal Sample  [] Pelvic Cultures  [] Blood Cultures  [] X 2  [] STREP Cultures  [] Wound Cultures

## 2024-08-31 NOTE — ED PROVIDER NOTES
[facility]     Emergency Department     Faculty Attestation    I performed a history and physical examination of the patient and discussed management with the resident. I reviewed the resident's note and agree with the documented findings and plan of care. Any areas of disagreement are noted on the chart. I was personally present for the key portions of any procedures. I have documented in the chart those procedures where I was not present during the key portions. I have reviewed the emergency nurses triage note. I agree with the chief complaint, past medical history, past surgical history, allergies, medications, social and family history as documented unless otherwise noted below. For Physician Assistant/ Nurse Practitioner cases/documentation I have personally evaluated this patient and have completed at least one if not all key elements of the E/M (history, physical exam, and MDM). Additional findings are as noted.    Bilateral calf pain without swelling, no pitting edema, both feet are warm to the touch.     Festus Walden MD  08/31/24 9016         EKG Interpretation    Interpreted by emergency department physician    Rhythm: normal sinus   Rate: normal/75  Axis: normal 5  Ectopy: none  Conduction: normal  ST Segments: no acute change  T Waves: no acute change  Q Waves: none    Clinical Impression: Normal EKG    MADDI Parker John A, MD  08/31/24 9775

## 2024-08-31 NOTE — ED NOTES
Pt updated on plan of care, pt states resident has not talked to him but he went to [facility] before he came to ED. Pt needs to use the restroom prior to being hooked up to medications.

## 2024-09-01 LAB
EKG ATRIAL RATE: 75 BPM
EKG P AXIS: 34 DEGREES
EKG P-R INTERVAL: 172 MS
EKG Q-T INTERVAL: 382 MS
EKG QRS DURATION: 88 MS
EKG QTC CALCULATION (BAZETT): 426 MS
EKG R AXIS: 5 DEGREES
EKG T AXIS: 53 DEGREES
EKG VENTRICULAR RATE: 75 BPM

## 2024-09-01 PROCEDURE — 93970 EXTREMITY STUDY: CPT | Performed by: SURGERY

## 2024-09-03 ENCOUNTER — CARE COORDINATION (OUTPATIENT)
Dept: CARE COORDINATION | Age: 64
End: 2024-09-03

## 2024-09-03 ENCOUNTER — OFFICE VISIT (OUTPATIENT)
Dept: FAMILY MEDICINE CLINIC | Age: 64
End: 2024-09-03
Payer: MEDICARE

## 2024-09-03 ENCOUNTER — HOSPITAL ENCOUNTER (OUTPATIENT)
Age: 64
Setting detail: SPECIMEN
Discharge: HOME | End: 2024-09-03

## 2024-09-03 VITALS
DIASTOLIC BLOOD PRESSURE: 75 MMHG | HEART RATE: 79 BPM | WEIGHT: 202.8 LBS | OXYGEN SATURATION: 99 % | SYSTOLIC BLOOD PRESSURE: 128 MMHG | HEIGHT: 69 IN | BODY MASS INDEX: 30.04 KG/M2

## 2024-09-03 DIAGNOSIS — S05.01XA INJ CONJUNCTIVA AND CORNEAL ABRASION W/O FB, RIGHT EYE, INIT: ICD-10-CM

## 2024-09-03 DIAGNOSIS — Z86.39 H/O HYPERKALEMIA: ICD-10-CM

## 2024-09-03 DIAGNOSIS — K21.9 GASTROESOPHAGEAL REFLUX DISEASE, UNSPECIFIED WHETHER ESOPHAGITIS PRESENT: ICD-10-CM

## 2024-09-03 DIAGNOSIS — Z79.4 CONTROLLED TYPE 2 DIABETES MELLITUS WITH DIABETIC POLYNEUROPATHY, WITH LONG-TERM CURRENT USE OF INSULIN (HCC): Primary | ICD-10-CM

## 2024-09-03 DIAGNOSIS — E11.42 CONTROLLED TYPE 2 DIABETES MELLITUS WITH DIABETIC POLYNEUROPATHY, WITH LONG-TERM CURRENT USE OF INSULIN (HCC): Primary | ICD-10-CM

## 2024-09-03 DIAGNOSIS — H00.022 HORDEOLUM INTERNUM OF RIGHT LOWER EYELID: ICD-10-CM

## 2024-09-03 LAB
ANION GAP SERPL CALCULATED.3IONS-SCNC: 10 MMOL/L (ref 9–16)
BUN SERPL-MCNC: 39 MG/DL (ref 8–23)
CALCIUM SERPL-MCNC: 8.6 MG/DL (ref 8.6–10.4)
CHLORIDE SERPL-SCNC: 104 MMOL/L (ref 98–107)
CO2 SERPL-SCNC: 24 MMOL/L (ref 20–31)
CREAT SERPL-MCNC: 1.2 MG/DL (ref 0.7–1.2)
EKG ATRIAL RATE: 75 BPM
EKG P AXIS: 34 DEGREES
EKG P-R INTERVAL: 172 MS
EKG Q-T INTERVAL: 382 MS
EKG QRS DURATION: 88 MS
EKG QTC CALCULATION (BAZETT): 426 MS
EKG R AXIS: 5 DEGREES
EKG T AXIS: 53 DEGREES
EKG VENTRICULAR RATE: 75 BPM
GFR, ESTIMATED: 70 ML/MIN/1.73M2
GLUCOSE SERPL-MCNC: 358 MG/DL (ref 74–99)
HBA1C MFR BLD: 6 %
POTASSIUM SERPL-SCNC: 5.2 MMOL/L (ref 3.7–5.3)
SODIUM SERPL-SCNC: 138 MMOL/L (ref 136–145)

## 2024-09-03 PROCEDURE — 3044F HG A1C LEVEL LT 7.0%: CPT

## 2024-09-03 PROCEDURE — 99213 OFFICE O/P EST LOW 20 MIN: CPT

## 2024-09-03 PROCEDURE — 83036 HEMOGLOBIN GLYCOSYLATED A1C: CPT

## 2024-09-03 PROCEDURE — 3074F SYST BP LT 130 MM HG: CPT

## 2024-09-03 PROCEDURE — 3078F DIAST BP <80 MM HG: CPT

## 2024-09-03 RX ORDER — INSULIN LISPRO 100 [IU]/ML
11 INJECTION, SOLUTION INTRAVENOUS; SUBCUTANEOUS
Qty: 3 ML | Refills: 3 | Status: SHIPPED | OUTPATIENT
Start: 2024-09-03

## 2024-09-03 RX ORDER — AZITHROMYCIN MONOHYDRATE 10 MG/ML
SOLUTION/ DROPS OPHTHALMIC
Qty: 2.5 ML | Refills: 0 | Status: CANCELLED | OUTPATIENT
Start: 2024-09-03 | End: 2024-09-13

## 2024-09-03 RX ORDER — ERYTHROMYCIN 5 MG/G
1 OINTMENT OPHTHALMIC EVERY 6 HOURS
Qty: 1 G | Refills: 0 | Status: SHIPPED | OUTPATIENT
Start: 2024-09-03 | End: 2024-09-06

## 2024-09-03 RX ORDER — GABAPENTIN 800 MG/1
800 TABLET ORAL 3 TIMES DAILY
Qty: 90 TABLET | Refills: 1 | Status: SHIPPED | OUTPATIENT
Start: 2024-09-03 | End: 2024-11-02

## 2024-09-03 ASSESSMENT — PATIENT HEALTH QUESTIONNAIRE - PHQ9
1. LITTLE INTEREST OR PLEASURE IN DOING THINGS: NOT AT ALL
SUM OF ALL RESPONSES TO PHQ QUESTIONS 1-9: 0
2. FEELING DOWN, DEPRESSED OR HOPELESS: NOT AT ALL
SUM OF ALL RESPONSES TO PHQ9 QUESTIONS 1 & 2: 0

## 2024-09-03 NOTE — CARE COORDINATION
Ambulatory Care Coordination Note     9/3/2024 12:06 PM     Patient Current Location:  Home: [address]  [place] [id number]     ACM contacted the patient by telephone. Verified name and  with patient as identifiers.       ACM: Caitlyn Beasley RN     Challenges to be reviewed by the provider   Additional needs identified to be addressed with provider Yes and update Dr. Muñoz re:  ED visit and previous orders from Dr. Valerio  none               Method of communication with provider: staff message.    Care Summary Note:     CC Plan:       1.  Review medications with Patient.     2.  Patient was seen in ED on 24 for leg pain. His K+ was 5.7 in the ED.  His Glucose was 489 in the ED.  It decreased to 193.  Patient left the ED AMA.  His DVT studies in the emergency department did not show any clots   Patient has an appointment with Dr. Muñoz today.      3.  ECHO (TTE) and lab test ordered on 7/15/2024 at office visit with Dr. Valerio for leg swelling.  These test were not completed.    Offered patient enrollment in the Remote Patient Monitoring (RPM) program for in-home monitoring:  Patient declined     Assessments Completed:   Not completed today    Medications Reviewed:   Patient declined to review medications today.  He has an appointment with Dr. Muñoz today.  He will review medications at office visit.    Advance Care Planning:   Not reviewed during this call     Care Planning:   Not completed during this call    PCP/Specialist follow up:   Future Appointments         Provider Specialty Dept Phone    9/3/2024 2:30 PM Janel Muñoz MD Family Medicine [phone]          Writer phoned Patient for ACM update and to discuss ED follow up/cc plan of care.  Patient states he's busy at this time.  He declined to review medications with Patient.  He states he will keep his appointment with Dr. Muñoz this afternoon.      Patient is aware his potassium is elevated.  He was encouraged to report to the ED if

## 2024-09-09 DIAGNOSIS — I10 ESSENTIAL HYPERTENSION: ICD-10-CM

## 2024-09-10 RX ORDER — LISINOPRIL 20 MG/1
40 TABLET ORAL DAILY
Qty: 180 TABLET | Refills: 4 | Status: SHIPPED | OUTPATIENT
Start: 2024-09-10

## 2024-09-11 ENCOUNTER — HOSPITAL ENCOUNTER (OUTPATIENT)
Age: 64
Discharge: HOME | End: 2024-09-13
Payer: MEDICARE

## 2024-09-11 VITALS — BODY MASS INDEX: 30.36 KG/M2 | HEIGHT: 69 IN | WEIGHT: 205 LBS

## 2024-09-11 DIAGNOSIS — M79.89 LEG SWELLING: ICD-10-CM

## 2024-09-11 DIAGNOSIS — R06.02 SHORTNESS OF BREATH: ICD-10-CM

## 2024-09-11 LAB
ECHO AO ROOT DIAM: 3.1 CM
ECHO AO ROOT INDEX: 1.48 CM/M2
ECHO AV AREA PEAK VELOCITY: 2.6 CM2
ECHO AV AREA VTI: 2.5 CM2
ECHO AV AREA/BSA PEAK VELOCITY: 1.2 CM2/M2
ECHO AV AREA/BSA VTI: 1.2 CM2/M2
ECHO AV MEAN GRADIENT: 6 MMHG
ECHO AV MEAN VELOCITY: 1.1 M/S
ECHO AV PEAK GRADIENT: 12 MMHG
ECHO AV PEAK VELOCITY: 1.7 M/S
ECHO AV VELOCITY RATIO: 0.82
ECHO AV VTI: 35.2 CM
ECHO BSA: 2.13 M2
ECHO EST RA PRESSURE: 3 MMHG
ECHO LA AREA 2C: 26.7 CM2
ECHO LA AREA 4C: 21.3 CM2
ECHO LA DIAMETER INDEX: 2.2 CM/M2
ECHO LA DIAMETER: 4.6 CM
ECHO LA MAJOR AXIS: 5.7 CM
ECHO LA MINOR AXIS: 6.6 CM
ECHO LA TO AORTIC ROOT RATIO: 1.48
ECHO LA VOL BP: 80 ML (ref 18–58)
ECHO LA VOL MOD A2C: 86 ML (ref 18–58)
ECHO LA VOL MOD A4C: 65 ML (ref 18–58)
ECHO LA VOL/BSA BIPLANE: 38 ML/M2 (ref 16–34)
ECHO LA VOLUME INDEX MOD A2C: 41 ML/M2 (ref 16–34)
ECHO LA VOLUME INDEX MOD A4C: 31 ML/M2 (ref 16–34)
ECHO LV E' LATERAL VELOCITY: 11 CM/S
ECHO LV E' SEPTAL VELOCITY: 7 CM/S
ECHO LV EDV 3D: 209 ML
ECHO LV EDV INDEX 3D: 100 ML/M2
ECHO LV EF PHYSICIAN: 60 %
ECHO LV EJECTION FRACTION 3D: 62 %
ECHO LV ESV 3D: 79 ML
ECHO LV ESV INDEX 3D: 38 ML/M2
ECHO LV FRACTIONAL SHORTENING: 25 % (ref 28–44)
ECHO LV INTERNAL DIMENSION DIASTOLE INDEX: 2.11 CM/M2
ECHO LV INTERNAL DIMENSION DIASTOLIC: 4.4 CM (ref 4.2–5.9)
ECHO LV INTERNAL DIMENSION SYSTOLIC INDEX: 1.58 CM/M2
ECHO LV INTERNAL DIMENSION SYSTOLIC: 3.3 CM
ECHO LV IVSD: 1.6 CM (ref 0.6–1)
ECHO LV MASS 2D: 227.5 G (ref 88–224)
ECHO LV MASS 3D INDEX: 126.3 G/M2
ECHO LV MASS 3D: 264 G
ECHO LV MASS INDEX 2D: 108.9 G/M2 (ref 49–115)
ECHO LV POSTERIOR WALL DIASTOLIC: 1.1 CM (ref 0.6–1)
ECHO LV RELATIVE WALL THICKNESS RATIO: 0.5
ECHO LVOT AREA: 3.1 CM2
ECHO LVOT AV VTI INDEX: 0.78
ECHO LVOT DIAM: 2 CM
ECHO LVOT MEAN GRADIENT: 4 MMHG
ECHO LVOT PEAK GRADIENT: 8 MMHG
ECHO LVOT PEAK VELOCITY: 1.4 M/S
ECHO LVOT STROKE VOLUME INDEX: 41.5 ML/M2
ECHO LVOT SV: 86.7 ML
ECHO LVOT VTI: 27.6 CM
ECHO MV A VELOCITY: 1.11 M/S
ECHO MV AREA VTI: 2.2 CM2
ECHO MV E DECELERATION TIME (DT): 232 MS
ECHO MV E VELOCITY: 1.23 M/S
ECHO MV E/A RATIO: 1.11
ECHO MV E/E' LATERAL: 11.18
ECHO MV E/E' RATIO (AVERAGED): 14.38
ECHO MV E/E' SEPTAL: 17.57
ECHO MV LVOT VTI INDEX: 1.46
ECHO MV MAX VELOCITY: 1.4 M/S
ECHO MV MEAN GRADIENT: 4 MMHG
ECHO MV MEAN VELOCITY: 1 M/S
ECHO MV PEAK GRADIENT: 8 MMHG
ECHO MV VTI: 40.3 CM
ECHO PV MAX VELOCITY: 1.2 M/S
ECHO PV PEAK GRADIENT: 5 MMHG
ECHO RIGHT VENTRICULAR SYSTOLIC PRESSURE (RVSP): 47 MMHG
ECHO RV BASAL DIMENSION: 3.7 CM
ECHO RV FREE WALL PEAK S': 17 CM/S
ECHO RV TAPSE: 2.6 CM (ref 1.7–?)
ECHO TV REGURGITANT MAX VELOCITY: 3.31 M/S
ECHO TV REGURGITANT PEAK GRADIENT: 44 MMHG

## 2024-09-11 PROCEDURE — 93306 TTE W/DOPPLER COMPLETE: CPT | Performed by: INTERNAL MEDICINE

## 2024-09-11 PROCEDURE — 93306 TTE W/DOPPLER COMPLETE: CPT

## 2024-09-23 ENCOUNTER — TELEPHONE (OUTPATIENT)
Dept: FAMILY MEDICINE CLINIC | Age: 64
End: 2024-09-23

## 2024-10-04 DIAGNOSIS — E11.42 CONTROLLED TYPE 2 DIABETES MELLITUS WITH DIABETIC POLYNEUROPATHY, WITH LONG-TERM CURRENT USE OF INSULIN (HCC): ICD-10-CM

## 2024-10-04 DIAGNOSIS — Z79.4 CONTROLLED TYPE 2 DIABETES MELLITUS WITH DIABETIC POLYNEUROPATHY, WITH LONG-TERM CURRENT USE OF INSULIN (HCC): ICD-10-CM

## 2024-10-04 RX ORDER — GABAPENTIN 800 MG/1
800 TABLET ORAL 3 TIMES DAILY
Qty: 90 TABLET | Refills: 0 | Status: SHIPPED | OUTPATIENT
Start: 2024-10-04 | End: 2024-12-03

## 2024-10-04 RX ORDER — INSULIN LISPRO 100 [IU]/ML
11 INJECTION, SOLUTION INTRAVENOUS; SUBCUTANEOUS
Qty: 3 ML | Refills: 3 | Status: SHIPPED | OUTPATIENT
Start: 2024-10-04

## 2024-10-04 RX ORDER — INSULIN GLARGINE 100 [IU]/ML
20 INJECTION, SOLUTION SUBCUTANEOUS 2 TIMES DAILY
Qty: 10 ADJUSTABLE DOSE PRE-FILLED PEN SYRINGE | Refills: 1 | Status: SHIPPED | OUTPATIENT
Start: 2024-10-04

## 2024-10-08 ENCOUNTER — OFFICE VISIT (OUTPATIENT)
Dept: FAMILY MEDICINE CLINIC | Age: 64
End: 2024-10-08
Payer: MEDICARE

## 2024-10-08 ENCOUNTER — PHARMACY VISIT (OUTPATIENT)
Dept: FAMILY MEDICINE CLINIC | Age: 64
End: 2024-10-08

## 2024-10-08 VITALS
WEIGHT: 209.6 LBS | HEART RATE: 77 BPM | BODY MASS INDEX: 31.04 KG/M2 | HEIGHT: 69 IN | TEMPERATURE: 98.1 F | SYSTOLIC BLOOD PRESSURE: 156 MMHG | OXYGEN SATURATION: 99 % | DIASTOLIC BLOOD PRESSURE: 83 MMHG

## 2024-10-08 DIAGNOSIS — R20.0 NUMBNESS AND TINGLING IN BOTH HANDS: ICD-10-CM

## 2024-10-08 DIAGNOSIS — E11.42 CONTROLLED TYPE 2 DIABETES MELLITUS WITH DIABETIC POLYNEUROPATHY, WITH LONG-TERM CURRENT USE OF INSULIN (HCC): ICD-10-CM

## 2024-10-08 DIAGNOSIS — Z79.4 CONTROLLED TYPE 2 DIABETES MELLITUS WITH DIABETIC POLYNEUROPATHY, WITH LONG-TERM CURRENT USE OF INSULIN (HCC): ICD-10-CM

## 2024-10-08 DIAGNOSIS — L98.9 SKIN LESION OF LEFT ARM: Primary | ICD-10-CM

## 2024-10-08 DIAGNOSIS — R20.2 NUMBNESS AND TINGLING IN BOTH HANDS: ICD-10-CM

## 2024-10-08 PROCEDURE — 3077F SYST BP >= 140 MM HG: CPT

## 2024-10-08 PROCEDURE — 3079F DIAST BP 80-89 MM HG: CPT

## 2024-10-08 PROCEDURE — 99213 OFFICE O/P EST LOW 20 MIN: CPT

## 2024-10-08 PROCEDURE — 99999 PR OFFICE/OUTPT VISIT,PROCEDURE ONLY: CPT | Performed by: PHARMACIST

## 2024-10-08 PROCEDURE — APPNB30 APP NON BILLABLE TIME 0-30 MINS

## 2024-10-08 RX ORDER — LANCETS 33 GAUGE
EACH MISCELLANEOUS
COMMUNITY
Start: 2024-09-03

## 2024-10-08 RX ORDER — BLOOD SUGAR DIAGNOSTIC
STRIP MISCELLANEOUS
COMMUNITY
Start: 2024-09-03

## 2024-10-08 RX ORDER — LORATADINE 10 MG/1
10 TABLET ORAL DAILY
Qty: 30 TABLET | Refills: 2 | Status: SHIPPED | OUTPATIENT
Start: 2024-10-08 | End: 2025-01-06

## 2024-10-08 RX ORDER — INSULIN GLARGINE 100 [IU]/ML
18 INJECTION, SOLUTION SUBCUTANEOUS 2 TIMES DAILY
Qty: 10 ADJUSTABLE DOSE PRE-FILLED PEN SYRINGE | Refills: 1
Start: 2024-10-08

## 2024-10-08 ASSESSMENT — PATIENT HEALTH QUESTIONNAIRE - PHQ9
SUM OF ALL RESPONSES TO PHQ QUESTIONS 1-9: 0
SUM OF ALL RESPONSES TO PHQ QUESTIONS 1-9: 0
1. LITTLE INTEREST OR PLEASURE IN DOING THINGS: NOT AT ALL
2. FEELING DOWN, DEPRESSED OR HOPELESS: NOT AT ALL
SUM OF ALL RESPONSES TO PHQ QUESTIONS 1-9: 0
SUM OF ALL RESPONSES TO PHQ QUESTIONS 1-9: 0
SUM OF ALL RESPONSES TO PHQ9 QUESTIONS 1 & 2: 0

## 2024-10-08 ASSESSMENT — ENCOUNTER SYMPTOMS: COLOR CHANGE: 0

## 2024-10-08 NOTE — PROGRESS NOTES
numbers   [x] Hypoglycemia symptoms and management/Hyperglycemia symptoms   [x] Today's Ambulatory Glucose Profile (AGP) Report    Next PharmD Visit: 10/29/2024 @ 1:00 PM, In-Person Consult  Next PCP Visit: 11/27/2024 w/ Dr. Valerio    Patient verbalized understanding of care plan. Patient advised to call Medication Management with any questions, concerns, or changes prior to next appointment.    Progress note sent to referring provider. (Dr. Muñoz)   Patient acknowledges working in a consult agreement with the pharmacist as referred by his/her physician.     Rajesh Monge (Robbie), PharmD  PGY2 Ambulatory Care Pharmacy Resident  [facility] Medication Management Service  [phone]  10/08/24    I have discussed the care of this patient with the resident.  I agree with the assessment and plan as documented by the resident.    Kaleigh Ryder, Pharm.D., [facility]  Clinical Pharmacist  [facility] Medication Management Service  [phone]  10/11/2024  9:05 AM    ================================================================  For Pharmacy Admin Tracking Only    Program: Medical Group  CPA in place:  Yes  Recommendation Provided To: Patient/Caregiver: 1 via In person  Intervention Detail: Dose Adjustment: 1, reason: Therapy De-escalation  Intervention Accepted By: Patient/Caregiver: 1  Time Spent (min): 30

## 2024-10-08 NOTE — PROGRESS NOTES
[facility] Residency Program - Outpatient Note      Subjective:    Marcelo Littlejohn is a 64 y.o. male with  has a past medical history of Bowel obstruction (HCC), Cocaine abuse (HCC), Diabetes mellitus (HCC), Diabetic polyneuropathy associated with type 2 diabetes mellitus (HCC), Diverticulitis, Diverticulosis, GERD (gastroesophageal reflux disease), History of blood transfusion, Hx of blood clots, Hyperlipidemia, Hypertension, MIGUEL (obstructive sleep apnea), Osteoarthritis, Renal lithiasis, Rheumatoid arteritis (HCC), and Type II or unspecified type diabetes mellitus without mention of complication, not stated as uncontrolled.    Presented to the office today for:  Chief Complaint   Patient presents with    Diabetes     Insulin medication change from last visit        HPI    Patient presented with complains of itchy lesion on his left arm on the dorsal aspect. They were dry and scaly and hypopigmented. Not on anywhere else on his body. Non tender, no redness,. He said that he once got an injection for them and they completely resolved but came back after a few weeks. Will give him Claritin for itching and plan for shave biopsy to rule out psoriasis.       He also had numbness in his 2-3rd fingers bilaterally with jones pain. It does not radiate down from his neck. This started a few months ago, increasing early in the morning. He works on ceilings, has worked as a  and at construction site with drills previously.   Phalen test was positive for numbness along the right wirst. No thenar eminence atrophy noted. Will give him nighttime wrist splint and EMG order placed.     He had a visit with pharmacy for his blood sugar level. Explained to him that he was having hypoglycemic episodes in the morning so we will cut down on lantus to 18 units BD. Patient stated that if he got a low sugar reading he would drink an entire can of soda. Counseled to drink half a can and recheck

## 2024-10-08 NOTE — PROGRESS NOTES
Diabetic visit information    BP Readings from Last 3 Encounters:   09/03/24 128/75   08/31/24 (!) 150/78   07/29/24 (!) 158/82       Hemoglobin A1C (%)   Date Value   09/03/2024 6.0   02/20/2024 6.7   11/16/2023 5.9               Have you changed or started any medications since your last visit including any over-the-counter medicines, vitamins, or herbal medicines? no   Have you stopped taking any of your medications? Is so, why? -  no  Are you having any side effects from any of your medications? - no    Have you seen any other physician or provider since your last visit?  no   Have you had any other diagnostic tests since your last visit?  no   Have you been seen in the emergency room and/or had an admission in a hospital since we last saw you?  no     Have you had your annual diabetic retinal (eye) exam? No   (ensure copy of exam is in the chart)    Have you had your routine dental cleaning in the past 6 months? no    Do you have an active [employer] account?  If not, what are your barriers?  Yes    Patient Care Team:  Vazquez Valerio MD as PCP - General (Family Medicine)    Medical history Review  Past Medical, Family, and Social History reviewed and does not contribute to the patient presenting condition.    Health Maintenance   Topic Date Due    Annual Wellness Visit (Medicare Advantage)  Never done    Diabetic retinal exam  05/05/2025 (Originally 10/6/1978)    Diabetic Alb to Cr ratio (uACR) test  04/23/2025    Lipids  04/23/2025    Diabetic foot exam  05/30/2025    A1C test (Diabetic or Prediabetic)  09/03/2025    Depression Screen  09/03/2025    GFR test (Diabetes, CKD 3-4, OR last GFR 15-59)  09/03/2025    DTaP/Tdap/Td vaccine (3 - Td or Tdap) 04/05/2033    Colorectal Cancer Screen  03/07/2034    Flu vaccine  Completed    Shingles vaccine  Completed    Pneumococcal 0-64 years Vaccine  Completed    COVID-19 Vaccine  Completed    Respiratory Syncytial Virus (RSV) Pregnant or age 60 yrs+  Completed

## 2024-10-08 NOTE — PROGRESS NOTES
Attending Physician Statement  I have discussed the care of Marcelo Littlejohn64 y.o.  male, including pertinent history and exam findings,  with the resident Janel Whitehead MD.  History and Exam:   Chief Complaint   Patient presents with    Diabetes     Insulin medication change from last visit        Patient seen and examined with resident team present.  Patient appears comfortable in no acute distress.  Patient complaining of chronic fingers 2 through 4 numbness with significant history of mechanical work, drilling, etc.  Patient does note to have several raised's lichenified scaly lesions on the lateral left forearm with some hypopigmentation more distal from the elbow.  Patient also states that lesions are itchy in nature as well and that he had this similar presentation a year ago and received a steroid shot which helped resolve the lesions at that time.  Patient is agreeable with coming in for shave biopsy and confirmation of lesion source.  No central clearing noted lesions.  Patient also states that he has multiple episodes of his CGM alarming for hypoglycemia and CGM report reviewed at length showing frequent mild hypoglycemia into the 60 range.    Past Medical History:   Diagnosis Date    Bowel obstruction (HCC)     Cocaine abuse (HCC) 03/11/2020    Diabetes mellitus (HCC)     Diabetic polyneuropathy associated with type 2 diabetes mellitus (HCC) 03/29/2022    Diverticulitis     Diverticulosis     GERD (gastroesophageal reflux disease)     History of blood transfusion     Hx of blood clots     Hyperlipidemia     Hypertension     MIGUEL (obstructive sleep apnea)     Osteoarthritis     Renal lithiasis     Rheumatoid arteritis (HCC)     Type II or unspecified type diabetes mellitus without mention of complication, not stated as uncontrolled      No Known Allergies I have seen and examined the patient and the key elements of the encounter have been performed by me.  BP Readings from Last 3 Encounters:   10/08/24

## 2024-10-08 NOTE — PATIENT INSTRUCTIONS
Thank you for letting us take care of you today. We hope all your questions were addressed. If a question was overlooked or something else comes to mind after you return home, please contact a member of your Care Team listed below.        Your Care Team at [facility] is Team #4  Jeff Hilton M.D. (Faculty)  Tolu Jefferson M.D. (Resident)  Bertha Shook M.D.  (Resident)  Salvador Cavanaugh M.D. (Resident)  Janel Muñoz M.D. (Resident)  Christopher Eason, ANDREW Morel, ANDREW Barrera, [facility]  Patti Lopez, [facility]  Rachelle Huertas, [facility]  Debi Campuzano, ANDREW Villasenor, ANDREW York (LJ) LUCA Montelongo (Clinical Practice Manager)  Kaleigh Ryder [facility] (Clinical Pharmacist)       Office phone number: [phone]    If you need to get in right away due to illness, please be advised we have \"Same Day\" appointments available Monday-Friday. Please call us at [phone] option #3 to schedule your \"Same Day\" appointment.

## 2024-10-30 ENCOUNTER — TELEPHONE (OUTPATIENT)
Dept: FAMILY MEDICINE CLINIC | Age: 64
End: 2024-10-30

## 2024-10-30 DIAGNOSIS — R20.0 NUMBNESS AND TINGLING IN BOTH HANDS: Primary | ICD-10-CM

## 2024-10-30 DIAGNOSIS — R20.2 NUMBNESS AND TINGLING IN BOTH HANDS: Primary | ICD-10-CM

## 2024-10-30 NOTE — TELEPHONE ENCOUNTER
Could you please put the wrist splint as a DME order so writer can forward to Medical Service company.       Thank you.

## 2024-11-05 ENCOUNTER — PROCEDURE VISIT (OUTPATIENT)
Dept: FAMILY MEDICINE CLINIC | Age: 64
End: 2024-11-05

## 2024-11-05 VITALS
DIASTOLIC BLOOD PRESSURE: 77 MMHG | SYSTOLIC BLOOD PRESSURE: 138 MMHG | BODY MASS INDEX: 31.19 KG/M2 | WEIGHT: 210.6 LBS | HEART RATE: 79 BPM | HEIGHT: 69 IN

## 2024-11-05 DIAGNOSIS — K21.9 GASTROESOPHAGEAL REFLUX DISEASE, UNSPECIFIED WHETHER ESOPHAGITIS PRESENT: ICD-10-CM

## 2024-11-05 DIAGNOSIS — Z79.4 CONTROLLED TYPE 2 DIABETES MELLITUS WITH DIABETIC POLYNEUROPATHY, WITH LONG-TERM CURRENT USE OF INSULIN (HCC): ICD-10-CM

## 2024-11-05 DIAGNOSIS — M06.9 RHEUMATOID ARTHRITIS OF LEFT ELBOW, UNSPECIFIED WHETHER RHEUMATOID FACTOR PRESENT (HCC): ICD-10-CM

## 2024-11-05 DIAGNOSIS — E11.42 CONTROLLED TYPE 2 DIABETES MELLITUS WITH DIABETIC POLYNEUROPATHY, WITH LONG-TERM CURRENT USE OF INSULIN (HCC): ICD-10-CM

## 2024-11-05 DIAGNOSIS — L85.3 XEROSIS OF SKIN: Primary | ICD-10-CM

## 2024-11-05 RX ORDER — CLOBETASOL PROPIONATE 0.5 MG/G
OINTMENT TOPICAL
Qty: 15 G | Refills: 3 | Status: SHIPPED | OUTPATIENT
Start: 2024-11-05

## 2024-11-05 RX ORDER — INSULIN LISPRO 100 [IU]/ML
11 INJECTION, SOLUTION INTRAVENOUS; SUBCUTANEOUS
Qty: 3 ML | Refills: 3 | Status: SHIPPED | OUTPATIENT
Start: 2024-11-05

## 2024-11-05 ASSESSMENT — ENCOUNTER SYMPTOMS
CHEST TIGHTNESS: 0
ABDOMINAL DISTENTION: 0
CHOKING: 0

## 2024-11-05 NOTE — PROGRESS NOTES
Visit Information    Have you changed or started any medications since your last visit including any over-the-counter medicines, vitamins, or herbal medicines? no   Have you stopped taking any of your medications? Is so, why? -  no  Are you having any side effects from any of your medications? - no    Have you seen any other physician or provider since your last visit?  no   Have you had any other diagnostic tests since your last visit?  no   Have you been seen in the emergency room and/or had an admission in a hospital since we last saw you?  no   Have you had your routine dental cleaning in the past 6 months?  no     Do you have an active MyChart account? If no, what is the barrier?  Yes    Patient Care Team:  Janel Muñoz MD as PCP - General (Family Medicine)  Kaleigh Ryder RPH as Pharmacist (Pharmacist)  Rajesh Monge RPH as Pharmacist (Pharmacy)    Medical History Review  Past Medical, Family, and Social History reviewed and does not contribute to the patient presenting condition    Health Maintenance   Topic Date Due    Annual Wellness Visit (Medicare Advantage)  Never done    Diabetic retinal exam  05/05/2025 (Originally [birth year]/1980)    Diabetic Alb to Cr ratio (uACR) test  04/23/2025    Lipids  04/23/2025    Diabetic foot exam  05/30/2025    A1C test (Diabetic or Prediabetic)  09/03/2025    GFR test (Diabetes, CKD 3-4, OR last GFR 15-59)  09/03/2025    Depression Screen  10/08/2025    DTaP/Tdap/Td vaccine (3 - Td or Tdap) 04/05/2033    Colorectal Cancer Screen  03/07/2034    Flu vaccine  Completed    Shingles vaccine  Completed    Pneumococcal 0-64 years Vaccine  Completed    COVID-19 Vaccine  Completed    Respiratory Syncytial Virus (RSV) Pregnant or age 60 yrs+  Completed    Hepatitis C screen  Completed    HIV screen  Completed    Hepatitis A vaccine  Aged Out    Hepatitis B vaccine  Aged Out    Hib vaccine  Aged Out    Polio vaccine  Aged Out    Meningococcal (ACWY) vaccine  Aged Out

## 2024-11-05 NOTE — PROGRESS NOTES
[facility] Residency Program - Outpatient Note      Subjective:    Marcelo Littlejohn is a 64 y.o. male with  has a past medical history of Bowel obstruction (HCC), Cocaine abuse (HCC), Diabetes mellitus (HCC), Diabetic polyneuropathy associated with type 2 diabetes mellitus (HCC), Diverticulitis, Diverticulosis, GERD (gastroesophageal reflux disease), History of blood transfusion, Hx of blood clots, Hyperlipidemia, Hypertension, MIGUEL (obstructive sleep apnea), Osteoarthritis, Renal lithiasis, Rheumatoid arteritis (HCC), and Type II or unspecified type diabetes mellitus without mention of complication, not stated as uncontrolled.    Presented to the office today for:  Chief Complaint   Patient presents with    Biopsy     Shaved Biopsy lesion on left arm     Medication Refill     DM & GERD       Medication Refill  Associated symptoms include arthralgias. Pertinent negatives include no chest pain, chills, fatigue, fever, headaches or myalgias.       Patient here for shave biopsy of itchy lesions on his right arm. His lesions had cleared up for mostly with two small plaque like lesions on his left elbow. Patient gives history of his left elbow swelling up previously with similar looking nodules. He also gives history of rheumatoid arthritis and is not currently following up with rheumatology.       Review of Systems   Constitutional:  Negative for activity change, chills, fatigue and fever.   Respiratory:  Negative for choking and chest tightness.    Cardiovascular:  Negative for chest pain.   Gastrointestinal:  Negative for abdominal distention.   Musculoskeletal:  Positive for arthralgias. Negative for gait problem and myalgias.   Neurological:  Negative for dizziness and headaches.                 The patient has a   Family History   Problem Relation Age of Onset    Diabetes Father     Diabetes Other     Heart Attack Other     Hypertension Other     Diabetes Brother

## 2024-11-05 NOTE — PATIENT INSTRUCTIONS
Thank you for letting us take care of you today. We hope all your questions were addressed. If a question was overlooked or something else comes to mind after you return home, please contact a member of your Care Team listed below.        Your Care Team at [facility] is Team #4  Jeff Hilton M.D. (Faculty)  Tolu Jefferson M.D. (Resident)  Bertha Shook M.D.  (Resident)  Salvador Cavanaugh M.D. (Resident)  Janel Muñoz M.D. (Resident)  Christopher Eason, ANDREW Morel, ANDREW Barrera, [facility]  Patti Lopez, [facility]  Rachelle Huertas, [facility]  Debi Campuzano, ANDREW Villasenor, ANDREW York (LJ) LUCA Montelongo (Clinical Practice Manager)  Kaleigh Ryder [facility] (Clinical Pharmacist)       Office phone number: [phone]    If you need to get in right away due to illness, please be advised we have \"Same Day\" appointments available Monday-Friday. Please call us at [phone] option #3 to schedule your \"Same Day\" appointment.

## 2024-11-05 NOTE — PROGRESS NOTES
Attending Physician Statement  I  have discussed the care of Marcelo Littlejohn including pertinent history and exam findings with the resident. I agree with the assessment, plan and orders as documented by the resident.      /77 (Site: Right Upper Arm, Position: Sitting, Cuff Size: Medium Adult)   Pulse 79   Ht 1.753 m (5' 9.02\")   Wt 95.5 kg (210 lb 9.6 oz)   BMI 31.09 kg/m²    BP Readings from Last 3 Encounters:   11/05/24 138/77   10/08/24 (!) 156/83   09/03/24 128/75     Wt Readings from Last 3 Encounters:   11/05/24 95.5 kg (210 lb 9.6 oz)   10/08/24 95.1 kg (209 lb 9.6 oz)   09/11/24 93 kg (205 lb)          Diagnosis Orders   1. Xerosis of skin  clobetasol (TEMOVATE) 0.05 % ointment      2. Controlled type 2 diabetes mellitus with diabetic polyneuropathy, with long-term current use of insulin ([facility])  insulin lispro, 1 Unit Dial, (HUMALOG KWIKPEN) 100 UNIT/ML SOPN      3. Gastroesophageal reflux disease, unspecified whether esophagitis present  omeprazole (PRILOSEC) 20 MG delayed release capsule      4. Rheumatoid arthritis of left elbow, unspecified whether rheumatoid factor present ([facility])  [facility]              Jeff Hilton DO 11/5/2024 4:36 PM

## 2024-11-12 ENCOUNTER — PHARMACY VISIT (OUTPATIENT)
Dept: FAMILY MEDICINE CLINIC | Age: 64
End: 2024-11-12

## 2024-11-12 ENCOUNTER — TELEPHONE (OUTPATIENT)
Dept: FAMILY MEDICINE CLINIC | Age: 64
End: 2024-11-12

## 2024-11-12 VITALS — WEIGHT: 211 LBS | BODY MASS INDEX: 31.14 KG/M2

## 2024-11-12 DIAGNOSIS — E11.42 CONTROLLED TYPE 2 DIABETES MELLITUS WITH DIABETIC POLYNEUROPATHY, WITH LONG-TERM CURRENT USE OF INSULIN (HCC): ICD-10-CM

## 2024-11-12 DIAGNOSIS — Z79.4 CONTROLLED TYPE 2 DIABETES MELLITUS WITH DIABETIC POLYNEUROPATHY, WITH LONG-TERM CURRENT USE OF INSULIN (HCC): ICD-10-CM

## 2024-11-12 PROCEDURE — 99999 PR OFFICE/OUTPT VISIT,PROCEDURE ONLY: CPT | Performed by: PHARMACIST

## 2024-11-12 PROCEDURE — APPNB45 APP NON BILLABLE 31-45 MINUTES

## 2024-11-12 RX ORDER — INSULIN GLARGINE 100 [IU]/ML
15 INJECTION, SOLUTION SUBCUTANEOUS DAILY
Qty: 15 ML | Refills: 1
Start: 2024-11-12

## 2024-11-12 RX ORDER — INSULIN LISPRO 100 [IU]/ML
10 INJECTION, SOLUTION INTRAVENOUS; SUBCUTANEOUS
Qty: 15 ML | Refills: 3 | Status: SHIPPED | OUTPATIENT
Start: 2024-11-12

## 2024-11-12 NOTE — PROGRESS NOTES
RA, insulin de-escalation    The following education was provided during today's visit:   [x] Medication adherence   [x] Healthy lifestyle including diet and exercise changes   [x] Hypoglycemia symptoms and management   [x] Blood glucose goals    [x] Interpreting Ambulatory Glucose Profile (AGP) Report with focus on page 1, average number of scans and glucose levels per day, and snapshot     The following brochure(s)/handout(s) discussed with patient during visit:     [x] Know your numbers   [x] Hypoglycemia symptoms and management/Hyperglycemia symptoms   [x] Today's Ambulatory Glucose Profile (AGP) Report    Next PharmD Visit: 12/06/2024 @ 11:30 AM, In-Person Consult  Next PCP Visit: 11/27/2024 w/ Dr. Valerio    Patient verbalized understanding of care plan. Patient advised to call Medication Management with any questions, concerns, or changes prior to next appointment.    Progress note sent to referring provider. (Dr. Muñoz)   Patient acknowledges working in a consult agreement with the pharmacist as referred by his/her physician.     Rajesh Mogne (Robbie), PharmD  PGY2 Ambulatory Care Pharmacy Resident  [facility] Medication Management Service  [phone]  11/12/24    I have discussed the care of this patient with the resident.  I agree with the assessment and plan as documented by the resident.    Kaleigh Ryder, Pharm.D., [facility]  Clinical Pharmacist  [facility] Medication Management Service  [phone]  11/12/2024  4:32 PM    ============================================  For Pharmacy Admin Tracking Only    Program: Medical Group  CPA in place:  Yes  Recommendation Provided To: Patient/Caregiver: 3 via In person  Intervention Detail: Dose Adjustment: 2, reason: Therapy De-escalation, New Rx: 1, reason: Needs Additional Therapy, and Refill(s) Provided  Intervention Accepted By: Patient/Caregiver: 3  Time Spent (min): 45

## 2024-11-12 NOTE — TELEPHONE ENCOUNTER
----- Message from Saad PEREZ sent at 11/7/2024 11:34 AM EST -----  Regarding: ECC Appointment Request  ECC Appointment Request    Patient needs appointment for ECC Appointment Type: Annual Visit.AWV     Patient Requested Dates(s):any day   Patient Requested Time: any time   Provider Name: Vazquez Valerio MD       Reason for Appointment Request: Established Patient - No appointments available during search  --------------------------------------------------------------------------------------------------------------------------    Relationship to Patient: Self     Call Back Information: OK to leave message on voicemail  Preferred Call Back Number: Phone [phone] (home)

## 2024-11-12 NOTE — TELEPHONE ENCOUNTER
Writer called patient to schedule AWV, writer didn't find an AWV appointment for patient. Will have to look again when more time opens up.

## 2024-11-12 NOTE — PATIENT INSTRUCTIONS
Start Jardiance 10 mg daily  Call me if any issues with cost or any side effects (UTI or yeast infections)  Decrease Lantus to 12 units once daily  Decrease Humalog to 8 units before meals  Educated to inject 15 minutes before each meal  If you have any issues, call [phone]

## 2024-12-01 DIAGNOSIS — E11.42 CONTROLLED TYPE 2 DIABETES MELLITUS WITH DIABETIC POLYNEUROPATHY, WITH LONG-TERM CURRENT USE OF INSULIN (HCC): ICD-10-CM

## 2024-12-01 DIAGNOSIS — Z79.4 CONTROLLED TYPE 2 DIABETES MELLITUS WITH DIABETIC POLYNEUROPATHY, WITH LONG-TERM CURRENT USE OF INSULIN (HCC): ICD-10-CM

## 2024-12-02 NOTE — TELEPHONE ENCOUNTER
Last visit: 11/5/24  Last Med refill: 10/4/24  Does patient have enough medication for 72 hours: no    Next Visit Date:  Future Appointments   Date Time Provider Department Center   12/5/2024 11:15 AM Artie Miller MD [place] [facility]   12/6/2024 11:30 AM Kaleigh Ryder [facility] DEP   3/25/2025  3:30 PM [facility]  [facility] [place]   3/25/2025  3:30 PM Jose Parrish MD [facility] med/reha [facility]       Health Maintenance   Topic Date Due    Annual Wellness Visit (Medicare Advantage)  Never done    Diabetic retinal exam  05/05/2025 (Originally 10/6/1978)    Diabetic Alb to Cr ratio (uACR) test  04/23/2025    Lipids  04/23/2025    Diabetic foot exam  05/30/2025    A1C test (Diabetic or Prediabetic)  09/03/2025    GFR test (Diabetes, CKD 3-4, OR last GFR 15-59)  09/03/2025    Depression Screen  10/08/2025    DTaP/Tdap/Td vaccine (3 - Td or Tdap) 04/05/2033    Colorectal Cancer Screen  03/07/2034    Flu vaccine  Completed    Shingles vaccine  Completed    Pneumococcal 0-64 years Vaccine  Completed    COVID-19 Vaccine  Completed    Respiratory Syncytial Virus (RSV) Pregnant or age 60 yrs+  Completed    Hepatitis C screen  Completed    HIV screen  Completed    Hepatitis A vaccine  Aged Out    Hepatitis B vaccine  Aged Out    Hib vaccine  Aged Out    Polio vaccine  Aged Out    Meningococcal (ACWY) vaccine  Aged Out       Hemoglobin A1C (%)   Date Value   09/03/2024 6.0   02/20/2024 6.7   11/16/2023 5.9             ( goal A1C is < 7)   No components found for: \"LABMICR\"  No components found for: \"LDLCHOLESTEROL\", \"LDLCALC\"    (goal LDL is <100)   AST (U/L)   Date Value   08/31/2024 21     ALT (U/L)   Date Value   08/31/2024 19     BUN (mg/dL)   Date Value   09/03/2024 39 (H)     BP Readings from Last 3 Encounters:   11/05/24 138/77   10/08/24 (!) 156/83   09/03/24 128/75          (goal 120/80)    All Future Testing planned in [facility]  Lab Frequency Next Occurrence   Microalbumin, Ur Once 03/02/2024

## 2024-12-03 DIAGNOSIS — Z79.4 CONTROLLED TYPE 2 DIABETES MELLITUS WITH DIABETIC POLYNEUROPATHY, WITH LONG-TERM CURRENT USE OF INSULIN (HCC): ICD-10-CM

## 2024-12-03 DIAGNOSIS — E11.42 CONTROLLED TYPE 2 DIABETES MELLITUS WITH DIABETIC POLYNEUROPATHY, WITH LONG-TERM CURRENT USE OF INSULIN (HCC): ICD-10-CM

## 2024-12-03 RX ORDER — GABAPENTIN 800 MG/1
800 TABLET ORAL 3 TIMES DAILY
Qty: 90 TABLET | Refills: 0 | Status: SHIPPED | OUTPATIENT
Start: 2024-12-03 | End: 2024-12-31 | Stop reason: SDUPTHER

## 2024-12-03 RX ORDER — GABAPENTIN 800 MG/1
800 TABLET ORAL 3 TIMES DAILY
Qty: 90 TABLET | Refills: 0 | Status: CANCELLED | OUTPATIENT
Start: 2024-12-03 | End: 2025-01-02

## 2024-12-07 DIAGNOSIS — Z79.4 CONTROLLED TYPE 2 DIABETES MELLITUS WITH DIABETIC POLYNEUROPATHY, WITH LONG-TERM CURRENT USE OF INSULIN (HCC): ICD-10-CM

## 2024-12-07 DIAGNOSIS — E11.42 CONTROLLED TYPE 2 DIABETES MELLITUS WITH DIABETIC POLYNEUROPATHY, WITH LONG-TERM CURRENT USE OF INSULIN (HCC): ICD-10-CM

## 2024-12-09 RX ORDER — GABAPENTIN 800 MG/1
800 TABLET ORAL 3 TIMES DAILY
Qty: 90 TABLET | Refills: 0 | OUTPATIENT
Start: 2024-12-09

## 2024-12-27 ENCOUNTER — TELEPHONE (OUTPATIENT)
Age: 64
End: 2024-12-27

## 2024-12-27 NOTE — TELEPHONE ENCOUNTER
Chief Complaint   Patient presents with   • Fever   • fatigue   • Sinus Problem          • Cough   • Generalized Body Aches              HISTORY OF PRESENT ILLNESS;  This is a 55 year old female here for evaluation of feeling sick for at least the last 5 days which seems last Wednesday.  She has had low-grade fever of 99 with sinus congestion, pressure, drainage, cough, achiness and fatigue.  She does feel some mild windedness but not coughing up a lot of phlegm.  Appetite is down but no nausea, vomiting, abdominal pain or diarrhea.  She did do a COVID test at home that was negative but her  did test positive couple days ago.  She does have a history of asthma and does have an albuterol inhaler to use if needed.    Also has noticed a small soft lump on the left wrist on the radial side.  No pain or change in size.  We did discuss could be a ganglion cyst and she would like to observe it instead of seeing Orthopedics at this time but has started if she gets any symptoms like pain or getting bigger then should call and go in and see ortho for evaluation.    REVIEW OF SYSTEMS: As in HPI (History of Present Illness), otherwise negative.    ALLERGIES:  ALLERGIES:   Allergen Reactions   • Ibuprofen HIVES   • Nsaids HIVES     Had hives in mouth and throat as well   • Aspirin HIVES   • Penicillins HIVES   • Sulfa Antibiotics HIVES       MEDICINES:  Current Outpatient Medications   Medication Sig   • tiZANidine (ZANAFLEX) 2 MG tablet TAKE 1 TABLET BY MOUTH TWICE DAILY AS NEEDED FOR MUSCLE SPASMS   • ARIPiprazole (ABILIFY) 10 MG tablet Take 1 tablet by mouth daily.   • lamotrigine (LAMICTAL) 200 MG tablet Take 1 tablet by mouth in the morning and 1 tablet in the evening.   • propRANolol (INDERAL) 10 MG tablet Take 0.5 tablets by mouth in the morning and 0.5 tablets at noon and 0.5 tablets in the evening. Indications: Feeling Anxious, akathisia.   • desvenlafaxine (PRISTIQ) 100 MG 24 hr tablet Take 100 mg by mouth  Drywavet link created for Pt to schedule, also called him and left a brief vm to call back and schedule    daily.   • albuterol 108 (90 Base) MCG/ACT inhaler INHALE 2 PUFFS INTO THE LUNGS EVERY 4 HOURS AS NEEDED FOR SHORTNESS OF BREATH OR WHEEZING   • benzonatate (TESSALON PERLES) 100 MG capsule Take 1 capsule by mouth 3 times daily as needed for Cough. May increase to 2 capsules 3 times daily if symptoms severe. Do not chew/pop/suck on capsule.   • Multiple Vitamins-Minerals (MULTIVITAMIN ADULT PO)    • B Complex Vitamins (VITAMIN B-COMPLEX PO)    • Multiple Vitamins-Minerals (ZINC PO)    • BREO ELLIPTA 100-25 MCG/INH inhaler INHALE 1 PUFF INTO THE LUNGS DAILY   • Cholecalciferol (VITAMIN D3) 125 mcg (5,000 units) tablet Take by mouth daily.      No current facility-administered medications for this visit.             PHYSICAL EXAMINATION:  Vitals:    11/28/22 1143   BP: 136/80   Pulse: (!) 108   Resp: 20   Temp: 99.1 °F (37.3 °C)   TempSrc: Oral   SpO2: 94%   Weight: 45.2 kg (99 lb 11.1 oz)   Height: 5' 0.5\" (1.537 m)     GENERAL APPEARANCE: No acute distress.  EYES: Sclerae clear, EOMI (extraocular movements intact).  MOUTH:  Oropharynx reveals no posterior pharyngeal erythema or exudative changes.  EARS:  Mild cerumen noted with what is seen is clear.  NECK: Supple, no adenopathy.  LUNGS: Clear to auscultation bilaterally; no crackles, rhonchi or wheezing.  CARDIOVASCULAR: Regular rate and rhythm.  No murmur, rub, or gallop.  EXTREMITIES: No edema.  NEUROLOGIC: Non-focal.  Mood is calm-appearing and appropriate.    ASSESSMENT AND PLAN:  Acute cough  (primary encounter diagnosis)  Mild intermittent asthma without complication  Plan: COVID/FLU/RSV PANEL  He did have close exposure to COVID 19 infection with her  testing positive home recently but her her home test was negative  Will call with results and additional recommendations.  Will keep off work for now till we get the results and can call  Will give prednisone 5 mg twice a day for 5 days, Flonase.  Can use some over-the-counter Robitussin DM or Mucinex DM  as needed for coughing, Tylenol as needed should drink plenty of fluids to stay hydrated.      Colon cancer screening  Plan: OPEN ACCESS COLONOSCOPY

## 2024-12-29 DIAGNOSIS — E11.42 CONTROLLED TYPE 2 DIABETES MELLITUS WITH DIABETIC POLYNEUROPATHY, WITH LONG-TERM CURRENT USE OF INSULIN (HCC): ICD-10-CM

## 2024-12-29 DIAGNOSIS — Z79.4 CONTROLLED TYPE 2 DIABETES MELLITUS WITH DIABETIC POLYNEUROPATHY, WITH LONG-TERM CURRENT USE OF INSULIN (HCC): ICD-10-CM

## 2024-12-30 ENCOUNTER — TELEPHONE (OUTPATIENT)
Dept: FAMILY MEDICINE CLINIC | Age: 64
End: 2024-12-30

## 2024-12-30 NOTE — TELEPHONE ENCOUNTER
Patient called office in regards to right leg calf pain. Patient stated hurts when bends leg around knee and calf area. Patient stated no swelling or redness. Patient linsey with office today, 12-.

## 2024-12-31 ENCOUNTER — OFFICE VISIT (OUTPATIENT)
Dept: FAMILY MEDICINE CLINIC | Age: 64
End: 2024-12-31
Payer: MEDICARE

## 2024-12-31 VITALS
DIASTOLIC BLOOD PRESSURE: 77 MMHG | SYSTOLIC BLOOD PRESSURE: 151 MMHG | HEIGHT: 69 IN | BODY MASS INDEX: 30.1 KG/M2 | WEIGHT: 203.2 LBS | HEART RATE: 65 BPM

## 2024-12-31 DIAGNOSIS — M62.838 MUSCLE SPASM: Primary | ICD-10-CM

## 2024-12-31 DIAGNOSIS — E11.42 CONTROLLED TYPE 2 DIABETES MELLITUS WITH DIABETIC POLYNEUROPATHY, WITH LONG-TERM CURRENT USE OF INSULIN (HCC): ICD-10-CM

## 2024-12-31 DIAGNOSIS — Z79.4 CONTROLLED TYPE 2 DIABETES MELLITUS WITH DIABETIC POLYNEUROPATHY, WITH LONG-TERM CURRENT USE OF INSULIN (HCC): ICD-10-CM

## 2024-12-31 PROCEDURE — 3078F DIAST BP <80 MM HG: CPT

## 2024-12-31 PROCEDURE — 3077F SYST BP >= 140 MM HG: CPT

## 2024-12-31 PROCEDURE — 3044F HG A1C LEVEL LT 7.0%: CPT

## 2024-12-31 PROCEDURE — 99214 OFFICE O/P EST MOD 30 MIN: CPT

## 2024-12-31 RX ORDER — PEN NEEDLE, DIABETIC 31 G X1/4"
NEEDLE, DISPOSABLE MISCELLANEOUS
Qty: 400 EACH | Refills: 3 | Status: SHIPPED | OUTPATIENT
Start: 2024-12-31

## 2024-12-31 RX ORDER — IBUPROFEN 800 MG/1
800 TABLET, FILM COATED ORAL 2 TIMES DAILY PRN
Qty: 60 TABLET | Refills: 0 | Status: SHIPPED | OUTPATIENT
Start: 2024-12-31 | End: 2025-01-30

## 2024-12-31 RX ORDER — INSULIN GLARGINE 100 [IU]/ML
15 INJECTION, SOLUTION SUBCUTANEOUS DAILY
Qty: 15 ML | Refills: 1 | Status: SHIPPED | OUTPATIENT
Start: 2024-12-31

## 2024-12-31 RX ORDER — GABAPENTIN 800 MG/1
800 TABLET ORAL 3 TIMES DAILY
Qty: 90 TABLET | Refills: 0 | Status: SHIPPED | OUTPATIENT
Start: 2024-12-31 | End: 2025-01-30

## 2024-12-31 RX ORDER — INSULIN LISPRO 100 [IU]/ML
10 INJECTION, SOLUTION INTRAVENOUS; SUBCUTANEOUS
Qty: 15 ML | Refills: 3 | Status: SHIPPED | OUTPATIENT
Start: 2024-12-31

## 2024-12-31 RX ORDER — CYCLOBENZAPRINE HCL 5 MG
5 TABLET ORAL 2 TIMES DAILY PRN
Qty: 10 TABLET | Refills: 0 | Status: SHIPPED | OUTPATIENT
Start: 2024-12-31 | End: 2025-01-10

## 2024-12-31 RX ORDER — GABAPENTIN 800 MG/1
800 TABLET ORAL 3 TIMES DAILY
Qty: 90 TABLET | Refills: 0 | OUTPATIENT
Start: 2024-12-31

## 2024-12-31 RX ORDER — LANCETS 33 GAUGE
EACH MISCELLANEOUS
Status: CANCELLED | OUTPATIENT
Start: 2024-12-31

## 2024-12-31 ASSESSMENT — ENCOUNTER SYMPTOMS
COLOR CHANGE: 0
CHEST TIGHTNESS: 0

## 2024-12-31 ASSESSMENT — PATIENT HEALTH QUESTIONNAIRE - PHQ9
SUM OF ALL RESPONSES TO PHQ QUESTIONS 1-9: 0
SUM OF ALL RESPONSES TO PHQ QUESTIONS 1-9: 0
1. LITTLE INTEREST OR PLEASURE IN DOING THINGS: NOT AT ALL
2. FEELING DOWN, DEPRESSED OR HOPELESS: NOT AT ALL
SUM OF ALL RESPONSES TO PHQ9 QUESTIONS 1 & 2: 0
SUM OF ALL RESPONSES TO PHQ QUESTIONS 1-9: 0
SUM OF ALL RESPONSES TO PHQ QUESTIONS 1-9: 0

## 2024-12-31 NOTE — PROGRESS NOTES
[facility] Residency Program - Outpatient Note      Subjective:    Marcelo Littlejohn is a 64 y.o. male with  has a past medical history of Bowel obstruction (HCC), Cocaine abuse (HCC), Diabetes mellitus (HCC), Diabetic polyneuropathy associated with type 2 diabetes mellitus (HCC), Diverticulitis, Diverticulosis, GERD (gastroesophageal reflux disease), History of blood transfusion, Hx of blood clots, Hyperlipidemia, Hypertension, MIGUEL (obstructive sleep apnea), Osteoarthritis, Renal lithiasis, Rheumatoid arteritis (HCC), and Type II or unspecified type diabetes mellitus without mention of complication, not stated as uncontrolled.    Presented to the office today for:  Chief Complaint   Patient presents with    Leg Pain     Cramps and soreness in right leg, patient states has been going on since Sunday night        Marcelo is a 64-year-old male that came for right thigh pain.  Patient reports chronic mid thigh pain that has gotten more frequent last year.  Patient describes as stinging and locked up.  Stretching and moving makes pain go away.  Reports gabapentin and over-the-counter Tylenol helps, takes gabapentin with mild fatigue and no other side effects.  In the past a spray to the muscle area helped.  No injury to area.    Patient works at [employer] reports has had difficulty with ambulation last 2 days due to pain and makes it hard to work.  Patient reports having to go to ED for similar pain in the summer.  Chart review shows negative DVT at that time.     Endorses taking his diabetes medications and needing refills. Patient checked his sugar was 277 but just ate food, normally within the appropriate range.  Reports taking his blood pressure medications daily and is in discomfort may be contributing to elevated blood pressure readings today.            Review of Systems   Constitutional:  Positive for activity change.   Respiratory:  Negative for chest tightness.    Cardiovascular:

## 2024-12-31 NOTE — PROGRESS NOTES
Attending Physician Statement  I  have discussed the care of Marcelo Littlejohn including pertinent history and exam findings with the resident. I agree with the assessment, plan and orders as documented by the resident.      BP (!) 151/77   Pulse 65   Ht 1.753 m (5' 9.02\")   Wt 92.2 kg (203 lb 3.2 oz)   BMI 29.99 kg/m²    BP Readings from Last 3 Encounters:   12/31/24 (!) 151/77   11/05/24 138/77   10/08/24 (!) 156/83     Wt Readings from Last 3 Encounters:   12/31/24 92.2 kg (203 lb 3.2 oz)   11/12/24 95.7 kg (211 lb)   11/05/24 95.5 kg (210 lb 9.6 oz)          Diagnosis Orders   1. Muscle spasm  cyclobenzaprine (FLEXERIL) 5 MG tablet    ibuprofen (ADVIL;MOTRIN) 800 MG tablet      2. Controlled type 2 diabetes mellitus with diabetic polyneuropathy, with long-term current use of insulin (HCC)  gabapentin (NEURONTIN) 800 MG tablet    Insulin Pen Needle ([employer] PEN NEEDLES) 31G X 6 MM MISC    insulin glargine (LANTUS SOLOSTAR) 100 UNIT/ML injection pen    insulin lispro, 1 Unit Dial, (HUMALOG KWIKPEN) 100 UNIT/ML SOPN          R leg pain- R inner thigh, locking sensation and stinging, tx with OMM in office- symptoms relieved. Will give short course flexeril, exercise instructions advised.     Jeff Hilton DO 12/31/2024 2:17 PM

## 2024-12-31 NOTE — PROGRESS NOTES
Visit Information    Have you changed or started any medications since your last visit including any over-the-counter medicines, vitamins, or herbal medicines? no   Are you having any side effects from any of your medications? -  no  Have you stopped taking any of your medications? Is so, why? -  no    Have you seen any other physician or provider since your last visit? No  Have you had any other diagnostic tests since your last visit? No  Have you been seen in the emergency room and/or had an admission to a hospital since we last saw you? No  Have you had your routine dental cleaning in the past 6 months? no    Have you activated your [employer] account? If not, what are your barriers? Yes     Patient Care Team:  Janel Muñoz MD as PCP - General (Family Medicine)  Kaleigh Ryder RPH as Pharmacist (Pharmacist)  Rajesh Monge RPH as Pharmacist (Pharmacy)    Medical History Review  Past Medical, Family, and Social History reviewed and does not contribute to the patient presenting condition    Health Maintenance   Topic Date Due    Annual Wellness Visit (Medicare Advantage)  Never done    Diabetic retinal exam  05/05/2025 (Originally 10/6/1978)    Diabetic Alb to Cr ratio (uACR) test  04/23/2025    Lipids  04/23/2025    Diabetic foot exam  05/30/2025    A1C test (Diabetic or Prediabetic)  09/03/2025    GFR test (Diabetes, CKD 3-4, OR last GFR 15-59)  09/03/2025    Depression Screen  10/08/2025    DTaP/Tdap/Td vaccine (3 - Td or Tdap) 04/05/2033    Colorectal Cancer Screen  03/07/2034    Flu vaccine  Completed    Shingles vaccine  Completed    Pneumococcal 0-64 years Vaccine  Completed    COVID-19 Vaccine  Completed    Respiratory Syncytial Virus (RSV) Pregnant or age 60 yrs+  Completed    Hepatitis C screen  Completed    HIV screen  Completed    Hepatitis A vaccine  Aged Out    Hepatitis B vaccine  Aged Out    Hib vaccine  Aged Out    Polio vaccine  Aged Out    Meningococcal (ACWY) vaccine  Aged Out

## 2024-12-31 NOTE — PATIENT INSTRUCTIONS
Thank you for letting us take care of you today. We hope all your questions were addressed. If a question was overlooked or something else comes to mind after you return home, please contact a member of your Care Team listed below.      Your Care Team at [facility] is Team #  Franco Chamorro M.D. (Faculty)  Roxana Felipe M.D. (Resident)  Citlaly Garay M.D. (Resident)   Yuki Miller M.D. (Resident)  Jordy Tran M.D. (Resident)  Kandi Infante M.D. (Resident)  Debi Campuzano., [facility]  Marlin Morel, [facility]  Patti Lopez, [facility]  Tramaine Barrera, [facility]  Rachelle Huertas, [facility]  Yamile Villasenor, [facility]  Fiordaliza York (LJ) [facility] Montelongo (Clinical Practice Manager)  Kaleigh Ryder [facility] (Clinical Pharmacist)     Office phone number: [phone]    If you need to get in right away due to illness, please be advised we have \"Same Day\" appointments available Monday-Friday. Please call us at [phone] option #3 to schedule your \"Same Day\" appointment.

## 2025-01-28 ENCOUNTER — OFFICE VISIT (OUTPATIENT)
Dept: FAMILY MEDICINE CLINIC | Age: 65
End: 2025-01-28
Payer: MEDICARE

## 2025-01-28 VITALS
HEIGHT: 69 IN | DIASTOLIC BLOOD PRESSURE: 79 MMHG | BODY MASS INDEX: 30.54 KG/M2 | WEIGHT: 206.2 LBS | SYSTOLIC BLOOD PRESSURE: 139 MMHG | HEART RATE: 73 BPM

## 2025-01-28 DIAGNOSIS — Z79.4 CONTROLLED TYPE 2 DIABETES MELLITUS WITH DIABETIC POLYNEUROPATHY, WITH LONG-TERM CURRENT USE OF INSULIN (HCC): Primary | ICD-10-CM

## 2025-01-28 DIAGNOSIS — K21.9 GASTROESOPHAGEAL REFLUX DISEASE, UNSPECIFIED WHETHER ESOPHAGITIS PRESENT: ICD-10-CM

## 2025-01-28 DIAGNOSIS — E11.42 CONTROLLED TYPE 2 DIABETES MELLITUS WITH DIABETIC POLYNEUROPATHY, WITH LONG-TERM CURRENT USE OF INSULIN (HCC): Primary | ICD-10-CM

## 2025-01-28 DIAGNOSIS — R14.3 FLATUS: ICD-10-CM

## 2025-01-28 DIAGNOSIS — I10 ESSENTIAL HYPERTENSION: ICD-10-CM

## 2025-01-28 LAB — HBA1C MFR BLD: 5.2 %

## 2025-01-28 PROCEDURE — 3044F HG A1C LEVEL LT 7.0%: CPT

## 2025-01-28 PROCEDURE — 3075F SYST BP GE 130 - 139MM HG: CPT

## 2025-01-28 PROCEDURE — 99213 OFFICE O/P EST LOW 20 MIN: CPT

## 2025-01-28 PROCEDURE — 83036 HEMOGLOBIN GLYCOSYLATED A1C: CPT

## 2025-01-28 PROCEDURE — 3078F DIAST BP <80 MM HG: CPT

## 2025-01-28 RX ORDER — SIMETHICONE 125 MG
125 TABLET,CHEWABLE ORAL EVERY 6 HOURS PRN
Qty: 60 TABLET | Refills: 3 | Status: SHIPPED | OUTPATIENT
Start: 2025-01-28 | End: 2025-03-29

## 2025-01-28 RX ORDER — AMLODIPINE BESYLATE 5 MG/1
5 TABLET ORAL DAILY
Qty: 90 TABLET | Refills: 0 | Status: SHIPPED | OUTPATIENT
Start: 2025-01-28

## 2025-01-28 RX ORDER — GABAPENTIN 800 MG/1
800 TABLET ORAL 3 TIMES DAILY
Qty: 90 TABLET | Refills: 0 | Status: SHIPPED | OUTPATIENT
Start: 2025-01-28 | End: 2025-02-27

## 2025-01-28 RX ORDER — LISINOPRIL 20 MG/1
40 TABLET ORAL DAILY
Qty: 180 TABLET | Refills: 4 | Status: SHIPPED | OUTPATIENT
Start: 2025-01-28

## 2025-01-28 SDOH — ECONOMIC STABILITY: FOOD INSECURITY: WITHIN THE PAST 12 MONTHS, YOU WORRIED THAT YOUR FOOD WOULD RUN OUT BEFORE YOU GOT MONEY TO BUY MORE.: NEVER TRUE

## 2025-01-28 SDOH — ECONOMIC STABILITY: FOOD INSECURITY: WITHIN THE PAST 12 MONTHS, THE FOOD YOU BOUGHT JUST DIDN'T LAST AND YOU DIDN'T HAVE MONEY TO GET MORE.: NEVER TRUE

## 2025-01-28 ASSESSMENT — PATIENT HEALTH QUESTIONNAIRE - PHQ9
SUM OF ALL RESPONSES TO PHQ QUESTIONS 1-9: 0
2. FEELING DOWN, DEPRESSED OR HOPELESS: NOT AT ALL
SUM OF ALL RESPONSES TO PHQ9 QUESTIONS 1 & 2: 0
1. LITTLE INTEREST OR PLEASURE IN DOING THINGS: NOT AT ALL
SUM OF ALL RESPONSES TO PHQ QUESTIONS 1-9: 0
SUM OF ALL RESPONSES TO PHQ QUESTIONS 1-9: 0
DEPRESSION UNABLE TO ASSESS: PT REFUSES
SUM OF ALL RESPONSES TO PHQ QUESTIONS 1-9: 0

## 2025-01-28 ASSESSMENT — ENCOUNTER SYMPTOMS
RHINORRHEA: 0
BACK PAIN: 0
NAUSEA: 0
SHORTNESS OF BREATH: 0
SORE THROAT: 0
COUGH: 0
ABDOMINAL PAIN: 0
DIARRHEA: 0

## 2025-01-28 NOTE — PROGRESS NOTES
ATTENDING NOTE    Attending Physician Statement  I have discussed the care of Radhaincluding pertinent history and exam findings,  with the resident. I have reviewed the key elements of all parts of the encounter with the resident.  I agree with the assessment, plan and orders as documented by the resident.  (GE Modifier)    Past Medical History:   Diagnosis Date    Bowel obstruction (HCC)     Cocaine abuse (HCC) 03/11/2020    Diabetes mellitus (HCC)     Diabetic polyneuropathy associated with type 2 diabetes mellitus (HCC) 03/29/2022    Diverticulitis     Diverticulosis     GERD (gastroesophageal reflux disease)     History of blood transfusion     Hx of blood clots     Hyperlipidemia     Hypertension     MIGUEL (obstructive sleep apnea)     Osteoarthritis     Renal lithiasis     Rheumatoid arteritis ([facility])     Type II or unspecified type diabetes mellitus without mention of complication, not stated as uncontrolled        Vitals:    01/28/25 1452   BP: 139/79   Pulse: 73       Marcelo was seen today for hypertension and spasms.    Diagnoses and all orders for this visit:    Controlled type 2 diabetes mellitus with diabetic polyneuropathy, with long-term current use of insulin ([facility])  -     gabapentin (NEURONTIN) 800 MG tablet; Take 1 tablet by mouth 3 times daily for 30 days.  -     Continuous Glucose Sensor (FREESTYLE LIBAN 2 SENSOR) [facility]; USE TO TEST BLOOD GLUCOSE THREE TIMES A DAY. CHANGE EVERY 14 DAYS  -     empagliflozin (JARDIANCE) 10 MG tablet; Take 1 tablet by mouth daily  -     POCT glycosylated hemoglobin (Hb A1C)    Gastroesophageal reflux disease, unspecified whether esophagitis present  -     omeprazole (PRILOSEC) 20 MG delayed release capsule; TAKE ONE CAPSULE BY MOUTH DAILY  -     Singh Valente IV, DO, General Surgery, [facility]    Flatus  -     simethicone (GAS-X EXTRA STRENGTH) 125 MG chewable tablet; Take 1 tablet by mouth every 6 hours as needed for Flatulence    Essential

## 2025-01-28 NOTE — PROGRESS NOTES
[facility] Residency Program - Outpatient Note      Subjective:    Marcelo Littlejohn is a 64 y.o. male with  has a past medical history of Bowel obstruction (HCC), Cocaine abuse (HCC), Diabetes mellitus (HCC), Diabetic polyneuropathy associated with type 2 diabetes mellitus (HCC), Diverticulitis, Diverticulosis, GERD (gastroesophageal reflux disease), History of blood transfusion, Hx of blood clots, Hyperlipidemia, Hypertension, MIGUEL (obstructive sleep apnea), Osteoarthritis, Renal lithiasis, Rheumatoid arteritis (HCC), and Type II or unspecified type diabetes mellitus without mention of complication, not stated as uncontrolled.    Presented to the office today for:  Chief Complaint   Patient presents with    Hypertension     Follow up on HTN    Spasms     Follow up, had a cramp in left calf today        HPI      64-year-old male past medical history of type 2 diabetes, essential hypertension, muscle cramp presenting today for a routine follow-up  -Patient was previously started on Flexeril, states that it has helped with his muscle cramps    Type 2 diabetes  - Currently patient is currently on Jardiance as well as is supposed to be on 12 units of Lantus nightly as well as 8 units Premeal short acting  - States that he has stopped taking the short acting 8 units in the last month or so  - His A1c has been improving, coming down to 5.2 today  - Encourage patient he can continue to not take his 8 units short acting.  - Will resume him on the Jardiance and eventually will need to be de-escalated from the long-acting Lantus        Review of Systems   Constitutional:  Negative for chills and fever.   HENT:  Negative for rhinorrhea and sore throat.    Eyes:  Negative for visual disturbance.   Respiratory:  Negative for cough and shortness of breath.    Cardiovascular:  Negative for chest pain and leg swelling.   Gastrointestinal:  Negative for abdominal pain, diarrhea and nausea.

## 2025-01-28 NOTE — PATIENT INSTRUCTIONS
Thank you for letting us take care of you today. We hope all your questions were addressed. If a question was overlooked or something else comes to mind after you return home, please contact a member of your Care Team listed below.      Your Care Team at [facility] is Team #1  Conchis Dunbar M.D. (Faculty)  Citlaly Asher M.D. (Resident)  Kirsten Milian D.O. (Resident)  Vazquez Valerio M.D. (Resident)  Kena Kilgore M.D. (Resident)  Marlin Morel, [facility]  Fiordaliza Barrera, [facility]  Debi Campuzano, [facility]  Rachelle Huertas, [facility]  Yamile Villasenor, [facility]  Patti Lopez, [facility]  Tramaine York (LJ) Jayda,   Kaleigh Ryder [facility] (Clinical Pharmacist)     Office phone number: [phone]    If you need to get in right away due to illness, please be advised we have \"Same Day\" appointments available Monday-Friday. Please call us at [phone] option #3 to schedule your \"Same Day\" appointment.

## 2025-01-28 NOTE — PROGRESS NOTES
HYPERTENSION visit     BP Readings from Last 3 Encounters:   12/31/24 (!) 151/77   11/05/24 138/77   10/08/24 (!) 156/83       HDL (mg/dL)   Date Value   04/23/2024 45     BUN (mg/dL)   Date Value   09/03/2024 39 (H)     Creatinine (mg/dL)   Date Value   09/03/2024 1.2     Glucose (mg/dL)   Date Value   09/03/2024 358 (H)   01/10/2012 115 (H)              Have you changed or started any medications since your last visit including any over-the-counter medicines, vitamins, or herbal medicines? no   Have you stopped taking any of your medications? Is so, why? -  no  Are you having any side effects from any of your medications? - no  How often do you miss doses of your medication? rare      Have you seen any other physician or provider since your last visit?  no   Have you had any other diagnostic tests since your last visit?  no   Have you been seen in the emergency room and/or had an admission in a hospital since we last saw you?  no   Have you had your routine dental cleaning in the past 6 months?  no     Do you have an active [facility] account? If no, what is the barrier?  Yes    Patient Care Team:  Janel Muñoz MD as PCP - General (Family Medicine)  Kaleigh Ryder RPH as Pharmacist (Pharmacist)  Rajesh Monge RPH as Pharmacist (Pharmacy)    Medical History Review  Past Medical, Family, and Social History reviewed and does contribute to the patient presenting condition    Health Maintenance   Topic Date Due    Annual Wellness Visit (Medicare Advantage)  Never done    Diabetic retinal exam  05/05/2025 (Originally 10/6/1978)    Diabetic Alb to Cr ratio (uACR) test  04/23/2025    Lipids  04/23/2025    Diabetic foot exam  05/30/2025    A1C test (Diabetic or Prediabetic)  09/03/2025    GFR test (Diabetes, CKD 3-4, OR last GFR 15-59)  09/03/2025    Depression Screen  12/31/2025    DTaP/Tdap/Td vaccine (3 - Td or Tdap) 04/05/2033    Colorectal Cancer Screen  03/07/2034    Flu vaccine  Completed    Shingles vaccine

## 2025-01-30 DIAGNOSIS — Z79.4 CONTROLLED TYPE 2 DIABETES MELLITUS WITH DIABETIC POLYNEUROPATHY, WITH LONG-TERM CURRENT USE OF INSULIN (HCC): ICD-10-CM

## 2025-01-30 DIAGNOSIS — E11.42 CONTROLLED TYPE 2 DIABETES MELLITUS WITH DIABETIC POLYNEUROPATHY, WITH LONG-TERM CURRENT USE OF INSULIN (HCC): ICD-10-CM

## 2025-01-31 RX ORDER — GABAPENTIN 800 MG/1
800 TABLET ORAL 3 TIMES DAILY
Qty: 90 TABLET | Refills: 0 | OUTPATIENT
Start: 2025-01-31

## 2025-02-10 ENCOUNTER — TELEPHONE (OUTPATIENT)
Dept: FAMILY MEDICINE CLINIC | Age: 65
End: 2025-02-10

## 2025-02-18 ENCOUNTER — OFFICE VISIT (OUTPATIENT)
Dept: FAMILY MEDICINE CLINIC | Age: 65
End: 2025-02-18

## 2025-02-18 VITALS
BODY MASS INDEX: 30.13 KG/M2 | TEMPERATURE: 97 F | DIASTOLIC BLOOD PRESSURE: 58 MMHG | WEIGHT: 203.4 LBS | SYSTOLIC BLOOD PRESSURE: 99 MMHG | HEART RATE: 79 BPM | HEIGHT: 69 IN

## 2025-02-18 DIAGNOSIS — M79.605 LEFT LEG PAIN: ICD-10-CM

## 2025-02-18 DIAGNOSIS — E11.42 CONTROLLED TYPE 2 DIABETES MELLITUS WITH DIABETIC POLYNEUROPATHY, WITH LONG-TERM CURRENT USE OF INSULIN (HCC): ICD-10-CM

## 2025-02-18 DIAGNOSIS — M25.551 RIGHT HIP PAIN: ICD-10-CM

## 2025-02-18 DIAGNOSIS — Z79.4 CONTROLLED TYPE 2 DIABETES MELLITUS WITH DIABETIC POLYNEUROPATHY, WITH LONG-TERM CURRENT USE OF INSULIN (HCC): ICD-10-CM

## 2025-02-18 DIAGNOSIS — R19.7 DIARRHEA, UNSPECIFIED TYPE: Primary | ICD-10-CM

## 2025-02-18 RX ORDER — GABAPENTIN 800 MG/1
800 TABLET ORAL 3 TIMES DAILY
Qty: 90 TABLET | Refills: 0 | Status: SHIPPED | OUTPATIENT
Start: 2025-02-28 | End: 2025-03-30

## 2025-02-18 ASSESSMENT — ENCOUNTER SYMPTOMS
CHOKING: 0
FACIAL SWELLING: 0
COUGH: 0
DIARRHEA: 1
BACK PAIN: 0
ABDOMINAL PAIN: 1
SHORTNESS OF BREATH: 0

## 2025-02-18 NOTE — PATIENT INSTRUCTIONS
Thank you for letting us take care of you today. We hope all your questions were addressed. If a question was overlooked or something else comes to mind after you return home, please contact a member of your Care Team listed below.      Your Care Team at [facility] is Team #1  Conchis Dunbar M.D. (Faculty)  Citlaly Asher M.D. (Resident)  Kirsten Milian D.O. (Resident)  Vazquez Valerio M.D. (Resident)  Kena Kilgore M.D. (Resident)  Marlin Morel, [facility]  Fiordaliza Barrera, [facility]  Debi Campuzano, [facility]  Rachelle Huertas, [facility]  Yamile Villasenor, [facility]  Patti Lopez, [facility]  Tramaine York (LJ) Jayda,   Kaleigh Ryder [facility] (Clinical Pharmacist)     Office phone number: [phone]    If you need to get in right away due to illness, please be advised we have \"Same Day\" appointments available Monday-Friday. Please call us at [phone] option #3 to schedule your \"Same Day\" appointment.

## 2025-02-18 NOTE — PROGRESS NOTES
[facility] - Outpatient Note      Subjective:    Marcelo Littlejohn is a 64 y.o. male with  has a past medical history of Bowel obstruction (HCC), Cocaine abuse (HCC), Diabetes mellitus (HCC), Diabetic polyneuropathy associated with type 2 diabetes mellitus (HCC), Diverticulitis, Diverticulosis, GERD (gastroesophageal reflux disease), History of blood transfusion, Hx of blood clots, Hyperlipidemia, Hypertension, MIGUEL (obstructive sleep apnea), Osteoarthritis, Renal lithiasis, Rheumatoid arteritis (HCC), and Type II or unspecified type diabetes mellitus without mention of complication, not stated as uncontrolled.    Presented to the office today for:  Chief Complaint   Patient presents with    Diarrhea     Patient states he has had diarrhea for last few and has been pooping himself at night        Diarrhea   Associated symptoms include abdominal pain. Pertinent negatives include no chills, coughing or headaches.     He is a 64 years old male with a past medical history of bowel obstruction, cocaine abuse, diabetes mellitus, diabetic neuropathy, diverticulosis, hypertension, MIGUEL, osteoarthritis, type 2 diabetes melitis, and GERD.  He came in today with a complaint of acute watery diarrhea that started on Sunday, 2/16/2025.The diarrhea was watery in consistency, 2 episodes per day, associated with stool incontinence, no blood in the stool, no reported fever, no severe abdominal pain.  He did not report of any shortness of breath, cough or any sick contact.    He reported that diarrhea is resolving, did not lose his appetite, eating drinking fine.    Review of Systems   Constitutional:  Negative for chills, diaphoresis and fatigue.   HENT:  Negative for drooling, ear discharge, ear pain and facial swelling.    Respiratory:  Negative for cough, choking and shortness of breath.    Cardiovascular:  Negative for chest pain, palpitations and leg swelling.

## 2025-02-18 NOTE — PROGRESS NOTES
Attending Physician Statement  I  have discussed the care of Marcelo Littlejohn including pertinent history and exam findings with the resident. I agree with the assessment, plan and orders as documented by the resident.      BP (!) 99/58 (Site: Right Upper Arm, Position: Sitting, Cuff Size: Medium Adult)   Pulse 79   Temp 97 °F (36.1 °C)   Ht 1.753 m (5' 9\")   Wt 92.3 kg (203 lb 6.4 oz)   BMI 30.04 kg/m²    BP Readings from Last 3 Encounters:   02/18/25 (!) 99/58   01/28/25 139/79   12/31/24 (!) 151/77     Wt Readings from Last 3 Encounters:   02/18/25 92.3 kg (203 lb 6.4 oz)   01/28/25 93.5 kg (206 lb 3.2 oz)   12/31/24 92.2 kg (203 lb 3.2 oz)          Diagnosis Orders   1. Diarrhea, unspecified type  bismuth subsalicylate (BISMATROL) 262 MG/15ML suspension      2. Controlled type 2 diabetes mellitus with diabetic polyneuropathy, with long-term current use of insulin ([facility])  gabapentin (NEURONTIN) 800 MG tablet      3. Left leg pain  gabapentin (NEURONTIN) 800 MG tablet      4. Right hip pain  gabapentin (NEURONTIN) 800 MG tablet        Acute diarrhea resolving, non bloody likely gastroenteritis- treat with supportive care at home.       Jeff Hilton DO 2/19/2025 4:43 PM

## 2025-02-18 NOTE — PROGRESS NOTES
DIABETES and HYPERTENSION visit    BP Readings from Last 3 Encounters:   01/28/25 139/79   12/31/24 (!) 151/77   11/05/24 138/77        Hemoglobin A1C (%)   Date Value   01/28/2025 5.2   09/03/2024 6.0   02/20/2024 6.7     HDL (mg/dL)   Date Value   04/23/2024 45     BUN (mg/dL)   Date Value   09/03/2024 39 (H)     Creatinine (mg/dL)   Date Value   09/03/2024 1.2     Glucose (mg/dL)   Date Value   09/03/2024 358 (H)   01/10/2012 115 (H)            Have you changed or started any medications since your last visit including any over-the-counter medicines, vitamins, or herbal medicines? no   Have you stopped taking any of your medications? Is so, why? -  no  Are you having any side effects from any of your medications? - no    Have you seen any other physician or provider since your last visit?  no   Have you had any other diagnostic tests since your last visit?  no   Have you been seen in the emergency room and/or had an admission in a hospital since we last saw you?  no   Have you had your routine dental cleaning in the past 6 months?  no     Have you had your annual diabetic retinal (eye) exam? No   (ensure copy of exam is in the chart)    Do you have an active [employer] account? If no, what is the barrier?  No: declined    Patient Care Team:  Janel Muñoz MD as PCP - General (Family Medicine)  Kaleigh Ryder RPH as Pharmacist (Pharmacist)  Rajesh Monge RPH as Pharmacist (Pharmacy)    Medical History Review  Past Medical, Family, and Social History reviewed and does not contribute to the patient presenting condition    Health Maintenance   Topic Date Due    Annual Wellness Visit (Medicare Advantage)  Never done    Diabetic retinal exam  05/05/2025 (Originally 10/6/1978)    Diabetic Alb to Cr ratio (uACR) test  04/23/2025    Lipids  04/23/2025    Diabetic foot exam  05/30/2025    GFR test (Diabetes, CKD 3-4, OR last GFR 15-59)  09/03/2025    A1C test (Diabetic or Prediabetic)  01/28/2026    Depression Screen

## 2025-03-25 DIAGNOSIS — M79.605 LEFT LEG PAIN: ICD-10-CM

## 2025-03-25 DIAGNOSIS — M25.551 RIGHT HIP PAIN: ICD-10-CM

## 2025-03-25 DIAGNOSIS — Z79.4 CONTROLLED TYPE 2 DIABETES MELLITUS WITH DIABETIC POLYNEUROPATHY, WITH LONG-TERM CURRENT USE OF INSULIN (HCC): ICD-10-CM

## 2025-03-25 DIAGNOSIS — E11.42 CONTROLLED TYPE 2 DIABETES MELLITUS WITH DIABETIC POLYNEUROPATHY, WITH LONG-TERM CURRENT USE OF INSULIN (HCC): ICD-10-CM

## 2025-03-26 RX ORDER — GABAPENTIN 800 MG/1
800 TABLET ORAL 3 TIMES DAILY
Qty: 90 TABLET | Refills: 0 | Status: SHIPPED | OUTPATIENT
Start: 2025-03-26 | End: 2025-04-25

## 2025-04-01 ENCOUNTER — TELEPHONE (OUTPATIENT)
Dept: FAMILY MEDICINE CLINIC | Age: 65
End: 2025-04-01

## 2025-04-01 ENCOUNTER — OFFICE VISIT (OUTPATIENT)
Dept: FAMILY MEDICINE CLINIC | Age: 65
End: 2025-04-01
Payer: MEDICARE

## 2025-04-01 VITALS
SYSTOLIC BLOOD PRESSURE: 128 MMHG | WEIGHT: 196.8 LBS | HEIGHT: 69 IN | DIASTOLIC BLOOD PRESSURE: 69 MMHG | BODY MASS INDEX: 29.15 KG/M2 | HEART RATE: 80 BPM

## 2025-04-01 DIAGNOSIS — M79.661 PAIN OF RIGHT CALF: ICD-10-CM

## 2025-04-01 DIAGNOSIS — R25.2 MUSCLE CRAMPS: Primary | ICD-10-CM

## 2025-04-01 PROCEDURE — 3074F SYST BP LT 130 MM HG: CPT

## 2025-04-01 PROCEDURE — 99213 OFFICE O/P EST LOW 20 MIN: CPT

## 2025-04-01 PROCEDURE — 3078F DIAST BP <80 MM HG: CPT

## 2025-04-01 NOTE — TELEPHONE ENCOUNTER
I don't mind sending these to him, but uncertain of which ones to choose? Could you advise, thank you.

## 2025-04-01 NOTE — PROGRESS NOTES
Attending Physician Statement  I  have discussed the care of Marcelo Littlejohn including pertinent history and exam findings with the resident. I agree with the assessment, plan and orders as documented by the resident.      /69 (BP Site: Left Upper Arm, Patient Position: Sitting, BP Cuff Size: Medium Adult)   Pulse 80   Ht 1.753 m (5' 9\")   Wt 89.3 kg (196 lb 12.8 oz)   BMI 29.06 kg/m²    BP Readings from Last 3 Encounters:   04/01/25 128/69   02/18/25 (!) 99/58   01/28/25 139/79     Wt Readings from Last 3 Encounters:   04/01/25 89.3 kg (196 lb 12.8 oz)   02/18/25 92.3 kg (203 lb 6.4 oz)   01/28/25 93.5 kg (206 lb 3.2 oz)          Diagnosis Orders   1. Muscle cramps  Basic Metabolic Panel      2. Pain of right calf  Vascular duplex lower extremity venous right              Jeff Hilton DO 4/1/2025 4:10 PM

## 2025-04-01 NOTE — PROGRESS NOTES
[facility] Residency Program - Outpatient Note      Subjective:    Marcelo Littlejohn is a 64 y.o. male with  has a past medical history of Bowel obstruction (HCC), Cocaine abuse, COVID-19 vaccine administered, Diabetes mellitus (HCC), Diabetic polyneuropathy associated with type 2 diabetes mellitus, Diverticulitis, Diverticulosis, GERD (gastroesophageal reflux disease), History of blood transfusion, Hx of blood clots, Hyperlipidemia, Hypertension, MIGUEL (obstructive sleep apnea), Osteoarthritis, Renal lithiasis, Rheumatoid arteritis (HCC), and Type II or unspecified type diabetes mellitus without mention of complication, not stated as uncontrolled.    Presented to the office today for:  Chief Complaint   Patient presents with    Leg Pain     Patient has been experiencing cramping in his right leg since sunday    Letter for School/Work     Needs letter for work because he was unable to make it to work last night due to cramping in leg       HPI    64-year-old male is here with complaints of right lower extremity cramps  He reports symptoms been present for a long time, they are intermittent  Patient does report a history of DVT while he was very young and was on anticoagulation for short amount of time  Patient denies any fever, chills, chest pain, shortness of breath, prior fracture/surgery at the site, any recent immobilization.    On exam there is no edema, rash, no obvious deformity.  Patient does have tenderness on calf Palpation      Review of Systems   Constitutional: Negative.    HENT: Negative.     Respiratory: Negative.     Cardiovascular: Negative.    Gastrointestinal: Negative.    Genitourinary: Negative.    Musculoskeletal: Negative.    Neurological: Negative.                  The patient has a   Family History   Problem Relation Age of Onset    Diabetes Father     Diabetes Other     Heart Attack Other     Hypertension Other     Diabetes Brother        Objective:    BP

## 2025-04-01 NOTE — TELEPHONE ENCOUNTER
----- Message from Dr. Tolu Jefferson MD sent at 4/1/2025  4:22 PM EDT -----  Hi, Sorry I could not put in the leg exercises for him at the check out, can we please print them out and mail it to him? I really appreciate you!  Dr. Jefferson

## 2025-04-01 NOTE — PROGRESS NOTES
DIABETES and HYPERTENSION visit    BP Readings from Last 3 Encounters:   02/18/25 (!) 99/58   01/28/25 139/79   12/31/24 (!) 151/77        Hemoglobin A1C (%)   Date Value   01/28/2025 5.2   09/03/2024 6.0   02/20/2024 6.7     HDL (mg/dL)   Date Value   04/23/2024 45     BUN (mg/dL)   Date Value   09/03/2024 39 (H)     Creatinine (mg/dL)   Date Value   09/03/2024 1.2     Glucose (mg/dL)   Date Value   09/03/2024 358 (H)   01/10/2012 115 (H)            Have you changed or started any medications since your last visit including any over-the-counter medicines, vitamins, or herbal medicines? no   Have you stopped taking any of your medications? Is so, why? -  no  Are you having any side effects from any of your medications? - no    Have you seen any other physician or provider since your last visit?  no   Have you had any other diagnostic tests since your last visit?  no   Have you been seen in the emergency room and/or had an admission in a hospital since we last saw you?  no   Have you had your routine dental cleaning in the past 6 months?  no     Have you had your annual diabetic retinal (eye) exam? No   (ensure copy of exam is in the chart)    Do you have an active [employer] account? If no, what is the barrier?  Yes    Patient Care Team:  Janel Muñoz MD as PCP - General (Family Medicine)  Kaleigh Ryder RPH as Pharmacist (Pharmacist)  Rajesh Monge RPH as Pharmacist (Pharmacy)    Medical History Review  Past Medical, Family, and Social History reviewed and does not contribute to the patient presenting condition    Health Maintenance   Topic Date Due    Annual Wellness Visit (Medicare)  Never done    Diabetic Alb to Cr ratio (uACR) test  04/23/2025    Lipids  04/23/2025    Diabetic retinal exam  05/05/2025 (Originally [birth year]/6/1980)    Diabetic foot exam  05/30/2025    GFR test (Diabetes, CKD 3-4, OR last GFR 15-59)  09/03/2025    A1C test (Diabetic or Prediabetic)  01/28/2026    Depression Screen  01/28/2026

## 2025-04-02 ENCOUNTER — TELEPHONE (OUTPATIENT)
Dept: FAMILY MEDICINE CLINIC | Age: 65
End: 2025-04-02

## 2025-04-02 NOTE — TELEPHONE ENCOUNTER
Writer called and informed patient that provider added some leg exercises. Writer mailed AVS that includes the leg exercises. Writer informed the provider order a lab as well and imaging. Writer provided scheduling number for imagining Patient thanked writer and voiced understanding.

## 2025-04-17 NOTE — CARE COORDINATION
Oct done ou     Jthomas    Per [facility] request, writer called [facility] Scheduling.  Schedule patient for ECHO  Wednesday July 31 @ 2 pm, no prep, check in 15 min prior to appointment time @ [address] [facility] [place].     Writer called patient and he was informed of ECHO appointment. Provided date/time/location. Patient verbally understands.   CC f/u one week

## 2025-04-22 DIAGNOSIS — M25.551 RIGHT HIP PAIN: ICD-10-CM

## 2025-04-22 DIAGNOSIS — E11.42 CONTROLLED TYPE 2 DIABETES MELLITUS WITH DIABETIC POLYNEUROPATHY, WITH LONG-TERM CURRENT USE OF INSULIN (HCC): ICD-10-CM

## 2025-04-22 DIAGNOSIS — M79.605 LEFT LEG PAIN: ICD-10-CM

## 2025-04-22 DIAGNOSIS — Z79.4 CONTROLLED TYPE 2 DIABETES MELLITUS WITH DIABETIC POLYNEUROPATHY, WITH LONG-TERM CURRENT USE OF INSULIN (HCC): ICD-10-CM

## 2025-04-22 RX ORDER — GABAPENTIN 800 MG/1
800 TABLET ORAL 3 TIMES DAILY
Qty: 90 TABLET | Refills: 2 | Status: SHIPPED | OUTPATIENT
Start: 2025-04-22 | End: 2025-07-21

## 2025-04-22 NOTE — TELEPHONE ENCOUNTER
Vascular duplex lower extremity venous right Once 04/01/2025               Patient Active Problem List:     Controlled type 2 diabetes mellitus with diabetic polyneuropathy, with long-term current use of insulin (HCC)     Diverticulosis     DM (diabetes mellitus) (HCC)     Essential hypertension     Elevated liver enzymes     Scrotal abscess     Abdominal abscess     Hyperplastic colon polyp     Lower abdominal pain     Diabetic foot (HCC)     Infected hernioplasty mesh     Cocaine abuse     Chest pain     BPH with obstruction/lower urinary tract symptoms     Chronic tension-type headache, intractable     Diabetic polyneuropathy associated with type 2 diabetes mellitus     Gastroesophageal reflux disease     Acute otitis externa of left ear     Intermittent claudication     Family history of malignant neoplasm of colon in relative diagnosed when older than 50 years of age     Benign prostatic hyperplasia (BPH) with straining on urination     Hyperglycemia due to diabetes mellitus (HCC)     Hyperosmolar coma due to type 2 diabetes mellitus (HCC)     Visit for wound check     Low blood pressure reading     Left leg pain     Right hip pain     Post-nasal drip     Xerosis of skin     Financial difficulties     Leg swelling     Shortness of breath

## 2025-05-26 NOTE — FLOWSHEET NOTE
OUTPATIENT PSYCHIATRY FOLLOW UP VISIT    Encounter Date: 5/26/2025    Clinical Status of Patient:  Outpatient (Virtual)  The patient location is: [address] [place] [address] Rd   [address]  [place] [address]  The patient phone number is: [phone]   Visit type: Virtual visit with synchronous audio and video  Each patient to whom he or she provides medical services by telemedicine is:  (1) informed of the relationship between the practitioner and patient and the respective role of any other health care provider with respect to management of the patient; and (2) notified that he or she may decline to receive medical services by telemedicine and may withdraw from such care at any time.    Chief Complaint:  Ion Garvey is a 34 y.o. male who presents today for follow-up.  Met with patient.      HISTORY OF PRESENTING ILLNESS:  Ion Garvey is a 34 y.o. male with history of ADHD-IT who presents for follow up appointment.      Plan at last appointment:  Continue dextroamphetamine-amphetamine XR 20-40 mg q.a.m. for ADHD-IT (40 mg q.a.m. on days he works; 20 mg q.a.m. on weekends)    Psychotropic medication history:   bupropion, hydroxyzine      INTERVAL HISTORY:    Patient reports that Adderall has been working well for him over the past two years. He has adapted to the medication and can better manage its effects. 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    Patient mentions improved work performance with Adderall. 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 The medication helps him maintain focus during work-related tasks.    Patient reports generally stable mood. When off the medication for a period, he tends to react more quickly to situations. This change  [] [facility]) - [facility] &  Therapy  [address]  [phone]  F: [phone] [] [address] [place] [address]  [facility]   [address]  P: [phone]  F: [phone] [] [address] &  Therapy  [address] [facility]  P: [phone]  F: [phone] [] [address]  P: [phone]  F: [phone] [] [address]  [facility]   [address]   [place]: [phone]  F: [phone]      Physical Therapy Daily Treatment Note    Date:  2020  Patient Name:  Minerva Shipley    :  1960  MRN: [id number]  Physician: Willie Mirandart: Mirella Roland (limit 30 Rx per year)  Medical Diagnosis: Acute bilateral knee pain M25.561,M25.562                 Rehab Codes: M79.604, M79.605, M25.561, M25.562, M25.662, R26.2, M62.81  Onset date: 2019                     Next 's appt. : 3 months     Visit# / total visits: 15/18     Cancels/No Shows: 2/0    Subjective:    Pain:  [x]? Yes  []? No  Location: B calves       Pain Rating: (0-10 scale) 8-9/10 -- rhianna feet   Pain altered Tx:  []? Yes  [x]? No  Action:  Comments: Patient states his feet are very painful at arrival today, however feels well enough to attempt exercises today.            Objective:  Modalities:   Precautions: Neuropathy-decreased sensation B feet  Exercises: B knees, LE  Exercise Reps/ Time Weight/ Level Comments   SciFit 6m  L4          Gastroc stretch 3x20\"  Wedge   HS stretch 3x20\"  Stool   Heel raises       HS curls  3lb    4 way hip 15x Blue    Step ups 20x ea 6\"    Lat step ups 20x ea 6\"    Step downs  15x ea 6\"    Marches   3lb    Leg press 2x10 30lb    Squats 15x     Lunges 10x ea     SLS 2x20\" ea  Min UE assist          Seated      HS in behavior is noticeable to others, with the patient's  able to discern when he has not taken his medication based on his demeanor.    Patient confirms he is still sleeping well.    Patient acknowledges a period of non-compliance with his blood pressure medication but has since resumed taking it regularly after experiencing noticeable effects from the lapse. He denies any major mood issues or significant problems with his current medication regimen.    Is patient experiencing or having changes in:  Trouble with sleep:  sleeping well  Appetite changes:  appetite is decreased when he takes dextroamphetamine-amphetamine, but schedules meals and snacks  Weight changes:  no  Lack of energy:  no  Anhedonia:  no  Somatic symptoms:  no  Anxiety/panic:  no  Irritability: no  Guilty/hopeless:  no  Racing thoughts: no  Impulsive behaviors: no  Paranoia/AVH: no  Self-injurious behavior/risky behavior:  no  Any drugs:  no  Alcohol:  no    No interval episodes with symptoms consistent with rasheeda or hypomania.  Denied interval or current suicidal/homicidal thoughts, intent, or plan or NSSI.  Denied other questions and concerns.  Patient reports feeling stable and wishing to continue current management unchanged.    Medication side effects: None Pt denies cardiovascular events including increased heart rate, palpitations, chest pain, dizziness, lightheadedness, or syncopal episodes. Pt denies A/V hallucinations or behavioral effects. Pt denies anorexia, decreased appetite, xerostomia, headache, insomnia, or digital changes.   Medication adherence: yes    MEDICAL REVIEW OF SYSTEMS:   General: -fever, -chills, -fatigue, -weight gain, -weight loss  Eyes: -vision changes, -redness, -discharge  ENT: -ear pain, -nasal congestion, -sore throat  Cardiovascular: -chest pain, -palpitations, -lower extremity edema  Respiratory: -cough, -shortness of breath  Gastrointestinal: -abdominal pain, -nausea, -vomiting, -diarrhea,  stretch   Modified to standing    LAQ 20x 3lb     Marches HEP  TrA iso   HS curls HEP Plum          Supine          SLR 20x 3lb     Bridge 2x10             Prone      HS curls 20x 3lb    Hip ext 20x  3lb           Sidelying      Clamshell 20x [place]    Hip abduction 2x10 3lb          Other:      Specific Instructions for next treatment:  ankle AROM ex, prone ex; Progress LE ex, stretches, strengthening, OK for heat to decrease pain, muscle tightness        Treatment Charges: Mins Units   []  Modalities     [x]  Ther Exercise 45 3   []  Manual Therapy     []  Ther Activities     []  Aquatics     []  Vasocompression     []  Other     Total Treatment time 45 3       Assessment: [x] Progressing toward goals. Held progressions this date as patient with significantly increased pain in bilateral feet at arrival. Patient verbalized minimal improvement in rhianna feet pain during treatment as therapeutic exercises continued on. Overall good tolerance to program noting minimal fatigue at conclusion of treatment. [] No change. [] Other:  [x] Patient would continue to benefit from skilled physical therapy services in order to: decrease pain, increase strength and ROM, and increase functional mobility, including steps    STG: (to be met in 10 treatments)  1. ? Pain: B LE 4/10 average pain, 7/10 at worst -- Progress towards, \"7/10 at worst in the last week, average up to 6/10\"  2. ? ROM: L knee ext 5° -- MET 1°  3. ? Strength:B hips 4-/5 B hip 4+/5,  B knee flex 4/5 B knee flex 4/5, B knee ext 4-/5 R knee 5/5, L knee 4+/5, B DF 4+/5 DF 5/5 MET ALL STRENGTH  4. ? Function: LEFS 56% loss of LE function. MET 50% impairment    5. Patient to be independent with home exercise program as demonstrated by performance with correct form without cues.  - MET     LTG: (to be met in 18 treatments)  1. ? Pain: B LE 2/10 average pain, 5/10 at worst  2. ? Strength:B hips 4/5, B knee flex 4+/5 , B knee ext 4/5   3. ? Function: "-constipation, -blood in stool  Genitourinary: -dysuria, -hematuria, -frequency  Musculoskeletal: -joint pain, -muscle pain  Skin: -rash, -lesion  Neurological: -headache, -dizziness, -numbness, -tingling  Psychiatric: -anxiety, -depression, -sleep difficulty, +impulsive decisions or behaviors     PAST PSYCHIATRIC, MEDICAL, AND SOCIAL HISTORY REVIEWED  The patient's past medical, family and social history have been reviewed and updated as appropriate within the electronic medical record - see encounter notes.    PAST MEDICAL HISTORY:   Past Medical History:   Diagnosis Date    Depression     Head trauma/Loss of consciousness: denies  Seizures: denies     PAST PSYCHIATRIC HISTORY:  First psych contact: in 2016 for anxiety and depression, [facility]    Prior hospitalizations: denies  Prior suicide attempts or self-harm: denies  Prior diagnosis: ADHD, anxiety, and depression  Prior psychotherapy: denies    EXAM:  Constitutional  Vitals:  Most recent vital signs were reviewed.   Last 3 sets of VS:      8/10/2023    12:10 PM 4/4/2024     2:16 PM 2/12/2025     8:34 AM   Vitals - 1 value per visit   SYSTOLIC 130 138 124   DIASTOLIC 87 70 86   Pulse 76 110 60   Temp  97.6 °F (36.4 °C)    Resp  16    SPO2  96 %    Weight (lb)  135 141.2   Weight (kg)  61.236 64.05   Height  5' 9" (1.753 m) 5' 9" (1.753 m)   BMI (Calculated)  19.9 20.8   Pain Score  Zero Zero      General:  unremarkable, age appropriate     Musculoskeletal  Muscle Strength/Tone:  No tremors appreciated   Gait & Station:  Pt seated during virtual visit     Psychiatric  Speech:  no latency; no press   Mood & Affect:  euthymic  congruent and appropriate   Thought Process:  normal and logical   Associations:  intact   Thought Content:  normal, no suicidality, no homicidality, delusions, or paranoia   Insight:  intact   Judgement: behavior is adequate to circumstances   Orientation:  grossly intact   Memory: intact for content of interview   Language: " LEFS 36% loss of LE function. 4. Pt report improved tolerance to steps                     Patient goals: \"get help\"    Pt. Education:  [x] Yes  [] No  [x] Reviewed Prior HEP/Ed  Method of Education: [x] Verbal  [x] Demo  [x] Written  Comprehension of Education:  [x] Verbalizes understanding. [] Demonstrates understanding. [] Needs review. [x] Demonstrates/verbalizes HEP/Ed previously given. HEP 10/22/20: SAQ, LAQ, bridges, HS stretch, SLR  HEP 11/10/20: 4 way ankle, gastroc stretch, SL hip abduction, SL clamshells     Plan: [x] Continue current frequency toward long and short term goals.     [x] Specific Instructions for subsequent treatments:  progress LE strengthening/stretching, rebounder     Frequency:  2 x/week for 18 visits      Time In:  1000 am            Time Out: 0128 am    Electronically signed by:  Oj Dobbins PTA "grossly intact   Attention Span & Concentration:  Intact to interview   Fund of Knowledge:  Not formally tested     SUICIDE RISK ASSESSMENT:  Protective factors: age, gender, no prior attempts, no prior hospitalizations, no ongoing substance abuse, no psychosis, denies SI/intent/plan, seeking treatment, access to treatment, future oriented, good primary support  Risks: hx anxiety and depression, gender, access to firearms  Patient is a low immediate and long-term risk considering risk factors.     RELEVANT LABS/STUDIES:    Lab Results   Component Value Date    WBC 8.2 04/06/2021    HGB 16.4 04/06/2021    HCT 48.9 04/06/2021    MCV 93.3 04/06/2021     04/06/2021     BMP  Lab Results   Component Value Date     02/12/2020    K 4.5 02/12/2020     02/12/2020    CO2 27 02/12/2020    BUN 13 02/12/2020    CREATININE 0.96 02/12/2020    CALCIUM 10.2 02/12/2020    ESTGFRAFRICA 125 02/12/2020    EGFRNONAA 108 02/12/2020     Lab Results   Component Value Date    ALT 13 02/12/2020    AST 13 02/12/2020    ALKPHOS 77 02/12/2020    BILITOT 1.1 02/12/2020     Lab Results   Component Value Date    TSH 0.85 02/12/2020     No results found for: "LABA1C", "HGBA1C"  Lab Results   Component Value Date    CHOL 116 02/12/2020    TRIG 73 02/12/2020    HDL 64 02/12/2020    LDLCALC 37 02/12/2020    CHOLHDL 1.8 02/12/2020       IMPRESSION:    Ion Garvey is a 34 y.o. male with history of ADHD-IT who presents for follow up appointment.    Status/Progress: Based on the examination today, the patient's problem(s) is/are well controlled when Pt is able to afford medication.  New problems have not been presented today.   Co-morbidities are not complicating management of the primary condition.  There are no active rule-out diagnoses for this patient at this time.     - Assessed response to Adderall, noting improved ability to manage tasks and adapt to medication effects over time, with improved emotional regulation when on " medication.  - Evaluated BP management, acknowledging importance of consistent medication adherence.      Risk Parameters:  Patient reports no suicidal ideation  Patient reports no homicidal ideation  Patient reports no self-injurious behavior  Patient reports no violent behavior    DIAGNOSES:    ICD-10-CM ICD-9-CM   1. Attention deficit hyperactivity disorder (ADHD), predominantly inattentive type  F90.0 314.00         PLAN:  Continue dextroamphetamine-amphetamine XR 20-40 mg q.a.m. for ADHD-IT (40 mg q.a.m. on days he works; 20 mg q.a.m. on weekends)      RETURN TO CLINIC:   3 months      HOLLY Bledsoe, PMHNP-BC      30 minutes of total time spent on the encounter, which includes face to face time and non-face to face time preparing to see the patient (eg. review of tests), obtaining and/or reviewing separately obtained history, documenting clinical information in the electronic health record, independently interpreting results (not separately reported), and communicating results to the patient/family/caregiver, or care coordination (not separately reported).     Visit today included managing the longitudinal care of the patient due to the serious and/or complex managed problem(s) ADHD.    At this time there are no indications the patient represents an imminent danger to either themselves or others; will continue to manage treatment in the outpatient setting.    I discussed the patient's care with the patient including benefits, alternatives, possible adverse effects of the treatment plan; including the potential for metabolic complications, major organ dysfunction, black box warnings, and contraindications. The opportunity was given for questions/clarification, and after this discussion the above treatment plan was devised through shared decision making. The patient voiced their understanding of the diagnoses and treatments listed above and agreed to the treatment plan. Follow up plan was reviewed with  "the patient. The patient was advised to call to report any worsening of symptoms or problems with medication.    Supportive therapy and psychoeducation provided. Patient has been given crisis information including Suicide and Crisis Lifeline (call or text: 550). Patient also given instructions to go to the nearest ER or call 911 if unable to remain safe or if the Pt develops thoughts of harming self or others.    [facility]: Reviewed today to detect potential controlled substance misuse, diversion, excessive prescribing, or multiple providers prescribing controlled substances. The patients report was deemed appropriate without new medications of concern prescribed by other providers.    Documentation entered by me for this encounter may have been done in part using [employer] voice recognition transcription software. Garbled syntax, mangled pronouns, and other bizarre constructions may be attributed to that software system. "Sound like" errors may exist and should be interpreted in context.            "

## 2025-05-28 ENCOUNTER — TELEPHONE (OUTPATIENT)
Age: 65
End: 2025-05-28

## 2025-06-09 DIAGNOSIS — M79.89 LEG SWELLING: ICD-10-CM

## 2025-06-09 RX ORDER — BUMETANIDE 1 MG/1
1 TABLET ORAL DAILY
Qty: 14 TABLET | Refills: 0 | OUTPATIENT
Start: 2025-06-09 | End: 2025-06-23

## 2025-06-09 NOTE — TELEPHONE ENCOUNTER
Last visit: 04/01/2025  Last Med refill:07/15/2024   Does patient have enough medication for 72 hours: No:     Next Visit Date:  Future Appointments   Date Time Provider Department Center   7/8/2025  8:45 AM Mere Case MD [facility] DE[facility]       Health Maintenance   Topic Date Due    Diabetic retinal exam  Never done    Annual Wellness Visit (Medicare Advantage)  Never done    Diabetic Alb to Cr ratio (uACR) test  04/23/2025    Lipids  04/23/2025    Diabetic foot exam  05/30/2025    GFR test (Diabetes, CKD 3-4, OR last GFR 15-59)  09/03/2025    A1C test (Diabetic or Prediabetic)  01/28/2026    Depression Screen  01/28/2026    DTaP/Tdap/Td vaccine (3 - Td or Tdap) 04/05/2033    Colorectal Cancer Screen  03/07/2034    Flu vaccine  Completed    Shingles vaccine  Completed    Pneumococcal 50+ years Vaccine  Completed    COVID-19 Vaccine  Completed    Respiratory Syncytial Virus (RSV) Pregnant or age 60 yrs+  Completed    Hepatitis C screen  Completed    HIV screen  Completed    Hepatitis A vaccine  Aged Out    Hepatitis B vaccine  Aged Out    Hib vaccine  Aged Out    Polio vaccine  Aged Out    Meningococcal (ACWY) vaccine  Aged Out    Meningococcal B vaccine  Aged Out    Pneumococcal 0-49 years Vaccine  Discontinued       Hemoglobin A1C (%)   Date Value   01/28/2025 5.2   09/03/2024 6.0   02/20/2024 6.7             ( goal A1C is < 7)   No components found for: \"LABMICR\"  No components found for: \"LDLCHOLESTEROL\", \"LDLCALC\"    (goal LDL is <100)   AST (U/L)   Date Value   08/31/2024 21     ALT (U/L)   Date Value   08/31/2024 19     BUN (mg/dL)   Date Value   09/03/2024 39 (H)     BP Readings from Last 3 Encounters:   04/01/25 128/69   02/18/25 (!) 99/58   01/28/25 139/79          (goal 120/80)    All Future Testing planned in [facility]  Lab Frequency Next Occurrence   Basic Metabolic Panel Once 07/15/2024   Brain Natriuretic Peptide Once 07/15/2024   D-Dimer, Quantitative Once 07/15/2024   Vascular duplex lower

## 2025-06-10 ENCOUNTER — OFFICE VISIT (OUTPATIENT)
Age: 65
End: 2025-06-10

## 2025-06-10 VITALS
BODY MASS INDEX: 31.31 KG/M2 | OXYGEN SATURATION: 96 % | TEMPERATURE: 97.6 F | DIASTOLIC BLOOD PRESSURE: 90 MMHG | WEIGHT: 212 LBS | SYSTOLIC BLOOD PRESSURE: 170 MMHG | HEART RATE: 82 BPM

## 2025-06-10 DIAGNOSIS — R06.02 SHORTNESS OF BREATH: Primary | ICD-10-CM

## 2025-06-10 DIAGNOSIS — I10 ESSENTIAL HYPERTENSION: ICD-10-CM

## 2025-06-10 RX ORDER — AMLODIPINE BESYLATE 5 MG/1
5 TABLET ORAL DAILY
Qty: 30 TABLET | Refills: 0 | Status: SHIPPED | OUTPATIENT
Start: 2025-06-10

## 2025-06-10 RX ORDER — HYDROCHLOROTHIAZIDE 25 MG/1
25 TABLET ORAL EVERY MORNING
Qty: 90 TABLET | Refills: 1 | Status: SHIPPED | OUTPATIENT
Start: 2025-06-10

## 2025-06-10 NOTE — PROGRESS NOTES
ATTENDING NOTE    Attending Physician Statement  I have discussed the care of Radhaincluding pertinent history and exam findings,  with the resident. And I examined the patient.  I have reviewed the key elements of all parts of the encounter with the resident.  I agree with the assessment, plan and orders as documented by the resident.  (GC Modifier)    Past Medical History:   Diagnosis Date    Bowel obstruction (HCC)     Cocaine abuse (HCC) 03/11/2020    COVID-19 vaccine administered     Diabetes mellitus (HCC)     Diabetic polyneuropathy associated with type 2 diabetes mellitus (HCC) 03/29/2022    Diverticulitis     Diverticulosis     GERD (gastroesophageal reflux disease)     History of blood transfusion     Hx of blood clots     Hyperlipidemia     Hypertension     MIGUEL (obstructive sleep apnea)     Osteoarthritis     Renal lithiasis     Rheumatoid arteritis (HCC)     Type II or unspecified type diabetes mellitus without mention of complication, not stated as uncontrolled        Vitals:    06/10/25 1328   BP: (!) 189/94   Pulse: 82   Temp: 97.6 °F (36.4 °C)   SpO2: 96%     Exam reassuring. Patient is in no distress. Talks easily with no increased effort of breathing. States he feels fine  and has no sense of alarm.    Denies CP. States that with coughing spells gets chest tightness that resolves when coughing stops.  Minimal lower extremity edema.    Get labs today.  Anemia noted on CBC one year ago.  Careful counseling per resident physician and myself about symptoms requiring imnediate medical attention. Patient was able to \"teach back\" to me the points of our discussion.    Follow up in 48 hours.  +

## 2025-06-10 NOTE — PROGRESS NOTES
[facility] Residency Program - Outpatient Note      Subjective:    Marcelo Littlejohn is a 64 y.o. male with  has a past medical history of Bowel obstruction (HCC), Cocaine abuse (HCC), COVID-19 vaccine administered, Diabetes mellitus (HCC), Diabetic polyneuropathy associated with type 2 diabetes mellitus (HCC), Diverticulitis, Diverticulosis, GERD (gastroesophageal reflux disease), History of blood transfusion, Hx of blood clots, Hyperlipidemia, Hypertension, MIGUEL (obstructive sleep apnea), Osteoarthritis, Renal lithiasis, Rheumatoid arteritis (HCC), and Type II or unspecified type diabetes mellitus without mention of complication, not stated as uncontrolled.    Presented to the office today for:  Chief Complaint   Patient presents with    Shortness of Breath     Patient been having sob for about 2 to 3 days     Leg Swelling     Patient states swelling in both leg       Shortness of Breath      Shortness of breath   Chronic, for a couple of months  Denied any fever, chills, chest pain, N/V/D  No trauma or recent infection  Smokes cigars and has mild cough sometimes with clear sputum    HTN  /90  He is taking lisinopril , not sure about amlodipine ?  He has +1 leg swelling, will benefit form a diuretic        Review of Systems   Constitutional: Negative.    HENT: Negative.     Respiratory:  Positive for shortness of breath.    Cardiovascular: Negative.    Gastrointestinal: Negative.    Genitourinary: Negative.    Neurological: Negative.    Psychiatric/Behavioral: Negative.                   The patient has a   Family History   Problem Relation Age of Onset    Diabetes Father     Diabetes Other     Heart Attack Other     Hypertension Other     Diabetes Brother        Objective:    BP (!) 170/90   Pulse 82   Temp 97.6 °F (36.4 °C) (Oral)   Wt 96.2 kg (212 lb)   SpO2 96%   BMI 31.31 kg/m²    BP Readings from Last 3 Encounters:   06/10/25 (!) 170/90   04/01/25 128/69

## 2025-06-10 NOTE — PROGRESS NOTES
Visit Information    Have you changed or started any medications since your last visit including any over-the-counter medicines, vitamins, or herbal medicines? no   Have you stopped taking any of your medications? Is so, why? -  no  Are you having any side effects from any of your medications? - no    Have you seen any other physician or provider since your last visit?  no   Have you had any other diagnostic tests since your last visit?  no   Have you been seen in the emergency room and/or had an admission in a hospital since we last saw you?  no   Have you had your routine dental cleaning in the past 6 months?  no     Do you have an active MyChart account? If no, what is the barrier?  Yes    Patient Care Team:  Janel Muñoz MD as PCP - General (Family Medicine)  Kaleigh Ryder RPH as Pharmacist (Pharmacist)  Rajesh Monge RPH as Pharmacist (Pharmacy)    Medical History Review  Past Medical, Family, and Social History reviewed and does not contribute to the patient presenting condition    Health Maintenance   Topic Date Due    Diabetic retinal exam  Never done    Annual Wellness Visit (Medicare Advantage)  Never done    Diabetic Alb to Cr ratio (uACR) test  04/23/2025    Lipids  04/23/2025    Diabetic foot exam  05/30/2025    GFR test (Diabetes, CKD 3-4, OR last GFR 15-59)  09/03/2025    A1C test (Diabetic or Prediabetic)  01/28/2026    Depression Screen  01/28/2026    DTaP/Tdap/Td vaccine (3 - Td or Tdap) 04/05/2033    Colorectal Cancer Screen  03/07/2034    Flu vaccine  Completed    Shingles vaccine  Completed    Pneumococcal 50+ years Vaccine  Completed    COVID-19 Vaccine  Completed    Respiratory Syncytial Virus (RSV) Pregnant or age 60 yrs+  Completed    Hepatitis C screen  Completed    HIV screen  Completed    Hepatitis A vaccine  Aged Out    Hepatitis B vaccine  Aged Out    Hib vaccine  Aged Out    Polio vaccine  Aged Out    Meningococcal (ACWY) vaccine  Aged Out    Meningococcal B vaccine  Aged Out

## 2025-06-11 ENCOUNTER — HOSPITAL ENCOUNTER (OUTPATIENT)
Age: 65
Setting detail: SPECIMEN
Discharge: HOME | End: 2025-06-11

## 2025-06-11 DIAGNOSIS — R06.02 SHORTNESS OF BREATH: ICD-10-CM

## 2025-06-11 LAB
ANION GAP SERPL CALCULATED.3IONS-SCNC: 11 MMOL/L (ref 9–16)
BASOPHILS # BLD: 0.03 K/UL (ref 0–0.2)
BASOPHILS NFR BLD: 0 % (ref 0–2)
BNP SERPL-MCNC: 1546 PG/ML (ref 0–125)
BUN SERPL-MCNC: 34 MG/DL (ref 8–23)
CALCIUM SERPL-MCNC: 8.4 MG/DL (ref 8.6–10.4)
CHLORIDE SERPL-SCNC: 107 MMOL/L (ref 98–107)
CO2 SERPL-SCNC: 21 MMOL/L (ref 20–31)
CREAT SERPL-MCNC: 1.5 MG/DL (ref 0.7–1.2)
D DIMER PPP FEU-MCNC: 0.7 UG/ML FEU (ref 0–0.57)
EOSINOPHIL # BLD: 0.18 K/UL (ref 0–0.44)
EOSINOPHILS RELATIVE PERCENT: 2 % (ref 1–4)
ERYTHROCYTE [DISTWIDTH] IN BLOOD BY AUTOMATED COUNT: 14.6 % (ref 11.8–14.4)
GFR, ESTIMATED: 52 ML/MIN/1.73M2
GLUCOSE SERPL-MCNC: 128 MG/DL (ref 74–99)
HCT VFR BLD AUTO: 25 % (ref 40.7–50.3)
HGB BLD-MCNC: 8.7 G/DL (ref 13–17)
IMM GRANULOCYTES # BLD AUTO: 0.05 K/UL (ref 0–0.3)
IMM GRANULOCYTES NFR BLD: 1 %
LYMPHOCYTES NFR BLD: 1.06 K/UL (ref 1.1–3.7)
LYMPHOCYTES RELATIVE PERCENT: 11 % (ref 24–43)
MCH RBC QN AUTO: 33.5 PG (ref 25.2–33.5)
MCHC RBC AUTO-ENTMCNC: 34.8 G/DL (ref 28.4–34.8)
MCV RBC AUTO: 96.2 FL (ref 82.6–102.9)
MONOCYTES NFR BLD: 0.77 K/UL (ref 0.1–1.2)
MONOCYTES NFR BLD: 8 % (ref 3–12)
NEUTROPHILS NFR BLD: 78 % (ref 36–65)
NEUTS SEG NFR BLD: 7.93 K/UL (ref 1.5–8.1)
NRBC BLD-RTO: 0 PER 100 WBC
PLATELET # BLD AUTO: 262 K/UL (ref 138–453)
PMV BLD AUTO: 10 FL (ref 8.1–13.5)
POTASSIUM SERPL-SCNC: 4.3 MMOL/L (ref 3.7–5.3)
RBC # BLD AUTO: 2.6 M/UL (ref 4.21–5.77)
RBC # BLD: ABNORMAL 10*6/UL
SODIUM SERPL-SCNC: 139 MMOL/L (ref 136–145)
WBC OTHER # BLD: 10 K/UL (ref 3.5–11.3)

## 2025-06-12 ENCOUNTER — RESULTS FOLLOW-UP (OUTPATIENT)
Age: 65
End: 2025-06-12

## 2025-06-12 NOTE — TELEPHONE ENCOUNTER
Received abnormal test results,  He needs immediate further evaluation. called patient three times, no response, mailbox is full and can not leave any VM msg, tried to send a SMS notification and left contact number to call back.    Electronically signed by Tolu Jefferson MD on 6/12/2025 at 10:33 AM

## 2025-07-08 ENCOUNTER — OFFICE VISIT (OUTPATIENT)
Age: 65
End: 2025-07-08

## 2025-07-08 VITALS
WEIGHT: 195 LBS | BODY MASS INDEX: 27.92 KG/M2 | HEART RATE: 71 BPM | TEMPERATURE: 98.1 F | DIASTOLIC BLOOD PRESSURE: 82 MMHG | HEIGHT: 70 IN | SYSTOLIC BLOOD PRESSURE: 135 MMHG

## 2025-07-08 DIAGNOSIS — I73.9 INTERMITTENT CLAUDICATION: ICD-10-CM

## 2025-07-08 DIAGNOSIS — Z00.00 INITIAL MEDICARE ANNUAL WELLNESS VISIT: Primary | ICD-10-CM

## 2025-07-08 DIAGNOSIS — E11.42 CONTROLLED TYPE 2 DIABETES MELLITUS WITH DIABETIC POLYNEUROPATHY, WITH LONG-TERM CURRENT USE OF INSULIN (HCC): ICD-10-CM

## 2025-07-08 DIAGNOSIS — M54.12 CERVICAL RADICULOPATHY: ICD-10-CM

## 2025-07-08 DIAGNOSIS — H91.8X2 OTHER HEARING LOSS OF LEFT EAR WITH UNRESTRICTED HEARING OF RIGHT EAR: ICD-10-CM

## 2025-07-08 DIAGNOSIS — Z00.00 ENCOUNTER FOR ANNUAL WELLNESS VISIT (AWV) IN MEDICARE PATIENT: ICD-10-CM

## 2025-07-08 DIAGNOSIS — I10 ESSENTIAL HYPERTENSION: ICD-10-CM

## 2025-07-08 DIAGNOSIS — Z79.4 CONTROLLED TYPE 2 DIABETES MELLITUS WITH DIABETIC POLYNEUROPATHY, WITH LONG-TERM CURRENT USE OF INSULIN (HCC): ICD-10-CM

## 2025-07-08 PROBLEM — H91.90 HEARING LOSS: Status: ACTIVE | Noted: 2025-07-08

## 2025-07-08 PROBLEM — M06.9: Status: ACTIVE | Noted: 2025-07-08

## 2025-07-08 RX ORDER — GABAPENTIN 800 MG/1
800 TABLET ORAL 3 TIMES DAILY
Qty: 90 TABLET | Refills: 2 | Status: SHIPPED | OUTPATIENT
Start: 2025-07-08 | End: 2025-10-06

## 2025-07-08 RX ORDER — IBUPROFEN 800 MG/1
800 TABLET, FILM COATED ORAL 2 TIMES DAILY PRN
Qty: 60 TABLET | Refills: 0 | Status: SHIPPED | OUTPATIENT
Start: 2025-07-08 | End: 2025-08-07

## 2025-07-08 RX ORDER — AMLODIPINE BESYLATE 5 MG/1
5 TABLET ORAL DAILY
Qty: 30 TABLET | Refills: 0 | Status: SHIPPED | OUTPATIENT
Start: 2025-07-08

## 2025-07-08 RX ORDER — HYDROCHLOROTHIAZIDE 25 MG/1
25 TABLET ORAL EVERY MORNING
Qty: 90 TABLET | Refills: 1 | Status: SHIPPED | OUTPATIENT
Start: 2025-07-08

## 2025-07-08 ASSESSMENT — PATIENT HEALTH QUESTIONNAIRE - PHQ9
SUM OF ALL RESPONSES TO PHQ QUESTIONS 1-9: 0
1. LITTLE INTEREST OR PLEASURE IN DOING THINGS: NOT AT ALL
SUM OF ALL RESPONSES TO PHQ QUESTIONS 1-9: 0
2. FEELING DOWN, DEPRESSED OR HOPELESS: NOT AT ALL
SUM OF ALL RESPONSES TO PHQ QUESTIONS 1-9: 0
SUM OF ALL RESPONSES TO PHQ QUESTIONS 1-9: 0

## 2025-07-08 ASSESSMENT — LIFESTYLE VARIABLES
HOW MANY STANDARD DRINKS CONTAINING ALCOHOL DO YOU HAVE ON A TYPICAL DAY: 1 OR 2
HOW OFTEN DO YOU HAVE A DRINK CONTAINING ALCOHOL: MONTHLY OR LESS

## 2025-07-08 NOTE — PROGRESS NOTES
Medicare Annual Wellness Visit    Marcelo Littlejohn is here for Medicare AWV (Patient states he is doing well)    Assessment & Plan   Essential hypertension  The following orders have not been finalized:  -     hydroCHLOROthiazide (HYDRODIURIL) 25 MG tablet  -     amLODIPine (NORVASC) 5 MG tablet  Controlled type 2 diabetes mellitus with diabetic polyneuropathy, with long-term current use of insulin (HCC)  The following orders have not been finalized:  -     gabapentin (NEURONTIN) 800 MG tablet  Left leg pain  The following orders have not been finalized:  -     gabapentin (NEURONTIN) 800 MG tablet  Right hip pain  The following orders have not been finalized:  -     gabapentin (NEURONTIN) 800 MG tablet  Muscle spasm  The following orders have not been finalized:  -     ibuprofen (ADVIL;MOTRIN) 800 MG tablet       Return in about 3 months (around 10/8/2025).     Subjective   The following acute and/or chronic problems were also addressed today:  -Intermittent claudication for the past few months in bilateral lower extremity with a wound on right 2nd toe    Patient's complete Health Risk Assessment and screening values have been reviewed and are found in Flowsheets. The following problems were reviewed today and where indicated follow up appointments were made and/or referrals ordered.    Positive Risk Factor Screenings with Interventions:             General HRA Questions:  Select all that apply: (!) New or Increased Pain  Interventions - Pain:  Patient advised to follow up in the office for further evaluation and treatment  -He states gabapentin and ibuprofen are helping       Dentist Screen:  Have you seen the dentist within the past year?: (!) No  -Provided a list of dentist  Intervention:  Advised to schedule with their dentist    Hearing Screen:  Do you or your family notice any trouble with your hearing that hasn't been managed with hearing aids?: (!) Yes  -No concerning physical findings; no impacted cerumen

## 2025-07-08 NOTE — PROGRESS NOTES
Attending Physician Statement  I have discussed the care of Marcelo Littlejohn, including pertinent history and exam findings,  with the resident. I have reviewed the key elements of all parts of the encounter with the resident.  I agree with the assessment, plan and orders as documented by the resident.  (GE Modifier)    Holly Brooks MD

## 2025-07-08 NOTE — PROGRESS NOTES
Visit Information    Have you changed or started any medications since your last visit including any over-the-counter medicines, vitamins, or herbal medicines? no   Have you stopped taking any of your medications? Is so, why? -  no  Are you having any side effects from any of your medications? - no    Have you seen any other physician or provider since your last visit?  no   Have you had any other diagnostic tests since your last visit?  no   Have you been seen in the emergency room and/or had an admission in a hospital since we last saw you?  no   Have you had your routine dental cleaning in the past 6 months?  no     Do you have an active MyChart account? If no, what is the barrier?  Yes    Patient Care Team:  Janel Muñoz MD as PCP - General (Family Medicine)  Kaleigh Ryder RPH as Pharmacist (Pharmacist)  Rajesh Monge RPH as Pharmacist (Pharmacy)    Medical History Review  Past Medical, Family, and Social History reviewed and does not contribute to the patient presenting condition    Health Maintenance   Topic Date Due    Diabetic retinal exam  Never done    Annual Wellness Visit (Medicare Advantage)  Never done    Diabetic Alb to Cr ratio (uACR) test  04/23/2025    Lipids  04/23/2025    Diabetic foot exam  05/30/2025    Flu vaccine (1) 08/01/2025    A1C test (Diabetic or Prediabetic)  01/28/2026    Depression Screen  01/28/2026    GFR test (Diabetes, CKD 3-4, OR last GFR 15-59)  06/11/2026    DTaP/Tdap/Td vaccine (3 - Td or Tdap) 04/05/2033    Colorectal Cancer Screen  03/07/2034    Shingles vaccine  Completed    Pneumococcal 50+ years Vaccine  Completed    COVID-19 Vaccine  Completed    Respiratory Syncytial Virus (RSV) Pregnant or age 60 yrs+  Completed    Hepatitis C screen  Completed    HIV screen  Completed    Hepatitis A vaccine  Aged Out    Hepatitis B vaccine  Aged Out    Hib vaccine  Aged Out    Polio vaccine  Aged Out    Meningococcal (ACWY) vaccine  Aged Out    Meningococcal B vaccine  Aged Out

## 2025-07-27 ENCOUNTER — HOSPITAL ENCOUNTER (EMERGENCY)
Age: 65
Discharge: HOME | End: 2025-07-27
Attending: HEALTH CARE PROVIDER
Payer: MEDICARE

## 2025-07-27 VITALS
DIASTOLIC BLOOD PRESSURE: 72 MMHG | RESPIRATION RATE: 16 BRPM | OXYGEN SATURATION: 100 % | HEART RATE: 76 BPM | TEMPERATURE: 97.7 F | SYSTOLIC BLOOD PRESSURE: 139 MMHG

## 2025-07-27 DIAGNOSIS — R73.09 OTHER ABNORMAL GLUCOSE: ICD-10-CM

## 2025-07-27 DIAGNOSIS — L03.011 PARONYCHIA OF FINGER OF RIGHT HAND: Primary | ICD-10-CM

## 2025-07-27 LAB — GLUCOSE BLD-MCNC: 168 MG/DL (ref 75–110)

## 2025-07-27 PROCEDURE — 10060 I&D ABSCESS SIMPLE/SINGLE: CPT

## 2025-07-27 PROCEDURE — 99283 EMERGENCY DEPT VISIT LOW MDM: CPT

## 2025-07-27 PROCEDURE — 82947 ASSAY GLUCOSE BLOOD QUANT: CPT

## 2025-07-27 RX ORDER — CEPHALEXIN 500 MG/1
500 CAPSULE ORAL 4 TIMES DAILY
Qty: 20 CAPSULE | Refills: 0 | Status: SHIPPED | OUTPATIENT
Start: 2025-07-27 | End: 2025-08-01

## 2025-07-27 RX ORDER — BLOOD-GLUCOSE METER
1 KIT MISCELLANEOUS DAILY
Qty: 1 KIT | Refills: 0 | Status: SHIPPED | OUTPATIENT
Start: 2025-07-27

## 2025-07-27 RX ORDER — GLUCOSAMINE HCL/CHONDROITIN SU 500-400 MG
CAPSULE ORAL
Qty: 200 STRIP | Refills: 0 | Status: SHIPPED | OUTPATIENT
Start: 2025-07-27

## 2025-07-27 NOTE — ED PROVIDER NOTES
[facility] [place] EMERGENCY DEPARTMENT     Emergency Department     Faculty Attestation        I performed a history and physical examination of the patient and discussed management with the resident. I reviewed the resident’s note and agree with the documented findings and plan of care. Any areas of disagreement are noted on the chart. I was personally present for the key portions of any procedures. I have documented in the chart those procedures where I was not present during the key portions. I have reviewed the emergency nurses triage note. I agree with the chief complaint, past medical history, past surgical history, allergies, medications, social and family history as documented unless otherwise noted below.    For mid-level providers such as nurse practitioners as well as physicians assistants:    I have personally seen and evaluated the patient.    I find the patient's history and physical exam are consistent with NP/PA documentation.  I agree with the care provided, treatment rendered, disposition, & follow-up plan.     Additional findings are as noted.    Vital Signs: /72   Pulse 76   Temp 97.7 °F (36.5 °C)   Resp 16   SpO2 100%   PCP:  Janel Muñoz MD  Note Started: 1:37 PM EDT    Pertinent Comments:     63 y/o M w/ multiple comorbidities who presents for concern for hypoglycemia.     Sensor has been reading low since yesterday. (53 today).   Otherwise asymptomatic.    Lantus 10 units daily.  Humalog PRN for elevation.  Jardiance prescribed but not taking 2/2 insurance reasons.     BG today is 153 on our check.   Just replaced the sensor 2 days ago.   Recommend sensor replacement.  Will give rx for monitor/strips/lancet in case the issue is with the main device.     Otherwise, patient also with paronychia on left hand index finger.   Plan for drainage and abx given some pain down dorsal aspect of hand.    Critical Care  None          Christopher Hobbs,

## 2025-07-27 NOTE — DISCHARGE INSTRUCTIONS
Take your medication as indicated, if you are given an antibiotic then make sure you get the prescription filled and take the antibiotics until finished.  Drink plenty of water while taking the antibiotics.  Avoid drinking alcohol or drinks that have caffeine in it while taking antibiotics.       For pain use ibuprofen (Motrin / Advil) or acetaminophen (Tylenol), unless prescribed medications that have acetaminophen in it.  You can take over the counter acetaminophen tablets (1 - 2 tablets of the 500-mg strength every 6 hours) or ibuprofen tablets (2 tablets every 4 hours).    PLEASE RETURN TO THE EMERGENCY DEPARTMENT IMMEDIATELY for worsening symptoms, white drainage from the wound, redness or streaking, or if you develop any concerning symptoms such as: high fever not relieved by acetaminophen (Tylenol) and/or ibuprofen (Motrin / Advil), chills, shortness of breath, chest pain, feeling of your heart fluttering or racing, persistent nausea and/or vomiting, numbness, weakness or tingling in the arms or legs or change in color of the extremities, changes in mental status, persistent headache, blurry vision, unable to follow up with your physician, or other any other care or concern.

## 2025-07-27 NOTE — ED NOTES
Pt arrives alert and oriented x4 and ambulatory from triage  Pt complains of hypoglycemia   Pt reports it has been reading in the 50s on his dexacom   Pt has no complaints at this time  Pt fingerstick was 168   Dr. Davila, and Dr. Hobbs notified   Will continue with plan of care

## 2025-07-27 NOTE — ED PROVIDER NOTES
[facility] EMERGENCY DEPARTMENT  Emergency Department Encounter  Emergency Medicine Resident     Pt Name:Marcelo Littlejohn  MRN: [id number]  Birthdate 1960  Date of evaluation: 7/27/25  PCP:  Janel Muñoz MD  Note Started: 2:17 PM EDT      CHIEF COMPLAINT       Chief Complaint   Patient presents with    Hypoglycemia       HISTORY OF PRESENT ILLNESS  (Location/Symptom, Timing/Onset, Context/Setting, Quality, Duration, Modifying Factors, Severity.)      Marcelo Littlejohn is a 64 y.o. male who presents with concern for hypoglycemia.  He stated that his sensor has been reading low since yesterday (53 today, the lowest that he said was 45).  He states that he is compliant with his diabetic medications and he takes Lantus 10 units daily and Humalog as needed for hyperglycemia.  He further stated that he does not currently take the Jardiance due to insurance reasons.  He denied any nausea, vomiting, abdominal pain, or urinary or bowel movement issues.  Also, patient said that he injured his left hand index finger while on the job and wants to get that checked out.    PAST MEDICAL / SURGICAL / SOCIAL / FAMILY HISTORY      has a past medical history of Bowel obstruction (HCC), Cocaine abuse (HCC), COVID-19 vaccine administered, Diabetes mellitus (HCC), Diabetic polyneuropathy associated with type 2 diabetes mellitus (HCC), Diverticulitis, Diverticulosis, GERD (gastroesophageal reflux disease), History of blood transfusion, Hx of blood clots, Hyperlipidemia, Hypertension, MIGUEL (obstructive sleep apnea), Osteoarthritis, Renal lithiasis, Rheumatoid arteritis (HCC), and Type II or unspecified type diabetes mellitus without mention of complication, not stated as uncontrolled.       has a past surgical history that includes Meckel's diverticulum excision; hernia repair; Cholecystectomy; Urethra dilation; Small intestine surgery; Colon surgery; Appendectomy; Cystoscopy (N/A, 06/30/2020); Colonoscopy (N/A,

## 2025-07-30 DIAGNOSIS — I10 ESSENTIAL HYPERTENSION: ICD-10-CM

## 2025-07-30 RX ORDER — AMLODIPINE BESYLATE 5 MG/1
5 TABLET ORAL DAILY
Qty: 30 TABLET | Refills: 0 | Status: SHIPPED | OUTPATIENT
Start: 2025-07-30

## 2025-07-30 NOTE — TELEPHONE ENCOUNTER
Last visit: 07/08/25   Last Med refill: 07/08/25  Does patient have enough medication for 72 hours: No:     Next Visit Date:  Future Appointments   Date Time Provider Department Center   10/14/2025  9:00 AM Janel Muñoz MD [facility] E[facility] DEP       Health Maintenance   Topic Date Due    Diabetic Alb to Cr ratio (uACR) test  04/23/2025    Lipids  04/23/2025    Diabetic foot exam  05/30/2025    Flu vaccine (1) 08/01/2025    A1C test (Diabetic or Prediabetic)  01/28/2026    GFR test (Diabetes, CKD 3-4, OR last GFR 15-59)  06/11/2026    Depression Screen  07/08/2026    Diabetic retinal exam  07/10/2026    DTaP/Tdap/Td vaccine (3 - Td or Tdap) 04/05/2033    Colorectal Cancer Screen  03/07/2034    Annual Wellness Visit (Medicare Advantage)  Completed    Shingles vaccine  Completed    Pneumococcal 50+ years Vaccine  Completed    COVID-19 Vaccine  Completed    Respiratory Syncytial Virus (RSV) Pregnant or age 60 yrs+  Completed    Hepatitis C screen  Completed    HIV screen  Completed    Hepatitis A vaccine  Aged Out    Hepatitis B vaccine  Aged Out    Hib vaccine  Aged Out    Polio vaccine  Aged Out    Meningococcal (ACWY) vaccine  Aged Out    Meningococcal B vaccine  Aged Out    Pneumococcal 0-49 years Vaccine  Discontinued       Hemoglobin A1C (%)   Date Value   01/28/2025 5.2   09/03/2024 6.0   02/20/2024 6.7             ( goal A1C is < 7)   No components found for: \"LABMICR\"  No components found for: \"LDLCHOLESTEROL\", \"LDLCALC\"    (goal LDL is <100)   AST (U/L)   Date Value   08/31/2024 21     ALT (U/L)   Date Value   08/31/2024 19     BUN (mg/dL)   Date Value   06/11/2025 34 (H)     BP Readings from Last 3 Encounters:   07/27/25 139/72   07/08/25 135/82   06/10/25 (!) 170/90          (goal 120/80)    All Future Testing planned in [facility]  Lab Frequency Next Occurrence   Basic Metabolic Panel Once 04/01/2025   Vascular duplex lower extremity venous right Once 04/01/2025   D-Dimer, Quantitative Once 06/10/2025

## (undated) DEVICE — TRAP SURG QUAD PARABOLA SLOT DSGN SFTY SCRN TRAPEASE

## (undated) DEVICE — SYRINGE MED 50ML LUERLOCK TIP

## (undated) DEVICE — ELECTRODE PT RET AD L9FT HI MOIST COND ADH HYDRGEL CORDED

## (undated) DEVICE — PACK PROCEDURE SURG CYSTO SVMMC LF

## (undated) DEVICE — FORCEP BX MESH TOOTH MIC 2.8 MMX240 CM NDL STRL RADIAL JAW 4

## (undated) DEVICE — SURGICAL PROCEDURE KIT BASIN MAJ SET UP

## (undated) DEVICE — CO2 CANNULA,SUPERSOFT, ADLT,7'O2,7'CO2: Brand: MEDLINE

## (undated) DEVICE — DRAPE,REIN 53X77,STERILE: Brand: MEDLINE

## (undated) DEVICE — GOWN,AURORA,NONREINFORCED,LARGE: Brand: MEDLINE

## (undated) DEVICE — FORCEPS BX L240CM JAW DIA2.8MM L CAP W/ NDL MIC MESH TOOTH

## (undated) DEVICE — ADAPTER TBNG LUER STUB 15 GA INTMED

## (undated) DEVICE — MEDICINE CUP, GRADUATED, STER: Brand: MEDLINE

## (undated) DEVICE — STAZ ENDO KIT: Brand: MEDLINE INDUSTRIES, INC.